# Patient Record
Sex: MALE | Race: WHITE | Employment: OTHER | URBAN - NONMETROPOLITAN AREA
[De-identification: names, ages, dates, MRNs, and addresses within clinical notes are randomized per-mention and may not be internally consistent; named-entity substitution may affect disease eponyms.]

---

## 2018-10-09 LAB — DIABETIC RETINOPATHY: NEGATIVE

## 2019-03-07 LAB
AVERAGE GLUCOSE: NORMAL
CHOLESTEROL, TOTAL: 188 MG/DL
CHOLESTEROL/HDL RATIO: 3.9
HBA1C MFR BLD: 9.1 %
HDLC SERPL-MCNC: 48 MG/DL (ref 35–70)
LDL CHOLESTEROL CALCULATED: 122 MG/DL (ref 0–160)
TRIGL SERPL-MCNC: 81 MG/DL
VLDLC SERPL CALC-MCNC: NORMAL MG/DL

## 2019-06-07 ENCOUNTER — OFFICE VISIT (OUTPATIENT)
Dept: PRIMARY CARE CLINIC | Age: 44
End: 2019-06-07
Payer: COMMERCIAL

## 2019-06-07 VITALS
WEIGHT: 315 LBS | SYSTOLIC BLOOD PRESSURE: 128 MMHG | BODY MASS INDEX: 38.36 KG/M2 | DIASTOLIC BLOOD PRESSURE: 84 MMHG | TEMPERATURE: 96.8 F | HEIGHT: 76 IN | HEART RATE: 114 BPM | OXYGEN SATURATION: 96 %

## 2019-06-07 DIAGNOSIS — G62.9 NEUROPATHY: ICD-10-CM

## 2019-06-07 DIAGNOSIS — I10 ESSENTIAL HYPERTENSION: ICD-10-CM

## 2019-06-07 DIAGNOSIS — J38.3 REACTIVE LESION OF THE VOCAL FOLD: ICD-10-CM

## 2019-06-07 DIAGNOSIS — E78.2 MIXED HYPERLIPIDEMIA: ICD-10-CM

## 2019-06-07 DIAGNOSIS — E11.65 UNCONTROLLED TYPE 2 DIABETES MELLITUS WITH HYPERGLYCEMIA (HCC): Primary | ICD-10-CM

## 2019-06-07 PROCEDURE — 99214 OFFICE O/P EST MOD 30 MIN: CPT | Performed by: FAMILY MEDICINE

## 2019-06-07 PROCEDURE — 96160 PT-FOCUSED HLTH RISK ASSMT: CPT | Performed by: FAMILY MEDICINE

## 2019-06-07 RX ORDER — HYDROCODONE BITARTRATE AND ACETAMINOPHEN 5; 325 MG/1; MG/1
1 TABLET ORAL 4 TIMES DAILY
Qty: 120 TABLET | Refills: 0 | Status: SHIPPED | OUTPATIENT
Start: 2019-07-07 | End: 2019-08-06 | Stop reason: SDUPTHER

## 2019-06-07 RX ORDER — LANCETS 28 GAUGE
EACH MISCELLANEOUS
Refills: 0 | COMMUNITY
Start: 2019-05-13 | End: 2019-11-06 | Stop reason: SDUPTHER

## 2019-06-07 RX ORDER — PEN NEEDLE, DIABETIC 29 G X1/2"
NEEDLE, DISPOSABLE MISCELLANEOUS
Refills: 0 | COMMUNITY
Start: 2019-04-04 | End: 2020-02-05

## 2019-06-07 RX ORDER — HYDROCODONE BITARTRATE AND ACETAMINOPHEN 5; 325 MG/1; MG/1
TABLET ORAL
COMMUNITY
Start: 2018-07-20 | End: 2019-06-07 | Stop reason: SDUPTHER

## 2019-06-07 RX ORDER — SERTRALINE HYDROCHLORIDE 100 MG/1
100 TABLET, FILM COATED ORAL DAILY
Refills: 0 | COMMUNITY
Start: 2019-05-13 | End: 2020-02-06 | Stop reason: ALTCHOICE

## 2019-06-07 RX ORDER — ARIPIPRAZOLE 10 MG/1
TABLET ORAL
Refills: 0 | COMMUNITY
Start: 2019-06-04 | End: 2020-02-11

## 2019-06-07 RX ORDER — GLUCOSAMINE HCL/CHONDROITIN SU 500-400 MG
CAPSULE ORAL
COMMUNITY
Start: 2019-01-07 | End: 2021-10-14

## 2019-06-07 RX ORDER — PEN NEEDLE, DIABETIC 29 G X1/2"
NEEDLE, DISPOSABLE MISCELLANEOUS
COMMUNITY
Start: 2018-07-02 | End: 2019-11-06 | Stop reason: SDUPTHER

## 2019-06-07 RX ORDER — ATORVASTATIN CALCIUM 20 MG/1
20 TABLET, FILM COATED ORAL
COMMUNITY
Start: 2018-03-21 | End: 2020-02-05

## 2019-06-07 RX ORDER — PRAZOSIN HYDROCHLORIDE 5 MG/1
CAPSULE ORAL
Refills: 0 | Status: ON HOLD | COMMUNITY
Start: 2019-06-04 | End: 2020-03-19 | Stop reason: HOSPADM

## 2019-06-07 RX ORDER — HYDROCODONE BITARTRATE AND ACETAMINOPHEN 5; 325 MG/1; MG/1
1 TABLET ORAL 4 TIMES DAILY
Qty: 120 TABLET | Refills: 0 | Status: SHIPPED | OUTPATIENT
Start: 2019-06-07 | End: 2019-06-07 | Stop reason: SDUPTHER

## 2019-06-07 RX ORDER — PEN NEEDLE, DIABETIC 32GX 5/32"
NEEDLE, DISPOSABLE MISCELLANEOUS
Refills: 0 | COMMUNITY
Start: 2019-05-13 | End: 2020-08-19 | Stop reason: SDUPTHER

## 2019-06-07 ASSESSMENT — PATIENT HEALTH QUESTIONNAIRE - PHQ9
4. FEELING TIRED OR HAVING LITTLE ENERGY: 3
SUM OF ALL RESPONSES TO PHQ QUESTIONS 1-9: 20
10. IF YOU CHECKED OFF ANY PROBLEMS, HOW DIFFICULT HAVE THESE PROBLEMS MADE IT FOR YOU TO DO YOUR WORK, TAKE CARE OF THINGS AT HOME, OR GET ALONG WITH OTHER PEOPLE: 2
7. TROUBLE CONCENTRATING ON THINGS, SUCH AS READING THE NEWSPAPER OR WATCHING TELEVISION: 3
1. LITTLE INTEREST OR PLEASURE IN DOING THINGS: 3
9. THOUGHTS THAT YOU WOULD BE BETTER OFF DEAD, OR OF HURTING YOURSELF: 1
8. MOVING OR SPEAKING SO SLOWLY THAT OTHER PEOPLE COULD HAVE NOTICED. OR THE OPPOSITE, BEING SO FIGETY OR RESTLESS THAT YOU HAVE BEEN MOVING AROUND A LOT MORE THAN USUAL: 0
SUM OF ALL RESPONSES TO PHQ9 QUESTIONS 1 & 2: 6
2. FEELING DOWN, DEPRESSED OR HOPELESS: 3
3. TROUBLE FALLING OR STAYING ASLEEP: 3
5. POOR APPETITE OR OVEREATING: 2
6. FEELING BAD ABOUT YOURSELF - OR THAT YOU ARE A FAILURE OR HAVE LET YOURSELF OR YOUR FAMILY DOWN: 2
SUM OF ALL RESPONSES TO PHQ QUESTIONS 1-9: 20

## 2019-06-07 NOTE — PROGRESS NOTES
2019     Hiren Harvey    : 1975 Sex: male   Age: 44 y.o. Chief Complaint   Patient presents with    Medication Refill       HPI: This 44y.o. -year-old male  presents today for evaluation and management of his  chronic medical problems. Current medication list reviewed. The patient is tolerating all medications well without adverse events or known side effects. The patient does understand the risk and benefits of the prescribed medications. The patient is up-to-date on all age-appropriate wellness issues. Agent presents today with a lesion on the parietal scalp region. Patient denies any recent evolution. Functional assessment of pain is positive for noted daily pain, however, the prescribed medication does allow the patient to perform ADLs. ROS:   Const: Denies changes in appetite, chills, fever, night sweats and weight loss. Eyes:  Denies discharge, a recent change in visual acuity, blurred vision and double vision. ENMT: Denies discharge of the ears, hearing loss, pain of the ears. Denies nasal or sinus symptoms other than stated above. Denies mouth or throat symptoms. CV:  Denies chest pain, dyspnea on exertion, orthopnea, palpitations and PND  Resp: Denies chest pain, cough, SOB and wheezing. GI: Denies abdominal pain, constipation, diarrhea, heartburn, indigestion, nausea and vomiting. : Denies dysuria, frequency, hematuria, nocturia and urgency. Musculo: Denies arthralgias and myalgia  Skin:  Denies lesions, pruritus and rash. Neuro: Denies dizziness, lightheadedness, numbness, tingling and weakness. Psych:  Denies anxiety and depression  Endocrine: Denies anxiety and depression. Hema/Lymph: Denies hematologic symptoms  Allergy/Immuno:  Denies allergic/immunologic symptoms.   Pertinent positives reviewed and noted      Current Outpatient Medications:     Alcohol Swabs 70 % PADS, , Disp: , Rfl:     blood glucose test strips (ASCENSIA AUTODISC VI;ONE TOUCH ULTRA TEST VI) strip, , Disp: , Rfl:     blood glucose test strips (EXACTECH TEST) strip, , Disp: , Rfl:     Insulin Pen Needle (PEN NEEDLES 29GX1/2\") 29G X 12MM MISC, , Disp: , Rfl:     ARIPiprazole (ABILIFY) 10 MG tablet, take 1 tablet by mouth once daily at bedtime, Disp: , Rfl: 0    BD INSULIN SYRINGE U/F 30G X 1/2\" 0.3 ML MISC, , Disp: , Rfl: 0    DULERA 100-5 MCG/ACT inhaler, , Disp: , Rfl: 0    prazosin (MINIPRESS) 5 MG capsule, take 1 capsule by mouth once daily at bedtime, Disp: , Rfl: 0    sertraline (ZOLOFT) 100 MG tablet, take 2 tablets by mouth once daily, Disp: , Rfl: 0    atorvastatin (LIPITOR) 40 MG tablet, Take by mouth, Disp: , Rfl:     insulin glargine (BASAGLAR KWIKPEN) 100 UNIT/ML injection pen, , Disp: , Rfl:     FREESTYLE LANCETS MISC, , Disp: , Rfl: 0    BD PEN NEEDLE PRAMOD U/F 32G X 4 MM MISC, , Disp: , Rfl: 0    Cholecalciferol (VITAMIN D3 ULTRA POTENCY) 72642 units TABS, Take 50,000 Int'l Units/day by mouth once a week, Disp: 30 tablet, Rfl: 2    HYDROcodone-acetaminophen (NORCO) 5-325 MG per tablet, Take 1 tablet by mouth 4 times daily for 30 days. , Disp: 120 tablet, Rfl: 0    lisinopril (PRINIVIL;ZESTRIL) 40 MG tablet, Take 40 mg by mouth daily, Disp: , Rfl:     amLODIPine (NORVASC) 5 MG tablet, Take 5 mg by mouth daily, Disp: , Rfl:     metFORMIN (GLUCOPHAGE) 850 MG tablet, Take 850 mg by mouth 3 times daily, Disp: , Rfl:     budesonide-formoterol (SYMBICORT) 160-4.5 MCG/ACT AERO, Inhale 2 puffs into the lungs 2 times daily, Disp: , Rfl:     albuterol sulfate  (90 BASE) MCG/ACT inhaler, Inhale 2 puffs into the lungs every 6 hours as needed for Wheezing, Disp: , Rfl:     insulin lispro (HUMALOG) 100 UNIT/ML injection vial, Inject into the skin 3 times daily (before meals) Indications: sliding scale, Disp: , Rfl:     Allergies   Allergen Reactions    Sulfa Antibiotics        Past Medical History:   Diagnosis Date    [facility]     Diabetes mellitus [facility]     Hyperlipidemia     Hypertension     Mediastinal mass     Neuropathy     Obesity     329 #     Social History     Socioeconomic History    Marital status: Single     Spouse name: Not on file    Number of children: Not on file    Years of education: Not on file    Highest education level: Not on file   Occupational History    Not on file   Social Needs    Financial resource strain: Not on file    Food insecurity:     Worry: Not on file     Inability: Not on file    Transportation needs:     Medical: Not on file     Non-medical: Not on file   Tobacco Use    Smoking status: Current Every Day Smoker     Packs/day: 1.00     Years: 25.00     Pack years: 25.00     Types: Cigarettes     Start date: 6/7/1994    Smokeless tobacco: Never Used   Substance and Sexual Activity    Alcohol use: No    Drug use: No    Sexual activity: Not on file   Lifestyle    Physical activity:     Days per week: Not on file     Minutes per session: Not on file    Stress: Not on file   Relationships    Social connections:     Talks on phone: Not on file     Gets together: Not on file     Attends Baptism service: Not on file     Active member of club or organization: Not on file     Attends meetings of clubs or organizations: Not on file     Relationship status: Not on file    Intimate partner violence:     Fear of current or ex partner: Not on file     Emotionally abused: Not on file     Physically abused: Not on file     Forced sexual activity: Not on file   Other Topics Concern    Not on file   Social History Narrative    Not on file     Past Surgical History:   Procedure Laterality Date    THORACOSCOPY  04/28/2017    resection of mediastinal mass    TONSILLECTOMY        Family History   Problem Relation Age of Onset    Heart Disease Mother     High Blood Pressure Mother     Diabetes Father     Heart Disease Father     High Blood Pressure Father     High Cholesterol Father     Cancer

## 2019-08-06 ENCOUNTER — OFFICE VISIT (OUTPATIENT)
Dept: PRIMARY CARE CLINIC | Age: 44
End: 2019-08-06
Payer: COMMERCIAL

## 2019-08-06 ENCOUNTER — HOSPITAL ENCOUNTER (OUTPATIENT)
Age: 44
Discharge: HOME | End: 2019-08-08
Payer: COMMERCIAL

## 2019-08-06 VITALS
SYSTOLIC BLOOD PRESSURE: 136 MMHG | HEIGHT: 76 IN | DIASTOLIC BLOOD PRESSURE: 82 MMHG | BODY MASS INDEX: 38.36 KG/M2 | HEART RATE: 108 BPM | WEIGHT: 315 LBS | RESPIRATION RATE: 16 BRPM | OXYGEN SATURATION: 97 %

## 2019-08-06 DIAGNOSIS — E78.2 MIXED HYPERLIPIDEMIA: ICD-10-CM

## 2019-08-06 DIAGNOSIS — G62.9 NEUROPATHY: ICD-10-CM

## 2019-08-06 DIAGNOSIS — I65.23 BILATERAL CAROTID ARTERY STENOSIS: ICD-10-CM

## 2019-08-06 DIAGNOSIS — E11.65 UNCONTROLLED TYPE 2 DIABETES MELLITUS WITH HYPERGLYCEMIA (HCC): Primary | ICD-10-CM

## 2019-08-06 DIAGNOSIS — E11.65 UNCONTROLLED TYPE 2 DIABETES MELLITUS WITH HYPERGLYCEMIA (HCC): ICD-10-CM

## 2019-08-06 DIAGNOSIS — I10 ESSENTIAL HYPERTENSION: ICD-10-CM

## 2019-08-06 LAB
ALBUMIN SERPL-MCNC: 4.2 G/DL (ref 3.5–5.2)
ALP BLD-CCNC: 91 U/L (ref 40–129)
ALT SERPL-CCNC: 13 U/L (ref 0–40)
ANION GAP SERPL CALCULATED.3IONS-SCNC: 12 MMOL/L (ref 7–16)
AST SERPL-CCNC: 10 U/L (ref 0–39)
BILIRUB SERPL-MCNC: 0.3 MG/DL (ref 0–1.2)
BUN BLDV-MCNC: 12 MG/DL (ref 6–20)
CALCIUM SERPL-MCNC: 9.7 MG/DL (ref 8.6–10.2)
CHLORIDE BLD-SCNC: 99 MMOL/L (ref 98–107)
CO2: 25 MMOL/L (ref 22–29)
CREAT SERPL-MCNC: 0.6 MG/DL (ref 0.7–1.2)
GFR AFRICAN AMERICAN: >60
GFR NON-AFRICAN AMERICAN: >60 ML/MIN/1.73
GLUCOSE BLD-MCNC: 178 MG/DL (ref 74–99)
HBA1C MFR BLD: 9.5 % (ref 4–5.6)
MICROALBUMIN UR-MCNC: 21.2 MG/L
POTASSIUM SERPL-SCNC: 4.1 MMOL/L (ref 3.5–5)
SODIUM BLD-SCNC: 136 MMOL/L (ref 132–146)
TOTAL PROTEIN: 7.8 G/DL (ref 6.4–8.3)

## 2019-08-06 PROCEDURE — 99214 OFFICE O/P EST MOD 30 MIN: CPT | Performed by: FAMILY MEDICINE

## 2019-08-06 PROCEDURE — 36415 COLL VENOUS BLD VENIPUNCTURE: CPT

## 2019-08-06 PROCEDURE — 83036 HEMOGLOBIN GLYCOSYLATED A1C: CPT

## 2019-08-06 PROCEDURE — 82044 UR ALBUMIN SEMIQUANTITATIVE: CPT

## 2019-08-06 PROCEDURE — 93880 EXTRACRANIAL BILAT STUDY: CPT | Performed by: FAMILY MEDICINE

## 2019-08-06 PROCEDURE — 80053 COMPREHEN METABOLIC PANEL: CPT

## 2019-08-06 RX ORDER — HYDROCODONE BITARTRATE AND ACETAMINOPHEN 5; 325 MG/1; MG/1
1 TABLET ORAL 4 TIMES DAILY
Qty: 120 TABLET | Refills: 0 | Status: SHIPPED | OUTPATIENT
Start: 2019-08-06 | End: 2019-09-05

## 2019-09-06 ENCOUNTER — HOSPITAL ENCOUNTER (OUTPATIENT)
Age: 44
Discharge: HOME | End: 2019-09-08
Payer: COMMERCIAL

## 2019-09-06 ENCOUNTER — OFFICE VISIT (OUTPATIENT)
Dept: PRIMARY CARE CLINIC | Age: 44
End: 2019-09-06
Payer: COMMERCIAL

## 2019-09-06 VITALS
HEIGHT: 76 IN | WEIGHT: 315 LBS | BODY MASS INDEX: 38.36 KG/M2 | DIASTOLIC BLOOD PRESSURE: 70 MMHG | TEMPERATURE: 97.7 F | RESPIRATION RATE: 16 BRPM | SYSTOLIC BLOOD PRESSURE: 128 MMHG | HEART RATE: 110 BPM | OXYGEN SATURATION: 97 %

## 2019-09-06 DIAGNOSIS — M25.551 BILATERAL HIP PAIN: ICD-10-CM

## 2019-09-06 DIAGNOSIS — M54.5 CHRONIC LOW BACK PAIN, UNSPECIFIED BACK PAIN LATERALITY, WITH SCIATICA PRESENCE UNSPECIFIED: ICD-10-CM

## 2019-09-06 DIAGNOSIS — M25.552 BILATERAL HIP PAIN: ICD-10-CM

## 2019-09-06 DIAGNOSIS — G62.9 NEUROPATHY: Primary | ICD-10-CM

## 2019-09-06 DIAGNOSIS — G62.9 NEUROPATHY: ICD-10-CM

## 2019-09-06 DIAGNOSIS — G89.29 CHRONIC LOW BACK PAIN, UNSPECIFIED BACK PAIN LATERALITY, WITH SCIATICA PRESENCE UNSPECIFIED: ICD-10-CM

## 2019-09-06 LAB
AMPHETAMINE SCREEN, URINE: NOT DETECTED
BARBITURATE SCREEN URINE: NOT DETECTED
BENZODIAZEPINE SCREEN, URINE: NOT DETECTED
CANNABINOID SCREEN URINE: NOT DETECTED
COCAINE METABOLITE SCREEN URINE: NOT DETECTED
Lab: NORMAL
METHADONE SCREEN, URINE: NOT DETECTED
OPIATE SCREEN URINE: NOT DETECTED
PHENCYCLIDINE SCREEN URINE: NOT DETECTED
PROPOXYPHENE SCREEN: NOT DETECTED

## 2019-09-06 PROCEDURE — 99213 OFFICE O/P EST LOW 20 MIN: CPT | Performed by: FAMILY MEDICINE

## 2019-09-06 PROCEDURE — 80307 DRUG TEST PRSMV CHEM ANLYZR: CPT

## 2019-09-06 RX ORDER — HYDROCODONE BITARTRATE AND ACETAMINOPHEN 5; 325 MG/1; MG/1
1 TABLET ORAL EVERY 6 HOURS PRN
COMMUNITY
End: 2019-09-10 | Stop reason: SDUPTHER

## 2019-09-10 DIAGNOSIS — G62.9 NEUROPATHY: Primary | ICD-10-CM

## 2019-09-10 RX ORDER — HYDROCODONE BITARTRATE AND ACETAMINOPHEN 5; 325 MG/1; MG/1
1 TABLET ORAL EVERY 8 HOURS PRN
Qty: 42 TABLET | Refills: 0 | Status: SHIPPED | OUTPATIENT
Start: 2019-09-10 | End: 2019-09-24

## 2019-11-06 ENCOUNTER — OFFICE VISIT (OUTPATIENT)
Dept: PRIMARY CARE CLINIC | Age: 44
End: 2019-11-06
Payer: COMMERCIAL

## 2019-11-06 ENCOUNTER — TELEPHONE (OUTPATIENT)
Dept: ADMINISTRATIVE | Age: 44
End: 2019-11-06

## 2019-11-06 VITALS
OXYGEN SATURATION: 94 % | SYSTOLIC BLOOD PRESSURE: 132 MMHG | HEIGHT: 76 IN | HEART RATE: 110 BPM | RESPIRATION RATE: 16 BRPM | DIASTOLIC BLOOD PRESSURE: 72 MMHG | BODY MASS INDEX: 38.36 KG/M2 | TEMPERATURE: 98.5 F | WEIGHT: 315 LBS

## 2019-11-06 DIAGNOSIS — R00.2 HEART PALPITATIONS: ICD-10-CM

## 2019-11-06 DIAGNOSIS — E11.65 UNCONTROLLED TYPE 2 DIABETES MELLITUS WITH HYPERGLYCEMIA (HCC): ICD-10-CM

## 2019-11-06 DIAGNOSIS — E78.2 MIXED HYPERLIPIDEMIA: ICD-10-CM

## 2019-11-06 DIAGNOSIS — I10 ESSENTIAL HYPERTENSION: Primary | ICD-10-CM

## 2019-11-06 LAB — HBA1C MFR BLD: 12 %

## 2019-11-06 PROCEDURE — G8599 NO ASA/ANTIPLAT THER USE RNG: HCPCS | Performed by: FAMILY MEDICINE

## 2019-11-06 PROCEDURE — G8427 DOCREV CUR MEDS BY ELIG CLIN: HCPCS | Performed by: FAMILY MEDICINE

## 2019-11-06 PROCEDURE — 3046F HEMOGLOBIN A1C LEVEL >9.0%: CPT | Performed by: FAMILY MEDICINE

## 2019-11-06 PROCEDURE — 83036 HEMOGLOBIN GLYCOSYLATED A1C: CPT | Performed by: FAMILY MEDICINE

## 2019-11-06 PROCEDURE — G8417 CALC BMI ABV UP PARAM F/U: HCPCS | Performed by: FAMILY MEDICINE

## 2019-11-06 PROCEDURE — 2022F DILAT RTA XM EVC RTNOPTHY: CPT | Performed by: FAMILY MEDICINE

## 2019-11-06 PROCEDURE — G8484 FLU IMMUNIZE NO ADMIN: HCPCS | Performed by: FAMILY MEDICINE

## 2019-11-06 PROCEDURE — 99214 OFFICE O/P EST MOD 30 MIN: CPT | Performed by: FAMILY MEDICINE

## 2019-11-06 PROCEDURE — 4004F PT TOBACCO SCREEN RCVD TLK: CPT | Performed by: FAMILY MEDICINE

## 2019-11-06 RX ORDER — LISINOPRIL 40 MG/1
40 TABLET ORAL DAILY
Qty: 30 TABLET | Refills: 3 | Status: SHIPPED
Start: 2019-11-06 | End: 2020-02-05 | Stop reason: SDUPTHER

## 2019-11-06 RX ORDER — BUDESONIDE AND FORMOTEROL FUMARATE DIHYDRATE 160; 4.5 UG/1; UG/1
2 AEROSOL RESPIRATORY (INHALATION) 2 TIMES DAILY
Qty: 1 INHALER | Refills: 3 | Status: CANCELLED | OUTPATIENT
Start: 2019-11-06

## 2019-11-06 RX ORDER — LANCETS 28 GAUGE
EACH MISCELLANEOUS
Qty: 100 EACH | Refills: 3 | Status: SHIPPED
Start: 2019-11-06 | End: 2020-02-05 | Stop reason: SDUPTHER

## 2019-11-06 RX ORDER — PEN NEEDLE, DIABETIC 29 G X1/2"
NEEDLE, DISPOSABLE MISCELLANEOUS
Qty: 100 EACH | Refills: 5 | Status: SHIPPED
Start: 2019-11-06 | End: 2021-03-17 | Stop reason: SDUPTHER

## 2019-11-11 RX ORDER — ATORVASTATIN CALCIUM 20 MG/1
20 TABLET, FILM COATED ORAL DAILY
Qty: 30 TABLET | Refills: 3 | Status: SHIPPED
Start: 2019-11-11 | End: 2020-02-05 | Stop reason: SDUPTHER

## 2019-11-11 RX ORDER — ALBUTEROL SULFATE 90 UG/1
2 AEROSOL, METERED RESPIRATORY (INHALATION) EVERY 6 HOURS PRN
Qty: 1 INHALER | Refills: 3 | Status: SHIPPED
Start: 2019-11-11 | End: 2020-02-05 | Stop reason: SDUPTHER

## 2019-11-20 ENCOUNTER — OFFICE VISIT (OUTPATIENT)
Dept: CARDIOLOGY CLINIC | Age: 44
End: 2019-11-20
Payer: COMMERCIAL

## 2019-11-20 VITALS
BODY MASS INDEX: 38.36 KG/M2 | WEIGHT: 315 LBS | RESPIRATION RATE: 18 BRPM | DIASTOLIC BLOOD PRESSURE: 76 MMHG | SYSTOLIC BLOOD PRESSURE: 112 MMHG | HEART RATE: 98 BPM | HEIGHT: 76 IN

## 2019-11-20 DIAGNOSIS — R00.2 PALPITATIONS: Primary | ICD-10-CM

## 2019-11-20 DIAGNOSIS — R07.9 CHEST PAIN, UNSPECIFIED TYPE: ICD-10-CM

## 2019-11-20 DIAGNOSIS — R06.02 SHORTNESS OF BREATH: ICD-10-CM

## 2019-11-20 PROCEDURE — G8427 DOCREV CUR MEDS BY ELIG CLIN: HCPCS | Performed by: INTERNAL MEDICINE

## 2019-11-20 PROCEDURE — 93000 ELECTROCARDIOGRAM COMPLETE: CPT | Performed by: INTERNAL MEDICINE

## 2019-11-20 PROCEDURE — G8417 CALC BMI ABV UP PARAM F/U: HCPCS | Performed by: INTERNAL MEDICINE

## 2019-11-20 PROCEDURE — G8484 FLU IMMUNIZE NO ADMIN: HCPCS | Performed by: INTERNAL MEDICINE

## 2019-11-20 PROCEDURE — 99242 OFF/OP CONSLTJ NEW/EST SF 20: CPT | Performed by: INTERNAL MEDICINE

## 2019-11-20 RX ORDER — HYDROCODONE BITARTRATE AND ACETAMINOPHEN 10; 325 MG/1; MG/1
1 TABLET ORAL EVERY 6 HOURS PRN
Status: ON HOLD | COMMUNITY
End: 2020-03-19 | Stop reason: HOSPADM

## 2019-12-13 ENCOUNTER — TELEPHONE (OUTPATIENT)
Dept: PRIMARY CARE CLINIC | Age: 44
End: 2019-12-13

## 2019-12-20 ENCOUNTER — HOSPITAL ENCOUNTER (OUTPATIENT)
Dept: CARDIOLOGY | Age: 44
Discharge: HOME | End: 2019-12-20
Payer: COMMERCIAL

## 2019-12-20 VITALS
HEART RATE: 97 BPM | BODY MASS INDEX: 38.36 KG/M2 | HEIGHT: 76 IN | DIASTOLIC BLOOD PRESSURE: 68 MMHG | SYSTOLIC BLOOD PRESSURE: 112 MMHG | WEIGHT: 315 LBS

## 2019-12-20 DIAGNOSIS — R06.02 SHORTNESS OF BREATH: ICD-10-CM

## 2019-12-20 DIAGNOSIS — R07.9 CHEST PAIN, UNSPECIFIED TYPE: ICD-10-CM

## 2019-12-20 LAB
LV EF: 60 %
LVEF MODALITY: NORMAL

## 2019-12-20 PROCEDURE — 93306 TTE W/DOPPLER COMPLETE: CPT

## 2019-12-20 PROCEDURE — A9500 TC99M SESTAMIBI: HCPCS | Performed by: INTERNAL MEDICINE

## 2019-12-20 PROCEDURE — 6360000002 HC RX W HCPCS: Performed by: INTERNAL MEDICINE

## 2019-12-20 PROCEDURE — 3430000000 HC RX DIAGNOSTIC RADIOPHARMACEUTICAL: Performed by: INTERNAL MEDICINE

## 2019-12-20 PROCEDURE — 2580000003 HC RX 258: Performed by: INTERNAL MEDICINE

## 2019-12-20 PROCEDURE — 93017 CV STRESS TEST TRACING ONLY: CPT

## 2019-12-20 PROCEDURE — 78452 HT MUSCLE IMAGE SPECT MULT: CPT

## 2019-12-20 RX ORDER — SODIUM CHLORIDE 0.9 % (FLUSH) 0.9 %
10 SYRINGE (ML) INJECTION PRN
Status: DISCONTINUED | OUTPATIENT
Start: 2019-12-20 | End: 2019-12-21 | Stop reason: HOSPADM

## 2019-12-20 RX ADMIN — Medication 10 ML: at 07:51

## 2019-12-20 RX ADMIN — Medication 10 ML: at 06:38

## 2019-12-20 RX ADMIN — Medication 28.8 MILLICURIE: at 07:51

## 2019-12-20 RX ADMIN — Medication 9 MILLICURIE: at 06:38

## 2019-12-20 RX ADMIN — Medication 10 ML: at 07:52

## 2019-12-20 RX ADMIN — REGADENOSON 0.4 MG: 0.08 INJECTION, SOLUTION INTRAVENOUS at 07:51

## 2019-12-23 ENCOUNTER — TELEPHONE (OUTPATIENT)
Dept: CARDIOLOGY CLINIC | Age: 44
End: 2019-12-23

## 2019-12-23 DIAGNOSIS — R07.9 CHEST PAIN, UNSPECIFIED TYPE: Primary | ICD-10-CM

## 2020-01-03 ENCOUNTER — OFFICE VISIT (OUTPATIENT)
Dept: FAMILY MEDICINE CLINIC | Age: 45
End: 2020-01-03
Payer: COMMERCIAL

## 2020-01-03 VITALS
TEMPERATURE: 98.7 F | DIASTOLIC BLOOD PRESSURE: 80 MMHG | RESPIRATION RATE: 18 BRPM | HEART RATE: 87 BPM | WEIGHT: 315 LBS | OXYGEN SATURATION: 96 % | BODY MASS INDEX: 38.36 KG/M2 | SYSTOLIC BLOOD PRESSURE: 124 MMHG | HEIGHT: 76 IN

## 2020-01-03 PROCEDURE — G8417 CALC BMI ABV UP PARAM F/U: HCPCS | Performed by: PHYSICIAN ASSISTANT

## 2020-01-03 PROCEDURE — G8427 DOCREV CUR MEDS BY ELIG CLIN: HCPCS | Performed by: PHYSICIAN ASSISTANT

## 2020-01-03 PROCEDURE — 99213 OFFICE O/P EST LOW 20 MIN: CPT | Performed by: PHYSICIAN ASSISTANT

## 2020-01-03 PROCEDURE — 4004F PT TOBACCO SCREEN RCVD TLK: CPT | Performed by: PHYSICIAN ASSISTANT

## 2020-01-03 PROCEDURE — G8484 FLU IMMUNIZE NO ADMIN: HCPCS | Performed by: PHYSICIAN ASSISTANT

## 2020-01-03 RX ORDER — PAROXETINE HYDROCHLORIDE 40 MG/1
TABLET, FILM COATED ORAL
Refills: 0 | COMMUNITY
Start: 2019-12-04 | End: 2020-02-06 | Stop reason: ALTCHOICE

## 2020-01-03 RX ORDER — DOXYCYCLINE 100 MG/1
100 CAPSULE ORAL 2 TIMES DAILY
Qty: 20 CAPSULE | Refills: 0 | Status: SHIPPED | OUTPATIENT
Start: 2020-01-03 | End: 2020-01-13

## 2020-01-03 NOTE — PROGRESS NOTES
1/3/2020   [address]  [facility] [address]  [address]  : 1975  Age: 38 y.o. Sex: male      Subjective:  Chief Complaint   Patient presents with    Otalgia     ear pain for 6 days        HPI: Pt presents with right ear pain for the past 6  days. The patient denies any trauma or injury to the ear. Denies any discharge from the ear. Patient states his right ear started hurting but then shortly after he developed right-sided sinus pressure drainage runny nose and what feels like a swollen lymph node on the right side of his throat. Patient denies fever, chills. Has been able to eat and drink without difficulty. Denies any nausea vomiting or diarrhea. Denies any chest pain or shortness of breath. Has not been taking any medication for symptoms. Patient denies other symptoms and presents for evaluation    ROS:  Positive and pertinent negatives as per HPI. All other systems are reviewed and negative.        Current Outpatient Medications:     PARoxetine (PAXIL) 40 MG tablet, TAKE 1/2 TABLET BY MOUTH ONCE DAILY X 7 DAYS THEN 1 TAB  DAILY THEREAFTER, Disp: , Rfl: 0    doxycycline monohydrate (MONODOX) 100 MG capsule, Take 1 capsule by mouth 2 times daily for 10 days, Disp: 20 capsule, Rfl: 0    HYDROcodone-acetaminophen (NORCO)  MG per tablet, Take 1 tablet by mouth every 6 hours as needed for Pain., Disp: , Rfl:     albuterol sulfate  (90 Base) MCG/ACT inhaler, Inhale 2 puffs into the lungs every 6 hours as needed for Wheezing, Disp: 1 Inhaler, Rfl: 3    atorvastatin (LIPITOR) 20 MG tablet, Take 1 tablet by mouth daily, Disp: 30 tablet, Rfl: 3    Cholecalciferol (VITAMIN D3 ULTRA POTENCY) 1.25 MG (91381 UT) TABS, Take 50,000 Int'l Units/day by mouth once a week, Disp: 30 tablet, Rfl: 2    metFORMIN (GLUCOPHAGE) 850 MG tablet, Take 1 tablet by mouth 3 times daily, Disp: 30 tablet, Rfl: 3    blood glucose test strips (EXACTECH TEST) strip, Use AS DIRECTED, Disp: 100 each, Rfl: 5    DULERA 100-5 MCG/ACT inhaler, Inhale 2 puffs into the lungs 2 times daily, Disp: 1 Inhaler, Rfl: 3    FREESTYLE LANCETS MISC, USE AS DIRECTED, Disp: 100 each, Rfl: 3    insulin glargine (BASAGLAR KWIKPEN) 100 UNIT/ML injection pen, Inject 110 Units into the skin nightly, Disp: 5 pen, Rfl: 3    Insulin Pen Needle (PEN NEEDLES 29GX1/2\") 29G X 12MM MISC, USE AS DIRECTED, Disp: 100 each, Rfl: 5    lisinopril (PRINIVIL;ZESTRIL) 40 MG tablet, Take 1 tablet by mouth daily, Disp: 30 tablet, Rfl: 3    insulin aspart (NOVOLOG) 100 UNIT/ML injection vial, Use tid with sliding scale, Disp: 5 Package, Rfl: 5    Alcohol Swabs 70 % PADS, , Disp: , Rfl:     blood glucose test strips (ASCENSIA AUTODISC VI;ONE TOUCH ULTRA TEST VI) strip, , Disp: , Rfl:     ARIPiprazole (ABILIFY) 10 MG tablet, take 1 tablet by mouth once daily at bedtime, Disp: , Rfl: 0    BD INSULIN SYRINGE U/F 30G X 1/2\" 0.3 ML MISC, , Disp: , Rfl: 0    prazosin (MINIPRESS) 5 MG capsule, take 1 capsule by mouth once daily at bedtime, Disp: , Rfl: 0    sertraline (ZOLOFT) 100 MG tablet, 100 mg daily , Disp: , Rfl: 0    atorvastatin (LIPITOR) 40 MG tablet, Take by mouth, Disp: , Rfl:     BD PEN NEEDLE PRAMOD U/F 32G X 4 MM MISC, , Disp: , Rfl: 0    amLODIPine (NORVASC) 5 MG tablet, Take 5 mg by mouth daily, Disp: , Rfl:    Allergies   Allergen Reactions    Sulfa Antibiotics      Told as a child        Objective:  Vitals:    01/03/20 1216   BP: 124/80   Pulse: 87   Resp: 18   Temp: 98.7 °F (37.1 °C)   TempSrc: Temporal   SpO2: 96%   Weight: (!) 324 lb (147 kg)   Height: 6' 4\" (1.93 m)        Exam:  Const: Appears healthy and well developed. Vital reviewed as per triage. No acute distress. Head/Face: Normocephalic, atraumatic. Facies is symmetric. Eyes: Pupils equal, round and reactive to light. ENMT: Left external canal is without inflammation or occlusion.   Right external canal is without inflammation or

## 2020-01-07 NOTE — PROGRESS NOTES
[facility]  Amilcar Nieves. Kunal Cronin M.D. Mima King M.D.  Nancie Peralta. Bianka Astudillo M.D. Jose Avery M.D. Aliyah Aguilar M.D. MD Tito Merritt   1975  Chelsea Cuellar MD      This 52-year-old man is seen for outpatient cardiac follow-up today. He had clinical evaluation 11/20/2019. He had a variety of symptoms. An echocardiogram showed a normal left ventricular ejection fraction without dilatation. There was moderate mitral regurgitation. A Lexiscan perfusion study showed fixed defects but no ischemia. The ejection fraction was 50%. Coronary CTA was recommended and approved but not scheduled. He has continued to have frequent palpitations which are anxiety producing for him. Unfortunately he continues to smoke cigarettes      Medical History:  1. COPD. 2. Diabetes mellitus. 3. Hyperlipidemia. Total cholesterol 188, HDL 48,  (03/2019). 4. Hypertension. 5. No history of MI, CHF, CVA. 6. Neuropathy. 7. Obesity. BMI 39- 01/2020.  8. Resection of a left anterior mediastinal mass, 04/28/2017 (Dr. Marlene Oliver). 9. Right carotid stenosis by ultrasound [facility], 2017. 10. Cigarette abuse. 11. Lexiscan stress MPS, 12/20/2019. Fixed inferolateral defect.  Mild inferior hypokinesis. EF 50%.  No reversible defect. 12. Echo, 12/20/2019. No LV dilatation.  Mild good concentric LVH.  Normal EF.  No WMA.   Mild to moderate central mitral regurgitation jet.       Review of Systems:  Constitutional: negative for fever and chills  Respiratory: negative for cough and hemoptysis  Cardiovascular:   Gastrointestinal: negative for abdominal pain, diarrhea, nausea and vomiting  Genitourinary:negative for dysuria and hematuria  Derm: negative for rash and skin lesion(s)  Neurological: negative for seizures and tremors  Endocrine: negative for diabetic symptoms including polydipsia and polyuria  Musculoskeletal: DIRECTED, Disp: 100 each, Rfl: 5    DULERA 100-5 MCG/ACT inhaler, Inhale 2 puffs into the lungs 2 times daily, Disp: 1 Inhaler, Rfl: 3    FREESTYLE LANCETS MISC, USE AS DIRECTED, Disp: 100 each, Rfl: 3    insulin glargine (BASAGLAR KWIKPEN) 100 UNIT/ML injection pen, Inject 110 Units into the skin nightly, Disp: 5 pen, Rfl: 3    Insulin Pen Needle (PEN NEEDLES 29GX1/2\") 29G X 12MM MISC, USE AS DIRECTED, Disp: 100 each, Rfl: 5    lisinopril (PRINIVIL;ZESTRIL) 40 MG tablet, Take 1 tablet by mouth daily, Disp: 30 tablet, Rfl: 3    insulin aspart (NOVOLOG) 100 UNIT/ML injection vial, Use tid with sliding scale, Disp: 5 Package, Rfl: 5    Alcohol Swabs 70 % PADS, , Disp: , Rfl:     blood glucose test strips (ASCENSIA AUTODISC VI;ONE TOUCH ULTRA TEST VI) strip, , Disp: , Rfl:     ARIPiprazole (ABILIFY) 10 MG tablet, take 1 tablet by mouth once daily at bedtime, Disp: , Rfl: 0    BD INSULIN SYRINGE U/F 30G X 1/2\" 0.3 ML MISC, , Disp: , Rfl: 0    prazosin (MINIPRESS) 5 MG capsule, take 1 capsule by mouth once daily at bedtime, Disp: , Rfl: 0    sertraline (ZOLOFT) 100 MG tablet, 100 mg daily , Disp: , Rfl: 0    atorvastatin (LIPITOR) 20 MG tablet, Take 20 mg by mouth , Disp: , Rfl:     BD PEN NEEDLE PRAMOD U/F 32G X 4 MM MISC, , Disp: , Rfl: 0    amLODIPine (NORVASC) 5 MG tablet, Take 5 mg by mouth daily, Disp: , Rfl:       Note: This report was completed utilizing computer voice recognition software. Every effort has been made to ensure accuracy, however; inadvertent computerized transcription errors may be present. Paris Rocha.  Haley Cantu MD

## 2020-01-09 ENCOUNTER — OFFICE VISIT (OUTPATIENT)
Dept: CARDIOLOGY CLINIC | Age: 45
End: 2020-01-09
Payer: COMMERCIAL

## 2020-01-09 VITALS
SYSTOLIC BLOOD PRESSURE: 134 MMHG | RESPIRATION RATE: 16 BRPM | HEART RATE: 94 BPM | DIASTOLIC BLOOD PRESSURE: 78 MMHG | BODY MASS INDEX: 38.36 KG/M2 | WEIGHT: 315 LBS | HEIGHT: 76 IN

## 2020-01-09 PROCEDURE — G8484 FLU IMMUNIZE NO ADMIN: HCPCS | Performed by: INTERNAL MEDICINE

## 2020-01-09 PROCEDURE — G8417 CALC BMI ABV UP PARAM F/U: HCPCS | Performed by: INTERNAL MEDICINE

## 2020-01-09 PROCEDURE — G8427 DOCREV CUR MEDS BY ELIG CLIN: HCPCS | Performed by: INTERNAL MEDICINE

## 2020-01-09 PROCEDURE — 4004F PT TOBACCO SCREEN RCVD TLK: CPT | Performed by: INTERNAL MEDICINE

## 2020-01-09 PROCEDURE — 99213 OFFICE O/P EST LOW 20 MIN: CPT | Performed by: INTERNAL MEDICINE

## 2020-01-09 PROCEDURE — 93000 ELECTROCARDIOGRAM COMPLETE: CPT | Performed by: INTERNAL MEDICINE

## 2020-01-10 ENCOUNTER — HOSPITAL ENCOUNTER (OUTPATIENT)
Age: 45
Discharge: HOME | End: 2020-01-12
Payer: COMMERCIAL

## 2020-01-10 ENCOUNTER — NURSE ONLY (OUTPATIENT)
Dept: CARDIOLOGY CLINIC | Age: 45
End: 2020-01-10

## 2020-01-10 LAB
ANION GAP SERPL CALCULATED.3IONS-SCNC: 17 MMOL/L (ref 7–16)
BUN BLDV-MCNC: 23 MG/DL (ref 6–20)
CALCIUM SERPL-MCNC: 9.6 MG/DL (ref 8.6–10.2)
CHLORIDE BLD-SCNC: 98 MMOL/L (ref 98–107)
CO2: 19 MMOL/L (ref 22–29)
CREAT SERPL-MCNC: 0.5 MG/DL (ref 0.7–1.2)
GFR AFRICAN AMERICAN: >60
GFR NON-AFRICAN AMERICAN: >60 ML/MIN/1.73
GLUCOSE BLD-MCNC: 231 MG/DL (ref 74–99)
POTASSIUM SERPL-SCNC: 4.4 MMOL/L (ref 3.5–5)
SODIUM BLD-SCNC: 134 MMOL/L (ref 132–146)

## 2020-01-10 PROCEDURE — 80048 BASIC METABOLIC PNL TOTAL CA: CPT

## 2020-01-31 ENCOUNTER — HOSPITAL ENCOUNTER (OUTPATIENT)
Dept: CT IMAGING | Age: 45
Discharge: HOME | End: 2020-02-02
Payer: COMMERCIAL

## 2020-01-31 VITALS
HEIGHT: 76 IN | WEIGHT: 315 LBS | BODY MASS INDEX: 38.36 KG/M2 | HEART RATE: 71 BPM | DIASTOLIC BLOOD PRESSURE: 67 MMHG | RESPIRATION RATE: 16 BRPM | OXYGEN SATURATION: 95 % | SYSTOLIC BLOOD PRESSURE: 129 MMHG

## 2020-01-31 PROCEDURE — 6360000004 HC RX CONTRAST MEDICATION: Performed by: RADIOLOGY

## 2020-01-31 PROCEDURE — 2580000003 HC RX 258: Performed by: RADIOLOGY

## 2020-01-31 PROCEDURE — 6370000000 HC RX 637 (ALT 250 FOR IP): Performed by: RADIOLOGY

## 2020-01-31 PROCEDURE — 75574 CT ANGIO HRT W/3D IMAGE: CPT

## 2020-01-31 RX ORDER — 0.9 % SODIUM CHLORIDE 0.9 %
1000 INTRAVENOUS SOLUTION INTRAVENOUS ONCE
Status: COMPLETED | OUTPATIENT
Start: 2020-01-31 | End: 2020-01-31

## 2020-01-31 RX ORDER — NITROGLYCERIN 0.4 MG/1
0.4 TABLET SUBLINGUAL ONCE
Status: COMPLETED | OUTPATIENT
Start: 2020-01-31 | End: 2020-01-31

## 2020-01-31 RX ORDER — METOPROLOL TARTRATE 50 MG/1
100 TABLET, FILM COATED ORAL
Status: COMPLETED | OUTPATIENT
Start: 2020-01-31 | End: 2020-01-31

## 2020-01-31 RX ORDER — HYDROCODONE BITARTRATE AND ACETAMINOPHEN 10; 325 MG/1; MG/1
0.5 TABLET ORAL ONCE
Status: COMPLETED | OUTPATIENT
Start: 2020-01-31 | End: 2020-01-31

## 2020-01-31 RX ORDER — METOPROLOL TARTRATE 50 MG/1
100 TABLET, FILM COATED ORAL ONCE
Status: COMPLETED | OUTPATIENT
Start: 2020-01-31 | End: 2020-01-31

## 2020-01-31 RX ADMIN — SODIUM CHLORIDE 1000 ML: 9 INJECTION, SOLUTION INTRAVENOUS at 09:22

## 2020-01-31 RX ADMIN — METOPROLOL TARTRATE 100 MG: 50 TABLET, FILM COATED ORAL at 10:16

## 2020-01-31 RX ADMIN — NITROGLYCERIN 0.4 MG: 0.4 TABLET, ORALLY DISINTEGRATING SUBLINGUAL at 11:46

## 2020-01-31 RX ADMIN — METOPROLOL TARTRATE 100 MG: 50 TABLET, FILM COATED ORAL at 09:22

## 2020-01-31 RX ADMIN — IOPAMIDOL 80 ML: 755 INJECTION, SOLUTION INTRAVENOUS at 11:30

## 2020-01-31 RX ADMIN — HYDROCODONE BITARTRATE AND ACETAMINOPHEN 0.5 TABLET: 10; 325 TABLET ORAL at 11:07

## 2020-01-31 ASSESSMENT — PAIN SCALES - GENERAL: PAINLEVEL_OUTOF10: 6

## 2020-01-31 ASSESSMENT — PAIN - FUNCTIONAL ASSESSMENT: PAIN_FUNCTIONAL_ASSESSMENT: 0-10

## 2020-02-05 ENCOUNTER — OFFICE VISIT (OUTPATIENT)
Dept: PRIMARY CARE CLINIC | Age: 45
End: 2020-02-05
Payer: COMMERCIAL

## 2020-02-05 ENCOUNTER — HOSPITAL ENCOUNTER (OUTPATIENT)
Age: 45
Discharge: HOME | End: 2020-02-07
Payer: COMMERCIAL

## 2020-02-05 VITALS
BODY MASS INDEX: 38.36 KG/M2 | OXYGEN SATURATION: 98 % | HEART RATE: 112 BPM | HEIGHT: 76 IN | DIASTOLIC BLOOD PRESSURE: 80 MMHG | SYSTOLIC BLOOD PRESSURE: 140 MMHG | TEMPERATURE: 97.8 F | WEIGHT: 315 LBS | RESPIRATION RATE: 18 BRPM

## 2020-02-05 LAB
ALBUMIN SERPL-MCNC: 4.1 G/DL (ref 3.5–5.2)
ALP BLD-CCNC: 81 U/L (ref 40–129)
ALT SERPL-CCNC: 14 U/L (ref 0–40)
ANION GAP SERPL CALCULATED.3IONS-SCNC: 15 MMOL/L (ref 7–16)
AST SERPL-CCNC: 11 U/L (ref 0–39)
BILIRUB SERPL-MCNC: 0.5 MG/DL (ref 0–1.2)
BUN BLDV-MCNC: 17 MG/DL (ref 6–20)
CALCIUM SERPL-MCNC: 9.6 MG/DL (ref 8.6–10.2)
CHLORIDE BLD-SCNC: 103 MMOL/L (ref 98–107)
CHOLESTEROL, TOTAL: 213 MG/DL (ref 0–199)
CO2: 21 MMOL/L (ref 22–29)
CREAT SERPL-MCNC: 0.6 MG/DL (ref 0.7–1.2)
CREATININE URINE POCT: 300
GFR AFRICAN AMERICAN: >60
GFR NON-AFRICAN AMERICAN: >60 ML/MIN/1.73
GLUCOSE BLD-MCNC: 209 MG/DL (ref 74–99)
HBA1C MFR BLD: 9 %
HDLC SERPL-MCNC: 59 MG/DL
LDL CHOLESTEROL CALCULATED: 132 MG/DL (ref 0–99)
MICROALBUMIN/CREAT 24H UR: 80 MG/G{CREAT}
MICROALBUMIN/CREAT UR-RTO: 30
POTASSIUM SERPL-SCNC: 3.9 MMOL/L (ref 3.5–5)
SODIUM BLD-SCNC: 139 MMOL/L (ref 132–146)
TOTAL PROTEIN: 7.6 G/DL (ref 6.4–8.3)
TRIGL SERPL-MCNC: 112 MG/DL (ref 0–149)
VLDLC SERPL CALC-MCNC: 22 MG/DL

## 2020-02-05 PROCEDURE — 82044 UR ALBUMIN SEMIQUANTITATIVE: CPT | Performed by: FAMILY MEDICINE

## 2020-02-05 PROCEDURE — 80053 COMPREHEN METABOLIC PANEL: CPT

## 2020-02-05 PROCEDURE — 83036 HEMOGLOBIN GLYCOSYLATED A1C: CPT | Performed by: FAMILY MEDICINE

## 2020-02-05 PROCEDURE — 36415 COLL VENOUS BLD VENIPUNCTURE: CPT

## 2020-02-05 PROCEDURE — G8417 CALC BMI ABV UP PARAM F/U: HCPCS | Performed by: FAMILY MEDICINE

## 2020-02-05 PROCEDURE — 4004F PT TOBACCO SCREEN RCVD TLK: CPT | Performed by: FAMILY MEDICINE

## 2020-02-05 PROCEDURE — G8484 FLU IMMUNIZE NO ADMIN: HCPCS | Performed by: FAMILY MEDICINE

## 2020-02-05 PROCEDURE — G8427 DOCREV CUR MEDS BY ELIG CLIN: HCPCS | Performed by: FAMILY MEDICINE

## 2020-02-05 PROCEDURE — 80061 LIPID PANEL: CPT

## 2020-02-05 PROCEDURE — 2022F DILAT RTA XM EVC RTNOPTHY: CPT | Performed by: FAMILY MEDICINE

## 2020-02-05 PROCEDURE — 99214 OFFICE O/P EST MOD 30 MIN: CPT | Performed by: FAMILY MEDICINE

## 2020-02-05 PROCEDURE — 3046F HEMOGLOBIN A1C LEVEL >9.0%: CPT | Performed by: FAMILY MEDICINE

## 2020-02-05 RX ORDER — PEN NEEDLE, DIABETIC 32GX 5/32"
1 NEEDLE, DISPOSABLE MISCELLANEOUS 3 TIMES DAILY
Qty: 100 EACH | Refills: 2 | Status: CANCELLED | OUTPATIENT
Start: 2020-02-05 | End: 2020-03-06

## 2020-02-05 RX ORDER — ATORVASTATIN CALCIUM 20 MG/1
20 TABLET, FILM COATED ORAL DAILY
Qty: 30 TABLET | Refills: 3 | Status: SHIPPED
Start: 2020-02-05 | End: 2020-02-11 | Stop reason: ALTCHOICE

## 2020-02-05 RX ORDER — BUSPIRONE HYDROCHLORIDE 10 MG/1
TABLET ORAL
COMMUNITY
Start: 2020-01-17 | End: 2020-03-03

## 2020-02-05 RX ORDER — ALBUTEROL SULFATE 90 UG/1
2 AEROSOL, METERED RESPIRATORY (INHALATION) EVERY 6 HOURS PRN
Qty: 1 INHALER | Refills: 3 | Status: SHIPPED
Start: 2020-02-05 | End: 2020-05-12 | Stop reason: SDUPTHER

## 2020-02-05 RX ORDER — AMLODIPINE BESYLATE 5 MG/1
5 TABLET ORAL DAILY
Qty: 30 TABLET | Refills: 2 | Status: ON HOLD
Start: 2020-02-05 | End: 2020-03-19 | Stop reason: HOSPADM

## 2020-02-05 RX ORDER — LANCETS 28 GAUGE
EACH MISCELLANEOUS
Qty: 100 EACH | Refills: 3 | Status: SHIPPED
Start: 2020-02-05 | End: 2020-07-02 | Stop reason: SDUPTHER

## 2020-02-05 RX ORDER — PEN NEEDLE, DIABETIC 29 G X1/2"
NEEDLE, DISPOSABLE MISCELLANEOUS
Qty: 100 EACH | Refills: 5 | Status: CANCELLED | OUTPATIENT
Start: 2020-02-05

## 2020-02-05 RX ORDER — LISINOPRIL 40 MG/1
40 TABLET ORAL DAILY
Qty: 30 TABLET | Refills: 3 | Status: ON HOLD
Start: 2020-02-05 | End: 2020-03-19 | Stop reason: HOSPADM

## 2020-02-05 RX ORDER — VENLAFAXINE HYDROCHLORIDE 37.5 MG/1
CAPSULE, EXTENDED RELEASE ORAL
COMMUNITY
Start: 2020-01-17 | End: 2020-03-06 | Stop reason: ALTCHOICE

## 2020-02-05 ASSESSMENT — PATIENT HEALTH QUESTIONNAIRE - PHQ9
4. FEELING TIRED OR HAVING LITTLE ENERGY: 3
3. TROUBLE FALLING OR STAYING ASLEEP: 3
DEPRESSION UNABLE TO ASSESS: FUNCTIONAL CAPACITY MOTIVATION LIMITS ACCURACY
9. THOUGHTS THAT YOU WOULD BE BETTER OFF DEAD, OR OF HURTING YOURSELF: 1
SUM OF ALL RESPONSES TO PHQ9 QUESTIONS 1 & 2: 6
7. TROUBLE CONCENTRATING ON THINGS, SUCH AS READING THE NEWSPAPER OR WATCHING TELEVISION: 3
8. MOVING OR SPEAKING SO SLOWLY THAT OTHER PEOPLE COULD HAVE NOTICED. OR THE OPPOSITE, BEING SO FIGETY OR RESTLESS THAT YOU HAVE BEEN MOVING AROUND A LOT MORE THAN USUAL: 3
10. IF YOU CHECKED OFF ANY PROBLEMS, HOW DIFFICULT HAVE THESE PROBLEMS MADE IT FOR YOU TO DO YOUR WORK, TAKE CARE OF THINGS AT HOME, OR GET ALONG WITH OTHER PEOPLE: 3
6. FEELING BAD ABOUT YOURSELF - OR THAT YOU ARE A FAILURE OR HAVE LET YOURSELF OR YOUR FAMILY DOWN: 3
2. FEELING DOWN, DEPRESSED OR HOPELESS: 3
SUM OF ALL RESPONSES TO PHQ QUESTIONS 1-9: 25
5. POOR APPETITE OR OVEREATING: 3
SUM OF ALL RESPONSES TO PHQ QUESTIONS 1-9: 25
1. LITTLE INTEREST OR PLEASURE IN DOING THINGS: 3

## 2020-02-05 NOTE — PROGRESS NOTES
well demonstrated. Possible severe stenosis mid RCA. EF 60%. 14. Ambulatory monitor 01/10 through 01/16/2020: Sinus rhythm. No atrial fibrillation. No prolonged pauses. Occasional PVCs without complex repetitive forms.     Review of Systems:  Constitutional: negative for fever and chills  Respiratory: negative for cough and hemoptysis  Cardiovascular:   Gastrointestinal: negative for abdominal pain, diarrhea, nausea and vomiting  Genitourinary:negative for dysuria and hematuria  Derm: negative for rash and skin lesion(s)  Neurological: negative for seizures and tremors  Endocrine: negative for diabetic symptoms including polydipsia and polyuria  Musculoskeletal: negative for CTD  Psychiatric: negative for psychosis and major depression    On examination, he is an alert pleasant middle-age man in no distress. Skin is warm and dry. Respirations are unlabored. /80   Pulse 96   Resp 16   Ht 6' 4\" (1.93 m)   Wt (!) 329 lb 6.4 oz (149.4 kg)   BMI 40.10 kg/m² . HEENT negative for scleral icterus. Extraocular muscles intact. No facial asymmetry or central cyanosis. Neck without masses or goiter. No bruit or JVD. Cardiac apex not displaced. Rhythm regular. Abdomen normal.  Extremities without edema. Lungs are clear    EKG today shows sinus rhythm 96/min. Normal.    Given the patient's age and CT results, he is recommended to have an invasive coronary angiogram.  This was reviewed with him and will be scheduled for his earliest possible convenience. Further recommendations will follow the study.   Today he is given prescriptions for Ecotrin 81 mg/day Lipitor 80 mg/day Lopressor 25 mg twice daily and nitroglycerin sublingual    At completion of today's visit, medications include the following:            Current Outpatient Medications:     busPIRone (BUSPAR) 10 MG tablet, , Disp: , Rfl:     venlafaxine (EFFEXOR XR) 37.5 MG extended release capsule, , Disp: , Rfl:     albuterol sulfate  (90 Base) MCG/ACT inhaler, Inhale 2 puffs into the lungs every 6 hours as needed for Wheezing, Disp: 1 Inhaler, Rfl: 3    atorvastatin (LIPITOR) 20 MG tablet, Take 1 tablet by mouth daily, Disp: 30 tablet, Rfl: 3    Cholecalciferol (VITAMIN D3 ULTRA POTENCY) 1.25 MG (96673 UT) TABS, Take 50,000 Int'l Units/day by mouth once a week, Disp: 30 tablet, Rfl: 2    DULERA 100-5 MCG/ACT inhaler, Inhale 2 puffs into the lungs 2 times daily, Disp: 1 Inhaler, Rfl: 3    FreeStyle Lancets MISC, USE AS DIRECTED, Disp: 100 each, Rfl: 3    insulin aspart (NOVOLOG) 100 UNIT/ML injection vial, Use tid with sliding scale, Disp: 5 Package, Rfl: 5    insulin glargine (BASAGLAR KWIKPEN) 100 UNIT/ML injection pen, Inject 110 Units into the skin nightly, Disp: 5 pen, Rfl: 3    lisinopril (PRINIVIL;ZESTRIL) 40 MG tablet, Take 1 tablet by mouth daily, Disp: 30 tablet, Rfl: 3    metFORMIN (GLUCOPHAGE) 850 MG tablet, Take 1 tablet by mouth 3 times daily, Disp: 30 tablet, Rfl: 3    amLODIPine (NORVASC) 5 MG tablet, Take 1 tablet by mouth daily Take 5 mg by mouth daily, Disp: 30 tablet, Rfl: 2    HYDROcodone-acetaminophen (NORCO)  MG per tablet, Take 1 tablet by mouth every 6 hours as needed for Pain., Disp: , Rfl:     blood glucose test strips (EXACTECH TEST) strip, Use AS DIRECTED, Disp: 100 each, Rfl: 5    Insulin Pen Needle (PEN NEEDLES 29GX1/2\") 29G X 12MM MISC, USE AS DIRECTED, Disp: 100 each, Rfl: 5    Alcohol Swabs 70 % PADS, , Disp: , Rfl:     blood glucose test strips (ASCENSIA AUTODISC VI;ONE TOUCH ULTRA TEST VI) strip, , Disp: , Rfl:     ARIPiprazole (ABILIFY) 10 MG tablet, take 1 tablet by mouth once daily at bedtime, Disp: , Rfl: 0    prazosin (MINIPRESS) 5 MG capsule, take 1 capsule by mouth once daily at bedtime, Disp: , Rfl: 0    BD PEN NEEDLE PRAMOD U/F 32G X 4 MM MISC, , Disp: , Rfl: 0    PARoxetine (PAXIL) 40 MG tablet, TAKE 1/2 TABLET BY MOUTH ONCE DAILY X 7 DAYS THEN 1 TAB  DAILY THEREAFTER,

## 2020-02-05 NOTE — PROGRESS NOTES
2020     Sheela Murphy    : 1975 Sex: male   Age: 38 y.o. Chief Complaint   Patient presents with    Hypertension    Diabetes    Peripheral Neuropathy       HPI: This 38y.o. -year-old male  presents today for evaluation and management of his  chronic medical problems. Current medication list reviewed. The patient is tolerating all medications well without adverse events or known side effects. The patient does understand the risk and benefits of the prescribed medications. The patient is up-to-date on all age-appropriate wellness issues. The patient recently had a cardiac work-up including CTA and Holter monitor. ROS:   Const: Denies changes in appetite, chills, fever, night sweats and weight loss. Eyes:  Denies discharge, a recent change in visual acuity, blurred vision and double vision. ENMT: Denies discharge of the ears, hearing loss, pain of the ears. Denies nasal or sinus symptoms other than stated above. Denies mouth or throat symptoms. CV:  Denies chest pain, dyspnea on exertion, orthopnea, palpitations and PND  Resp: Denies chest pain, cough, SOB and wheezing. GI: Denies abdominal pain, constipation, diarrhea, heartburn, indigestion, nausea and vomiting. : Denies dysuria, frequency, hematuria, nocturia and urgency. Musculo: Denies arthralgias and myalgia  Skin:  Denies lesions, pruritus and rash. Neuro: Denies dizziness, lightheadedness, numbness, tingling and weakness. Psych:  Denies anxiety and depression  Endocrine: Denies anxiety and depression. Hema/Lymph: Denies hematologic symptoms  Allergy/Immuno:  Denies allergic/immunologic symptoms.   Pertinent positives reviewed and noted      Current Outpatient Medications:     busPIRone (BUSPAR) 10 MG tablet, , Disp: , Rfl:     venlafaxine (EFFEXOR XR) 37.5 MG extended release capsule, , Disp: , Rfl:     albuterol sulfate  (90 Base) MCG/ACT inhaler, Inhale 2 puffs into the lungs every 6 hours as needed for Wheezing, Disp: 1 Inhaler, Rfl: 3    atorvastatin (LIPITOR) 20 MG tablet, Take 1 tablet by mouth daily, Disp: 30 tablet, Rfl: 3    Cholecalciferol (VITAMIN D3 ULTRA POTENCY) 1.25 MG (72415 UT) TABS, Take 50,000 Int'l Units/day by mouth once a week, Disp: 30 tablet, Rfl: 2    DULERA 100-5 MCG/ACT inhaler, Inhale 2 puffs into the lungs 2 times daily, Disp: 1 Inhaler, Rfl: 3    FreeStyle Lancets MISC, USE AS DIRECTED, Disp: 100 each, Rfl: 3    insulin aspart (NOVOLOG) 100 UNIT/ML injection vial, Use tid with sliding scale, Disp: 5 Package, Rfl: 5    insulin glargine (BASAGLAR KWIKPEN) 100 UNIT/ML injection pen, Inject 110 Units into the skin nightly, Disp: 5 pen, Rfl: 3    lisinopril (PRINIVIL;ZESTRIL) 40 MG tablet, Take 1 tablet by mouth daily, Disp: 30 tablet, Rfl: 3    metFORMIN (GLUCOPHAGE) 850 MG tablet, Take 1 tablet by mouth 3 times daily, Disp: 30 tablet, Rfl: 3    amLODIPine (NORVASC) 5 MG tablet, Take 1 tablet by mouth daily Take 5 mg by mouth daily, Disp: 30 tablet, Rfl: 2    PARoxetine (PAXIL) 40 MG tablet, TAKE 1/2 TABLET BY MOUTH ONCE DAILY X 7 DAYS THEN 1 TAB  DAILY THEREAFTER, Disp: , Rfl: 0    HYDROcodone-acetaminophen (NORCO)  MG per tablet, Take 1 tablet by mouth every 6 hours as needed for Pain., Disp: , Rfl:     blood glucose test strips (EXACTECH TEST) strip, Use AS DIRECTED, Disp: 100 each, Rfl: 5    Insulin Pen Needle (PEN NEEDLES 29GX1/2\") 29G X 12MM MISC, USE AS DIRECTED, Disp: 100 each, Rfl: 5    Alcohol Swabs 70 % PADS, , Disp: , Rfl:     blood glucose test strips (ASCENSIA AUTODISC VI;ONE TOUCH ULTRA TEST VI) strip, , Disp: , Rfl:     ARIPiprazole (ABILIFY) 10 MG tablet, take 1 tablet by mouth once daily at bedtime, Disp: , Rfl: 0    prazosin (MINIPRESS) 5 MG capsule, take 1 capsule by mouth once daily at bedtime, Disp: , Rfl: 0    sertraline (ZOLOFT) 100 MG tablet, 100 mg daily , Disp: , Rfl: 0    BD PEN NEEDLE PRAMOD U/F 32G X 4 MM MISC, , Disp: , Rfl: resection of mediastinal mass    TONSILLECTOMY      as a child      Family History   Problem Relation Age of Onset    Heart Disease Mother         MI age 39    [facility] High Blood Pressure Mother     Diabetes Father     Heart Disease Father         MI    High Blood Pressure Father     High Cholesterol Father     Cancer Father     Stroke Father     Kidney Disease Father         dialysis    High Blood Pressure Sister     Other Brother     COPD Brother     High Blood Pressure Brother         Vitals:    20 0807 20 0822   BP: (!) 140/80 (!) 140/80   Pulse: 112    Resp: 18    Temp: 97.8 °F (36.6 °C)    TempSrc: Temporal    SpO2: 98%    Weight: (!) 331 lb (150.1 kg)    Height: 6' 4\" (1.93 m)         Exam: Const: Appears healthy and well developed. No signs of acute distress present. Eyes: PERRL  ENMT: Tympanic membranes are intact. Nasal mucosa intact without noted erythema Septum is in the midline. Posterior pharynx shows no exudate, irritation or redness. Neck:  Supple without adenopathy. Adequate range of motion   Resp: No rales, rhonchi, wheezes appreciated over the lungs bilaterally. CV: S1, S2 within normal limits. Regular rate and rhythm noted. Without murmur, gallop or rub. Extremities:  Pulses intact. Without noted edema. Abdomen: Positive bowel sounds. Palpation reveals softness, with no distension, organomegaly or tenderness. No abdominal masses palpable. Skin: Skin is warm and dry. Musculo: Unchanged upon examination  Neuro: Alert and oriented X3. Cranial nerves grossly intact. Psych: Mood is normal.  Affect is normal.   Vital signs reviewed. Controlled Substances Monitoring:     RX Monitoring 2019   Periodic Controlled Substance Monitoring No signs of potential drug abuse or diversion identified. Plan Per Assessment:  Giorgi Rascon was seen today for hypertension, diabetes and peripheral neuropathy.     Diagnoses and all orders for this visit:    Mixed hyperlipidemia  -     Lipid Panel; Future    Uncontrolled type 2 diabetes mellitus with hyperglycemia (HCC)  -     Comprehensive Metabolic Panel; Future  -     Lipid Panel; Future  -     POCT microalbumin  -     POCT glycosylated hemoglobin (Hb A1C)    Essential hypertension  -     Comprehensive Metabolic Panel; Future  -     Lipid Panel; Future    Other orders  -     albuterol sulfate  (90 Base) MCG/ACT inhaler; Inhale 2 puffs into the lungs every 6 hours as needed for Wheezing  -     atorvastatin (LIPITOR) 20 MG tablet; Take 1 tablet by mouth daily  -     Cholecalciferol (VITAMIN D3 ULTRA POTENCY) 1.25 MG (42932 UT) TABS; Take 50,000 Int'l Units/day by mouth once a week  -     DULERA 100-5 MCG/ACT inhaler; Inhale 2 puffs into the lungs 2 times daily  -     FreeStyle Lancets MISC; USE AS DIRECTED  -     insulin aspart (NOVOLOG) 100 UNIT/ML injection vial; Use tid with sliding scale  -     insulin glargine (BASAGLAR KWIKPEN) 100 UNIT/ML injection pen; Inject 110 Units into the skin nightly  -     lisinopril (PRINIVIL;ZESTRIL) 40 MG tablet; Take 1 tablet by mouth daily  -     metFORMIN (GLUCOPHAGE) 850 MG tablet; Take 1 tablet by mouth 3 times daily  -     amLODIPine (NORVASC) 5 MG tablet; Take 1 tablet by mouth daily Take 5 mg by mouth daily      Follow-up with cardiology as directed. Return in about 3 months (around 5/5/2020) for MEDICATION CHECK, FOLLOW UP [id number] Jacqueline Mills MD    Note was generated with the assistance of voice recognition software. Document was reviewed however may contain grammatical errors.

## 2020-02-06 ENCOUNTER — OFFICE VISIT (OUTPATIENT)
Dept: CARDIOLOGY CLINIC | Age: 45
End: 2020-02-06
Payer: COMMERCIAL

## 2020-02-06 VITALS
HEART RATE: 96 BPM | BODY MASS INDEX: 38.36 KG/M2 | RESPIRATION RATE: 16 BRPM | HEIGHT: 76 IN | SYSTOLIC BLOOD PRESSURE: 128 MMHG | WEIGHT: 315 LBS | DIASTOLIC BLOOD PRESSURE: 80 MMHG

## 2020-02-06 PROCEDURE — 99213 OFFICE O/P EST LOW 20 MIN: CPT | Performed by: INTERNAL MEDICINE

## 2020-02-06 PROCEDURE — 4004F PT TOBACCO SCREEN RCVD TLK: CPT | Performed by: INTERNAL MEDICINE

## 2020-02-06 PROCEDURE — G8427 DOCREV CUR MEDS BY ELIG CLIN: HCPCS | Performed by: INTERNAL MEDICINE

## 2020-02-06 PROCEDURE — 93000 ELECTROCARDIOGRAM COMPLETE: CPT | Performed by: INTERNAL MEDICINE

## 2020-02-06 PROCEDURE — G8417 CALC BMI ABV UP PARAM F/U: HCPCS | Performed by: INTERNAL MEDICINE

## 2020-02-06 PROCEDURE — G8484 FLU IMMUNIZE NO ADMIN: HCPCS | Performed by: INTERNAL MEDICINE

## 2020-02-06 RX ORDER — ASPIRIN 81 MG/1
81 TABLET ORAL DAILY
Qty: 90 TABLET | Refills: 3 | Status: SHIPPED
Start: 2020-02-06 | End: 2020-08-28 | Stop reason: SDUPTHER

## 2020-02-06 RX ORDER — NITROGLYCERIN 0.4 MG/1
0.4 TABLET SUBLINGUAL EVERY 5 MIN PRN
Qty: 25 TABLET | Refills: 3 | Status: ON HOLD
Start: 2020-02-06 | End: 2020-03-19 | Stop reason: HOSPADM

## 2020-02-06 RX ORDER — ATORVASTATIN CALCIUM 80 MG/1
80 TABLET, FILM COATED ORAL DAILY
Qty: 90 TABLET | Refills: 3 | Status: ON HOLD
Start: 2020-02-06 | End: 2020-03-19 | Stop reason: HOSPADM

## 2020-02-11 ENCOUNTER — HOSPITAL ENCOUNTER (OUTPATIENT)
Dept: CARDIAC CATH/INVASIVE PROCEDURES | Age: 45
Discharge: HOME | End: 2020-02-11
Payer: COMMERCIAL

## 2020-02-11 VITALS
WEIGHT: 315 LBS | RESPIRATION RATE: 20 BRPM | DIASTOLIC BLOOD PRESSURE: 80 MMHG | HEART RATE: 90 BPM | HEIGHT: 76 IN | BODY MASS INDEX: 38.36 KG/M2 | TEMPERATURE: 98.4 F | SYSTOLIC BLOOD PRESSURE: 127 MMHG

## 2020-02-11 LAB
ABO/RH: NORMAL
ANTIBODY SCREEN: NORMAL
HCT VFR BLD CALC: 45.4 % (ref 37–54)
HEMOGLOBIN: 14.9 G/DL (ref 12.5–16.5)
MCH RBC QN AUTO: 30 PG (ref 26–35)
MCHC RBC AUTO-ENTMCNC: 32.8 % (ref 32–34.5)
MCV RBC AUTO: 91.3 FL (ref 80–99.9)
PDW BLD-RTO: 13.1 FL (ref 11.5–15)
PLATELET # BLD: 250 E9/L (ref 130–450)
PMV BLD AUTO: 9.1 FL (ref 7–12)
POC ACT LR: 244 SECONDS
RBC # BLD: 4.97 E12/L (ref 3.8–5.8)
WBC # BLD: 8.8 E9/L (ref 4.5–11.5)

## 2020-02-11 PROCEDURE — 93458 L HRT ARTERY/VENTRICLE ANGIO: CPT | Performed by: INTERNAL MEDICINE

## 2020-02-11 PROCEDURE — 93571 IV DOP VEL&/PRESS C FLO 1ST: CPT | Performed by: INTERNAL MEDICINE

## 2020-02-11 PROCEDURE — 86901 BLOOD TYPING SEROLOGIC RH(D): CPT

## 2020-02-11 PROCEDURE — C1894 INTRO/SHEATH, NON-LASER: HCPCS

## 2020-02-11 PROCEDURE — 85027 COMPLETE CBC AUTOMATED: CPT

## 2020-02-11 PROCEDURE — 6360000002 HC RX W HCPCS

## 2020-02-11 PROCEDURE — 2709999900 HC NON-CHARGEABLE SUPPLY

## 2020-02-11 PROCEDURE — 86900 BLOOD TYPING SEROLOGIC ABO: CPT

## 2020-02-11 PROCEDURE — 85347 COAGULATION TIME ACTIVATED: CPT

## 2020-02-11 PROCEDURE — 36415 COLL VENOUS BLD VENIPUNCTURE: CPT

## 2020-02-11 PROCEDURE — C1887 CATHETER, GUIDING: HCPCS

## 2020-02-11 PROCEDURE — 86850 RBC ANTIBODY SCREEN: CPT

## 2020-02-11 PROCEDURE — C1769 GUIDE WIRE: HCPCS

## 2020-02-11 PROCEDURE — 2500000003 HC RX 250 WO HCPCS

## 2020-02-11 RX ORDER — ACETAMINOPHEN 325 MG/1
650 TABLET ORAL EVERY 4 HOURS PRN
Status: DISCONTINUED | OUTPATIENT
Start: 2020-02-11 | End: 2020-02-12 | Stop reason: HOSPADM

## 2020-02-11 NOTE — PROCEDURES
fashion. Using a micropuncture   technique, a 6-Egyptian Rebeca Silversmith was placed in the right radial  artery, this was  aspirated and flushed several times throughout the  procedure. This was medicated with  verapamil and nitroglycerin. He  was given heparin systemically. A 5-Egyptian Jaylon catheter was advanced  over a wire to the radial artery, this was aspirated and flushed with  saline. Pressures were obtained. This was then filled  with contrast  and manipulated in the left main coronary artery. Four orthogonal views  were obtained. The catheter was then manipulated in the right coronary  artery and three orthogonal views were obtained. The catheter was  removed. A 5-Egyptian  angled pigtail was advanced and manipulated in the  left ventricle. The catheter was  aspirated and flushed with saline,  and pressures were obtained. Pullback pressures across  the aortic  valve were obtained. Pressure wire was then prepped outside of body. This was then  advanced and normalized in the left main with the guide  catheter pulled back into the aorta outside of the left main. The  pressure wire was then advanced with the transducer well past the mid  LAD lesion. Three IFRs were performed, which demonstrated a  hemodynamically significant stenosis. The pressure wire was then pulled  back between the proximal stenosis and the mid LAD stenosis and three  IFRs were performed. This demonstrated a negative IFR, indicating that  the mid LAD stenosis is the more severe hemodynamically significant  lesion. The wire was removed. Followup angiography was performed, this  demonstrated no complications. The catheter was removed. The right  radial artery sheath has been removed and TR Band placed successfully  with good patent hemostasis. He tolerated the procedure well with no   complications. Cardiothoracic Surgery has been consulted.         Angelina Staples DO    D: 02/11/2020 11:16:28       T: 02/11/2020 12:51:06     [email or web address]  Job#: [id number]     Doc#: [id number]    CC:  Rachel Avery MD

## 2020-02-11 NOTE — PROGRESS NOTES
Consult received  Will see pt in office    Future Appointments   Date Time Provider Larisa Salazar   2/25/2020 10:00 AM Francisca Martinez MD CARDIO SURG [facility]   5/5/2020  8:20 AM Kassandra Landau,  [facility]

## 2020-02-12 ENCOUNTER — TELEPHONE (OUTPATIENT)
Dept: FAMILY MEDICINE CLINIC | Age: 45
End: 2020-02-12

## 2020-02-12 RX ORDER — NICOTINE 21 MG/24HR
1 PATCH, TRANSDERMAL 24 HOURS TRANSDERMAL EVERY 24 HOURS
Qty: 30 PATCH | Refills: 1 | Status: SHIPPED
Start: 2020-02-12 | End: 2020-03-03

## 2020-02-12 NOTE — TELEPHONE ENCOUNTER
If he wants a medication try Wellbutrin 150 milligrams daily for 3 days then 300 mg daily. If he wants nicotine patch start with 21 mg daily.

## 2020-02-14 ENCOUNTER — TELEPHONE (OUTPATIENT)
Dept: ADMINISTRATIVE | Age: 45
End: 2020-02-14

## 2020-02-14 NOTE — TELEPHONE ENCOUNTER
Contacted patient. Per Dr. Maida Mcdonald, he will see patient after his consult and CABG. Patient acknowledged understanding.

## 2020-02-19 ENCOUNTER — OFFICE VISIT (OUTPATIENT)
Dept: FAMILY MEDICINE CLINIC | Age: 45
End: 2020-02-19
Payer: COMMERCIAL

## 2020-02-19 VITALS
DIASTOLIC BLOOD PRESSURE: 88 MMHG | HEART RATE: 104 BPM | WEIGHT: 315 LBS | BODY MASS INDEX: 38.36 KG/M2 | OXYGEN SATURATION: 98 % | SYSTOLIC BLOOD PRESSURE: 132 MMHG | TEMPERATURE: 97.9 F | HEIGHT: 76 IN

## 2020-02-19 PROCEDURE — 99213 OFFICE O/P EST LOW 20 MIN: CPT | Performed by: PHYSICIAN ASSISTANT

## 2020-02-19 PROCEDURE — G8417 CALC BMI ABV UP PARAM F/U: HCPCS | Performed by: PHYSICIAN ASSISTANT

## 2020-02-19 PROCEDURE — G8427 DOCREV CUR MEDS BY ELIG CLIN: HCPCS | Performed by: PHYSICIAN ASSISTANT

## 2020-02-19 PROCEDURE — G8484 FLU IMMUNIZE NO ADMIN: HCPCS | Performed by: PHYSICIAN ASSISTANT

## 2020-02-19 PROCEDURE — 4004F PT TOBACCO SCREEN RCVD TLK: CPT | Performed by: PHYSICIAN ASSISTANT

## 2020-02-19 RX ORDER — METHYLPHENIDATE HYDROCHLORIDE 20 MG/1
TABLET ORAL
COMMUNITY
Start: 2020-02-13 | End: 2020-03-03

## 2020-02-19 RX ORDER — CYCLOBENZAPRINE HCL 10 MG
10 TABLET ORAL 3 TIMES DAILY PRN
Qty: 12 TABLET | Refills: 0 | Status: SHIPPED
Start: 2020-02-19 | End: 2020-03-03

## 2020-02-19 NOTE — PROGRESS NOTES
2020   [address] [place] [address]  [address] [place]  : 1975  Age: 38 y.o. Sex: male      Subjective:  Chief Complaint   Patient presents with    Back Pain       HPI: Back pain. The patient states that they are experiencing mid to low back pain. The pain started over the weekend. Patient denies any trauma or injury but states he works as a  and it started hurting him 1 day while he was at work. Patient states he picks up many heavy objects as well as just has an overall physical job. Pain is nonradiating. The pain is worse with movement. The patient denies any radicular symptoms. Patient denies any numbness, tingling weakness or paralysis to the extremities. Patient denies any saddle anesthesia. Denies any bowel or bladder incontinence. No fever or chills. No dysuria, urinary, frequency, or gross hematuria. No abdominal pain, nausea, vomiting, or diarrhea. No history of kidney stones. Denies chest pain or shortness of breath. Patient denies other symptoms and presents for evaluation      ROS:  Positive and pertinent negatives as per HPI. All other systems are reviewed and negative.        Current Outpatient Medications:     methylphenidate (RITALIN) 20 MG tablet, , Disp: , Rfl:     cyclobenzaprine (FLEXERIL) 10 MG tablet, Take 1 tablet by mouth 3 times daily as needed for Muscle spasms, Disp: 12 tablet, Rfl: 0    nicotine (NICODERM CQ) 21 MG/24HR, Place 1 patch onto the skin every 24 hours, Disp: 30 patch, Rfl: 1    atorvastatin (LIPITOR) 80 MG tablet, Take 1 tablet by mouth daily, Disp: 90 tablet, Rfl: 3    aspirin EC 81 MG EC tablet, Take 1 tablet by mouth daily (Patient taking differently: Take 81 mg by mouth daily Pt too 325 ASA for cath on 2020), Disp: 90 tablet, Rfl: 3    nitroGLYCERIN (NITROSTAT) 0.4 MG SL tablet, Place 1 tablet under the tongue every 5 minutes as needed for Chest pain, Disp: 25 tablet, Rfl: 3    busPIRone (BUSPAR) 10 MG tablet, , Disp: , Rfl:     venlafaxine (EFFEXOR XR) 37.5 MG extended release capsule, , Disp: , Rfl:     albuterol sulfate  (90 Base) MCG/ACT inhaler, Inhale 2 puffs into the lungs every 6 hours as needed for Wheezing, Disp: 1 Inhaler, Rfl: 3    Cholecalciferol (VITAMIN D3 ULTRA POTENCY) 1.25 MG (30003 UT) TABS, Take 50,000 Int'l Units/day by mouth once a week, Disp: 30 tablet, Rfl: 2    DULERA 100-5 MCG/ACT inhaler, Inhale 2 puffs into the lungs 2 times daily, Disp: 1 Inhaler, Rfl: 3    FreeStyle Lancets MISC, USE AS DIRECTED, Disp: 100 each, Rfl: 3    insulin aspart (NOVOLOG) 100 UNIT/ML injection vial, Use tid with sliding scale, Disp: 5 Package, Rfl: 5    insulin glargine (BASAGLAR KWIKPEN) 100 UNIT/ML injection pen, Inject 110 Units into the skin nightly, Disp: 5 pen, Rfl: 3    lisinopril (PRINIVIL;ZESTRIL) 40 MG tablet, Take 1 tablet by mouth daily, Disp: 30 tablet, Rfl: 3    metFORMIN (GLUCOPHAGE) 850 MG tablet, Take 1 tablet by mouth 3 times daily, Disp: 30 tablet, Rfl: 3    amLODIPine (NORVASC) 5 MG tablet, Take 1 tablet by mouth daily Take 5 mg by mouth daily, Disp: 30 tablet, Rfl: 2    HYDROcodone-acetaminophen (NORCO)  MG per tablet, Take 1 tablet by mouth every 6 hours as needed for Pain., Disp: , Rfl:     blood glucose test strips (EXACTECH TEST) strip, Use AS DIRECTED, Disp: 100 each, Rfl: 5    Insulin Pen Needle (PEN NEEDLES 29GX1/2\") 29G X 12MM MISC, USE AS DIRECTED, Disp: 100 each, Rfl: 5    Alcohol Swabs 70 % PADS, , Disp: , Rfl:     blood glucose test strips (ASCENSIA AUTODISC VI;ONE TOUCH ULTRA TEST VI) strip, , Disp: , Rfl:     prazosin (MINIPRESS) 5 MG capsule, take 1 capsule by mouth once daily at bedtime, Disp: , Rfl: 0    BD PEN NEEDLE PRAMOD U/F 32G X 4 MM MISC, , Disp: , Rfl: 0   Allergies   Allergen Reactions    Sulfa Antibiotics      Told as a child        Objective:  Vitals:    02/19/20 0853   BP: 132/88   Pulse: 104   Temp: 97.9 °F (36.6 °C) TempSrc: Temporal   SpO2: 98%   Weight: (!) 328 lb (148.8 kg)   Height: 6' 4\" (1.93 m)        Exam:  Const: Appears healthy and well developed. No signs of acute distress present. Vital signs reviewed per triage. Head/Face: Normocephalic, atraumatic on inspection  ENMT: Buccal mucosa is moist.  Neck: Trachea midline. Resp: Clear to auscultation bilaterally. CV:S1 and S2 audible. Musculo: Spine: The patient has tenderness to bilateral paraspinous lumbar musculature with spasms. No POP to midline spine. No tenderness to the SI joints. Straight leg raise is negative bilaterally. Muscle strength is a 5/5 and equal bilaterally in the lower extremities. Pulses are equal bilaterally. No peripheral edema to lower extremities. Skin: Dry and warm. Neuro: Alert and oriented x3. Displays comfort and cooperation during encounter. Speech is articulate and fluent. Sensation grossly intact to soft touch and pinprick noted. No saddle anethesia noted. Psych: Mood and affect are normal.       Joceline Coffman was seen today for back pain. Diagnoses and all orders for this visit:    Acute bilateral low back pain without sciatica  -     cyclobenzaprine (FLEXERIL) 10 MG tablet; Take 1 tablet by mouth 3 times daily as needed for Muscle spasms        Patient is to follow-up with their PCP in the next 5-7 days. If there are any new or worsening symptoms to the emergency department.       Seen By:    Jose Valencia PA-C

## 2020-02-25 ENCOUNTER — OFFICE VISIT (OUTPATIENT)
Dept: CARDIOTHORACIC SURGERY | Age: 45
End: 2020-02-25
Payer: COMMERCIAL

## 2020-02-25 ENCOUNTER — PREP FOR PROCEDURE (OUTPATIENT)
Dept: CARDIOTHORACIC SURGERY | Age: 45
End: 2020-02-25

## 2020-02-25 VITALS
WEIGHT: 315 LBS | HEIGHT: 76 IN | SYSTOLIC BLOOD PRESSURE: 136 MMHG | BODY MASS INDEX: 38.36 KG/M2 | HEART RATE: 100 BPM | DIASTOLIC BLOOD PRESSURE: 83 MMHG

## 2020-02-25 PROCEDURE — G8427 DOCREV CUR MEDS BY ELIG CLIN: HCPCS | Performed by: THORACIC SURGERY (CARDIOTHORACIC VASCULAR SURGERY)

## 2020-02-25 PROCEDURE — 99204 OFFICE O/P NEW MOD 45 MIN: CPT | Performed by: THORACIC SURGERY (CARDIOTHORACIC VASCULAR SURGERY)

## 2020-02-25 PROCEDURE — G8484 FLU IMMUNIZE NO ADMIN: HCPCS | Performed by: THORACIC SURGERY (CARDIOTHORACIC VASCULAR SURGERY)

## 2020-02-25 PROCEDURE — 4004F PT TOBACCO SCREEN RCVD TLK: CPT | Performed by: THORACIC SURGERY (CARDIOTHORACIC VASCULAR SURGERY)

## 2020-02-25 PROCEDURE — G8417 CALC BMI ABV UP PARAM F/U: HCPCS | Performed by: THORACIC SURGERY (CARDIOTHORACIC VASCULAR SURGERY)

## 2020-02-25 RX ORDER — CHLORHEXIDINE GLUCONATE 4 G/100ML
SOLUTION TOPICAL ONCE
Status: CANCELLED | OUTPATIENT
Start: 2020-02-25 | End: 2020-02-25

## 2020-02-25 RX ORDER — SODIUM CHLORIDE 9 MG/ML
INJECTION, SOLUTION INTRAVENOUS CONTINUOUS
Status: CANCELLED | OUTPATIENT
Start: 2020-02-25

## 2020-02-25 RX ORDER — SODIUM CHLORIDE 0.9 % (FLUSH) 0.9 %
10 SYRINGE (ML) INJECTION PRN
Status: CANCELLED | OUTPATIENT
Start: 2020-02-25

## 2020-02-25 RX ORDER — CHLORHEXIDINE GLUCONATE 0.12 MG/ML
15 RINSE ORAL ONCE
Status: CANCELLED | OUTPATIENT
Start: 2020-02-25 | End: 2020-02-25

## 2020-02-25 RX ORDER — SODIUM CHLORIDE 0.9 % (FLUSH) 0.9 %
10 SYRINGE (ML) INJECTION EVERY 12 HOURS SCHEDULED
Status: CANCELLED | OUTPATIENT
Start: 2020-02-25

## 2020-02-25 ASSESSMENT — ENCOUNTER SYMPTOMS
COLOR CHANGE: 0
ABDOMINAL PAIN: 0
SHORTNESS OF BREATH: 0

## 2020-02-25 NOTE — H&P
Stroke Father      Kidney Disease Father           dialysis    High Blood Pressure Sister      Other Brother      COPD Brother      High Blood Pressure Brother                 Allergies   Allergen Reactions    Sulfa Antibiotics         Told as a child            Current Outpatient Medications:     methylphenidate (RITALIN) 20 MG tablet, , Disp: , Rfl:     cyclobenzaprine (FLEXERIL) 10 MG tablet, Take 1 tablet by mouth 3 times daily as needed for Muscle spasms, Disp: 12 tablet, Rfl: 0    nicotine (NICODERM CQ) 21 MG/24HR, Place 1 patch onto the skin every 24 hours, Disp: 30 patch, Rfl: 1    atorvastatin (LIPITOR) 80 MG tablet, Take 1 tablet by mouth daily, Disp: 90 tablet, Rfl: 3    aspirin EC 81 MG EC tablet, Take 1 tablet by mouth daily (Patient taking differently: Take 81 mg by mouth daily Pt too 325 ASA for cath on 2/11/2020), Disp: 90 tablet, Rfl: 3    nitroGLYCERIN (NITROSTAT) 0.4 MG SL tablet, Place 1 tablet under the tongue every 5 minutes as needed for Chest pain, Disp: 25 tablet, Rfl: 3    busPIRone (BUSPAR) 10 MG tablet, , Disp: , Rfl:     venlafaxine (EFFEXOR XR) 37.5 MG extended release capsule, , Disp: , Rfl:     albuterol sulfate  (90 Base) MCG/ACT inhaler, Inhale 2 puffs into the lungs every 6 hours as needed for Wheezing, Disp: 1 Inhaler, Rfl: 3    Cholecalciferol (VITAMIN D3 ULTRA POTENCY) 1.25 MG (44614 UT) TABS, Take 50,000 Int'l Units/day by mouth once a week, Disp: 30 tablet, Rfl: 2    DULERA 100-5 MCG/ACT inhaler, Inhale 2 puffs into the lungs 2 times daily, Disp: 1 Inhaler, Rfl: 3    FreeStyle Lancets MISC, USE AS DIRECTED, Disp: 100 each, Rfl: 3    insulin aspart (NOVOLOG) 100 UNIT/ML injection vial, Use tid with sliding scale, Disp: 5 Package, Rfl: 5    insulin glargine (BASAGLAR KWIKPEN) 100 UNIT/ML injection pen, Inject 110 Units into the skin nightly, Disp: 5 pen, Rfl: 3    lisinopril (PRINIVIL;ZESTRIL) 40 MG tablet, Take 1 tablet by mouth daily, Disp: 30 tablet, Rfl: 3    metFORMIN (GLUCOPHAGE) 850 MG tablet, Take 1 tablet by mouth 3 times daily, Disp: 30 tablet, Rfl: 3    amLODIPine (NORVASC) 5 MG tablet, Take 1 tablet by mouth daily Take 5 mg by mouth daily, Disp: 30 tablet, Rfl: 2    HYDROcodone-acetaminophen (NORCO)  MG per tablet, Take 1 tablet by mouth every 6 hours as needed for Pain., Disp: , Rfl:     blood glucose test strips (EXACTECH TEST) strip, Use AS DIRECTED, Disp: 100 each, Rfl: 5    Insulin Pen Needle (PEN NEEDLES 29GX1/2\") 29G X 12MM MISC, USE AS DIRECTED, Disp: 100 each, Rfl: 5    Alcohol Swabs 70 % PADS, , Disp: , Rfl:     blood glucose test strips (ASCENSIA AUTODISC VI;ONE TOUCH ULTRA TEST VI) strip, , Disp: , Rfl:     prazosin (MINIPRESS) 5 MG capsule, take 1 capsule by mouth once daily at bedtime, Disp: , Rfl: 0    BD PEN NEEDLE PRAMOD U/F 32G X 4 MM MISC, , Disp: , Rfl: 0     Social History            Tobacco Use    Smoking status: Current Every Day Smoker       Packs/day: 1.00       Years: 25.00       Pack years: 25.00       Types: Cigarettes       Start date: 6/7/1994    Smokeless tobacco: Current User       Types: Chew, Snuff   Substance Use Topics    Alcohol use: No             Vitals:     02/25/20 1012   BP: 136/83   Site: Left Upper Arm   Position: Sitting   Pulse: 100   Weight: (!) 325 lb (147.4 kg)   Height: 6' 4\" (1.93 m)            Review of Systems   Constitutional: Negative for fatigue. Respiratory: Negative for shortness of breath. Cardiovascular: Positive for palpitations. + chest pressure   Gastrointestinal: Negative for abdominal pain. Skin: Negative for color change. Neurological: Negative for dizziness, syncope and light-headedness.         Objective:   Physical Exam  Constitutional:       General: He is not in acute distress. Neck:      Musculoskeletal: Normal range of motion. Cardiovascular:      Rate and Rhythm: Normal rate.    Pulmonary:      Effort: Pulmonary effort is normal. No

## 2020-02-25 NOTE — LETTER
[address] [facility]  [address] [address] [facility] [address] [place] [address]  Phone: [phone]  Fax: [phone]    Kirstin Lyons MD        2020       Patient: Mert Mcgraw   MR Number: [id number]   YOB: 1975   Date of Visit: 2020       Dear Dr. Bebe Regan: Thank you for the request for consultation for Mert Mcgraw to me. Below are the relevant portions of my assessment and plan of care.   Past Medical History:   Diagnosis Date    CAD (coronary artery disease)     [facility]     Diabetes mellitus ([facility]     Hyperlipidemia     Hypertension     Mediastinal mass     Neuropathy     Obesity     329 #       Past Surgical History:   Procedure Laterality Date    CARDIAC CATHETERIZATION  2020    Dr. Flor Kim- multi vessel disease    THORACOSCOPY  2017    resection of mediastinal mass    TONSILLECTOMY      as a child       Family History   Problem Relation Age of Onset    Heart Disease Mother         MI age 39     High Blood Pressure Mother     Diabetes Father     Heart Disease Father         MI    High Blood Pressure Father     High Cholesterol Father     Cancer Father     Stroke Father     Kidney Disease Father         dialysis    High Blood Pressure Sister     Other Brother     COPD Brother     High Blood Pressure Brother        Allergies   Allergen Reactions    Sulfa Antibiotics      Told as a child         Current Outpatient Medications:     methylphenidate (RITALIN) 20 MG tablet, , Disp: , Rfl:     cyclobenzaprine (FLEXERIL) 10 MG tablet, Take 1 tablet by mouth 3 times daily as needed for Muscle spasms, Disp: 12 tablet, Rfl: 0    nicotine (NICODERM CQ) 21 MG/24HR, Place 1 patch onto the skin every 24 hours, Disp: 30 patch, Rfl: 1    atorvastatin (LIPITOR) 80 MG tablet, Take 1 tablet by mouth daily, Disp: 90 tablet, Rfl: 3 VI) strip, , Disp: , Rfl:     prazosin (MINIPRESS) 5 MG capsule, take 1 capsule by mouth once daily at bedtime, Disp: , Rfl: 0    BD PEN NEEDLE PRAMOD U/F 32G X 4 MM MISC, , Disp: , Rfl: 0    Social History     Tobacco Use    Smoking status: Current Every Day Smoker     Packs/day: 1.00     Years: 25.00     Pack years: 25.00     Types: Cigarettes     Start date: 6/7/1994    Smokeless tobacco: Current User     Types: Chew, Snuff   Substance Use Topics    Alcohol use: No       Vitals:    02/25/20 1012   BP: 136/83   Site: Left Upper Arm   Position: Sitting   Pulse: 100   Weight: (!) 325 lb (147.4 kg)   Height: 6' 4\" (1.93 m)       Subjective:      Patient ID: Lefty Walsh is a 38 y.o. male. Chief Complaint   Patient presents with    Consultation     discuss surgery     HPI   Mr. Adarsh Dai is a 25-year-old male presenting to office to discuss possible surgical intervention. He has been following with cardiology due to a history of palpitations, and recently has reported chest discomfort and mild exertional dyspnea. He underwent a coronary CT angiogram that showed a total calcium score of 367, with the LAD having extensive calcified and noncalcified plaque without stenosis as well as probable total mid circumflex and RCA occlusion. This prompted him to undergo a cardiac catheterization on 2-, which revealed a left main with 0% stenosis, LAD with 40, 60 to 70% stenosis, diagonal 2 with 90% stenosis, circumflex with 99% stenosis, OM with 99% stenosis, RCA dominant with 85% stenosis, and a PDA with 85% stenosis. His history includes hypertension, hyperlipidemia, diabetes, COPD, neuropathy, and a mediastinal mass which was resected in 2017. He currently denies any chest pain at rest, S OB at rest, or any other significant complaints. Review of Systems   Constitutional: Negative for fatigue. Respiratory: Negative for shortness of breath. Cardiovascular: Positive for palpitations.

## 2020-02-25 NOTE — PROGRESS NOTES
Status: He is alert and oriented to person, place, and time. Psychiatric:         Mood and Affect: Mood normal.         Assessment:      CAD      Plan:      CABG to LAD, OM and RCA sequence onto PDA. His echo shows at least moderate MR and not surprising considering his OM is essentially . I would like him to get a PATRICE with Dr. Medina Valdivia before surgery to confirm or deny the severity of his MR (moderate is left alone, severe needs mitral replacement).   He will work on dental.  PAT on 3/12 and OR 3/16 at 7am.

## 2020-02-27 ENCOUNTER — TELEPHONE (OUTPATIENT)
Dept: CARDIOLOGY CLINIC | Age: 45
End: 2020-02-27

## 2020-02-27 NOTE — TELEPHONE ENCOUNTER
Pt states he received a call yesterday from the office regarding his procedure on 03-03-20.  Please return call to [phone]

## 2020-02-28 ENCOUNTER — TELEPHONE (OUTPATIENT)
Dept: CARDIOTHORACIC SURGERY | Age: 45
End: 2020-02-28

## 2020-03-02 ENCOUNTER — OFFICE VISIT (OUTPATIENT)
Dept: FAMILY MEDICINE CLINIC | Age: 45
End: 2020-03-02
Payer: COMMERCIAL

## 2020-03-02 VITALS
HEIGHT: 76 IN | BODY MASS INDEX: 38.36 KG/M2 | RESPIRATION RATE: 18 BRPM | WEIGHT: 315 LBS | TEMPERATURE: 98.7 F | OXYGEN SATURATION: 97 % | SYSTOLIC BLOOD PRESSURE: 132 MMHG | DIASTOLIC BLOOD PRESSURE: 84 MMHG | HEART RATE: 87 BPM

## 2020-03-02 PROCEDURE — G8484 FLU IMMUNIZE NO ADMIN: HCPCS | Performed by: PHYSICIAN ASSISTANT

## 2020-03-02 PROCEDURE — G8427 DOCREV CUR MEDS BY ELIG CLIN: HCPCS | Performed by: PHYSICIAN ASSISTANT

## 2020-03-02 PROCEDURE — 4004F PT TOBACCO SCREEN RCVD TLK: CPT | Performed by: PHYSICIAN ASSISTANT

## 2020-03-02 PROCEDURE — 99213 OFFICE O/P EST LOW 20 MIN: CPT | Performed by: PHYSICIAN ASSISTANT

## 2020-03-02 PROCEDURE — G8417 CALC BMI ABV UP PARAM F/U: HCPCS | Performed by: PHYSICIAN ASSISTANT

## 2020-03-02 RX ORDER — TOBRAMYCIN 3 MG/ML
1 SOLUTION/ DROPS OPHTHALMIC EVERY 4 HOURS
Qty: 1 BOTTLE | Refills: 0 | Status: SHIPPED | OUTPATIENT
Start: 2020-03-02 | End: 2020-03-12

## 2020-03-02 RX ORDER — ERGOCALCIFEROL 1.25 MG/1
CAPSULE ORAL
COMMUNITY
Start: 2020-02-22 | End: 2020-03-06

## 2020-03-02 NOTE — PROGRESS NOTES
3/2/2020   [address]  [facility] [address]  [address]  : 1975  Age: 38 y.o. Sex: male      Subjective:  Chief Complaint   Patient presents with    Eye Drainage       HPI:  The patient states discharge from the left eye for the last 2 days. Patient states the last 2 days he has woken up with his eye matted shut in the morning. The patient denies any trauma or injury to the eye. The patient denies any blurred or double vision. Patient denies pain to the eye. Patient denies any upper respiratory symptoms. The patient does not wear contacts. The patient denies any coughing, sneezing and or heavy lifting. No fevers or chills. No headache or dizziness. No contact exposures. ROS:  Positive and pertinent negatives as per HPI. All other systems are reviewed and negative.        Current Outpatient Medications:     vitamin D (ERGOCALCIFEROL) 1.25 MG (41809 UT) CAPS capsule, take 1 capsule by mouth every week, Disp: , Rfl:     tobramycin (TOBREX) 0.3 % ophthalmic solution, Apply 1 drop to eye every 4 hours for 10 days, Disp: 1 Bottle, Rfl: 0    methylphenidate (RITALIN) 20 MG tablet, , Disp: , Rfl:     cyclobenzaprine (FLEXERIL) 10 MG tablet, Take 1 tablet by mouth 3 times daily as needed for Muscle spasms, Disp: 12 tablet, Rfl: 0    nicotine (NICODERM CQ) 21 MG/24HR, Place 1 patch onto the skin every 24 hours, Disp: 30 patch, Rfl: 1    atorvastatin (LIPITOR) 80 MG tablet, Take 1 tablet by mouth daily, Disp: 90 tablet, Rfl: 3    aspirin EC 81 MG EC tablet, Take 1 tablet by mouth daily (Patient taking differently: Take 81 mg by mouth daily Pt too 325 ASA for cath on 2020), Disp: 90 tablet, Rfl: 3    nitroGLYCERIN (NITROSTAT) 0.4 MG SL tablet, Place 1 tablet under the tongue every 5 minutes as needed for Chest pain, Disp: 25 tablet, Rfl: 3    busPIRone (BUSPAR) 10 MG tablet, , Disp: , Rfl:     venlafaxine (EFFEXOR XR) 37.5 MG extended release (1.93 m)        Exam:  Const: Appears healthy and well developed. No signs of acute distress present. Vitals reviewed per triage. Head/Face: Normocephalic, atraumatic. Facies is symmetric. Eyes: Pupils are equal round and reactive to light. Extraocular movements are intact. Eyelids are normal without inflammation. Conjunctiva involving right eye is clear. No discharge noted. Conjunctiva involving left eye is erythematous with discharge noted. No gross abnormalities on fundoscopic exam.  The globe is soft bilaterally. ENMT: Tympanic membranes are pearly gray with good light reflex bilaterally. Nares are patent. Buccal mucosa is moist. No erythema in the posterior pharynx. Neck: Supple and symmetric. Palpation reveals no adenopathy. Trachea midline. Resp:  No respiratory distress. Musculo: Patient moves extremities without pain or limitation. Skin: Skin is warm and dry. Neuro: Alert and oriented x3. Speech is articulate and fluent. Psych: Mood/Affect: Patient's mood and affect is appropriate to situation. Eddye Heal was seen today for eye drainage. Diagnoses and all orders for this visit:    Conjunctivitis of left eye, unspecified conjunctivitis type    Other orders  -     tobramycin (TOBREX) 0.3 % ophthalmic solution; Apply 1 drop to eye every 4 hours for 10 days        Return for Follow up with PCP in 5 days for re-evaluation. .    Seen By:    RICKY Munoz

## 2020-03-03 ENCOUNTER — ANESTHESIA EVENT (OUTPATIENT)
Dept: CARDIAC CATH/INVASIVE PROCEDURES | Age: 45
End: 2020-03-03

## 2020-03-03 ENCOUNTER — HOSPITAL ENCOUNTER (OUTPATIENT)
Dept: CARDIAC CATH/INVASIVE PROCEDURES | Age: 45
Discharge: HOME | End: 2020-03-03
Payer: COMMERCIAL

## 2020-03-03 ENCOUNTER — ANESTHESIA (OUTPATIENT)
Dept: CARDIAC CATH/INVASIVE PROCEDURES | Age: 45
End: 2020-03-03

## 2020-03-03 VITALS
HEIGHT: 76 IN | RESPIRATION RATE: 26 BRPM | SYSTOLIC BLOOD PRESSURE: 101 MMHG | TEMPERATURE: 98.4 F | WEIGHT: 315 LBS | HEART RATE: 96 BPM | BODY MASS INDEX: 38.36 KG/M2 | OXYGEN SATURATION: 99 % | DIASTOLIC BLOOD PRESSURE: 67 MMHG

## 2020-03-03 VITALS
DIASTOLIC BLOOD PRESSURE: 98 MMHG | OXYGEN SATURATION: 97 % | RESPIRATION RATE: 23 BRPM | SYSTOLIC BLOOD PRESSURE: 134 MMHG

## 2020-03-03 LAB
LV EF: 60 %
LVEF MODALITY: NORMAL
METER GLUCOSE: 144 MG/DL (ref 74–99)

## 2020-03-03 PROCEDURE — 93312 ECHO TRANSESOPHAGEAL: CPT

## 2020-03-03 PROCEDURE — 2709999900 HC NON-CHARGEABLE SUPPLY

## 2020-03-03 PROCEDURE — 93325 DOPPLER ECHO COLOR FLOW MAPG: CPT

## 2020-03-03 PROCEDURE — 6370000000 HC RX 637 (ALT 250 FOR IP): Performed by: ANESTHESIOLOGIST ASSISTANT

## 2020-03-03 PROCEDURE — 93320 DOPPLER ECHO COMPLETE: CPT | Performed by: INTERNAL MEDICINE

## 2020-03-03 PROCEDURE — 93312 ECHO TRANSESOPHAGEAL: CPT | Performed by: INTERNAL MEDICINE

## 2020-03-03 PROCEDURE — 82962 GLUCOSE BLOOD TEST: CPT

## 2020-03-03 PROCEDURE — 93325 DOPPLER ECHO COLOR FLOW MAPG: CPT | Performed by: INTERNAL MEDICINE

## 2020-03-03 PROCEDURE — 6360000002 HC RX W HCPCS: Performed by: ANESTHESIOLOGIST ASSISTANT

## 2020-03-03 PROCEDURE — 3700000001 HC ADD 15 MINUTES (ANESTHESIA)

## 2020-03-03 PROCEDURE — 93321 DOPPLER ECHO F-UP/LMTD STD: CPT

## 2020-03-03 PROCEDURE — 2580000003 HC RX 258: Performed by: ANESTHESIOLOGIST ASSISTANT

## 2020-03-03 PROCEDURE — 3700000000 HC ANESTHESIA ATTENDED CARE

## 2020-03-03 RX ORDER — PROPOFOL 10 MG/ML
INJECTION, EMULSION INTRAVENOUS PRN
Status: DISCONTINUED | OUTPATIENT
Start: 2020-03-03 | End: 2020-03-03 | Stop reason: SDUPTHER

## 2020-03-03 RX ORDER — ALBUTEROL SULFATE 90 UG/1
AEROSOL, METERED RESPIRATORY (INHALATION) PRN
Status: DISCONTINUED | OUTPATIENT
Start: 2020-03-03 | End: 2020-03-03 | Stop reason: SDUPTHER

## 2020-03-03 RX ORDER — SODIUM CHLORIDE 9 MG/ML
INJECTION, SOLUTION INTRAVENOUS CONTINUOUS PRN
Status: DISCONTINUED | OUTPATIENT
Start: 2020-03-03 | End: 2020-03-03 | Stop reason: SDUPTHER

## 2020-03-03 RX ADMIN — PROPOFOL 220 MG: 10 INJECTION, EMULSION INTRAVENOUS at 08:37

## 2020-03-03 RX ADMIN — ALBUTEROL SULFATE 2 PUFF: 108 AEROSOL, METERED RESPIRATORY (INHALATION) at 08:24

## 2020-03-03 RX ADMIN — SODIUM CHLORIDE: 9 INJECTION, SOLUTION INTRAVENOUS at 08:26

## 2020-03-03 ASSESSMENT — LIFESTYLE VARIABLES: SMOKING_STATUS: 1

## 2020-03-03 NOTE — ANESTHESIA PRE PROCEDURE
hours.    Coags: No results found for: PROTIME, INR, APTT    HCG (If Applicable): No results found for: PREGTESTUR, PREGSERUM, HCG, HCGQUANT     ABGs: No results found for: PHART, PO2ART, ECJ6OMO, JVI7CDI, BEART, C0ZCPYAY     Type & Screen (If Applicable):  No results found for: LABABO, LABRH     EK2020  Sinus  Rhythm (96 bpm)  -Left axis. ABNORMAL     ECHO: 2019  TTE procedure:Echo Complete W/ Dop & Color Flow. Procedure Date  Date: 2019 Start: 08:00 AM     Study Location: Echo Lab  Technical Quality: Adequate visualization     Indications:Shortness of breath.     Patient Status: Routine     Height: 76 inches Weight: 320 pounds BSA: 2.71 m^2 BMI: 38.95 kg/m^2     BP: 112/76 mmHg      Findings      Left Ventricle   Normal left ventricle size. Normal left ventricle wall thickness. Normal left ventricle wall thickness. Normal diastolic function. Ejection fraction is visually estimated at 55-65%. Right Ventricle   Normal right ventricular size and function. Left Atrium   Normal left atrium. Right Atrium   Normal right atrium size. Mitral Valve   Normal mitral valve structure   Moderate mitral regurgitation      Tricuspid Valve   The tricuspid valve appears structurally normal.   Mild tricuspid regurgitation. RVSP is 24 mmHg. TR velocity = 2.48 m/s   No pulmonary hypertension      Aortic Valve   Structurally normal aortic valve. Pulmonic Valve   Pulmonic valve is structurally normal.      Pericardial Effusion   No pericardial effusion. Aorta   The aorta is within normal limits. Conclusions      Summary   Normal diastolic function. Ejection fraction is visually estimated at 55-65%. Normal left atrium. Normal mitral valve structure   Moderate mitral regurgitation   Structurally normal aortic valve. No pulmonary hypertension   No pericardial effusion. C. Cath: 2020     PROCEDURES:  1. Left heart catheterization.   2.   IFR of the

## 2020-03-06 NOTE — PROGRESS NOTES
[address] PRE-ADMISSION TESTING GENERAL INSTRUCTIONS- [facility]-phone number:[phone]    GENERAL INSTRUCTIONS  [x] Antibacterial Soap shower Night before and/or AM of Surgery  [x]Hibiclens shower  the night before and the morning of surgery. Do not use             Hibiclens on your face or head. [x] Nothing by mouth after midnight, including gum, candy, mints, or water. [x] You may brush your teeth, gargle, but do NOT swallow water. [x]No smoking, chewing tobacco, illegal drugs, or alcohol within 24 hours of your surgery. [x] Jewelry, valuables or body piercing's should not be brought to the hospital. All body and/or tongue piercing's must be removed prior to arriving to hospital.   [x] Do not wear lotions, powders, deodorant. [] Bring insurance card and photo ID. [x] Transfusion Bracelet: Please bring with you to hospital, day of surgery  [x] Use inhalers the morning of surgery and bring with you to hospital.     PARKING INSTRUCTIONS:   [x] Arrival Time:_5 AM, MARCH 16.  · [x] Parking lot '\"I\"  is located on [facility] (the corner of [facility] and [facility]). To enter, press the button and the gate will lift. A free token will be provided to exit the lot. One car per patient is allowed to park in this lot. All other cars are to park on [address] [place] [address] either in the parking garage or the handicap lot. [] To reach the [facility] from [address] [place] [address], upon entering the hospital, take elevator B to the 3rd floor. EDUCATION INSTRUCTIONS:    [x] [address] placed in chart. [x] Pre-admission Testing educational folder given  [x] Incentive Spirometry,coughing & deep breathing exercises reviewed. [x]Medication information sheet(s)   [x]Fluoroscopy-Xray used in surgery reviewed with patient. Educational pamphlet placed in chart. [x]Pain: Post-op pain is normal and to be expected.   You will be asked to rate your pain from 0-10(a zero is [x]  Delays may occur with surgery and staff will make a sincere effort to keep you informed of delays. If any delays occur with your procedure, we apologize ahead of time for your inconvenience as we recognize the value of your time.

## 2020-03-12 ENCOUNTER — HOSPITAL ENCOUNTER (OUTPATIENT)
Dept: INTERVENTIONAL RADIOLOGY/VASCULAR | Age: 45
Discharge: HOME | End: 2020-03-14
Payer: COMMERCIAL

## 2020-03-12 ENCOUNTER — HOSPITAL ENCOUNTER (OUTPATIENT)
Dept: PREADMISSION TESTING | Age: 45
Discharge: HOME | End: 2020-03-12
Payer: COMMERCIAL

## 2020-03-12 ENCOUNTER — HOSPITAL ENCOUNTER (OUTPATIENT)
Dept: ULTRASOUND IMAGING | Age: 45
Discharge: HOME | End: 2020-03-14
Payer: COMMERCIAL

## 2020-03-12 ENCOUNTER — HOSPITAL ENCOUNTER (OUTPATIENT)
Dept: GENERAL RADIOLOGY | Age: 45
Discharge: HOME | End: 2020-03-14
Payer: COMMERCIAL

## 2020-03-12 ENCOUNTER — HOSPITAL ENCOUNTER (OUTPATIENT)
Dept: PULMONOLOGY | Age: 45
Discharge: HOME | End: 2020-03-12
Payer: COMMERCIAL

## 2020-03-12 ENCOUNTER — ANESTHESIA EVENT (OUTPATIENT)
Dept: OPERATING ROOM | Age: 45
DRG: 166 | End: 2020-03-12
Payer: COMMERCIAL

## 2020-03-12 VITALS
DIASTOLIC BLOOD PRESSURE: 85 MMHG | RESPIRATION RATE: 12 BRPM | OXYGEN SATURATION: 97 % | HEIGHT: 76 IN | BODY MASS INDEX: 38.36 KG/M2 | HEART RATE: 90 BPM | WEIGHT: 315 LBS | TEMPERATURE: 97.9 F | SYSTOLIC BLOOD PRESSURE: 136 MMHG

## 2020-03-12 LAB
ABO/RH: NORMAL
ANION GAP SERPL CALCULATED.3IONS-SCNC: 14 MMOL/L (ref 7–16)
ANTIBODY SCREEN: NORMAL
APTT: 36.3 SEC (ref 24.5–35.1)
BILIRUBIN URINE: ABNORMAL
BLOOD, URINE: NEGATIVE
BUN BLDV-MCNC: 16 MG/DL (ref 6–20)
CALCIUM SERPL-MCNC: 10.1 MG/DL (ref 8.6–10.2)
CHLORIDE BLD-SCNC: 98 MMOL/L (ref 98–107)
CLARITY: CLEAR
CO2: 25 MMOL/L (ref 22–29)
COLOR: YELLOW
CREAT SERPL-MCNC: 0.6 MG/DL (ref 0.7–1.2)
GFR AFRICAN AMERICAN: >60
GFR NON-AFRICAN AMERICAN: >60 ML/MIN/1.73
GLUCOSE BLD-MCNC: 188 MG/DL (ref 74–99)
GLUCOSE URINE: NEGATIVE MG/DL
HBA1C MFR BLD: 8.4 % (ref 4–5.6)
HCT VFR BLD CALC: 46.2 % (ref 37–54)
HEMOGLOBIN: 15.5 G/DL (ref 12.5–16.5)
INR BLD: 1.1
KETONES, URINE: ABNORMAL MG/DL
LEUKOCYTE ESTERASE, URINE: NEGATIVE
MCH RBC QN AUTO: 30.8 PG (ref 26–35)
MCHC RBC AUTO-ENTMCNC: 33.5 % (ref 32–34.5)
MCV RBC AUTO: 91.7 FL (ref 80–99.9)
NITRITE, URINE: NEGATIVE
PDW BLD-RTO: 12.9 FL (ref 11.5–15)
PH UA: 5 (ref 5–9)
PLATELET # BLD: 279 E9/L (ref 130–450)
PMV BLD AUTO: 9.1 FL (ref 7–12)
POTASSIUM SERPL-SCNC: 3.9 MMOL/L (ref 3.5–5)
PROTEIN UA: NEGATIVE MG/DL
PROTHROMBIN TIME: 12.2 SEC (ref 9.3–12.4)
RBC # BLD: 5.04 E12/L (ref 3.8–5.8)
SODIUM BLD-SCNC: 137 MMOL/L (ref 132–146)
SPECIFIC GRAVITY UA: >=1.03 (ref 1–1.03)
UROBILINOGEN, URINE: 1 E.U./DL
WBC # BLD: 8.4 E9/L (ref 4.5–11.5)

## 2020-03-12 PROCEDURE — 85610 PROTHROMBIN TIME: CPT

## 2020-03-12 PROCEDURE — 86923 COMPATIBILITY TEST ELECTRIC: CPT

## 2020-03-12 PROCEDURE — 36415 COLL VENOUS BLD VENIPUNCTURE: CPT

## 2020-03-12 PROCEDURE — 94010 BREATHING CAPACITY TEST: CPT

## 2020-03-12 PROCEDURE — 93923 UPR/LXTR ART STDY 3+ LVLS: CPT

## 2020-03-12 PROCEDURE — 93880 EXTRACRANIAL BILAT STUDY: CPT

## 2020-03-12 PROCEDURE — 86850 RBC ANTIBODY SCREEN: CPT

## 2020-03-12 PROCEDURE — 86901 BLOOD TYPING SEROLOGIC RH(D): CPT

## 2020-03-12 PROCEDURE — 87088 URINE BACTERIA CULTURE: CPT

## 2020-03-12 PROCEDURE — 94729 DIFFUSING CAPACITY: CPT

## 2020-03-12 PROCEDURE — 83036 HEMOGLOBIN GLYCOSYLATED A1C: CPT

## 2020-03-12 PROCEDURE — 94726 PLETHYSMOGRAPHY LUNG VOLUMES: CPT | Performed by: INTERNAL MEDICINE

## 2020-03-12 PROCEDURE — 85027 COMPLETE CBC AUTOMATED: CPT

## 2020-03-12 PROCEDURE — 94729 DIFFUSING CAPACITY: CPT | Performed by: INTERNAL MEDICINE

## 2020-03-12 PROCEDURE — 85730 THROMBOPLASTIN TIME PARTIAL: CPT

## 2020-03-12 PROCEDURE — 93922 UPR/L XTREMITY ART 2 LEVELS: CPT

## 2020-03-12 PROCEDURE — 86900 BLOOD TYPING SEROLOGIC ABO: CPT

## 2020-03-12 PROCEDURE — 81003 URINALYSIS AUTO W/O SCOPE: CPT

## 2020-03-12 PROCEDURE — 94060 EVALUATION OF WHEEZING: CPT | Performed by: INTERNAL MEDICINE

## 2020-03-12 PROCEDURE — 80048 BASIC METABOLIC PNL TOTAL CA: CPT

## 2020-03-12 PROCEDURE — 87081 CULTURE SCREEN ONLY: CPT

## 2020-03-12 PROCEDURE — 94375 RESPIRATORY FLOW VOLUME LOOP: CPT

## 2020-03-12 PROCEDURE — 71046 X-RAY EXAM CHEST 2 VIEWS: CPT

## 2020-03-12 PROCEDURE — 93970 EXTREMITY STUDY: CPT

## 2020-03-12 ASSESSMENT — LIFESTYLE VARIABLES: SMOKING_STATUS: 1

## 2020-03-12 NOTE — ANESTHESIA PRE PROCEDURE
Department of Anesthesiology  Preprocedure Note       Name:  Danielle Reilly   Age:  38 y.o.  :  1975                                          MRN:  [id number]         Date:  3/16/2020      Surgeon: Hayden John):  Davina Valencia MD    Procedure: CORONARY ARTERY BYPASS POSSIBLE LEFT RADIAL ARTERY HARVEST, PATRICE (N/A )    Medications prior to admission:   Prior to Admission medications    Medication Sig Start Date End Date Taking? Authorizing Provider   atorvastatin (LIPITOR) 80 MG tablet Take 1 tablet by mouth daily 20  Yes Juaquin Rossi MD   aspirin EC 81 MG EC tablet Take 1 tablet by mouth daily  Patient taking differently: Take 81 mg by mouth every evening  20  Yes Juaquin Rossi MD   nitroGLYCERIN (NITROSTAT) 0.4 MG SL tablet Place 1 tablet under the tongue every 5 minutes as needed for Chest pain 20  Yes Juaquin Rossi MD   albuterol sulfate  (90 Base) MCG/ACT inhaler Inhale 2 puffs into the lungs every 6 hours as needed for Wheezing 20  Yes Ac Shields MD   insulin aspart (NOVOLOG) 100 UNIT/ML injection vial Use tid with sliding scale 20  Yes Ac Shields MD   insulin glargine (BASAGLAR KWIKPEN) 100 UNIT/ML injection pen Inject 110 Units into the skin nightly 20  Yes Ac Shields MD   lisinopril (PRINIVIL;ZESTRIL) 40 MG tablet Take 1 tablet by mouth daily  Patient taking differently: Take 40 mg by mouth every evening  20  Yes Ac Shields MD   metFORMIN (GLUCOPHAGE) 850 MG tablet Take 1 tablet by mouth 3 times daily 20  Yes Ac Shields MD   amLODIPine (NORVASC) 5 MG tablet Take 1 tablet by mouth daily Take 5 mg by mouth daily  Patient taking differently: Take 5 mg by mouth every evening Take 5 mg by mouth daily 2/5/20 3/16/20 Yes Ac Shields MD   HYDROcodone-acetaminophen (NORCO)  MG per tablet Take 1 tablet by mouth every 6 hours as needed for Pain.    Yes Historical Provider, MD   prazosin (MINIPRESS) 5 MG capsule take 1 capsule by mouth 279 03/12/2020       CMP:   Lab Results   Component Value Date     03/12/2020    K 3.9 03/12/2020    CL 98 03/12/2020    CO2 25 03/12/2020    BUN 16 03/12/2020    CREATININE 0.6 03/12/2020    GFRAA >60 03/12/2020    LABGLOM >60 03/12/2020    GLUCOSE 188 03/12/2020    PROT 7.6 02/05/2020    CALCIUM 10.1 03/12/2020    BILITOT 0.5 02/05/2020    ALKPHOS 81 02/05/2020    AST 11 02/05/2020    ALT 14 02/05/2020       POC Tests: No results for input(s): POCGLU, POCNA, POCK, POCCL, POCBUN, POCHEMO, POCHCT in the last 72 hours. Coags:   Lab Results   Component Value Date    PROTIME 12.2 03/12/2020    INR 1.1 03/12/2020    APTT 36.3 03/12/2020       HCG (If Applicable): No results found for: PREGTESTUR, PREGSERUM, HCG, HCGQUANT     ABGs: No results found for: PHART, PO2ART, UMK3FGV, OLV8HKQ, BEART, L9LPXUPG     Type & Screen (If Applicable):  No results found for: LABABO, [address]      Carotid US 3/12/2020  FINDINGS:   Right common carotid flow velocity is elevated measuring 163 cm/s. There is no flow in the proximal mid or distal right ICA. Right ECA is   patent with a flow velocity 164 cm/s. ICA\CCA ratios are  within normal limits on the left measuring 0.6. The right vertebral artery doppler images demonstrate  antegrade flow.       The left vertebral artery doppler images demonstrate  antegrade flow. Grey scale images demonstrate mild plaque identified in the right and   left carotid arteries.               Impression   Atherosclerotic disease. Right ICA is occluded at its origin       Estimated stenosis by NASCET criteria in the proximal left carotid   artery is between 0% and 49%.      CTA Coronary 1/31/2020  Impression   Extensive atherosclerotic disease of the LAD, without   hemodynamically significant stenosis   Essentially nonvisualization of the circumflex artery beyond its   origin highly suspicious for severe stenosis or occlusion   Findings compatible severe stenosis of the right mid coronary artery,   significant motion in this region is noted. The findings could be   related to severe motion artifact. ECHO 3/3/2020     Procedure Date  Date: 03/03/2020 Start: 08:23 AM     Study Location: Portable  Technical Quality: Adequate visualization     Indications:Mitral regurgitation.     Patient Status: Routine     Contrast Medium: Bubble Study.     Height: 76 inches Weight: 325 pounds BSA: 2.72 m^2 BMI: 39.56 kg/m^2     Rhythm: Within normal limits     PATRICE Performed By: the attending and the sonographer      Type of Anesthesia: Moderate sedation     Allergies    - Sulfa drugs.      Findings      Left Ventricle   Left ventricle size is normal.   Normal left ventricular systolic function. Ejection fraction is visually estimated at 60%. Right Ventricle   Normal right ventricular size and function. Left Atrium   Mildly dilated left atrium. No evidence of a left atrial appendage thrombus. No evidence of right to left interatrial shunting on bubble study. Right Atrium   Normal sized right atrium. Mitral Valve   Mild mitral regurgitation. No evidence of hemodynamically significant mitral stenosis. Tricuspid Valve   Physiologic and/or trace tricuspid regurgitation. Aortic Valve   Aortic valve opens well. The aortic valve is trileaflet. No evidence of hemodynamically significant aortic regurgitation or   stenosis. Pulmonic Valve   No evidence of hemodynamically significant pulmonic regurgitation. Pericardial Effusion   No evidence of a hemodynamically significant pericardial effusion. Aorta   Aortic root dimension within normal limits. Conclusions      Summary   Normal left ventricular systolic function. Ejection fraction is visually estimated at 60%. Normal right ventricular size and function. Mild mitral regurgitation. Signature     EKG 2*/6/2020  Sinus  Rhythm HR 96  -Left axis.      ABNORMAL       Anesthesia Evaluation  Patient summary

## 2020-03-13 LAB — MRSA CULTURE ONLY: NORMAL

## 2020-03-14 LAB — URINE CULTURE, ROUTINE: NORMAL

## 2020-03-14 NOTE — PROCEDURES
[address]                  [address] [facility],  [facility]                               PULMONARY FUNCTION    PATIENT NAME: Julee Martinez                      :        1975  MED REC NO:   [id number]                            ROOM:  ACCOUNT NO:   [de-identified]                           ADMIT DATE: 2020  PROVIDER:     Malia Hood DO    DATE OF PROCEDURE:  2020    INDICATIONS:  A 67-year-old male, 76 inches, 325 pounds. Cigarette  smoker, 25 pack years. FINDINGS:  Pulmonary function test revealed forced vital capacity of  5.67 liters, 90% of predicted with an FEV1 of 4.50 liters, 90% of  predicted with an FEV1/FVC ratio of 79%. Expiratory time is 7.05  seconds. Static lung volumes with slow vital capacity of 5.56 liters, 88% of  predicted. Inspiratory capacity of 5.09 liters, 129% of predicted. Expiratory reserve volume of 0.46 liters, 19% of predicted. Diffusion  capacity of 54.87 mL/minute per mmHg, which is 135% of predicted. Flow  volume loops are normal.    IMPRESSION:  Normal spirometry with no evidence of airflow obstruction. There is no loss in gas transfer. Clinical correlation needed.         Torrey Dos Santos DO    D: 2020 18:41:09       T: 2020 18:50:26     [id number]  Job#: [id number]     Doc#: [id number]    CC:

## 2020-03-16 ENCOUNTER — HOSPITAL ENCOUNTER (INPATIENT)
Age: 45
LOS: 3 days | Discharge: HOME | DRG: 166 | End: 2020-03-19
Attending: THORACIC SURGERY (CARDIOTHORACIC VASCULAR SURGERY) | Admitting: THORACIC SURGERY (CARDIOTHORACIC VASCULAR SURGERY)
Payer: COMMERCIAL

## 2020-03-16 ENCOUNTER — APPOINTMENT (OUTPATIENT)
Dept: GENERAL RADIOLOGY | Age: 45
DRG: 166 | End: 2020-03-16
Attending: THORACIC SURGERY (CARDIOTHORACIC VASCULAR SURGERY)
Payer: COMMERCIAL

## 2020-03-16 ENCOUNTER — ANESTHESIA (OUTPATIENT)
Dept: OPERATING ROOM | Age: 45
DRG: 166 | End: 2020-03-16
Payer: COMMERCIAL

## 2020-03-16 VITALS
RESPIRATION RATE: 7 BRPM | DIASTOLIC BLOOD PRESSURE: 77 MMHG | SYSTOLIC BLOOD PRESSURE: 128 MMHG | OXYGEN SATURATION: 86 %

## 2020-03-16 PROBLEM — I65.21 INTERNAL CAROTID ARTERY OCCLUSION, RIGHT: Status: ACTIVE | Noted: 2020-03-16

## 2020-03-16 PROBLEM — I25.10 CAD IN NATIVE ARTERY: Status: ACTIVE | Noted: 2020-03-16

## 2020-03-16 LAB
AADO2: 193 MMHG
AADO2: 279.7 MMHG
ANION GAP SERPL CALCULATED.3IONS-SCNC: 8 MMOL/L (ref 7–16)
APTT: 30.1 SEC (ref 24.5–35.1)
B.E.: -1.1 MMOL/L (ref -3–0)
B.E.: -1.6 MMOL/L (ref -3–0)
B.E.: -2.4 MMOL/L (ref -3–0)
B.E.: -3.9 MMOL/L (ref -3–3)
B.E.: -4.4 MMOL/L (ref -3–3)
B.E.: -4.7 MMOL/L (ref -3–3)
BLOOD BANK DISPENSE STATUS: NORMAL
BLOOD BANK PRODUCT CODE: NORMAL
BPU ID: NORMAL
BUN BLDV-MCNC: 14 MG/DL (ref 6–20)
CALCIUM SERPL-MCNC: 8.5 MG/DL (ref 8.6–10.2)
CALCIUM SERPL-MCNC: 8.6 MG/DL (ref 8.6–10.2)
CARDIOPULMONARY BYPASS: NO
CARDIOPULMONARY BYPASS: YES
CARDIOPULMONARY BYPASS: YES
CHLORIDE BLD-SCNC: 101 MMOL/L (ref 98–107)
CO2: 23 MMOL/L (ref 22–29)
COHB: 1.1 % (ref 0–1.5)
COHB: 1.2 % (ref 0–1.5)
COHB: 1.5 % (ref 0–1.5)
CREAT SERPL-MCNC: 0.6 MG/DL (ref 0.7–1.2)
CRITICAL: ABNORMAL
CRITICAL: NORMAL
DATE ANALYZED: ABNORMAL
DATE ANALYZED: NORMAL
DATE OF COLLECTION: ABNORMAL
DATE OF COLLECTION: NORMAL
DESCRIPTION BLOOD BANK: NORMAL
DEVICE: ABNORMAL
FIO2: 50 %
FIO2: 60 %
GFR AFRICAN AMERICAN: >60
GFR NON-AFRICAN AMERICAN: >60 ML/MIN/1.73
GLUCOSE BLD-MCNC: 148 MG/DL (ref 74–99)
HCO3 ARTERIAL: 24 MMOL/L (ref 22–26)
HCO3 ARTERIAL: 24.4 MMOL/L (ref 22–26)
HCO3 ARTERIAL: 25.7 MMOL/L (ref 22–26)
HCO3: 21.6 MMOL/L (ref 22–26)
HCO3: 21.6 MMOL/L (ref 22–26)
HCO3: 22.3 MMOL/L (ref 22–26)
HCT (EST): 32 % (ref 37–54)
HCT (EST): 34 % (ref 37–54)
HCT (EST): 35 % (ref 37–54)
HCT VFR BLD CALC: 38.8 % (ref 37–54)
HEMOGLOBIN: 12.8 G/DL (ref 12.5–16.5)
HGB, (EST): 11 G/DL (ref 12.5–15.5)
HGB, (EST): 11.5 G/DL (ref 12.5–15.5)
HGB, (EST): 11.9 G/DL (ref 12.5–15.5)
HHB: 3.1 % (ref 0–5)
HHB: 4.4 % (ref 0–5)
HHB: 5.1 % (ref 0–5)
INR BLD: 1.2
LAB: ABNORMAL
LAB: NORMAL
Lab: ABNORMAL
Lab: NORMAL
MAGNESIUM: 2.4 MG/DL (ref 1.6–2.6)
MCH RBC QN AUTO: 30.2 PG (ref 26–35)
MCHC RBC AUTO-ENTMCNC: 33 % (ref 32–34.5)
MCV RBC AUTO: 91.5 FL (ref 80–99.9)
METER GLUCOSE: 133 MG/DL (ref 74–99)
METER GLUCOSE: 144 MG/DL (ref 74–99)
METER GLUCOSE: 148 MG/DL (ref 74–99)
METER GLUCOSE: 154 MG/DL (ref 74–99)
METER GLUCOSE: 163 MG/DL (ref 74–99)
METER GLUCOSE: 165 MG/DL (ref 74–99)
METER GLUCOSE: 166 MG/DL (ref 74–99)
METER GLUCOSE: 202 MG/DL (ref 74–99)
METER GLUCOSE: 232 MG/DL (ref 74–99)
METER GLUCOSE: 238 MG/DL (ref 74–99)
METHB: 0.3 % (ref 0–1.5)
METHB: 0.4 % (ref 0–1.5)
METHB: 0.5 % (ref 0–1.5)
MODE: ABNORMAL
MODE: ABNORMAL
MODE: AC
MODE: NORMAL
O2 CONTENT: 18.4 ML/DL
O2 CONTENT: 18.6 ML/DL
O2 CONTENT: 18.6 ML/DL
O2 SATURATION: 100 % (ref 92–98.5)
O2 SATURATION: 94.8 % (ref 92–98.5)
O2 SATURATION: 95.5 % (ref 92–98.5)
O2 SATURATION: 96.9 % (ref 92–98.5)
O2 SATURATION: 98 % (ref 92–98.5)
O2 SATURATION: 99.9 % (ref 92–98.5)
O2HB: 93.3 % (ref 94–97)
O2HB: 93.7 % (ref 94–97)
O2HB: 95.4 % (ref 94–97)
OPERATOR ID: ABNORMAL
OPERATOR ID: NORMAL
PATIENT TEMP: 37 C
PCO2 ARTERIAL: 42.9 MMHG (ref 35–45)
PCO2 ARTERIAL: 49.5 MMHG (ref 35–45)
PCO2 ARTERIAL: 51.6 MMHG (ref 35–45)
PCO2: 40.9 MMHG (ref 35–45)
PCO2: 44.5 MMHG (ref 35–45)
PCO2: 46.7 MMHG (ref 35–45)
PDW BLD-RTO: 13 FL (ref 11.5–15)
PEEP/CPAP: 5 CMH2O
PEEP/CPAP: 8 CMH2O
PFO2: 1.4 MMHG/%
PFO2: 1.97 MMHG/%
PH BLOOD GAS: 7.3 (ref 7.35–7.45)
PH BLOOD GAS: 7.31 (ref 7.35–7.45)
PH BLOOD GAS: 7.34 (ref 7.35–7.45)
PH BLOOD GAS: 7.36 (ref 7.35–7.45)
PLATELET # BLD: 198 E9/L (ref 130–450)
PMV BLD AUTO: 9 FL (ref 7–12)
PO2 ARTERIAL: 116.9 MMHG (ref 80–100)
PO2 ARTERIAL: 348.5 MMHG (ref 80–100)
PO2 ARTERIAL: 559.9 MMHG (ref 80–100)
PO2: 77 MMHG (ref 75–100)
PO2: 84.2 MMHG (ref 75–100)
PO2: 98.5 MMHG (ref 75–100)
POTASSIUM SERPL-SCNC: 4.1 MMOL/L (ref 3.5–5.5)
POTASSIUM SERPL-SCNC: 4.17 MMOL/L (ref 3.5–5)
POTASSIUM SERPL-SCNC: 4.2 MMOL/L (ref 3.5–5)
POTASSIUM SERPL-SCNC: 4.3 MMOL/L (ref 3.5–5)
POTASSIUM SERPL-SCNC: 4.39 MMOL/L (ref 3.5–5)
POTASSIUM SERPL-SCNC: 4.41 MMOL/L (ref 3.5–5)
POTASSIUM SERPL-SCNC: 4.43 MMOL/L (ref 3.5–5)
POTASSIUM SERPL-SCNC: 4.5 MMOL/L (ref 3.5–5.5)
POTASSIUM SERPL-SCNC: 5.2 MMOL/L (ref 3.5–5.5)
PROTHROMBIN TIME: 13 SEC (ref 9.3–12.4)
PS: 10 CMH20
RBC # BLD: 4.24 E12/L (ref 3.8–5.8)
RI(T): 1.96
RI(T): 3.32
RR MECHANICAL: 14 B/MIN
SODIUM BLD-SCNC: 132 MMOL/L (ref 132–146)
SOURCE, BLOOD GAS: ABNORMAL
SOURCE, BLOOD GAS: NORMAL
THB: 13.8 G/DL (ref 11.5–16.5)
THB: 14 G/DL (ref 11.5–16.5)
THB: 14.1 G/DL (ref 11.5–16.5)
TIME ANALYZED: 1124
TIME ANALYZED: 1241
TIME ANALYZED: 1359
TIME ANALYZED: 1846
VT MECHANICAL: 600 ML
WBC # BLD: 17 E9/L (ref 4.5–11.5)

## 2020-03-16 PROCEDURE — 6360000002 HC RX W HCPCS

## 2020-03-16 PROCEDURE — 99231 SBSQ HOSP IP/OBS SF/LOW 25: CPT | Performed by: INTERNAL MEDICINE

## 2020-03-16 PROCEDURE — 6370000000 HC RX 637 (ALT 250 FOR IP): Performed by: THORACIC SURGERY (CARDIOTHORACIC VASCULAR SURGERY)

## 2020-03-16 PROCEDURE — C1713 ANCHOR/SCREW BN/BN,TIS/BN: HCPCS | Performed by: THORACIC SURGERY (CARDIOTHORACIC VASCULAR SURGERY)

## 2020-03-16 PROCEDURE — 6360000002 HC RX W HCPCS: Performed by: THORACIC SURGERY (CARDIOTHORACIC VASCULAR SURGERY)

## 2020-03-16 PROCEDURE — 33534 CABG ARTERIAL TWO: CPT | Performed by: THORACIC SURGERY (CARDIOTHORACIC VASCULAR SURGERY)

## 2020-03-16 PROCEDURE — 2709999900 HC NON-CHARGEABLE SUPPLY: Performed by: THORACIC SURGERY (CARDIOTHORACIC VASCULAR SURGERY)

## 2020-03-16 PROCEDURE — 94640 AIRWAY INHALATION TREATMENT: CPT

## 2020-03-16 PROCEDURE — 6370000000 HC RX 637 (ALT 250 FOR IP): Performed by: NURSE PRACTITIONER

## 2020-03-16 PROCEDURE — 6370000000 HC RX 637 (ALT 250 FOR IP)

## 2020-03-16 PROCEDURE — 3700000001 HC ADD 15 MINUTES (ANESTHESIA): Performed by: THORACIC SURGERY (CARDIOTHORACIC VASCULAR SURGERY)

## 2020-03-16 PROCEDURE — 82805 BLOOD GASES W/O2 SATURATION: CPT

## 2020-03-16 PROCEDURE — P9041 ALBUMIN (HUMAN),5%, 50ML: HCPCS

## 2020-03-16 PROCEDURE — 85610 PROTHROMBIN TIME: CPT

## 2020-03-16 PROCEDURE — 33508 ENDOSCOPIC VEIN HARVEST: CPT | Performed by: THORACIC SURGERY (CARDIOTHORACIC VASCULAR SURGERY)

## 2020-03-16 PROCEDURE — 5A1221Z PERFORMANCE OF CARDIAC OUTPUT, CONTINUOUS: ICD-10-PCS | Performed by: THORACIC SURGERY (CARDIOTHORACIC VASCULAR SURGERY)

## 2020-03-16 PROCEDURE — 94002 VENT MGMT INPAT INIT DAY: CPT

## 2020-03-16 PROCEDURE — 2580000003 HC RX 258

## 2020-03-16 PROCEDURE — 2580000003 HC RX 258: Performed by: THORACIC SURGERY (CARDIOTHORACIC VASCULAR SURGERY)

## 2020-03-16 PROCEDURE — 94664 DEMO&/EVAL PT USE INHALER: CPT

## 2020-03-16 PROCEDURE — 85027 COMPLETE CBC AUTOMATED: CPT

## 2020-03-16 PROCEDURE — 2580000003 HC RX 258: Performed by: NURSE PRACTITIONER

## 2020-03-16 PROCEDURE — 3600000018 HC SURGERY OHS ADDTL 15MIN: Performed by: THORACIC SURGERY (CARDIOTHORACIC VASCULAR SURGERY)

## 2020-03-16 PROCEDURE — 3700000000 HC ANESTHESIA ATTENDED CARE: Performed by: THORACIC SURGERY (CARDIOTHORACIC VASCULAR SURGERY)

## 2020-03-16 PROCEDURE — 2500000003 HC RX 250 WO HCPCS

## 2020-03-16 PROCEDURE — 2700000000 HC OXYGEN THERAPY PER DAY

## 2020-03-16 PROCEDURE — 06BQ4ZZ EXCISION OF LEFT SAPHENOUS VEIN, PERCUTANEOUS ENDOSCOPIC APPROACH: ICD-10-PCS | Performed by: THORACIC SURGERY (CARDIOTHORACIC VASCULAR SURGERY)

## 2020-03-16 PROCEDURE — 2720000010 HC SURG SUPPLY STERILE: Performed by: THORACIC SURGERY (CARDIOTHORACIC VASCULAR SURGERY)

## 2020-03-16 PROCEDURE — 02100Z9 BYPASS CORONARY ARTERY, ONE ARTERY FROM LEFT INTERNAL MAMMARY, OPEN APPROACH: ICD-10-PCS | Performed by: THORACIC SURGERY (CARDIOTHORACIC VASCULAR SURGERY)

## 2020-03-16 PROCEDURE — 82803 BLOOD GASES ANY COMBINATION: CPT

## 2020-03-16 PROCEDURE — 7100000000 HC PACU RECOVERY - FIRST 15 MIN

## 2020-03-16 PROCEDURE — 84132 ASSAY OF SERUM POTASSIUM: CPT

## 2020-03-16 PROCEDURE — 99233 SBSQ HOSP IP/OBS HIGH 50: CPT | Performed by: NURSE PRACTITIONER

## 2020-03-16 PROCEDURE — 021209W BYPASS CORONARY ARTERY, THREE ARTERIES FROM AORTA WITH AUTOLOGOUS VENOUS TISSUE, OPEN APPROACH: ICD-10-PCS | Performed by: THORACIC SURGERY (CARDIOTHORACIC VASCULAR SURGERY)

## 2020-03-16 PROCEDURE — 99254 IP/OBS CNSLTJ NEW/EST MOD 60: CPT | Performed by: SURGERY

## 2020-03-16 PROCEDURE — 80048 BASIC METABOLIC PNL TOTAL CA: CPT

## 2020-03-16 PROCEDURE — 7100000001 HC PACU RECOVERY - ADDTL 15 MIN

## 2020-03-16 PROCEDURE — 85347 COAGULATION TIME ACTIVATED: CPT

## 2020-03-16 PROCEDURE — 35600 OPEN HRV UXTR ART 1 SGM CAB: CPT | Performed by: THORACIC SURGERY (CARDIOTHORACIC VASCULAR SURGERY)

## 2020-03-16 PROCEDURE — P9045 ALBUMIN (HUMAN), 5%, 250 ML: HCPCS | Performed by: THORACIC SURGERY (CARDIOTHORACIC VASCULAR SURGERY)

## 2020-03-16 PROCEDURE — 71045 X-RAY EXAM CHEST 1 VIEW: CPT

## 2020-03-16 PROCEDURE — APPSS30 APP SPLIT SHARED TIME 16-30 MINUTES: Performed by: PHYSICIAN ASSISTANT

## 2020-03-16 PROCEDURE — 83735 ASSAY OF MAGNESIUM: CPT

## 2020-03-16 PROCEDURE — 33518 CABG ARTERY-VEIN TWO: CPT | Performed by: THORACIC SURGERY (CARDIOTHORACIC VASCULAR SURGERY)

## 2020-03-16 PROCEDURE — 85730 THROMBOPLASTIN TIME PARTIAL: CPT

## 2020-03-16 PROCEDURE — 2000000000 HC ICU R&B

## 2020-03-16 PROCEDURE — 6360000002 HC RX W HCPCS: Performed by: NURSE PRACTITIONER

## 2020-03-16 PROCEDURE — 82310 ASSAY OF CALCIUM: CPT

## 2020-03-16 PROCEDURE — 36415 COLL VENOUS BLD VENIPUNCTURE: CPT

## 2020-03-16 PROCEDURE — 82962 GLUCOSE BLOOD TEST: CPT

## 2020-03-16 PROCEDURE — 3600000008 HC SURGERY OHS BASE: Performed by: THORACIC SURGERY (CARDIOTHORACIC VASCULAR SURGERY)

## 2020-03-16 PROCEDURE — C9113 INJ PANTOPRAZOLE SODIUM, VIA: HCPCS | Performed by: THORACIC SURGERY (CARDIOTHORACIC VASCULAR SURGERY)

## 2020-03-16 DEVICE — PLATE LCK STRNL 8-H LAD T 1.8MM: Type: IMPLANTABLE DEVICE | Site: STERNUM | Status: FUNCTIONAL

## 2020-03-16 DEVICE — SCREW BNE L13MM DIA2.3MM THOR STRNL TI LOK DRL FREE LEV 1: Type: IMPLANTABLE DEVICE | Site: STERNUM | Status: FUNCTIONAL

## 2020-03-16 DEVICE — SCREW BNE L17MM DIA2.3MM THOR STRNL TI LOK DRL FREE LEV 1: Type: IMPLANTABLE DEVICE | Site: STERNUM | Status: FUNCTIONAL

## 2020-03-16 DEVICE — PLATE BONE 1.8MM THICKNESS STRNL LCK 6 H CP TI: Type: IMPLANTABLE DEVICE | Site: STERNUM | Status: FUNCTIONAL

## 2020-03-16 DEVICE — SCREW BNE L15MM DIA2.3MM THOR STRNL TI LOK DRL FREE LEV 1: Type: IMPLANTABLE DEVICE | Site: STERNUM | Status: FUNCTIONAL

## 2020-03-16 RX ORDER — OXYCODONE HYDROCHLORIDE 5 MG/1
10 TABLET ORAL EVERY 4 HOURS PRN
Status: DISCONTINUED | OUTPATIENT
Start: 2020-03-16 | End: 2020-03-19 | Stop reason: HOSPADM

## 2020-03-16 RX ORDER — CHLORHEXIDINE GLUCONATE 0.12 MG/ML
15 RINSE ORAL 2 TIMES DAILY
Status: DISCONTINUED | OUTPATIENT
Start: 2020-03-16 | End: 2020-03-17

## 2020-03-16 RX ORDER — ACETAMINOPHEN 325 MG/1
650 TABLET ORAL EVERY 4 HOURS PRN
Status: DISCONTINUED | OUTPATIENT
Start: 2020-03-21 | End: 2020-03-19 | Stop reason: HOSPADM

## 2020-03-16 RX ORDER — CALCIUM CHLORIDE 100 MG/ML
INJECTION INTRAVENOUS; INTRAVENTRICULAR PRN
Status: DISCONTINUED | OUTPATIENT
Start: 2020-03-16 | End: 2020-03-16 | Stop reason: SDUPTHER

## 2020-03-16 RX ORDER — INSULIN GLARGINE 100 [IU]/ML
0.15 INJECTION, SOLUTION SUBCUTANEOUS NIGHTLY
Status: DISCONTINUED | OUTPATIENT
Start: 2020-03-17 | End: 2020-03-19

## 2020-03-16 RX ORDER — HEPARIN SODIUM 10000 [USP'U]/ML
INJECTION, SOLUTION INTRAVENOUS; SUBCUTANEOUS PRN
Status: DISCONTINUED | OUTPATIENT
Start: 2020-03-16 | End: 2020-03-16 | Stop reason: SDUPTHER

## 2020-03-16 RX ORDER — 0.9 % SODIUM CHLORIDE 0.9 %
250 INTRAVENOUS SOLUTION INTRAVENOUS CONTINUOUS PRN
Status: DISCONTINUED | OUTPATIENT
Start: 2020-03-16 | End: 2020-03-17

## 2020-03-16 RX ORDER — SODIUM CHLORIDE 9 MG/ML
INJECTION, SOLUTION INTRAVENOUS CONTINUOUS PRN
Status: DISCONTINUED | OUTPATIENT
Start: 2020-03-16 | End: 2020-03-16 | Stop reason: SDUPTHER

## 2020-03-16 RX ORDER — ACETAMINOPHEN 325 MG/1
650 TABLET ORAL EVERY 4 HOURS PRN
Status: DISCONTINUED | OUTPATIENT
Start: 2020-03-16 | End: 2020-03-16

## 2020-03-16 RX ORDER — MORPHINE SULFATE 10 MG/ML
INJECTION, SOLUTION INTRAMUSCULAR; INTRAVENOUS PRN
Status: DISCONTINUED | OUTPATIENT
Start: 2020-03-16 | End: 2020-03-16 | Stop reason: SDUPTHER

## 2020-03-16 RX ORDER — ACETAMINOPHEN 500 MG
1000 TABLET ORAL EVERY 6 HOURS SCHEDULED
Status: DISCONTINUED | OUTPATIENT
Start: 2020-03-16 | End: 2020-03-19 | Stop reason: HOSPADM

## 2020-03-16 RX ORDER — NITROGLYCERIN 5 MG/ML
INJECTION, SOLUTION INTRAVENOUS PRN
Status: DISCONTINUED | OUTPATIENT
Start: 2020-03-16 | End: 2020-03-16 | Stop reason: SDUPTHER

## 2020-03-16 RX ORDER — OXYCODONE HYDROCHLORIDE 5 MG/1
5 TABLET ORAL EVERY 4 HOURS PRN
Status: DISCONTINUED | OUTPATIENT
Start: 2020-03-16 | End: 2020-03-19 | Stop reason: HOSPADM

## 2020-03-16 RX ORDER — LIDOCAINE HYDROCHLORIDE 20 MG/ML
INJECTION, SOLUTION INTRAVENOUS PRN
Status: DISCONTINUED | OUTPATIENT
Start: 2020-03-16 | End: 2020-03-16 | Stop reason: SDUPTHER

## 2020-03-16 RX ORDER — AMINOCAPROIC ACID 250 MG/ML
INJECTION, SOLUTION INTRAVENOUS PRN
Status: DISCONTINUED | OUTPATIENT
Start: 2020-03-16 | End: 2020-03-16 | Stop reason: SDUPTHER

## 2020-03-16 RX ORDER — PROPOFOL 10 MG/ML
10 INJECTION, EMULSION INTRAVENOUS CONTINUOUS PRN
Status: DISCONTINUED | OUTPATIENT
Start: 2020-03-16 | End: 2020-03-17

## 2020-03-16 RX ORDER — ALBUMIN, HUMAN INJ 5% 5 %
SOLUTION INTRAVENOUS
Status: DISPENSED
Start: 2020-03-16 | End: 2020-03-16

## 2020-03-16 RX ORDER — ACETAMINOPHEN 650 MG/1
650 SUPPOSITORY RECTAL EVERY 4 HOURS PRN
Status: DISCONTINUED | OUTPATIENT
Start: 2020-03-16 | End: 2020-03-16

## 2020-03-16 RX ORDER — SODIUM CHLORIDE 0.9 % (FLUSH) 0.9 %
10 SYRINGE (ML) INJECTION PRN
Status: DISCONTINUED | OUTPATIENT
Start: 2020-03-16 | End: 2020-03-16

## 2020-03-16 RX ORDER — ONDANSETRON 2 MG/ML
4 INJECTION INTRAMUSCULAR; INTRAVENOUS EVERY 8 HOURS PRN
Status: DISCONTINUED | OUTPATIENT
Start: 2020-03-16 | End: 2020-03-19 | Stop reason: HOSPADM

## 2020-03-16 RX ORDER — CLOPIDOGREL BISULFATE 75 MG/1
75 TABLET ORAL DAILY
Status: DISCONTINUED | OUTPATIENT
Start: 2020-03-17 | End: 2020-03-19 | Stop reason: HOSPADM

## 2020-03-16 RX ORDER — IPRATROPIUM BROMIDE AND ALBUTEROL SULFATE 2.5; .5 MG/3ML; MG/3ML
1 SOLUTION RESPIRATORY (INHALATION)
Status: DISCONTINUED | OUTPATIENT
Start: 2020-03-16 | End: 2020-03-19 | Stop reason: HOSPADM

## 2020-03-16 RX ORDER — CHLORHEXIDINE GLUCONATE 0.12 MG/ML
15 RINSE ORAL ONCE
Status: COMPLETED | OUTPATIENT
Start: 2020-03-16 | End: 2020-03-16

## 2020-03-16 RX ORDER — KETOROLAC TROMETHAMINE 30 MG/ML
INJECTION, SOLUTION INTRAMUSCULAR; INTRAVENOUS
Status: DISCONTINUED
Start: 2020-03-16 | End: 2020-03-17

## 2020-03-16 RX ORDER — ASPIRIN 81 MG/1
81 TABLET ORAL DAILY
Status: DISCONTINUED | OUTPATIENT
Start: 2020-03-17 | End: 2020-03-17

## 2020-03-16 RX ORDER — FENTANYL CITRATE 0.05 MG/ML
INJECTION, SOLUTION INTRAMUSCULAR; INTRAVENOUS PRN
Status: DISCONTINUED | OUTPATIENT
Start: 2020-03-16 | End: 2020-03-16 | Stop reason: SDUPTHER

## 2020-03-16 RX ORDER — KETOROLAC TROMETHAMINE 30 MG/ML
15 INJECTION, SOLUTION INTRAMUSCULAR; INTRAVENOUS EVERY 6 HOURS PRN
Status: DISCONTINUED | OUTPATIENT
Start: 2020-03-16 | End: 2020-03-19 | Stop reason: HOSPADM

## 2020-03-16 RX ORDER — SODIUM CHLORIDE 9 MG/ML
10 INJECTION INTRAVENOUS DAILY
Status: COMPLETED | OUTPATIENT
Start: 2020-03-16 | End: 2020-03-16

## 2020-03-16 RX ORDER — ALBUMIN, HUMAN INJ 5% 5 %
12.5 SOLUTION INTRAVENOUS ONCE
Status: DISCONTINUED | OUTPATIENT
Start: 2020-03-16 | End: 2020-03-17

## 2020-03-16 RX ORDER — SODIUM CHLORIDE 9 MG/ML
30 INJECTION, SOLUTION INTRAVENOUS CONTINUOUS
Status: DISCONTINUED | OUTPATIENT
Start: 2020-03-16 | End: 2020-03-17

## 2020-03-16 RX ORDER — PROPOFOL 10 MG/ML
INJECTION, EMULSION INTRAVENOUS
Status: DISCONTINUED
Start: 2020-03-16 | End: 2020-03-16

## 2020-03-16 RX ORDER — TAMSULOSIN HYDROCHLORIDE 0.4 MG/1
0.4 CAPSULE ORAL DAILY
Status: DISCONTINUED | OUTPATIENT
Start: 2020-03-17 | End: 2020-03-19 | Stop reason: HOSPADM

## 2020-03-16 RX ORDER — SODIUM CHLORIDE 0.9 % (FLUSH) 0.9 %
10 SYRINGE (ML) INJECTION EVERY 12 HOURS SCHEDULED
Status: DISCONTINUED | OUTPATIENT
Start: 2020-03-16 | End: 2020-03-19 | Stop reason: HOSPADM

## 2020-03-16 RX ORDER — FENTANYL CITRATE 50 UG/ML
50 INJECTION, SOLUTION INTRAMUSCULAR; INTRAVENOUS
Status: DISCONTINUED | OUTPATIENT
Start: 2020-03-16 | End: 2020-03-17

## 2020-03-16 RX ORDER — FENTANYL CITRATE 50 UG/ML
25 INJECTION, SOLUTION INTRAMUSCULAR; INTRAVENOUS
Status: DISCONTINUED | OUTPATIENT
Start: 2020-03-16 | End: 2020-03-17

## 2020-03-16 RX ORDER — SUCCINYLCHOLINE/SOD CL,ISO/PF 200MG/10ML
SYRINGE (ML) INTRAVENOUS PRN
Status: DISCONTINUED | OUTPATIENT
Start: 2020-03-16 | End: 2020-03-16 | Stop reason: SDUPTHER

## 2020-03-16 RX ORDER — NICOTINE POLACRILEX 4 MG
15 LOZENGE BUCCAL PRN
Status: DISCONTINUED | OUTPATIENT
Start: 2020-03-16 | End: 2020-03-17

## 2020-03-16 RX ORDER — SENNA PLUS 8.6 MG/1
1 TABLET ORAL NIGHTLY
Status: DISCONTINUED | OUTPATIENT
Start: 2020-03-17 | End: 2020-03-17

## 2020-03-16 RX ORDER — MIDAZOLAM HYDROCHLORIDE 1 MG/ML
INJECTION INTRAMUSCULAR; INTRAVENOUS PRN
Status: DISCONTINUED | OUTPATIENT
Start: 2020-03-16 | End: 2020-03-16 | Stop reason: SDUPTHER

## 2020-03-16 RX ORDER — CHLORHEXIDINE GLUCONATE 4 G/100ML
SOLUTION TOPICAL ONCE
Status: DISCONTINUED | OUTPATIENT
Start: 2020-03-16 | End: 2020-03-16

## 2020-03-16 RX ORDER — PROTAMINE SULFATE 10 MG/ML
INJECTION, SOLUTION INTRAVENOUS PRN
Status: DISCONTINUED | OUTPATIENT
Start: 2020-03-16 | End: 2020-03-16 | Stop reason: SDUPTHER

## 2020-03-16 RX ORDER — ALBUMIN, HUMAN INJ 5% 5 %
25 SOLUTION INTRAVENOUS PRN
Status: DISCONTINUED | OUTPATIENT
Start: 2020-03-16 | End: 2020-03-17

## 2020-03-16 RX ORDER — MAGNESIUM SULFATE IN WATER 40 MG/ML
2 INJECTION, SOLUTION INTRAVENOUS PRN
Status: DISCONTINUED | OUTPATIENT
Start: 2020-03-16 | End: 2020-03-17

## 2020-03-16 RX ORDER — MEPERIDINE HYDROCHLORIDE 25 MG/ML
25 INJECTION INTRAMUSCULAR; INTRAVENOUS; SUBCUTANEOUS
Status: ACTIVE | OUTPATIENT
Start: 2020-03-16 | End: 2020-03-16

## 2020-03-16 RX ORDER — ASPIRIN 300 MG/1
300 SUPPOSITORY RECTAL ONCE
Status: COMPLETED | OUTPATIENT
Start: 2020-03-16 | End: 2020-03-16

## 2020-03-16 RX ORDER — ETOMIDATE 2 MG/ML
INJECTION INTRAVENOUS PRN
Status: DISCONTINUED | OUTPATIENT
Start: 2020-03-16 | End: 2020-03-16 | Stop reason: SDUPTHER

## 2020-03-16 RX ORDER — SODIUM CHLORIDE, SODIUM LACTATE, POTASSIUM CHLORIDE, CALCIUM CHLORIDE 600; 310; 30; 20 MG/100ML; MG/100ML; MG/100ML; MG/100ML
INJECTION, SOLUTION INTRAVENOUS CONTINUOUS PRN
Status: DISCONTINUED | OUTPATIENT
Start: 2020-03-16 | End: 2020-03-16 | Stop reason: SDUPTHER

## 2020-03-16 RX ORDER — DEXTROSE MONOHYDRATE 50 MG/ML
100 INJECTION, SOLUTION INTRAVENOUS PRN
Status: DISCONTINUED | OUTPATIENT
Start: 2020-03-16 | End: 2020-03-17

## 2020-03-16 RX ORDER — VECURONIUM BROMIDE 1 MG/ML
INJECTION, POWDER, LYOPHILIZED, FOR SOLUTION INTRAVENOUS PRN
Status: DISCONTINUED | OUTPATIENT
Start: 2020-03-16 | End: 2020-03-16 | Stop reason: SDUPTHER

## 2020-03-16 RX ORDER — ATORVASTATIN CALCIUM 40 MG/1
40 TABLET, FILM COATED ORAL DAILY
Status: DISCONTINUED | OUTPATIENT
Start: 2020-03-17 | End: 2020-03-19 | Stop reason: HOSPADM

## 2020-03-16 RX ORDER — SODIUM CHLORIDE 0.9 % (FLUSH) 0.9 %
10 SYRINGE (ML) INJECTION EVERY 12 HOURS SCHEDULED
Status: DISCONTINUED | OUTPATIENT
Start: 2020-03-16 | End: 2020-03-16

## 2020-03-16 RX ORDER — SODIUM CHLORIDE 0.9 % (FLUSH) 0.9 %
10 SYRINGE (ML) INJECTION PRN
Status: DISCONTINUED | OUTPATIENT
Start: 2020-03-16 | End: 2020-03-19 | Stop reason: HOSPADM

## 2020-03-16 RX ORDER — DEXTROSE MONOHYDRATE 25 G/50ML
12.5 INJECTION, SOLUTION INTRAVENOUS PRN
Status: DISCONTINUED | OUTPATIENT
Start: 2020-03-16 | End: 2020-03-17

## 2020-03-16 RX ORDER — ALBUMIN, HUMAN INJ 5% 5 %
SOLUTION INTRAVENOUS PRN
Status: DISCONTINUED | OUTPATIENT
Start: 2020-03-16 | End: 2020-03-16 | Stop reason: SDUPTHER

## 2020-03-16 RX ORDER — SODIUM CHLORIDE 9 MG/ML
INJECTION, SOLUTION INTRAVENOUS CONTINUOUS
Status: DISCONTINUED | OUTPATIENT
Start: 2020-03-16 | End: 2020-03-16

## 2020-03-16 RX ORDER — POTASSIUM CHLORIDE 29.8 MG/ML
20 INJECTION INTRAVENOUS PRN
Status: DISCONTINUED | OUTPATIENT
Start: 2020-03-16 | End: 2020-03-17

## 2020-03-16 RX ORDER — PANTOPRAZOLE SODIUM 40 MG/1
40 TABLET, DELAYED RELEASE ORAL DAILY
Status: DISCONTINUED | OUTPATIENT
Start: 2020-03-17 | End: 2020-03-19 | Stop reason: HOSPADM

## 2020-03-16 RX ORDER — PHENYLEPHRINE HYDROCHLORIDE 10 MG/ML
INJECTION INTRAVENOUS PRN
Status: DISCONTINUED | OUTPATIENT
Start: 2020-03-16 | End: 2020-03-16 | Stop reason: SDUPTHER

## 2020-03-16 RX ORDER — SENNA AND DOCUSATE SODIUM 50; 8.6 MG/1; MG/1
1 TABLET, FILM COATED ORAL 2 TIMES DAILY
Status: DISCONTINUED | OUTPATIENT
Start: 2020-03-16 | End: 2020-03-17

## 2020-03-16 RX ORDER — PANTOPRAZOLE SODIUM 40 MG/10ML
40 INJECTION, POWDER, LYOPHILIZED, FOR SOLUTION INTRAVENOUS DAILY
Status: COMPLETED | OUTPATIENT
Start: 2020-03-16 | End: 2020-03-16

## 2020-03-16 RX ORDER — ALBUTEROL SULFATE 2.5 MG/3ML
2.5 SOLUTION RESPIRATORY (INHALATION)
Status: DISCONTINUED | OUTPATIENT
Start: 2020-03-16 | End: 2020-03-19 | Stop reason: HOSPADM

## 2020-03-16 RX ADMIN — FENTANYL CITRATE 200 MCG: 50 INJECTION, SOLUTION INTRAMUSCULAR; INTRAVENOUS at 07:21

## 2020-03-16 RX ADMIN — PROPOFOL 10 MCG/KG/MIN: 10 INJECTION, EMULSION INTRAVENOUS at 11:08

## 2020-03-16 RX ADMIN — NITROGLYCERIN 50 MCG: 5 INJECTION, SOLUTION INTRAVENOUS at 07:41

## 2020-03-16 RX ADMIN — SODIUM CHLORIDE: 9 INJECTION, SOLUTION INTRAVENOUS at 05:58

## 2020-03-16 RX ADMIN — PHENYLEPHRINE HYDROCHLORIDE 100 MCG: 10 INJECTION INTRAVENOUS at 09:51

## 2020-03-16 RX ADMIN — ACETAMINOPHEN 1000 MG: 500 TABLET ORAL at 17:14

## 2020-03-16 RX ADMIN — FENTANYL CITRATE 100 MCG: 50 INJECTION, SOLUTION INTRAMUSCULAR; INTRAVENOUS at 06:48

## 2020-03-16 RX ADMIN — ALBUMIN (HUMAN) 25 G: 12.5 INJECTION, SOLUTION INTRAVENOUS at 11:13

## 2020-03-16 RX ADMIN — DEXTROSE MONOHYDRATE 3 G: 50 INJECTION, SOLUTION INTRAVENOUS at 17:49

## 2020-03-16 RX ADMIN — KETOROLAC TROMETHAMINE 15 MG: 30 INJECTION, SOLUTION INTRAMUSCULAR; INTRAVENOUS at 14:10

## 2020-03-16 RX ADMIN — SODIUM CHLORIDE: 9 INJECTION, SOLUTION INTRAVENOUS at 06:30

## 2020-03-16 RX ADMIN — AMIODARONE HYDROCHLORIDE 0.5 MG/MIN: 50 INJECTION, SOLUTION INTRAVENOUS at 13:27

## 2020-03-16 RX ADMIN — PHENYLEPHRINE HYDROCHLORIDE 100 MCG: 10 INJECTION INTRAVENOUS at 09:58

## 2020-03-16 RX ADMIN — ASPIRIN 300 MG: 300 SUPPOSITORY RECTAL at 12:12

## 2020-03-16 RX ADMIN — SODIUM CHLORIDE: 9 INJECTION, SOLUTION INTRAVENOUS at 06:28

## 2020-03-16 RX ADMIN — CHLORHEXIDINE GLUCONATE 0.12% ORAL RINSE 15 ML: 1.2 LIQUID ORAL at 06:02

## 2020-03-16 RX ADMIN — OXYCODONE 5 MG: 5 TABLET ORAL at 21:46

## 2020-03-16 RX ADMIN — IPRATROPIUM BROMIDE AND ALBUTEROL SULFATE 1 AMPULE: 2.5; .5 SOLUTION RESPIRATORY (INHALATION) at 16:43

## 2020-03-16 RX ADMIN — IPRATROPIUM BROMIDE AND ALBUTEROL SULFATE 1 AMPULE: 2.5; .5 SOLUTION RESPIRATORY (INHALATION) at 21:35

## 2020-03-16 RX ADMIN — KETOROLAC TROMETHAMINE 15 MG: 30 INJECTION, SOLUTION INTRAMUSCULAR; INTRAVENOUS at 22:49

## 2020-03-16 RX ADMIN — IPRATROPIUM BROMIDE AND ALBUTEROL SULFATE 1 AMPULE: 2.5; .5 SOLUTION RESPIRATORY (INHALATION) at 11:30

## 2020-03-16 RX ADMIN — PHENYLEPHRINE HYDROCHLORIDE 100 MCG: 10 INJECTION INTRAVENOUS at 10:12

## 2020-03-16 RX ADMIN — SODIUM CHLORIDE, PRESERVATIVE FREE 10 ML: 5 INJECTION INTRAVENOUS at 12:13

## 2020-03-16 RX ADMIN — ETOMIDATE 20 MG: 2 INJECTION, SOLUTION INTRAVENOUS at 06:48

## 2020-03-16 RX ADMIN — ALBUMIN (HUMAN) 25 G: 12.5 INJECTION, SOLUTION INTRAVENOUS at 21:48

## 2020-03-16 RX ADMIN — FENTANYL CITRATE 25 MCG: 50 INJECTION, SOLUTION INTRAMUSCULAR; INTRAVENOUS at 20:26

## 2020-03-16 RX ADMIN — FENTANYL CITRATE 100 MCG: 50 INJECTION, SOLUTION INTRAMUSCULAR; INTRAVENOUS at 07:25

## 2020-03-16 RX ADMIN — INSULIN LISPRO 3 UNITS: 100 INJECTION, SOLUTION INTRAVENOUS; SUBCUTANEOUS at 15:10

## 2020-03-16 RX ADMIN — AMINOCAPROIC ACID 5000 MG: 250 INJECTION, SOLUTION INTRAVENOUS at 06:56

## 2020-03-16 RX ADMIN — INSULIN HUMAN 10 UNITS: 100 INJECTION, SOLUTION PARENTERAL at 09:35

## 2020-03-16 RX ADMIN — SODIUM CHLORIDE 6 UNITS/HR: 9 INJECTION, SOLUTION INTRAVENOUS at 08:24

## 2020-03-16 RX ADMIN — LIDOCAINE HYDROCHLORIDE 100 MG: 20 INJECTION, SOLUTION INTRAVENOUS at 06:48

## 2020-03-16 RX ADMIN — VECURONIUM BROMIDE FOR INJECTION 20 MG: 1 INJECTION, POWDER, LYOPHILIZED, FOR SOLUTION INTRAVENOUS at 06:48

## 2020-03-16 RX ADMIN — PROTAMINE SULFATE 400 MG: 10 INJECTION, SOLUTION INTRAVENOUS at 09:47

## 2020-03-16 RX ADMIN — METOPROLOL TARTRATE 12.5 MG: 25 TABLET, FILM COATED ORAL at 06:02

## 2020-03-16 RX ADMIN — PHENYLEPHRINE HYDROCHLORIDE 100 MCG: 10 INJECTION INTRAVENOUS at 10:19

## 2020-03-16 RX ADMIN — MUPIROCIN: 20 OINTMENT TOPICAL at 13:19

## 2020-03-16 RX ADMIN — Medication 140 MG: at 06:48

## 2020-03-16 RX ADMIN — FENTANYL CITRATE 25 MCG: 50 INJECTION, SOLUTION INTRAMUSCULAR; INTRAVENOUS at 17:07

## 2020-03-16 RX ADMIN — FENTANYL CITRATE 50 MCG: 50 INJECTION, SOLUTION INTRAMUSCULAR; INTRAVENOUS at 11:56

## 2020-03-16 RX ADMIN — INSULIN LISPRO 3 UNITS: 100 INJECTION, SOLUTION INTRAVENOUS; SUBCUTANEOUS at 19:00

## 2020-03-16 RX ADMIN — ALBUMIN (HUMAN) 25 G: 12.5 INJECTION, SOLUTION INTRAVENOUS at 10:03

## 2020-03-16 RX ADMIN — FENTANYL CITRATE 400 MCG: 50 INJECTION, SOLUTION INTRAMUSCULAR; INTRAVENOUS at 07:32

## 2020-03-16 RX ADMIN — SODIUM CHLORIDE, POTASSIUM CHLORIDE, SODIUM LACTATE AND CALCIUM CHLORIDE: 600; 310; 30; 20 INJECTION, SOLUTION INTRAVENOUS at 06:30

## 2020-03-16 RX ADMIN — HEPARIN SODIUM 60000 UNITS: 10000 INJECTION, SOLUTION INTRAVENOUS; SUBCUTANEOUS at 08:14

## 2020-03-16 RX ADMIN — CALCIUM CHLORIDE 1 G: 100 INJECTION INTRAVENOUS; INTRAVENTRICULAR at 09:50

## 2020-03-16 RX ADMIN — MIDAZOLAM 2 MG: 1 INJECTION INTRAMUSCULAR; INTRAVENOUS at 10:50

## 2020-03-16 RX ADMIN — PANTOPRAZOLE SODIUM 40 MG: 40 INJECTION, POWDER, FOR SOLUTION INTRAVENOUS at 12:25

## 2020-03-16 RX ADMIN — SODIUM CHLORIDE 30 ML/HR: 9 INJECTION, SOLUTION INTRAVENOUS at 11:16

## 2020-03-16 RX ADMIN — DEXTROSE MONOHYDRATE 3 G: 50 INJECTION, SOLUTION INTRAVENOUS at 10:17

## 2020-03-16 RX ADMIN — MUPIROCIN: 20 OINTMENT TOPICAL at 21:26

## 2020-03-16 RX ADMIN — FENTANYL CITRATE 200 MCG: 50 INJECTION, SOLUTION INTRAMUSCULAR; INTRAVENOUS at 07:17

## 2020-03-16 RX ADMIN — AMINOCAPROIC ACID 1 G/HR: 250 INJECTION, SOLUTION INTRAVENOUS at 07:02

## 2020-03-16 RX ADMIN — MIDAZOLAM 6 MG: 1 INJECTION INTRAMUSCULAR; INTRAVENOUS at 06:28

## 2020-03-16 RX ADMIN — INSULIN LISPRO 3 UNITS: 100 INJECTION, SOLUTION INTRAVENOUS; SUBCUTANEOUS at 11:17

## 2020-03-16 RX ADMIN — MORPHINE SULFATE 10 MG: 10 INJECTION, SOLUTION INTRAMUSCULAR; INTRAVENOUS at 07:45

## 2020-03-16 RX ADMIN — INSULIN HUMAN 10 UNITS: 100 INJECTION, SOLUTION PARENTERAL at 08:32

## 2020-03-16 RX ADMIN — DEXTROSE MONOHYDRATE 3 G: 50 INJECTION, SOLUTION INTRAVENOUS at 06:53

## 2020-03-16 RX ADMIN — ALBUMIN (HUMAN) 25 G: 12.5 INJECTION, SOLUTION INTRAVENOUS at 15:43

## 2020-03-16 RX ADMIN — FENTANYL CITRATE 250 MCG: 50 INJECTION, SOLUTION INTRAMUSCULAR; INTRAVENOUS at 07:34

## 2020-03-16 RX ADMIN — VECURONIUM BROMIDE FOR INJECTION 10 MG: 1 INJECTION, POWDER, LYOPHILIZED, FOR SOLUTION INTRAVENOUS at 08:01

## 2020-03-16 ASSESSMENT — PULMONARY FUNCTION TESTS
PIF_VALUE: 0
PIF_VALUE: 0
PIF_VALUE: 1
PIF_VALUE: 22
PIF_VALUE: 1
PIF_VALUE: 1
PIF_VALUE: 26
PIF_VALUE: 0
PIF_VALUE: 0
PIF_VALUE: 27
PIF_VALUE: 25
PIF_VALUE: 26
PIF_VALUE: 1
PIF_VALUE: 25
PIF_VALUE: 1
PIF_VALUE: 27
PIF_VALUE: 1
PIF_VALUE: 26
PIF_VALUE: 25
PIF_VALUE: 24
PIF_VALUE: 23
PIF_VALUE: 1
PIF_VALUE: 22
PIF_VALUE: 25
PIF_VALUE: 26
PIF_VALUE: 26
PIF_VALUE: 1
PIF_VALUE: 25
PIF_VALUE: 1
PIF_VALUE: 1
PIF_VALUE: 24
PIF_VALUE: 27
PIF_VALUE: 25
PIF_VALUE: 25
PIF_VALUE: 31
PIF_VALUE: 25
PIF_VALUE: 25
PIF_VALUE: 2
PIF_VALUE: 24
PIF_VALUE: 25
PIF_VALUE: 25
PIF_VALUE: 22
PIF_VALUE: 1
PIF_VALUE: 25
PIF_VALUE: 23
PIF_VALUE: 25
PIF_VALUE: 27
PIF_VALUE: 25
PIF_VALUE: 0
PIF_VALUE: 27
PIF_VALUE: 1
PIF_VALUE: 2
PIF_VALUE: 27
PIF_VALUE: 1
PIF_VALUE: 25
PIF_VALUE: 26
PIF_VALUE: 26
PIF_VALUE: 21
PIF_VALUE: 0
PIF_VALUE: 21
PIF_VALUE: 1
PIF_VALUE: 26
PIF_VALUE: 25
PIF_VALUE: 2
PIF_VALUE: 0
PIF_VALUE: 1
PIF_VALUE: 1
PIF_VALUE: 0
PIF_VALUE: 22
PIF_VALUE: 25
PIF_VALUE: 27
PIF_VALUE: 21
PIF_VALUE: 25
PIF_VALUE: 22
PIF_VALUE: 24
PIF_VALUE: 25
PIF_VALUE: 1
PIF_VALUE: 26
PIF_VALUE: 27
PIF_VALUE: 25
PIF_VALUE: 22
PIF_VALUE: 26
PIF_VALUE: 24
PIF_VALUE: 26
PIF_VALUE: 1
PIF_VALUE: 24
PIF_VALUE: 27
PIF_VALUE: 1
PIF_VALUE: 27
PIF_VALUE: 0
PIF_VALUE: 24
PIF_VALUE: 23
PIF_VALUE: 1
PIF_VALUE: 25
PIF_VALUE: 21
PIF_VALUE: 2
PIF_VALUE: 26
PIF_VALUE: 1
PIF_VALUE: 24
PIF_VALUE: 26
PIF_VALUE: 24
PIF_VALUE: 24
PIF_VALUE: 26
PIF_VALUE: 1
PIF_VALUE: 27
PIF_VALUE: 25
PIF_VALUE: 24
PIF_VALUE: 25
PIF_VALUE: 21
PIF_VALUE: 21
PIF_VALUE: 1
PIF_VALUE: 27
PIF_VALUE: 26
PIF_VALUE: 25
PIF_VALUE: 26
PIF_VALUE: 25
PIF_VALUE: 27
PIF_VALUE: 22
PIF_VALUE: 25
PIF_VALUE: 2
PIF_VALUE: 26
PIF_VALUE: 25
PIF_VALUE: 1
PIF_VALUE: 1
PIF_VALUE: 25
PIF_VALUE: 2
PIF_VALUE: 2
PIF_VALUE: 25
PIF_VALUE: 26
PIF_VALUE: 32
PIF_VALUE: 1
PIF_VALUE: 27
PIF_VALUE: 22
PIF_VALUE: 0
PIF_VALUE: 25
PIF_VALUE: 1
PIF_VALUE: 1
PIF_VALUE: 28
PIF_VALUE: 1
PIF_VALUE: 3
PIF_VALUE: 25
PIF_VALUE: 24
PIF_VALUE: 0
PIF_VALUE: 25
PIF_VALUE: 1
PIF_VALUE: 25
PIF_VALUE: 1
PIF_VALUE: 23
PIF_VALUE: 27
PIF_VALUE: 24
PIF_VALUE: 20
PIF_VALUE: 27
PIF_VALUE: 1
PIF_VALUE: 26
PIF_VALUE: 25
PIF_VALUE: 24
PIF_VALUE: 1
PIF_VALUE: 25
PIF_VALUE: 1
PIF_VALUE: 22
PIF_VALUE: 21
PIF_VALUE: 26
PIF_VALUE: 25
PIF_VALUE: 27
PIF_VALUE: 21
PIF_VALUE: 27
PIF_VALUE: 1
PIF_VALUE: 2
PIF_VALUE: 26
PIF_VALUE: 25
PIF_VALUE: 22
PIF_VALUE: 25
PIF_VALUE: 28
PIF_VALUE: 1
PIF_VALUE: 24
PIF_VALUE: 1
PIF_VALUE: 25
PIF_VALUE: 16
PIF_VALUE: 27
PIF_VALUE: 24
PIF_VALUE: 19
PIF_VALUE: 1
PIF_VALUE: 25
PIF_VALUE: 25
PIF_VALUE: 1
PIF_VALUE: 26
PIF_VALUE: 23
PIF_VALUE: 22
PIF_VALUE: 1
PIF_VALUE: 25
PIF_VALUE: 0
PIF_VALUE: 1
PIF_VALUE: 23
PIF_VALUE: 24
PIF_VALUE: 0
PIF_VALUE: 25
PIF_VALUE: 25
PIF_VALUE: 1
PIF_VALUE: 25
PIF_VALUE: 2
PIF_VALUE: 1
PIF_VALUE: 26
PIF_VALUE: 25
PIF_VALUE: 25
PIF_VALUE: 22
PIF_VALUE: 26
PIF_VALUE: 1
PIF_VALUE: 0
PIF_VALUE: 25
PIF_VALUE: 25
PIF_VALUE: 24
PIF_VALUE: 1
PIF_VALUE: 2
PIF_VALUE: 25
PIF_VALUE: 21
PIF_VALUE: 2
PIF_VALUE: 4
PIF_VALUE: 26
PIF_VALUE: 25
PIF_VALUE: 23
PIF_VALUE: 27
PIF_VALUE: 26
PIF_VALUE: 28
PIF_VALUE: 27
PIF_VALUE: 25
PIF_VALUE: 25
PIF_VALUE: 1
PIF_VALUE: 28
PIF_VALUE: 26
PIF_VALUE: 27
PIF_VALUE: 25
PIF_VALUE: 0
PIF_VALUE: 0
PIF_VALUE: 1
PIF_VALUE: 23
PIF_VALUE: 1
PIF_VALUE: 0
PIF_VALUE: 1
PIF_VALUE: 21
PIF_VALUE: 27
PIF_VALUE: 24
PIF_VALUE: 26
PIF_VALUE: 1
PIF_VALUE: 24
PIF_VALUE: 25
PIF_VALUE: 1
PIF_VALUE: 25
PIF_VALUE: 26
PIF_VALUE: 25
PIF_VALUE: 21
PIF_VALUE: 22
PIF_VALUE: 2
PIF_VALUE: 0
PIF_VALUE: 2
PIF_VALUE: 22

## 2020-03-16 ASSESSMENT — PAIN DESCRIPTION - DESCRIPTORS
DESCRIPTORS: ACHING;DISCOMFORT;DULL
DESCRIPTORS: ACHING
DESCRIPTORS: ACHING;CONSTANT;DISCOMFORT
DESCRIPTORS: ACHING;CONSTANT;DISCOMFORT
DESCRIPTORS: ACHING
DESCRIPTORS: ACHING

## 2020-03-16 ASSESSMENT — PAIN SCALES - GENERAL
PAINLEVEL_OUTOF10: 5
PAINLEVEL_OUTOF10: 6
PAINLEVEL_OUTOF10: 5
PAINLEVEL_OUTOF10: 0
PAINLEVEL_OUTOF10: 4
PAINLEVEL_OUTOF10: 0
PAINLEVEL_OUTOF10: 5
PAINLEVEL_OUTOF10: 6
PAINLEVEL_OUTOF10: 2
PAINLEVEL_OUTOF10: 5

## 2020-03-16 ASSESSMENT — PAIN DESCRIPTION - PROGRESSION
CLINICAL_PROGRESSION: NOT CHANGED
CLINICAL_PROGRESSION: GRADUALLY WORSENING
CLINICAL_PROGRESSION: GRADUALLY IMPROVING
CLINICAL_PROGRESSION: GRADUALLY WORSENING

## 2020-03-16 ASSESSMENT — PAIN DESCRIPTION - LOCATION
LOCATION: CHEST

## 2020-03-16 ASSESSMENT — PAIN - FUNCTIONAL ASSESSMENT
PAIN_FUNCTIONAL_ASSESSMENT: PREVENTS OR INTERFERES SOME ACTIVE ACTIVITIES AND ADLS
PAIN_FUNCTIONAL_ASSESSMENT: 0-10

## 2020-03-16 ASSESSMENT — PAIN DESCRIPTION - ONSET
ONSET: ON-GOING

## 2020-03-16 ASSESSMENT — PAIN DESCRIPTION - PAIN TYPE
TYPE: SURGICAL PAIN

## 2020-03-16 ASSESSMENT — PAIN DESCRIPTION - FREQUENCY
FREQUENCY: CONTINUOUS

## 2020-03-16 NOTE — CONSULTS
Chief Complaint: Patient seen for evaluation of right internal carotid artery occlusion      HPI: This patient, who is only 38years old, has significant cardiovascular history, recently has history of shortness of breath, palpitation underwent CTA of the coronary arteries revealed significant calcium score, underwent cardiac catheterization revealed multivessel severe coronary artery disease and patient did undergo cardiac bypass surgery as outlined below and as a part of the preoperative work-up that was done revealed evidence of right internal carotid artery occlusion and vascular service was consulted for further evaluation    When I came to see the patient, patient was resting comfortably on supplemental oxygen, somewhat sleepy but arousable, responds appropriately      Patient has multiple other medical risk factors including chronic obstructive lung disease diabetes mellitus hypertension hyperlipidemia etc.      Patient denies any focal lateralizing neurological symptoms like loss of speech, vision or loss of function of extremity    Patient can walk a few blocks, and denies any symptoms of rest pain    Allergies   Allergen Reactions    Sulfa Antibiotics      ?  unsure       Current Facility-Administered Medications   Medication Dose Route Frequency Provider Last Rate Last Dose    [START ON 3/17/2020] atorvastatin (LIPITOR) tablet 40 mg  40 mg Oral Daily Sussy Morel MD        0.9 % sodium chloride infusion  30 mL/hr Intravenous Continuous Sussy Morel MD 30 mL/hr at 03/16/20 1116 30 mL/hr at 03/16/20 1116    sodium chloride flush 0.9 % injection 10 mL  10 mL Intravenous 2 times per day Sussy Morel MD   10 mL at 03/16/20 1213    sodium chloride flush 0.9 % injection 10 mL  10 mL Intravenous PRN Sussy Morel MD        potassium chloride 20 mEq/50 mL IVPB (Central Line)  20 mEq Intravenous PRN Sussy Morel MD        magnesium sulfate 2 g in 50 mL IVPB premix  2 g Intravenous PRN Sussy Morel MD sodium chloride bolus  250 mL Intravenous Continuous PRN Toula Brittle, MD        norepinephrine (LEVOPHED) 16 mg in dextrose 5% 250 mL infusion  2 mcg/min Intravenous Continuous PRN Toula Brittle, MD        clevidipine (CLEVIPREX) infusion  2 mg/hr Intravenous Continuous PRN Toula Brittle, MD        insulin regular (HUMULIN R;NOVOLIN R) 100 Units in sodium chloride 0.9 % 100 mL infusion  1 Units/hr Intravenous Continuous PRN Toula Brittle, MD        [START ON 3/17/2020] insulin glargine (LANTUS) injection vial 22 Units  0.15 Units/kg Subcutaneous Nightly Toula Brittle, MD        glucose (GLUTOSE) 40 % oral gel 15 g  15 g Oral PRN Toula Brittle, MD        dextrose 50 % IV solution  12.5 g Intravenous PRN Toula Brittle, MD        glucagon (rDNA) injection 1 mg  1 mg Intramuscular PRN Toula Brittle, MD        dextrose 5 % solution  100 mL/hr Intravenous PRN Toula Brittle, MD        [START ON 3/17/2020] senna (SENOKOT) tablet 8.6 mg  1 tablet Oral Nightly Toula Brittle, MD        [START ON 3/17/2020] tamsulosin (FLOMAX) capsule 0.4 mg  0.4 mg Oral Daily Toula Brittle, MD        [START ON 3/17/2020] insulin lispro (HUMALOG) injection vial 0-18 Units  0-18 Units Subcutaneous Q4H Toula Brittle, MD        insulin lispro (HUMALOG) injection vial 0-18 Units  0-18 Units Subcutaneous Q4H Toula Brittle, MD   3 Units at 03/16/20 1117    [START ON 3/17/2020] clopidogrel (PLAVIX) tablet 75 mg  75 mg Oral Daily Toula Brittle, MD        calcium gluconate 1 g in dextrose 5 % 100 mL IVPB  1 g Intravenous PRN Toula Brittle, MD        albumin human 5 % IV solution             albuterol (PROVENTIL) nebulizer solution 2.5 mg  2.5 mg Nebulization Q2H PRN ZIA Reed CNP        albumin human 5 % IV solution 12.5 g  12.5 g Intravenous Once ZIA Zarate CNP   Stopped at 03/16/20 1346    amiodarone (CORDARONE) 450 mg in dextrose 5 % 250 mL infusion  0.5 mg/min Intravenous Continuous ZIA Reed - CNP 16.7 mL/hr at 20 1327 0.5 mg/min at 20 1327       Past Medical History:   Diagnosis Date    CAD (coronary artery disease)     [facility]chronic obstructive pulmonary disease) ([facility]     Diabetes mellitus ([facility]     Hyperlipidemia     Hypertension     Mediastinal mass     Neuropathy     Obesity     329 #       Past Surgical History:   Procedure Laterality Date    CARDIAC CATHETERIZATION  2020    Dr. Catherine Dalal- multi vessel disease    MITRAL VALVE REPAIR N/A 3/16/2020    CORONARY ARTERY BYPASS POSSIBLE LEFT RADIAL ARTERY HARVEST, PATRICE performed by Zhao Mccoy MD at [address] [place] [address]  2017    resection of mediastinal mass    TONSILLECTOMY      as a child    TRANSESOPHAGEAL ECHOCARDIOGRAM  2020    Dr. Evelyn Clay       Family History   Problem Relation Age of Onset    Heart Disease Mother         MI age 39     High Blood Pressure Mother     Diabetes Father     Heart Disease Father         MI    High Blood Pressure Father     High Cholesterol Father     Cancer Father     Stroke Father     Kidney Disease Father         dialysis    High Blood Pressure Sister     Other Brother     COPD Brother     High Blood Pressure Brother        Social History     Socioeconomic History    Marital status: Single     Spouse name: Not on file    Number of children: Not on file    Years of education: Not on file    Highest education level: Not on file   Occupational History    Occupation: [employer] [address]     Employer: NOT EMPLOYED   Social Needs    Financial resource strain: Not on file    Food insecurity     Worry: Not on file     Inability: Not on file    Transportation needs     Medical: Not on file     Non-medical: Not on file   Tobacco Use    Smoking status: Current Every Day Smoker     Packs/day: 1.00     Years: 25.00     Pack years: 25.00     Types: Cigarettes     Start date: 1994    Smokeless tobacco: Current User     Types: Chew, Snuff Substance and Sexual Activity    Alcohol use: No    Drug use: No    Sexual activity: Not on file   Lifestyle    Physical activity     Days per week: Not on file     Minutes per session: Not on file    Stress: Not on file   Relationships    Social connections     Talks on phone: Not on file     Gets together: Not on file     Attends Judaism service: Not on file     Active member of club or organization: Not on file     Attends meetings of clubs or organizations: Not on file     Relationship status: Not on file    Intimate partner violence     Fear of current or ex partner: Not on file     Emotionally abused: Not on file     Physically abused: Not on file     Forced sexual activity: Not on file   Other Topics Concern    Not on file   Social History Narrative    Not on file       Review of Systems:  Skin:  No abnormal pigmentation or rash. Eyes:  No blurring, diplopia or vision loss. Ears/Nose/Throat:  No hearing loss or vertigo. Respiratory:  No cough, pleuritic chest pain, dyspnea, or wheezing. Chronic obstructive lung disease    Cardiovascular: No angina, palpitations . Coronary artery disease with symptoms of palpitation and difficulty breathing, cardiac catheterization revealed severe coronary artery disease status post cardiac bypass surgery, hypertension, hyperlipidemia    Gastrointestinal:  No nausea or vomiting; no abdominal pain or rectal bleeding. Musculoskeletal:  No arthritis or weakness. Neurologic:  No paralysis, paresis, seizures or headaches. Hematologic/Lymphatic/Immunologic:  No anemia, abnormal bleeding/bruising. Endocrine:  No heat or cold intolerance. No polyphagia, polydipsia or polyuria. Diabetes mellitus      Physical Exam:  BP (!) 102/53   Pulse 111   Temp 99.1 °F (37.3 °C) (Bladder)   Resp (!) 32   Ht 6' 4\" (1.93 m)   Wt (!) 330 lb (149.7 kg)   SpO2 100%   BMI 40.17 kg/m²   General appearance:  Alert, awake, oriented x 3. No distress.   Skin:  Warm and dry. Head:  Normocephalic. No masses, lesions or tenderness. Eyes:  Conjunctivae appear normal; PERRL. Ears:  External ears normal.  Nose/Sinuses:  Septum midline, mucosa normal; no drainage. Oropharynx:  Clear, no exudate noted. Neck:  No jugular venous distention, lymphadenopathy or thyromegaly. No evidence of carotid bruit, patient does have a central venous access catheter on the right side    Chest: Median sternotomy scar noted      Lungs:  Clear to ausculation bilaterally. No rhonchi, crackles, wheezes. Heart:  Regular rate and rhythm. No rub or murmur. .    Abdomen:  Soft, non-tender. No masses, organomegaly. Musculoskeletal: No joint effusions, tenderness swelling or warmth. Neuro: Speech is intact. Moving all extremities. No focal motor or sensory deficits. Extremities:  Both feet are warm to touch. The color of both feet is normal.          Pulses Right  Left    Brachial 3 3    Radial    3=normal   Femoral 2 2  2=diminished   Popliteal    1=barely palpable   Dorsalis pedis 1 1  0=absent   Posterior tibial    4=aneurysmal           Other pertinent information:1. The past medical records were reviewed.     2.    Lab Results   Component Value Date    WBC 17.0 (H) 03/16/2020    HGB 12.8 03/16/2020    HCT 38.8 03/16/2020    MCV 91.5 03/16/2020     03/16/2020      Lab Results   Component Value Date     03/16/2020    K 4.17 03/16/2020     03/16/2020    CO2 23 03/16/2020    BUN 14 03/16/2020    CREATININE 0.6 (L) 03/16/2020    GLUCOSE 148 (H) 03/16/2020    CALCIUM 8.5 (L) 03/16/2020    CALCIUM 8.6 03/16/2020    PROT 7.6 02/05/2020    LABALBU 4.1 02/05/2020    BILITOT 0.5 02/05/2020    ALKPHOS 81 02/05/2020    AST 11 02/05/2020    ALT 14 02/05/2020    LABGLOM >60 03/16/2020    GFRAA >60 03/16/2020     Lab Results   Component Value Date    APTT 30.1 03/16/2020      Lab Results   Component Value Date    INR 1.2 03/16/2020    INR 1.1 03/12/2020    PROTIME 13.0 (H) 03/16/2020    PROTIME 12.2 03/12/2020        3. XR CHEST PORTABLE   Final Result   New postoperative changes with likely bibasilar atelectasis and low   lung volumes. XR CHEST PORTABLE    (Results Pending)     4. The history physical was reviewed    5. Carotid ultrasound was personally reviewed by me, absent Doppler signals of the right internal carotid consistent with occlusion right internal carotid with minimal disease only on the left side    6. The ankle-brachial index was normal    Assessment:    1. Complete occlusion right internal carotid arteries with minimal disease on the left, asymptomatic      2. Coronary artery disease post cardiac bypass surgery    3.   Multiple comorbid risk factors, diabetes mellitus, hypertension, hyperlipidemia, chronic obstructive lung disease, obesity        Plan:     Discussed in detail patient, all options, risks benefits and alternatives were explained, patient is reassured no vascular perspective, was discharged, will follow-up as an outpatient, if there is any doubt about a trickle flow on a repeat ultrasound under my supervision, at that time may consider CTA of the carotids based upon the patient's overall medical condition, for now follow conservatively, and when permitted by cardiac surgeon, consider low-dose 81 mg aspirin daily    All his questions were answered    Thank you for letting me participate in the care of your patient              Electronically signed by Prashant Mills MD on 3/16/2020 at 1:53 PM

## 2020-03-16 NOTE — ANESTHESIA PROCEDURE NOTES
Procedure Performed: PATRICE     Start Time:        End Time:      Preanesthesia Checklist:  Patient identified, IV assessed, risks and benefits discussed, monitors and equipment assessed, procedure being performed at surgeon's request and anesthesia consent obtained. General Procedure Information  Diagnostic Indications for Echo:  assessment of surgical repair, hemodynamic monitoring and assessment of valve function  Physician Requesting Echo: Olga Galvez MD  Intubated  Bite block placed  Heart visualized  Probe Insertion:  Easy  Probe Type:  3D and mulitplane  Modalities:  3D, color flow mapping, pulse wave Doppler and continuous wave Doppler    Echocardiographic and Doppler Measurements    Ventricles    Right Ventricle:  Cavity size normal.  Hypertrophy not present. Thrombus not present. Global function normal.    Left Ventricle:  Cavity size normal.  Hypertrophy not present. Thrombus not present. Global Function normal.  Ejection Fraction 55%. Ventricular Regional Function:  1- Basal Anteroseptal:  normal  2- Basal Anterior:  normal  3- Basal Anterolateral:  normal  4- Basal Inferolateral:  normal  5- Basal Inferior:  normal  6- Basal Inferoseptal:  normal  7- Mid Anteroseptal:  normal  8- Mid Anterior:  normal  9- Mid Anterolateral:  normal  10- Mid Inferolateral:  normal  11- Mid Inferior:  normal  12- Mid Inferoseptal:  normal  13- Apical Anterior:  normal  14- Apical Lateral:  normal  15- Apical Inferior:  normal  16- Apical Septal:  normal  17- Hayes:  normal      Valves    Aortic Valve: Annulus normal.  Stenosis not present. Regurgitation none. Leaflets normal.  Leaflet motions normal.      Mitral Valve: Annulus normal.  Stenosis not present. Regurgitation mild. Leaflets normal.  Leaflet motions normal.      Tricuspid Valve: Annulus normal.  Stenosis not present. Regurgitation mild. Leaflets normal.  Leaflet motions normal.    Pulmonic Valve: Annulus normal.  Stenosis not present.

## 2020-03-16 NOTE — PROGRESS NOTES
Q4H PRN  acetaminophen (TYLENOL) suppository 650 mg, Q4H PRN  oxyCODONE (ROXICODONE) immediate release tablet 5 mg, Q4H PRN    Or  oxyCODONE (ROXICODONE) immediate release tablet 10 mg, Q4H PRN  fentaNYL (SUBLIMAZE) injection 25 mcg, Q1H PRN    Or  fentaNYL (SUBLIMAZE) injection 50 mcg, Q1H PRN  sennosides-docusate sodium (SENOKOT-S) 8.6-50 MG tablet 1 tablet, BID  magnesium hydroxide (MILK OF MAGNESIA) 400 MG/5ML suspension 30 mL, Daily PRN  ondansetron (ZOFRAN) injection 4 mg, Q8H PRN  [START ON 3/17/2020] pantoprazole (PROTONIX) tablet 40 mg, Daily  pantoprazole (PROTONIX) injection 40 mg, Daily    And  sodium chloride (PF) 0.9 % injection 10 mL, Daily  chlorhexidine (PERIDEX) 0.12 % solution 15 mL, BID  [START ON 3/17/2020] magnesium oxide (MAG-OX) tablet 400 mg, Daily  mupirocin (BACTROBAN) 2 % ointment, BID  propofol injection, Continuous PRN  [START ON 3/17/2020] aspirin EC tablet 81 mg, Daily  aspirin suppository 300 mg, Once  meperidine (DEMEROL) injection 25 mg, Once PRN  ipratropium-albuterol (DUONEB) nebulizer solution 1 ampule, Q4H [place]  albumin human 5 % IV solution 25 g, PRN  0.9 % sodium chloride bolus, Continuous PRN  norepinephrine (LEVOPHED) 16 mg in dextrose 5% 250 mL infusion, Continuous PRN  clevidipine (CLEVIPREX) infusion, Continuous PRN  insulin regular (HUMULIN R;NOVOLIN R) 100 Units in sodium chloride 0.9 % 100 mL infusion, Continuous PRN  [START ON 3/17/2020] insulin glargine (LANTUS) injection vial 22 Units, Nightly  glucose (GLUTOSE) 40 % oral gel 15 g, PRN  dextrose 50 % IV solution, PRN  glucagon (rDNA) injection 1 mg, PRN  dextrose 5 % solution, PRN  [START ON 3/17/2020] senna (SENOKOT) tablet 8.6 mg, Nightly  [START ON 3/17/2020] tamsulosin (FLOMAX) capsule 0.4 mg, Daily  [START ON 3/17/2020] insulin lispro (HUMALOG) injection vial 0-18 Units, Q4H  insulin lispro (HUMALOG) injection vial 0-18 Units, Q4H  [START ON 3/17/2020] clopidogrel (PLAVIX) tablet 75 mg, Daily  calcium gluconate 1 g in dextrose 5 % 100 mL IVPB, PRN  albumin human 5 % IV solution,   albuterol (PROVENTIL) nebulizer solution 2.5 mg, Q2H PRN  albumin human 5 % IV solution 12.5 g, Once        Review of systems:     Unable to obtain complete 10 system review due to intubated status. Physical exam:    Constitutional: orally intubated; no acute distress. He was just extubated prior to my arrival.  See above. Eyes: Closed  ENT: clear, no bleeding. No external masses. Lips normal formation. Neck: supple, full ROM, no JVD, no bruits, no lymphadenopathy. No masses. trachea midline. Heart: regular rate & rhythm, normal S1 & S2, no abnormal murmur, S4 gallop. No heave. Lungs: orally intubated, diminished BS noted. Clear bilaterally. Abd: soft, non-tender. Normal bowel sounds. Neuro: Full ROM X 4, EOMI, no tremors. EXT: trace bilateral lower extremity edema  Skin: warm, dry, intact. Good turgor. Psych: A&O x 3, normal behavior, not anxious. PATRICE 3/3/2020:   Summary   Normal left ventricular systolic function. Ejection fraction is visually estimated at 60%. Normal right ventricular size and function. Mild mitral regurgitation. RS: NSR      Assessment/Recommendations  1. CAD (S/p CABG x 4 (LIMA-LAD, Radial-OM, GSV-RCA sequence onto PDA, 3/16/2020) POD #0. Postop CABG. Hemodynamically stable. Pressors are off.  2. HTN  3. Hyperlipidemia  4. T2DM  5. COPD  6. Mild MR (PATRICE 3/3/2020)    - monitor telemetry  - continue statin  - continue DAPT  - per CTS, for nitrate tomorrow (for radial graft protection)  - monitor lytes, creatinine, H/H closely  - will follow along    Agree      Will discuss with Dr. Renetta Castro, [facility]  Cardiology     I have personally seen and evaluated the patient. I personally obtained the history and performed the physical exam.  They are currently pain free. I personally reviewed all of the above labs and data.   All of the above assessments and recommendations are from me. All of the above cardiac medical decisions are from me.     Electronically signed by Karina Avery DO on 3/16/2020 at 1:37 PM

## 2020-03-16 NOTE — PROGRESS NOTES
CVICU Admission Note    Name: Selby Hodgkins  MRN: [id number]    CC: Postoperative Critical Care Management     Indication for Surgery/Procedure: CAD   LVEF:  Normal    RVF:  Normal     Important/Relevant PMH/PSH: HTN, HPL, COPD, DM with neuropathy, right ICA chronic occlusion, obesity, current smoker     Procedure/Surgeries: 3/16/2020 Sternotomy/CABG x 4 (LIMA-LAD, Radial-OM, GSV-RCA sequence onto PDA)/EVH LLE/ERA LUE/Rigid internal fixation of the sternum with KLS plates x 2    Pacing wires: Ventricular       Physical Exam:    /83   Pulse 104   Temp 98.7 °F (37.1 °C) (Temporal)   Resp 19   Ht 6' 4\" (1.93 m)   Wt (!) 330 lb (149.7 kg)   SpO2 98%   BMI 40.17 kg/m²       Post operative CXR:  Atelectasis, No pneumothorax noted, Mildly increased vascular markings bilateral, No significant pleural effusion. ETT/lines/drains appear to be in proper position. Final Radiology report pending. General Appearance: Arrived to ICU intubated and sedated. Hemodynamically stable, currently no pressors. Eyes: PERRL  Pulmonary: Diminished bibasilar. No wheezes, no accessory muscle use noted   Ventilator: Mode: AC/VC, 60% FiO2, 8 PEEP, 600 Vt   Cardiovascular: RRR, no heaves or thrills palpated   Telemetry: SR   Abdomen: Soft, OG to LIWS  Extremities: LUE radial harvest (cap refill <3 seconds) all other extremities palpable pulses, no edema   Neurologic/Psych: Sedation   Skin: Warm and dry    Incision: MSI dressing C/D/I, LUE radial harvest site with ace wrap- soft to palpation, LLE SVG sites with ace wrap     Assessment/Plan: Day of Surgery     1. CAD S/p CABGx4 (LIMA-LAD, Radial-OM, GSV-RCA sequence onto PDA)  - DAPT, statin  - Plan for nitrate tomorrow for radial graft protection as hemodynamics allow   - Monitor chest tube output and hemodynamics closely  - IVF resuscitation PRN     2.  Acute Postoperative Respiratory Insufficiency  - 2/2 surgery  - Intubated on lung protective ventilation  - Wean vent settings and extubate patient once awake, following commands, VEGAS, and no signs of bleeding  - ABGs per protocol and PRN   - H/o COPD-- duonebs and PRN albuterol     3.  Acute Post Operative Pain   - PRN fentanyl for pain management until able to take PO     4. DM Type 2   - Hgb A1C 8.4%, on home insulin   - SSI q 4 hours for glycemic control, start RHI infusion for BG >180 per protocol        Electronically signed by ZIA Pate - CNP on 3/16/2020 at 10:54 AM

## 2020-03-16 NOTE — PROGRESS NOTES
PT EXTUBATED TO A 6L NASAL CANNULA FOLLOWING GOOD LEAK, NO STRIDOR HEARD AT THIS TIME, O2 SATURATION 99%

## 2020-03-16 NOTE — ANESTHESIA POSTPROCEDURE EVALUATION
Department of Anesthesiology  Postprocedure Note    Patient: Katherine Guadarrama  MRN: [id number]  [place]: 1975  Date of evaluation: 3/16/2020  Time:  12:14 PM     Procedure Summary     Date:  03/16/20 Room / Location:  [facility] OR 01 / [facility]    Anesthesia Start:  4711 Anesthesia Stop:  1059    Procedure:  CORONARY ARTERY BYPASS POSSIBLE LEFT RADIAL ARTERY HARVEST, PATRICE (N/A ) Diagnosis:  (CAD)    Surgeon:  Minor Marroquin MD Responsible Provider:  Helio Jeong DO    Anesthesia Type:  general ASA Status:  4          Anesthesia Type: general    Ze Phase I: Ze Score: 10    Ze Phase II:      Last vitals: Reviewed and per EMR flowsheets.        Anesthesia Post Evaluation    Patient location during evaluation: ICU  Patient participation: complete - patient cannot participate  Level of consciousness: sedated and ventilated  Airway patency: patent  Nausea & Vomiting: no nausea and no vomiting  Complications: no  Cardiovascular status: blood pressure returned to baseline  Respiratory status: acceptable  Hydration status: euvolemic

## 2020-03-16 NOTE — BRIEF OP NOTE
Brief Postoperative Note    Billy Lora  YOB: 1975  [id number]    Pre-operative Diagnosis: CAD    Post-operative Diagnosis: Same    Operation: Sternotomy/CABG x 4 (LIMA-LAD, Radial-OM, GSV-RCA sequence onto PDA)/EVH LLE/ERA LUE/Rigid internal fixation of the sternum with KLS plates x 2    Anesthesia: General    Surgeon: Wyatt valera    Assistants: Chriss/Jeb/Milagros/Mina    Estimated Blood Loss: 1817    Complications: None    Specimens:   * No specimens in log *    Implants:  Implant Name Type Inv. Item Serial No.  Lot No. LRB No. Used   PLATE STERNAL TI 6 HL 1.8MM Screw/Plate/Nail/Ubaldo PLATE STERNAL TI 6 HL 1.8MM  [employer] LP  N/A 1   PLATE STERNL LK 8 HL LADDER 1.8MM Screw/Plate/Nail/Ubaldo PLATE STERNL LK 8 HL LADDER 1.8MM  [employer] LP  N/A 1   SCREW LK STNAL 2.3X13MM Screw/Plate/Nail/Ubaldo SCREW LK STNAL 2.3X13MM  [employer] LP  N/A 4   SCREW LK STNAL 2.3X15MM Screw/Plate/Nail/Ubaldo SCREW LK STNAL 2.3X15MM  [employer] LP  N/A 4   SCREW LK STNAL 2.3X17MM Screw/Plate/Nail/Ubaldo SCREW LK STNAL 2.3X17MM  K[employer] LP  N/A 6         Drains:   Chest Tube 1 Anterior Mediastinal 32 [place] (Active)       Chest Tube 2 Anterior Mediastinal 32 [place] (Active)       Chest Tube 3 Anterior Pleural 28 [place] (Active)       Chest Tube 4 Right Pericardial 24 [place] (Active)   Dressing Status Clean;Dry; Intact 3/16/2020  9:53 AM   Dressing Type Dry dressing 3/16/2020  9:53 AM   Site Assessment Clean;Dry; Intact 3/16/2020  9:53 AM       Urethral Catheter Temperature probe 16 fr (Active)       Findings: see dictation    Teo Elam MD  Date: 3/16/2020  Time: 10:21 AM

## 2020-03-17 ENCOUNTER — APPOINTMENT (OUTPATIENT)
Dept: GENERAL RADIOLOGY | Age: 45
DRG: 166 | End: 2020-03-17
Attending: THORACIC SURGERY (CARDIOTHORACIC VASCULAR SURGERY)
Payer: COMMERCIAL

## 2020-03-17 LAB
ACTIVATED CLOTTING TIME: 108 SECONDS (ref 99–130)
ACTIVATED CLOTTING TIME: 127 SECONDS (ref 99–130)
ACTIVATED CLOTTING TIME: 491 SECONDS (ref 99–130)
ACTIVATED CLOTTING TIME: 567 SECONDS (ref 99–130)
ACTIVATED CLOTTING TIME: 916 SECONDS (ref 99–130)
ANION GAP SERPL CALCULATED.3IONS-SCNC: 11 MMOL/L (ref 7–16)
BUN BLDV-MCNC: 14 MG/DL (ref 6–20)
CALCIUM SERPL-MCNC: 8.2 MG/DL (ref 8.6–10.2)
CHLORIDE BLD-SCNC: 100 MMOL/L (ref 98–107)
CO2: 24 MMOL/L (ref 22–29)
CREAT SERPL-MCNC: 0.5 MG/DL (ref 0.7–1.2)
GFR AFRICAN AMERICAN: >60
GFR NON-AFRICAN AMERICAN: >60 ML/MIN/1.73
GLUCOSE BLD-MCNC: 156 MG/DL (ref 74–99)
HCT VFR BLD CALC: 32.2 % (ref 37–54)
HEMOGLOBIN: 10.7 G/DL (ref 12.5–16.5)
MAGNESIUM: 2 MG/DL (ref 1.6–2.6)
MCH RBC QN AUTO: 30.2 PG (ref 26–35)
MCHC RBC AUTO-ENTMCNC: 33.2 % (ref 32–34.5)
MCV RBC AUTO: 91 FL (ref 80–99.9)
METER GLUCOSE: 132 MG/DL (ref 74–99)
METER GLUCOSE: 154 MG/DL (ref 74–99)
METER GLUCOSE: 217 MG/DL (ref 74–99)
METER GLUCOSE: 294 MG/DL (ref 74–99)
PDW BLD-RTO: 13 FL (ref 11.5–15)
PLATELET # BLD: 158 E9/L (ref 130–450)
PMV BLD AUTO: 9.4 FL (ref 7–12)
POTASSIUM SERPL-SCNC: 3.5 MMOL/L (ref 3.5–5)
POTASSIUM SERPL-SCNC: 4.2 MMOL/L (ref 3.5–5)
RBC # BLD: 3.54 E12/L (ref 3.8–5.8)
SODIUM BLD-SCNC: 135 MMOL/L (ref 132–146)
WBC # BLD: 10.8 E9/L (ref 4.5–11.5)

## 2020-03-17 PROCEDURE — 6370000000 HC RX 637 (ALT 250 FOR IP): Performed by: NURSE PRACTITIONER

## 2020-03-17 PROCEDURE — 97166 OT EVAL MOD COMPLEX 45 MIN: CPT

## 2020-03-17 PROCEDURE — 6360000002 HC RX W HCPCS: Performed by: NURSE PRACTITIONER

## 2020-03-17 PROCEDURE — 97530 THERAPEUTIC ACTIVITIES: CPT

## 2020-03-17 PROCEDURE — 93798 PHYS/QHP OP CAR RHAB W/ECG: CPT

## 2020-03-17 PROCEDURE — 2700000000 HC OXYGEN THERAPY PER DAY

## 2020-03-17 PROCEDURE — 94669 MECHANICAL CHEST WALL OSCILL: CPT

## 2020-03-17 PROCEDURE — 2140000000 HC CCU INTERMEDIATE R&B

## 2020-03-17 PROCEDURE — 36415 COLL VENOUS BLD VENIPUNCTURE: CPT

## 2020-03-17 PROCEDURE — 94640 AIRWAY INHALATION TREATMENT: CPT

## 2020-03-17 PROCEDURE — 33518 CABG ARTERY-VEIN TWO: CPT | Performed by: THORACIC SURGERY (CARDIOTHORACIC VASCULAR SURGERY)

## 2020-03-17 PROCEDURE — 37799 UNLISTED PX VASCULAR SURGERY: CPT

## 2020-03-17 PROCEDURE — 6370000000 HC RX 637 (ALT 250 FOR IP): Performed by: THORACIC SURGERY (CARDIOTHORACIC VASCULAR SURGERY)

## 2020-03-17 PROCEDURE — 99232 SBSQ HOSP IP/OBS MODERATE 35: CPT | Performed by: INTERNAL MEDICINE

## 2020-03-17 PROCEDURE — 84132 ASSAY OF SERUM POTASSIUM: CPT

## 2020-03-17 PROCEDURE — 82962 GLUCOSE BLOOD TEST: CPT

## 2020-03-17 PROCEDURE — 85027 COMPLETE CBC AUTOMATED: CPT

## 2020-03-17 PROCEDURE — 2580000003 HC RX 258: Performed by: NURSE PRACTITIONER

## 2020-03-17 PROCEDURE — 6360000002 HC RX W HCPCS: Performed by: THORACIC SURGERY (CARDIOTHORACIC VASCULAR SURGERY)

## 2020-03-17 PROCEDURE — 2580000003 HC RX 258: Performed by: THORACIC SURGERY (CARDIOTHORACIC VASCULAR SURGERY)

## 2020-03-17 PROCEDURE — 97162 PT EVAL MOD COMPLEX 30 MIN: CPT

## 2020-03-17 PROCEDURE — 99232 SBSQ HOSP IP/OBS MODERATE 35: CPT | Performed by: NURSE PRACTITIONER

## 2020-03-17 PROCEDURE — 71045 X-RAY EXAM CHEST 1 VIEW: CPT

## 2020-03-17 PROCEDURE — 83735 ASSAY OF MAGNESIUM: CPT

## 2020-03-17 PROCEDURE — 33534 CABG ARTERIAL TWO: CPT | Performed by: THORACIC SURGERY (CARDIOTHORACIC VASCULAR SURGERY)

## 2020-03-17 PROCEDURE — 97535 SELF CARE MNGMENT TRAINING: CPT

## 2020-03-17 PROCEDURE — 80048 BASIC METABOLIC PNL TOTAL CA: CPT

## 2020-03-17 RX ORDER — AMIODARONE HYDROCHLORIDE 200 MG/1
400 TABLET ORAL 2 TIMES DAILY
Status: DISCONTINUED | OUTPATIENT
Start: 2020-03-17 | End: 2020-03-19 | Stop reason: HOSPADM

## 2020-03-17 RX ORDER — ISOSORBIDE DINITRATE 10 MG/1
10 TABLET ORAL 3 TIMES DAILY
Status: DISCONTINUED | OUTPATIENT
Start: 2020-03-17 | End: 2020-03-17

## 2020-03-17 RX ORDER — ASPIRIN 81 MG/1
81 TABLET ORAL DAILY
Status: DISCONTINUED | OUTPATIENT
Start: 2020-03-18 | End: 2020-03-19 | Stop reason: HOSPADM

## 2020-03-17 RX ORDER — NICOTINE POLACRILEX 4 MG
15 LOZENGE BUCCAL PRN
Status: DISCONTINUED | OUTPATIENT
Start: 2020-03-17 | End: 2020-03-19 | Stop reason: HOSPADM

## 2020-03-17 RX ORDER — FOLIC ACID 1 MG/1
1 TABLET ORAL DAILY
Status: DISCONTINUED | OUTPATIENT
Start: 2020-03-17 | End: 2020-03-19 | Stop reason: HOSPADM

## 2020-03-17 RX ORDER — ISOSORBIDE MONONITRATE 30 MG/1
30 TABLET, EXTENDED RELEASE ORAL DAILY
Status: DISCONTINUED | OUTPATIENT
Start: 2020-03-17 | End: 2020-03-17

## 2020-03-17 RX ORDER — SENNA AND DOCUSATE SODIUM 50; 8.6 MG/1; MG/1
1 TABLET, FILM COATED ORAL 2 TIMES DAILY
Status: DISCONTINUED | OUTPATIENT
Start: 2020-03-17 | End: 2020-03-19 | Stop reason: HOSPADM

## 2020-03-17 RX ORDER — DEXTROSE MONOHYDRATE 25 G/50ML
12.5 INJECTION, SOLUTION INTRAVENOUS PRN
Status: DISCONTINUED | OUTPATIENT
Start: 2020-03-17 | End: 2020-03-19 | Stop reason: HOSPADM

## 2020-03-17 RX ORDER — ASCORBIC ACID 500 MG
500 TABLET ORAL 2 TIMES DAILY
Status: DISCONTINUED | OUTPATIENT
Start: 2020-03-17 | End: 2020-03-19 | Stop reason: HOSPADM

## 2020-03-17 RX ORDER — DEXTROSE MONOHYDRATE 50 MG/ML
100 INJECTION, SOLUTION INTRAVENOUS PRN
Status: DISCONTINUED | OUTPATIENT
Start: 2020-03-17 | End: 2020-03-19 | Stop reason: HOSPADM

## 2020-03-17 RX ORDER — ISOSORBIDE MONONITRATE 30 MG/1
30 TABLET, EXTENDED RELEASE ORAL DAILY
Status: DISCONTINUED | OUTPATIENT
Start: 2020-03-17 | End: 2020-03-19 | Stop reason: HOSPADM

## 2020-03-17 RX ORDER — POTASSIUM CHLORIDE 20 MEQ/1
20 TABLET, EXTENDED RELEASE ORAL PRN
Status: DISCONTINUED | OUTPATIENT
Start: 2020-03-17 | End: 2020-03-19 | Stop reason: HOSPADM

## 2020-03-17 RX ORDER — FERROUS SULFATE 325(65) MG
325 TABLET ORAL 2 TIMES DAILY WITH MEALS
Status: DISCONTINUED | OUTPATIENT
Start: 2020-03-17 | End: 2020-03-19 | Stop reason: HOSPADM

## 2020-03-17 RX ADMIN — DEXTROSE MONOHYDRATE 3 G: 50 INJECTION, SOLUTION INTRAVENOUS at 09:33

## 2020-03-17 RX ADMIN — IPRATROPIUM BROMIDE AND ALBUTEROL SULFATE 1 AMPULE: 2.5; .5 SOLUTION RESPIRATORY (INHALATION) at 07:48

## 2020-03-17 RX ADMIN — CLOPIDOGREL 75 MG: 75 TABLET, FILM COATED ORAL at 08:31

## 2020-03-17 RX ADMIN — OXYCODONE 10 MG: 5 TABLET ORAL at 18:32

## 2020-03-17 RX ADMIN — INSULIN LISPRO 9 UNITS: 100 INJECTION, SOLUTION INTRAVENOUS; SUBCUTANEOUS at 23:28

## 2020-03-17 RX ADMIN — ATORVASTATIN CALCIUM 40 MG: 40 TABLET, FILM COATED ORAL at 08:31

## 2020-03-17 RX ADMIN — SODIUM CHLORIDE, PRESERVATIVE FREE 10 ML: 5 INJECTION INTRAVENOUS at 08:32

## 2020-03-17 RX ADMIN — IPRATROPIUM BROMIDE AND ALBUTEROL SULFATE 1 AMPULE: 2.5; .5 SOLUTION RESPIRATORY (INHALATION) at 11:47

## 2020-03-17 RX ADMIN — DEXTROSE MONOHYDRATE 3 G: 50 INJECTION, SOLUTION INTRAVENOUS at 23:36

## 2020-03-17 RX ADMIN — INSULIN LISPRO 6 UNITS: 100 INJECTION, SOLUTION INTRAVENOUS; SUBCUTANEOUS at 11:19

## 2020-03-17 RX ADMIN — ACETAMINOPHEN 1000 MG: 500 TABLET ORAL at 00:09

## 2020-03-17 RX ADMIN — Medication 500 MG: at 18:29

## 2020-03-17 RX ADMIN — POTASSIUM CHLORIDE 20 MEQ: 400 INJECTION, SOLUTION INTRAVENOUS at 07:01

## 2020-03-17 RX ADMIN — DEXTROSE MONOHYDRATE 3 G: 50 INJECTION, SOLUTION INTRAVENOUS at 02:25

## 2020-03-17 RX ADMIN — AMIODARONE HYDROCHLORIDE 400 MG: 200 TABLET ORAL at 23:25

## 2020-03-17 RX ADMIN — AMIODARONE HYDROCHLORIDE 0.5 MG/MIN: 50 INJECTION, SOLUTION INTRAVENOUS at 02:36

## 2020-03-17 RX ADMIN — SENNOSIDES AND DOCUSATE SODIUM 1 TABLET: 8.6; 5 TABLET ORAL at 23:25

## 2020-03-17 RX ADMIN — TAMSULOSIN HYDROCHLORIDE 0.4 MG: 0.4 CAPSULE ORAL at 08:30

## 2020-03-17 RX ADMIN — ACETAMINOPHEN 1000 MG: 500 TABLET ORAL at 11:22

## 2020-03-17 RX ADMIN — FOLIC ACID 1 MG: 1 TABLET ORAL at 18:29

## 2020-03-17 RX ADMIN — KETOROLAC TROMETHAMINE 15 MG: 30 INJECTION, SOLUTION INTRAMUSCULAR; INTRAVENOUS at 05:25

## 2020-03-17 RX ADMIN — MAGNESIUM GLUCONATE 500 MG ORAL TABLET 400 MG: 500 TABLET ORAL at 08:31

## 2020-03-17 RX ADMIN — MUPIROCIN: 20 OINTMENT TOPICAL at 08:30

## 2020-03-17 RX ADMIN — SENNOSIDES AND DOCUSATE SODIUM 1 TABLET: 8.6; 5 TABLET ORAL at 08:31

## 2020-03-17 RX ADMIN — ISOSORBIDE MONONITRATE 30 MG: 30 TABLET ORAL at 08:31

## 2020-03-17 RX ADMIN — OXYCODONE 5 MG: 5 TABLET ORAL at 14:01

## 2020-03-17 RX ADMIN — PANTOPRAZOLE SODIUM 40 MG: 40 TABLET, DELAYED RELEASE ORAL at 08:31

## 2020-03-17 RX ADMIN — INSULIN LISPRO 3 UNITS: 100 INJECTION, SOLUTION INTRAVENOUS; SUBCUTANEOUS at 07:41

## 2020-03-17 RX ADMIN — OXYCODONE 5 MG: 5 TABLET ORAL at 02:41

## 2020-03-17 RX ADMIN — OXYCODONE 10 MG: 5 TABLET ORAL at 07:45

## 2020-03-17 RX ADMIN — CALCIUM GLUCONATE 1 G: 98 INJECTION, SOLUTION INTRAVENOUS at 08:29

## 2020-03-17 RX ADMIN — METOPROLOL TARTRATE 12.5 MG: 25 TABLET, FILM COATED ORAL at 11:19

## 2020-03-17 RX ADMIN — AMIODARONE HYDROCHLORIDE 400 MG: 200 TABLET ORAL at 08:31

## 2020-03-17 RX ADMIN — INSULIN GLARGINE 22 UNITS: 100 INJECTION, SOLUTION SUBCUTANEOUS at 23:28

## 2020-03-17 RX ADMIN — Medication 500 MG: at 23:26

## 2020-03-17 RX ADMIN — METOPROLOL TARTRATE 12.5 MG: 25 TABLET, FILM COATED ORAL at 23:26

## 2020-03-17 RX ADMIN — ACETAMINOPHEN 1000 MG: 500 TABLET ORAL at 16:04

## 2020-03-17 RX ADMIN — ACETAMINOPHEN 1000 MG: 500 TABLET ORAL at 06:26

## 2020-03-17 RX ADMIN — FERROUS SULFATE TAB 325 MG (65 MG ELEMENTAL FE) 325 MG: 325 (65 FE) TAB at 18:30

## 2020-03-17 RX ADMIN — SODIUM CHLORIDE, PRESERVATIVE FREE 10 ML: 5 INJECTION INTRAVENOUS at 23:27

## 2020-03-17 RX ADMIN — ASPIRIN 81 MG: 81 TABLET, COATED ORAL at 08:31

## 2020-03-17 RX ADMIN — KETOROLAC TROMETHAMINE 15 MG: 30 INJECTION, SOLUTION INTRAMUSCULAR; INTRAVENOUS at 11:37

## 2020-03-17 RX ADMIN — OXYCODONE 10 MG: 5 TABLET ORAL at 23:25

## 2020-03-17 ASSESSMENT — PAIN DESCRIPTION - PAIN TYPE
TYPE: ACUTE PAIN;SURGICAL PAIN
TYPE: SURGICAL PAIN
TYPE: SURGICAL PAIN
TYPE: ACUTE PAIN;SURGICAL PAIN
TYPE: ACUTE PAIN;SURGICAL PAIN
TYPE: SURGICAL PAIN

## 2020-03-17 ASSESSMENT — PAIN SCALES - GENERAL
PAINLEVEL_OUTOF10: 7
PAINLEVEL_OUTOF10: 5
PAINLEVEL_OUTOF10: 4
PAINLEVEL_OUTOF10: 0
PAINLEVEL_OUTOF10: 8
PAINLEVEL_OUTOF10: 2
PAINLEVEL_OUTOF10: 5
PAINLEVEL_OUTOF10: 2
PAINLEVEL_OUTOF10: 8
PAINLEVEL_OUTOF10: 2
PAINLEVEL_OUTOF10: 0
PAINLEVEL_OUTOF10: 4
PAINLEVEL_OUTOF10: 5

## 2020-03-17 ASSESSMENT — PAIN DESCRIPTION - FREQUENCY
FREQUENCY: CONTINUOUS
FREQUENCY: CONTINUOUS

## 2020-03-17 ASSESSMENT — PAIN DESCRIPTION - LOCATION
LOCATION: CHEST

## 2020-03-17 ASSESSMENT — PAIN DESCRIPTION - DESCRIPTORS
DESCRIPTORS: ACHING
DESCRIPTORS: ACHING;DISCOMFORT;SORE
DESCRIPTORS: ACHING;DISCOMFORT;SORE

## 2020-03-17 ASSESSMENT — PAIN - FUNCTIONAL ASSESSMENT
PAIN_FUNCTIONAL_ASSESSMENT: PREVENTS OR INTERFERES SOME ACTIVE ACTIVITIES AND ADLS
PAIN_FUNCTIONAL_ASSESSMENT: PREVENTS OR INTERFERES WITH MANY ACTIVE NOT PASSIVE ACTIVITIES

## 2020-03-17 ASSESSMENT — PAIN DESCRIPTION - ORIENTATION
ORIENTATION: MID

## 2020-03-17 ASSESSMENT — PAIN DESCRIPTION - PROGRESSION
CLINICAL_PROGRESSION: GRADUALLY WORSENING
CLINICAL_PROGRESSION: GRADUALLY IMPROVING
CLINICAL_PROGRESSION: GRADUALLY WORSENING
CLINICAL_PROGRESSION: GRADUALLY WORSENING

## 2020-03-17 NOTE — OP NOTE
radial artery to the obtuse marginal, greater saphenous vein to  the right coronary artery and then sequenced onto the PDA. 3.  Endoscopic harvesting, left lower extremity greater saphenous vein. 4.  Endoscopic harvesting, left upper extremity radial artery. 5.  Rigid internal fixation of sternum using KLS plates x2.  6.  22 modifier. HISTORY:  This is a 81-year-old man who had a cardiac cath which showed  severe multivessel coronary artery disease. He was referred for  coronary artery bypass graft and the patient had an echo which showed  moderate mitral regurgitation. We did a PATRICE which showed that this was  only a mild ischemic mitral regurgitation. Therefore, we elected to  leave this alone. He was deemed a candidate for coronary artery bypass  grafting. The patient was described the procedure in full including the  risks and complications including, but not limited to bleeding,  infection, need for reoperation, hemothorax, pneumothorax, stroke,  myocardial infarction, and death and the patient agreed to proceed. FINDINGS:  Preoperative PATRICE revealed preserved ejection fraction without  any significant valvular abnormalities. Intraoperatively, left internal  mammary artery was a good conduit for bypass. The radial artery was a  good conduit for bypass. The vein was fair to good conduit for bypass. The LAD was a 2 mm vessel and a good target for bypass. The obtuse  marginal was a 1.8 mm vessel and a good target for bypass. The right  coronary artery was a 2.2 mm vessel and an excellent target for bypass. The PDA was a 1.8 mm vessel and a good target for bypass. The patient  was  from cardiopulmonary bypass without the assistance of any  inotropic support. Postoperative PATRICE revealed preserved ejection  fraction without any new valvular abnormalities. All sponge,  instrument, and needle counts were correct at the end of the case.     DESCRIPTION OF OPERATION:  After adequate informed consent was obtained  and adequate preoperative antibiotics were given, the patient was  brought to the operating room in stable condition. He was laid in  supine position and induced under general endotracheal anesthesia by  anesthesia staff. Lind catheter and adequate monitoring lines were  placed. The patient's chest, abdomen, pelvis, lower extremities, and  left upper extremity were prepped and draped in the usual sterile  fashion. Left lower extremity greater saphenous vein and left upper  extremity radial artery were harvested in an endoscopic technique and  prepared on the back table for anastomosis. These wounds were closed in  multiple layers of absorbable stitch. Standard sternotomy was  performed. Left internal mammary artery was harvested in a pedicle  fashion. The patient was systemically heparinized. The mammary was  clipped distally and transected. Excellent pulsatile flow was noted. It was injected with papaverine, clipped distally, and placed in the  left chest.    Standard retractor was placed. The pericardium was entered and tacked  to the chest wall. The patient did have previous adhesions from his  previous anterior mediastinotomy and these were taken down using Bovie  electrocautery. Pericardium was entered and tacked to the chest wall. After assuring adequate ACT, the patient was instituted on  cardiopulmonary bypass by cannulating the ascending aorta using a  21-Moroccan aortic cannula and the right atrial appendage using a  two-stage venous cannula. Retrograde catheter and root vent were  placed. The aorta was cross-clamped and the heart was arrested with  cold blood antegrade and retrograde Buckberg cardioplegia. Excellent  diastolic arrest was noted. Topical ice was used. Cardioplegia was  given intermittently throughout the remainder of the operation. The  vein was brought into the field and was grafted to the PDA using 7-0  Prolene.   It was then measured out and side-to-side anastomosis in a  sequence fashion was created between the vein and the right coronary  artery. It was then brought up to the ascending aorta and a proximal  anastomosis was created using 6-0 Prolene. The radial artery was  brought into the field and was grafted to the obtuse marginal using 7-0  Prolene. It was brought to the ascending aorta and a proximal  anastomosis was created using 7-0 Prolene. The mammary was brought into  the field and was grafted to the LAD in its midportion using 7-0  Prolene. The pedicle was tacked to the epicardium. Hotshot antegrade  and retrograde were given. The cross-clamp was released. Ventricular  pacing wire was placed. The patient returned to sinus rhythm. The patient was easily weaned  from cardiopulmonary bypass without the assistance of any inotropic  support. The patient was decannulated in the usual fashion and  protamine was given. Mediastinum was inspected for hemostasis. Hemostasis was achieved using Bovie electrocautery and stitches. With  the patient warm and hemodynamically stable in a sinus rhythm, I then  closed the chest using #6 steel wires including double wires. Because  the patient is morbidly obese and diabetic, he is at high risk for  sternal wound infection. Therefore, I did rigid internal fixation of  the sternum using KLS plates x2. Due to the previous adhesions and the  size of this man, a 22 modifier was indicated. The remainder of the  wound was closed in multiple layers of absorbable stitch. Dressings  were applied. The patient tolerated the procedure well. The patient  was transferred to the cardiothoracic intensive care unit in stable and  guarded condition. All sponge, instrument, and needle counts were  correct at the end of the case.         David Doe MD    D: 03/16/2020 10:37:14       T: 03/16/2020 11:50:02     [email or web address]  Job#: [id number]     Doc#: [id number]    CC:  MD Anyi Smith

## 2020-03-17 NOTE — PROGRESS NOTES
dressing techniques/equipment to maintain sternal precautions with self care tasks; education on recommended DME for fall prevention & bathroom safety. Pt provided with handouts on energy conservation and sternal precautions during functional activities for safe return home. Pt demonstrates G understanding. Comments: OK from RN to see patient. Upon arrival, patient supine in bed; motivated for OOB activity. Pt demo Fair+ tolerance during activity with short breaks and demo G understanding of education/techniques. At end of session, patient left seated up in chair and set up with breakfast tray. Call light within reach, all lines and tubes intact. Pt instructed on use of call light for assistance and fall prevention. Line management and environmental modifications made prior to and end of session to ensure patient safety and to increase efficiency of session. Skilled monitoring of HR, O2 saturation, blood pressure and patient's response to activity performed throughout session. Overall, pt presents with decreased activity tolerance, dynamic balance, & functional mobility limiting completion of ADLs and safe return home. Pt can benefit from continued skilled OT to increase safety and functional independence. Evaluation Complexity: Moderate: Moderate    · History: Expanded chart review of consults, imaging, and psychosocial history related to current functional performance. · Exam: 5+ performance deficits identified limiting functional independence and safe return home   · Assistance/Modification: Min/mod assistance or modifications required to perform tasks. May have comorbidities that affect occupational performance.     Assessment of current deficits   Functional mobility [x]  ADLs [x] Strength [x]  Cognition []  Functional transfers  [x] IADLs [x] Safety Awareness []  Endurance [x]  Fine Motor Coordination [] Balance [x] Vision/perception [] Sensation []   Gross Motor Coordination [] ROM [] Delirium []                  Communication []    Plan of Care: 1-5 tx/wk PRN   ADL retraining [x]   Equipment needs [x]   Neuromuscular re-education [] Energy Conservation Techniques [x]  Functional Transfer training [x] Patient and/or Family Education [x]  Functional Mobility training [x]  Environmental Modifications [x]  Cognitive re-training []   Compensatory techniques for ADLs [x]  Splinting Needs []   Positioning to improve overall function [x]   Therapeutic Activity [x]                       Therapeutic Exercise  [x]  Visual/Perceptual: []    Delirium prevention/treatment  [x]   Other:  []    Rehab Potential: good for established goals    Patient / Family Goal: to go home    Patient and/or family were instructed/educated on diagnosis, prognosis/goals and plan of care. Pt demonstrated G understanding. [] Malnutrition indicators have been identified and nursing has been notified to ensure a dietitian consult is ordered. Moderate Evaluation +   Treatment Time In:0910              Treatment Time Out: 0199                Treatment Charges: Mins Units   Ther Ex  [id number]     Manual Therapy 86273     Thera Activities 24460 10 1   ADL/Home Mgt 36574 15 1   Neuro Re-ed 73729     Orthotic manage/training  68904     Non-Billable Time     Total Timed Treatment 25 2     Evaluation time includes thorough review of current medical information, gathering information on past medical history/social history and prior level of function, completion of standardized testing/informal observation of tasks, assessment of data and development of POC/Goals.      Rao Chisholm, OTR/L [id number]

## 2020-03-17 NOTE — PROGRESS NOTES
tablet 1,000 mg, 4 times per day  [START ON 3/21/2020] acetaminophen (TYLENOL) tablet 650 mg, Q4H PRN        Review of systems:     Unable to obtain complete 10 system review due to intubated status. Physical exam:    Constitutional: orally intubated; no acute distress. He was just extubated prior to my arrival.  See above. Eyes: Closed  ENT: clear, no bleeding. No external masses. Lips normal formation. Neck: supple, full ROM, no JVD, no bruits, no lymphadenopathy. No masses. trachea midline. Heart: regular rate & rhythm, normal S1 & S2, no abnormal murmur, S4 gallop. No heave. Lungs: Poor air movement due to difficulty taking breaths due to pain from his chest tubes. Clear bilaterally. Abd: soft, non-tender. Normal bowel sounds. Neuro: Full ROM X 4, EOMI, no tremors. EXT: trace bilateral lower extremity edema  Skin: warm, dry, intact. Good turgor. Psych: A&O x 3, normal behavior, not anxious. PATRICE 3/3/2020:   Summary   Normal left ventricular systolic function. Ejection fraction is visually estimated at 60%. Normal right ventricular size and function. Mild mitral regurgitation. RS: NSR      Assessment/Recommendations  1. CAD (S/p CABG x 4 (LIMA-LAD, Radial-OM, GSV-RCA sequence onto PDA, 3/16/2020). Hemodynamically stable. Pressors are off. Heart rate is around 100 and blood pressure is borderline low. Start beta-blockers when able to tolerate. 2. HTN  3. Hyperlipidemia  4. T2DM  5. COPD  6.  Mild MR (PATRICE 3/3/2020)

## 2020-03-18 ENCOUNTER — APPOINTMENT (OUTPATIENT)
Dept: GENERAL RADIOLOGY | Age: 45
DRG: 166 | End: 2020-03-18
Attending: THORACIC SURGERY (CARDIOTHORACIC VASCULAR SURGERY)
Payer: COMMERCIAL

## 2020-03-18 LAB
ANION GAP SERPL CALCULATED.3IONS-SCNC: 11 MMOL/L (ref 7–16)
BUN BLDV-MCNC: 13 MG/DL (ref 6–20)
CALCIUM SERPL-MCNC: 8.9 MG/DL (ref 8.6–10.2)
CHLORIDE BLD-SCNC: 95 MMOL/L (ref 98–107)
CO2: 25 MMOL/L (ref 22–29)
CREAT SERPL-MCNC: 0.6 MG/DL (ref 0.7–1.2)
GFR AFRICAN AMERICAN: >60
GFR NON-AFRICAN AMERICAN: >60 ML/MIN/1.73
GLUCOSE BLD-MCNC: 243 MG/DL (ref 74–99)
HCT VFR BLD CALC: 31.6 % (ref 37–54)
HEMOGLOBIN: 10.4 G/DL (ref 12.5–16.5)
MCH RBC QN AUTO: 30.1 PG (ref 26–35)
MCHC RBC AUTO-ENTMCNC: 32.9 % (ref 32–34.5)
MCV RBC AUTO: 91.6 FL (ref 80–99.9)
METER GLUCOSE: 253 MG/DL (ref 74–99)
METER GLUCOSE: 263 MG/DL (ref 74–99)
METER GLUCOSE: 312 MG/DL (ref 74–99)
METER GLUCOSE: 314 MG/DL (ref 74–99)
PDW BLD-RTO: 12.7 FL (ref 11.5–15)
PLATELET # BLD: 164 E9/L (ref 130–450)
PMV BLD AUTO: 9.7 FL (ref 7–12)
POTASSIUM SERPL-SCNC: 4 MMOL/L (ref 3.5–5)
RBC # BLD: 3.45 E12/L (ref 3.8–5.8)
SODIUM BLD-SCNC: 131 MMOL/L (ref 132–146)
WBC # BLD: 14.2 E9/L (ref 4.5–11.5)

## 2020-03-18 PROCEDURE — 2580000003 HC RX 258: Performed by: NURSE PRACTITIONER

## 2020-03-18 PROCEDURE — 80048 BASIC METABOLIC PNL TOTAL CA: CPT

## 2020-03-18 PROCEDURE — 99231 SBSQ HOSP IP/OBS SF/LOW 25: CPT | Performed by: SURGERY

## 2020-03-18 PROCEDURE — 6370000000 HC RX 637 (ALT 250 FOR IP): Performed by: NURSE PRACTITIONER

## 2020-03-18 PROCEDURE — 6360000002 HC RX W HCPCS: Performed by: NURSE PRACTITIONER

## 2020-03-18 PROCEDURE — 71045 X-RAY EXAM CHEST 1 VIEW: CPT

## 2020-03-18 PROCEDURE — 2140000000 HC CCU INTERMEDIATE R&B

## 2020-03-18 PROCEDURE — 82962 GLUCOSE BLOOD TEST: CPT

## 2020-03-18 PROCEDURE — 2700000000 HC OXYGEN THERAPY PER DAY

## 2020-03-18 PROCEDURE — 93798 PHYS/QHP OP CAR RHAB W/ECG: CPT

## 2020-03-18 PROCEDURE — 36415 COLL VENOUS BLD VENIPUNCTURE: CPT

## 2020-03-18 PROCEDURE — 6370000000 HC RX 637 (ALT 250 FOR IP): Performed by: PHYSICIAN ASSISTANT

## 2020-03-18 PROCEDURE — 99232 SBSQ HOSP IP/OBS MODERATE 35: CPT | Performed by: NURSE PRACTITIONER

## 2020-03-18 PROCEDURE — 94640 AIRWAY INHALATION TREATMENT: CPT

## 2020-03-18 PROCEDURE — 85027 COMPLETE CBC AUTOMATED: CPT

## 2020-03-18 PROCEDURE — 94669 MECHANICAL CHEST WALL OSCILL: CPT

## 2020-03-18 RX ADMIN — ATORVASTATIN CALCIUM 40 MG: 40 TABLET, FILM COATED ORAL at 08:51

## 2020-03-18 RX ADMIN — SENNOSIDES AND DOCUSATE SODIUM 1 TABLET: 8.6; 5 TABLET ORAL at 20:59

## 2020-03-18 RX ADMIN — ASPIRIN 81 MG: 81 TABLET, COATED ORAL at 08:51

## 2020-03-18 RX ADMIN — FERROUS SULFATE TAB 325 MG (65 MG ELEMENTAL FE) 325 MG: 325 (65 FE) TAB at 08:51

## 2020-03-18 RX ADMIN — INSULIN LISPRO 9 UNITS: 100 INJECTION, SOLUTION INTRAVENOUS; SUBCUTANEOUS at 20:44

## 2020-03-18 RX ADMIN — TAMSULOSIN HYDROCHLORIDE 0.4 MG: 0.4 CAPSULE ORAL at 08:51

## 2020-03-18 RX ADMIN — SENNOSIDES AND DOCUSATE SODIUM 1 TABLET: 8.6; 5 TABLET ORAL at 08:51

## 2020-03-18 RX ADMIN — OXYCODONE 10 MG: 5 TABLET ORAL at 20:51

## 2020-03-18 RX ADMIN — MUPIROCIN: 20 OINTMENT TOPICAL at 01:08

## 2020-03-18 RX ADMIN — INSULIN GLARGINE 22 UNITS: 100 INJECTION, SOLUTION SUBCUTANEOUS at 20:44

## 2020-03-18 RX ADMIN — SODIUM CHLORIDE, PRESERVATIVE FREE 10 ML: 5 INJECTION INTRAVENOUS at 08:51

## 2020-03-18 RX ADMIN — OXYCODONE 10 MG: 5 TABLET ORAL at 08:50

## 2020-03-18 RX ADMIN — OXYCODONE 10 MG: 5 TABLET ORAL at 16:23

## 2020-03-18 RX ADMIN — INSULIN LISPRO 12 UNITS: 100 INJECTION, SOLUTION INTRAVENOUS; SUBCUTANEOUS at 16:25

## 2020-03-18 RX ADMIN — FOLIC ACID 1 MG: 1 TABLET ORAL at 08:51

## 2020-03-18 RX ADMIN — DEXTROSE MONOHYDRATE 3 G: 50 INJECTION, SOLUTION INTRAVENOUS at 06:37

## 2020-03-18 RX ADMIN — ACETAMINOPHEN 1000 MG: 500 TABLET ORAL at 18:12

## 2020-03-18 RX ADMIN — IPRATROPIUM BROMIDE AND ALBUTEROL SULFATE 1 AMPULE: 2.5; .5 SOLUTION RESPIRATORY (INHALATION) at 21:01

## 2020-03-18 RX ADMIN — INSULIN LISPRO 12 UNITS: 100 INJECTION, SOLUTION INTRAVENOUS; SUBCUTANEOUS at 12:22

## 2020-03-18 RX ADMIN — OXYCODONE 10 MG: 5 TABLET ORAL at 03:39

## 2020-03-18 RX ADMIN — MUPIROCIN: 20 OINTMENT TOPICAL at 08:52

## 2020-03-18 RX ADMIN — IPRATROPIUM BROMIDE AND ALBUTEROL SULFATE 1 AMPULE: 2.5; .5 SOLUTION RESPIRATORY (INHALATION) at 16:42

## 2020-03-18 RX ADMIN — MUPIROCIN: 20 OINTMENT TOPICAL at 20:51

## 2020-03-18 RX ADMIN — FERROUS SULFATE TAB 325 MG (65 MG ELEMENTAL FE) 325 MG: 325 (65 FE) TAB at 18:12

## 2020-03-18 RX ADMIN — CLOPIDOGREL 75 MG: 75 TABLET, FILM COATED ORAL at 08:51

## 2020-03-18 RX ADMIN — AMIODARONE HYDROCHLORIDE 400 MG: 200 TABLET ORAL at 20:51

## 2020-03-18 RX ADMIN — ISOSORBIDE MONONITRATE 30 MG: 30 TABLET ORAL at 08:51

## 2020-03-18 RX ADMIN — METOPROLOL TARTRATE 25 MG: 25 TABLET, FILM COATED ORAL at 20:51

## 2020-03-18 RX ADMIN — PANTOPRAZOLE SODIUM 40 MG: 40 TABLET, DELAYED RELEASE ORAL at 08:51

## 2020-03-18 RX ADMIN — Medication 500 MG: at 08:51

## 2020-03-18 RX ADMIN — AMIODARONE HYDROCHLORIDE 400 MG: 200 TABLET ORAL at 08:51

## 2020-03-18 RX ADMIN — MAGNESIUM GLUCONATE 500 MG ORAL TABLET 400 MG: 500 TABLET ORAL at 08:51

## 2020-03-18 RX ADMIN — INSULIN LISPRO 9 UNITS: 100 INJECTION, SOLUTION INTRAVENOUS; SUBCUTANEOUS at 06:38

## 2020-03-18 RX ADMIN — ACETAMINOPHEN 1000 MG: 500 TABLET ORAL at 06:36

## 2020-03-18 RX ADMIN — METOPROLOL TARTRATE 12.5 MG: 25 TABLET, FILM COATED ORAL at 08:52

## 2020-03-18 RX ADMIN — SODIUM CHLORIDE, PRESERVATIVE FREE 10 ML: 5 INJECTION INTRAVENOUS at 20:52

## 2020-03-18 RX ADMIN — ACETAMINOPHEN 1000 MG: 500 TABLET ORAL at 12:22

## 2020-03-18 RX ADMIN — Medication 500 MG: at 20:51

## 2020-03-18 RX ADMIN — ACETAMINOPHEN 1000 MG: 500 TABLET ORAL at 01:09

## 2020-03-18 ASSESSMENT — PAIN SCALES - GENERAL
PAINLEVEL_OUTOF10: 3
PAINLEVEL_OUTOF10: 4
PAINLEVEL_OUTOF10: 4
PAINLEVEL_OUTOF10: 5
PAINLEVEL_OUTOF10: 5
PAINLEVEL_OUTOF10: 4
PAINLEVEL_OUTOF10: 7
PAINLEVEL_OUTOF10: 5
PAINLEVEL_OUTOF10: 3
PAINLEVEL_OUTOF10: 5
PAINLEVEL_OUTOF10: 4

## 2020-03-18 ASSESSMENT — PAIN DESCRIPTION - PAIN TYPE
TYPE: ACUTE PAIN
TYPE: ACUTE PAIN

## 2020-03-18 ASSESSMENT — PAIN DESCRIPTION - FREQUENCY
FREQUENCY: CONTINUOUS
FREQUENCY: CONTINUOUS

## 2020-03-18 ASSESSMENT — PAIN DESCRIPTION - LOCATION
LOCATION: CHEST
LOCATION: CHEST

## 2020-03-18 ASSESSMENT — PAIN DESCRIPTION - ORIENTATION
ORIENTATION: MID
ORIENTATION: MID

## 2020-03-18 ASSESSMENT — PAIN DESCRIPTION - ONSET: ONSET: ON-GOING

## 2020-03-18 ASSESSMENT — PAIN DESCRIPTION - DESCRIPTORS
DESCRIPTORS: ACHING;DISCOMFORT
DESCRIPTORS: ACHING;DISCOMFORT

## 2020-03-18 NOTE — PROGRESS NOTES
Vascular:    Patient seen by me a few days ago, on the date of cardiac bypass surgery for carotid ultrasound evidence of complete occlusion right internal carotid artery with minimal disease on the left side    Patient today doing much better, sitting in a chair, more awake alert oriented and tells me that he is feeling better    Patient denies any neurological symptoms    Neurologic exam is normal, moving all extremities, no neuro deficit, speech is intact    In discussion with patient, patient tells me that he is known to have chronic occlusion right internal carotid artery based on the testing done at [facility], 4 years ago    I had a detailed discussion the patient, patient was reassured, was recommended to 81 mg aspirin daily when permitted by his cardiac surgical team and to see me once a year, and to call me PRN anytime he has neurological symptoms, explained    All his questions were answered

## 2020-03-18 NOTE — CARE COORDINATION
SOCIAL WORK/CASEMANAGEMENT TRANSITION OF CARE PLANNING: pt ambulated 200' x 2 this a.m. the plan is home with [facility] per pt choice and a referral was made today. Pt said his sister and brother in law will be staying at his home for the 24/7 care. Pt was under the impression that [facility] would have someone stay with him. I explained to him they will send hhc out once prescribed but for a about a hour tops to follow the [facility] protocol.  Brianne Cole  3/18/2020

## 2020-03-18 NOTE — PROGRESS NOTES
APTT 30.1   FASTING LIPID PANEL:  Lab Results   Component Value Date    CHOL 213 02/05/2020    HDL 59 02/05/2020    LDLCALC 132 02/05/2020    TRIG 112 02/05/2020 12/20/2019 Echocardiogram:   Normal diastolic function. Ejection fraction is visually estimated at 55-65%. Normal left atrium. Normal mitral valve structure   Moderate mitral regurgitation   Structurally normal aortic valve. No pulmonary hypertension   No pericardial effusion. 01/31/2020 Coronary CTA:  Extensive atherosclerotic disease of the LAD, without hemodynamically significant stenosis. Essentially nonvisualization of the circumflex artery beyond its origin highly suspicious for severe stenosis or occlusion. Findings compatible severe stenosis of the right mid coronary artery, significant motion in this region is noted. The findings could be related to severe motion artifact. 02/11/2020 Cardiac Catheterization (Dr. Mario Alberto Ceron):   1. LEFT MAIN:  Mild diffuse luminal irregularities. 2.  LAD:  Proximal eccentric 40% stenosis, mild eccentric 60% to 70%  diffuse stenosis. 3.  D1:  Small vessel. No angiographically  significant stenosis. 4.  D2:  Ostial 90% stenosis. 5.  D3:  Small vessel. 6.  CIRCUMFLEX:  Mid diffuse 99% subtotal occlusion with TAE-2 flow. 7.  OM1:  Ostial diffuse 99% subtotal occlusion with TAE-2 flow. 8.  Right coronary artery:  Mid diffuse 85% stenosis, ostial and  proximal  PDA 85% stenosis. 03/03/2020 PATRICE:   Normal left ventricular systolic function. Ejection fraction is visually estimated at 60%. Normal right ventricular size and function. Mild mitral regurgitation. 03/18/2020 CXR:  Stable postoperative chest with improving CHF. Telemetry: SR  12 lead EKG: NA       ASSESSMENT:   1. CAD (S/p CABG x 4 (LIMA-LAD, Radial-OM, GSV-RCA sequence onto PDA, 3/16/2020). 2. HTN: Stable on BB  3. HLD, on statin   4. T2DM  5. COPD  6. Mild MR (PATRICE 3/3/2020)  7.  Complete occlusion right internal

## 2020-03-18 NOTE — PROGRESS NOTES
POD#2  Awake, alert. Complains of pain from CTs     Vitals:    03/18/20 0705 03/18/20 0800 03/18/20 1045 03/18/20 1135   BP:  128/74  131/69   Pulse:  102  100   Resp:  18  16   Temp:  98.3 °F (36.8 °C)  98.2 °F (36.8 °C)   TempSrc:  Temporal  Oral   SpO2: 93% 94% 93%    Weight:       Height:           Intake/Output Summary (Last 24 hours) at 3/18/2020 1147  Last data filed at 3/18/2020 1135  Gross per 24 hour   Intake 907 ml   Output 1665 ml   Net -758 ml     Recent Labs     03/18/20  0614   HGB 10.4*   HCT 31.6*   BUN 13   CREATININE 0.6*        PE  Cardiac: RRR  Lungs: decreased bases  Chest incision clean. Sternum stable, CTs and pacing wires present and secure   Abd: Soft, +BS  Ext: Incisions intact, + edema, left radial site c/d/i, site soft, good  strength     A/P: Stable s/p CABG x 4  POD#2   Acute blood loss anemia secondary to open heart surgery-- stable   Maintaining NSR- Yecenia PO BB and amio- will up-titrate Lopressor today   Scr stable at 0.6 today   Cont CTs today, likely remove tomm  On 2L o2, cont to wean as tolerated to keep sp02>92%.   Cont EZPAP with duonebs, IS and ambulation with PT/OT    Increase activity as tolerated   Encourage incentive spirometry  This patient's case and care plan was discussed with the attending surgeon

## 2020-03-19 ENCOUNTER — TELEPHONE (OUTPATIENT)
Dept: VASCULAR SURGERY | Age: 45
End: 2020-03-19

## 2020-03-19 VITALS
RESPIRATION RATE: 20 BRPM | HEIGHT: 76 IN | BODY MASS INDEX: 38.36 KG/M2 | HEART RATE: 106 BPM | WEIGHT: 315 LBS | DIASTOLIC BLOOD PRESSURE: 69 MMHG | OXYGEN SATURATION: 94 % | TEMPERATURE: 97.5 F | SYSTOLIC BLOOD PRESSURE: 115 MMHG

## 2020-03-19 LAB
ANION GAP SERPL CALCULATED.3IONS-SCNC: 15 MMOL/L (ref 7–16)
BUN BLDV-MCNC: 11 MG/DL (ref 6–20)
CALCIUM SERPL-MCNC: 9 MG/DL (ref 8.6–10.2)
CHLORIDE BLD-SCNC: 96 MMOL/L (ref 98–107)
CO2: 24 MMOL/L (ref 22–29)
CREAT SERPL-MCNC: 0.6 MG/DL (ref 0.7–1.2)
GFR AFRICAN AMERICAN: >60
GFR NON-AFRICAN AMERICAN: >60 ML/MIN/1.73
GLUCOSE BLD-MCNC: 243 MG/DL (ref 74–99)
HCT VFR BLD CALC: 30.8 % (ref 37–54)
HEMOGLOBIN: 10.2 G/DL (ref 12.5–16.5)
MCH RBC QN AUTO: 30.5 PG (ref 26–35)
MCHC RBC AUTO-ENTMCNC: 33.1 % (ref 32–34.5)
MCV RBC AUTO: 92.2 FL (ref 80–99.9)
METER GLUCOSE: 229 MG/DL (ref 74–99)
METER GLUCOSE: 236 MG/DL (ref 74–99)
PDW BLD-RTO: 12.6 FL (ref 11.5–15)
PLATELET # BLD: 180 E9/L (ref 130–450)
PMV BLD AUTO: 10 FL (ref 7–12)
POTASSIUM SERPL-SCNC: 4.1 MMOL/L (ref 3.5–5)
RBC # BLD: 3.34 E12/L (ref 3.8–5.8)
SODIUM BLD-SCNC: 135 MMOL/L (ref 132–146)
WBC # BLD: 13.1 E9/L (ref 4.5–11.5)

## 2020-03-19 PROCEDURE — 6360000002 HC RX W HCPCS: Performed by: NURSE PRACTITIONER

## 2020-03-19 PROCEDURE — 93798 PHYS/QHP OP CAR RHAB W/ECG: CPT

## 2020-03-19 PROCEDURE — 6370000000 HC RX 637 (ALT 250 FOR IP): Performed by: NURSE PRACTITIONER

## 2020-03-19 PROCEDURE — 94640 AIRWAY INHALATION TREATMENT: CPT

## 2020-03-19 PROCEDURE — 99231 SBSQ HOSP IP/OBS SF/LOW 25: CPT | Performed by: NURSE PRACTITIONER

## 2020-03-19 PROCEDURE — 36415 COLL VENOUS BLD VENIPUNCTURE: CPT

## 2020-03-19 PROCEDURE — 6370000000 HC RX 637 (ALT 250 FOR IP): Performed by: PHYSICIAN ASSISTANT

## 2020-03-19 PROCEDURE — 94669 MECHANICAL CHEST WALL OSCILL: CPT

## 2020-03-19 PROCEDURE — 82962 GLUCOSE BLOOD TEST: CPT

## 2020-03-19 PROCEDURE — 85027 COMPLETE CBC AUTOMATED: CPT

## 2020-03-19 PROCEDURE — 2580000003 HC RX 258: Performed by: NURSE PRACTITIONER

## 2020-03-19 PROCEDURE — 80048 BASIC METABOLIC PNL TOTAL CA: CPT

## 2020-03-19 RX ORDER — DILTIAZEM HYDROCHLORIDE 120 MG/1
120 CAPSULE, COATED, EXTENDED RELEASE ORAL DAILY
Qty: 30 CAPSULE | Refills: 5 | Status: SHIPPED | OUTPATIENT
Start: 2020-03-19 | End: 2020-12-18 | Stop reason: ALTCHOICE

## 2020-03-19 RX ORDER — AMIODARONE HYDROCHLORIDE 200 MG/1
400 TABLET ORAL SEE ADMIN INSTRUCTIONS
Qty: 56 TABLET | Refills: 0 | Status: SHIPPED | OUTPATIENT
Start: 2020-03-19 | End: 2020-03-19

## 2020-03-19 RX ORDER — ASCORBIC ACID 500 MG
500 TABLET ORAL 2 TIMES DAILY
Qty: 60 TABLET | Refills: 0 | Status: SHIPPED | OUTPATIENT
Start: 2020-03-19 | End: 2020-06-01 | Stop reason: ALTCHOICE

## 2020-03-19 RX ORDER — BISACODYL 10 MG
10 SUPPOSITORY, RECTAL RECTAL DAILY
Status: DISCONTINUED | OUTPATIENT
Start: 2020-03-19 | End: 2020-03-19 | Stop reason: HOSPADM

## 2020-03-19 RX ORDER — ISOSORBIDE MONONITRATE 30 MG/1
30 TABLET, EXTENDED RELEASE ORAL DAILY
Qty: 30 TABLET | Refills: 5 | Status: SHIPPED | OUTPATIENT
Start: 2020-03-19 | End: 2020-04-28 | Stop reason: ALTCHOICE

## 2020-03-19 RX ORDER — FUROSEMIDE 10 MG/ML
20 INJECTION INTRAMUSCULAR; INTRAVENOUS ONCE
Status: COMPLETED | OUTPATIENT
Start: 2020-03-19 | End: 2020-03-19

## 2020-03-19 RX ORDER — PANTOPRAZOLE SODIUM 40 MG/1
40 TABLET, DELAYED RELEASE ORAL DAILY
Qty: 14 TABLET | Refills: 0 | Status: SHIPPED | OUTPATIENT
Start: 2020-03-19 | End: 2020-04-14

## 2020-03-19 RX ORDER — CLOPIDOGREL BISULFATE 75 MG/1
75 TABLET ORAL DAILY
Qty: 60 TABLET | Refills: 0 | Status: SHIPPED | OUTPATIENT
Start: 2020-03-19 | End: 2020-05-26 | Stop reason: ALTCHOICE

## 2020-03-19 RX ORDER — AMIODARONE HYDROCHLORIDE 200 MG/1
400 TABLET ORAL SEE ADMIN INSTRUCTIONS
Qty: 56 TABLET | Refills: 0 | Status: SHIPPED | OUTPATIENT
Start: 2020-03-19 | End: 2020-04-28 | Stop reason: ALTCHOICE

## 2020-03-19 RX ORDER — INSULIN GLARGINE 100 [IU]/ML
20 INJECTION, SOLUTION SUBCUTANEOUS 2 TIMES DAILY
Status: DISCONTINUED | OUTPATIENT
Start: 2020-03-19 | End: 2020-03-19 | Stop reason: HOSPADM

## 2020-03-19 RX ORDER — ATORVASTATIN CALCIUM 40 MG/1
40 TABLET, FILM COATED ORAL DAILY
Qty: 30 TABLET | Refills: 3 | Status: SHIPPED | OUTPATIENT
Start: 2020-03-19 | End: 2020-05-26

## 2020-03-19 RX ORDER — FOLIC ACID 1 MG/1
1 TABLET ORAL DAILY
Qty: 30 TABLET | Refills: 0 | Status: SHIPPED | OUTPATIENT
Start: 2020-03-19 | End: 2020-04-28 | Stop reason: ALTCHOICE

## 2020-03-19 RX ORDER — DOCUSATE SODIUM 100 MG/1
100 CAPSULE, LIQUID FILLED ORAL 2 TIMES DAILY PRN
Qty: 40 CAPSULE | Refills: 0 | Status: SHIPPED | OUTPATIENT
Start: 2020-03-19 | End: 2020-04-18

## 2020-03-19 RX ORDER — OXYCODONE HYDROCHLORIDE AND ACETAMINOPHEN 5; 325 MG/1; MG/1
1 TABLET ORAL EVERY 6 HOURS PRN
Qty: 28 TABLET | Refills: 0 | Status: SHIPPED | OUTPATIENT
Start: 2020-03-19 | End: 2020-03-26

## 2020-03-19 RX ORDER — FERROUS SULFATE 325(65) MG
325 TABLET ORAL 2 TIMES DAILY WITH MEALS
Qty: 60 TABLET | Refills: 0 | Status: SHIPPED | OUTPATIENT
Start: 2020-03-19 | End: 2020-06-01 | Stop reason: ALTCHOICE

## 2020-03-19 RX ORDER — DILTIAZEM HYDROCHLORIDE 120 MG/1
120 CAPSULE, COATED, EXTENDED RELEASE ORAL DAILY
Status: DISCONTINUED | OUTPATIENT
Start: 2020-03-19 | End: 2020-03-19 | Stop reason: HOSPADM

## 2020-03-19 RX ADMIN — MAGNESIUM GLUCONATE 500 MG ORAL TABLET 400 MG: 500 TABLET ORAL at 09:40

## 2020-03-19 RX ADMIN — FUROSEMIDE 20 MG: 10 INJECTION, SOLUTION INTRAVENOUS at 09:41

## 2020-03-19 RX ADMIN — METOPROLOL TARTRATE 25 MG: 25 TABLET, FILM COATED ORAL at 09:41

## 2020-03-19 RX ADMIN — CLOPIDOGREL 75 MG: 75 TABLET, FILM COATED ORAL at 09:40

## 2020-03-19 RX ADMIN — FOLIC ACID 1 MG: 1 TABLET ORAL at 09:41

## 2020-03-19 RX ADMIN — ISOSORBIDE MONONITRATE 30 MG: 30 TABLET ORAL at 09:40

## 2020-03-19 RX ADMIN — IPRATROPIUM BROMIDE AND ALBUTEROL SULFATE 1 AMPULE: 2.5; .5 SOLUTION RESPIRATORY (INHALATION) at 10:06

## 2020-03-19 RX ADMIN — ATORVASTATIN CALCIUM 40 MG: 40 TABLET, FILM COATED ORAL at 09:39

## 2020-03-19 RX ADMIN — SENNOSIDES AND DOCUSATE SODIUM 1 TABLET: 8.6; 5 TABLET ORAL at 09:41

## 2020-03-19 RX ADMIN — MUPIROCIN: 20 OINTMENT TOPICAL at 09:39

## 2020-03-19 RX ADMIN — TAMSULOSIN HYDROCHLORIDE 0.4 MG: 0.4 CAPSULE ORAL at 09:39

## 2020-03-19 RX ADMIN — ASPIRIN 81 MG: 81 TABLET, COATED ORAL at 09:40

## 2020-03-19 RX ADMIN — ACETAMINOPHEN 1000 MG: 500 TABLET ORAL at 01:15

## 2020-03-19 RX ADMIN — OXYCODONE 10 MG: 5 TABLET ORAL at 07:45

## 2020-03-19 RX ADMIN — PANTOPRAZOLE SODIUM 40 MG: 40 TABLET, DELAYED RELEASE ORAL at 09:41

## 2020-03-19 RX ADMIN — METFORMIN HYDROCHLORIDE 850 MG: 850 TABLET ORAL at 09:39

## 2020-03-19 RX ADMIN — INSULIN LISPRO 6 UNITS: 100 INJECTION, SOLUTION INTRAVENOUS; SUBCUTANEOUS at 06:28

## 2020-03-19 RX ADMIN — FERROUS SULFATE TAB 325 MG (65 MG ELEMENTAL FE) 325 MG: 325 (65 FE) TAB at 09:41

## 2020-03-19 RX ADMIN — IPRATROPIUM BROMIDE AND ALBUTEROL SULFATE 1 AMPULE: 2.5; .5 SOLUTION RESPIRATORY (INHALATION) at 13:17

## 2020-03-19 RX ADMIN — Medication 500 MG: at 09:40

## 2020-03-19 RX ADMIN — INSULIN LISPRO 6 UNITS: 100 INJECTION, SOLUTION INTRAVENOUS; SUBCUTANEOUS at 12:13

## 2020-03-19 RX ADMIN — ENOXAPARIN SODIUM 40 MG: 40 INJECTION SUBCUTANEOUS at 09:39

## 2020-03-19 RX ADMIN — INSULIN GLARGINE 20 UNITS: 100 INJECTION, SOLUTION SUBCUTANEOUS at 09:39

## 2020-03-19 RX ADMIN — AMIODARONE HYDROCHLORIDE 400 MG: 200 TABLET ORAL at 09:41

## 2020-03-19 RX ADMIN — SODIUM CHLORIDE, PRESERVATIVE FREE 10 ML: 5 INJECTION INTRAVENOUS at 09:39

## 2020-03-19 RX ADMIN — DILTIAZEM HYDROCHLORIDE 120 MG: 120 CAPSULE, COATED, EXTENDED RELEASE ORAL at 09:41

## 2020-03-19 ASSESSMENT — PAIN SCALES - GENERAL
PAINLEVEL_OUTOF10: 0
PAINLEVEL_OUTOF10: 0
PAINLEVEL_OUTOF10: 2
PAINLEVEL_OUTOF10: 0
PAINLEVEL_OUTOF10: 6

## 2020-03-19 NOTE — PROGRESS NOTES
OT BEDSIDE TREATMENT NOTE      Date:3/19/2020  Patient Name: Grady Nguyen  MRN: [id number]  : 1975  Room: [address] [place] [address]     Attempted to see pt this date, with pt refusing therapy services stating \"I already walked enough today, I walked like 4 times, im good\". Pt educated on importance of therpy, and role of OT. Will attempt at a later time, date.      [address] HOYOS/L [id number]

## 2020-03-19 NOTE — PROGRESS NOTES
Inpatient Cardiology Progress note     PATIENT IS BEING FOLLOWED FOR: CAD s/p CABG     Jennifer Cote is a 38year old  male who follows with Dr. Sudheer Rocha. SUBJECTIVE: Denies dyspnea. Complains of chest discomfort with deep inspiration that is improved since the chest tubes were removed earlier today. OBJECTIVE: No apparent distress     ROS:  Consist: Denies fevers, chills or night sweats  Heart: Denies palpitations, lightheadedness, dizziness or syncope  Lungs: Denies SOB, cough, wheezing, orthopnea or PND  GI: Denies abdominal pain, vomiting or diarrhea    PHYSICAL EXAM:   /77   Pulse 105   Temp 98.6 °F (37 °C) (Temporal)   Resp 18   Ht 6' 4\" (1.93 m)   Wt (!) 331 lb 3.2 oz (150.2 kg)   SpO2 94% Comment: 94% walking; 96% after walking  BMI 40.31 kg/m²    CONST: Well developed, obese, middle aged male who appears of his stated age. Awake, alert and cooperative. No apparent distress  HEENT:   Head- Normocephalic, atraumatic   Eyes- Conjunctivae pink, anicteric  Throat- Oral mucosa pink and moist  Neck-  No stridor, trachea midline, no jugular venous distention sitting up in chair. No carotid bruit  CHEST: Chest symmetrical and non-tender to palpation. No accessory muscle use or intercostal retractions. RESPIRATORY:  Lung sounds - clear throughout anterior and lateral fields   CARDIOVASCULAR:     Heart Inspection- shows no noted pulsations. Sternal incision DANIEL WNL   Heart Palpation- no heaves or thrills; PMI is non-displaced   Heart Ausculation- Regular rate and rhythm, no murmur. No s3, s4 or rub   PV: No lower extremity edema. Pedal pulses palpable, no clubbing or cyanosis   ABDOMEN: Soft, obese, non-tender to light palpation. Bowel sounds present. No palpable masses no organomegaly; no abdominal bruit  MS: Good muscle strength and tone. No atrophy or abnormal movements.    : Deferred  SKIN: Warm and dry no statis dermatitis or ulcers   NEURO / PSYCH: Oriented to person, place and time. Speech clear and appropriate. Follows all commands.  Pleasant affect       Intake/Output Summary (Last 24 hours) at 3/19/2020 1003  Last data filed at 3/19/2020 0644  Gross per 24 hour   Intake 1520 ml   Output 1725 ml   Net -205 ml       Weight:   Wt Readings from Last 3 Encounters:   03/19/20 (!) 331 lb 3.2 oz (150.2 kg)   03/12/20 (!) 330 lb (149.7 kg)   03/03/20 (!) 325 lb (147.4 kg)     Current Inpatient Medications:   magnesium hydroxide  30 mL Oral Daily    bisacodyl  10 mg Rectal Daily    enoxaparin  40 mg Subcutaneous Daily    metFORMIN  850 mg Oral BID WC    insulin glargine  20 Units Subcutaneous BID    dilTIAZem  120 mg Oral Daily    metoprolol tartrate  25 mg Oral BID    amiodarone  400 mg Oral BID    isosorbide mononitrate  30 mg Oral Daily    insulin lispro  0-18 Units Subcutaneous 4x Daily AC & HS    aspirin  81 mg Oral Daily    sennosides-docusate sodium  1 tablet Oral BID    vitamin C  500 mg Oral BID    ferrous sulfate  325 mg Oral BID WC    folic acid  1 mg Oral Daily    atorvastatin  40 mg Oral Daily    sodium chloride flush  10 mL Intravenous 2 times per day    pantoprazole  40 mg Oral Daily    magnesium oxide  400 mg Oral Daily    mupirocin   Nasal BID    ipratropium-albuterol  1 ampule Inhalation Q4H WA    tamsulosin  0.4 mg Oral Daily    clopidogrel  75 mg Oral Daily    acetaminophen  1,000 mg Oral 4 times per day       IV Infusions (if any):   dextrose         DIAGNOSTIC/ LABORATORY DATA:  Labs:   CBC:   Recent Labs     03/18/20  0614 03/19/20  0618   WBC 14.2* 13.1*   HGB 10.4* 10.2*   HCT 31.6* 30.8*    180     BMP:   Recent Labs     03/18/20  0614 03/19/20  0618   * 135   K 4.0 4.1   CO2 25 24   BUN 13 11   CREATININE 0.6* 0.6*   LABGLOM >60 >60   CALCIUM 8.9 9.0     Mag:   Recent Labs     03/16/20  1110 03/17/20  0502   MG 2.4 2.0   HgA1c:   Lab Results   Component Value Date    LABA1C 8.4 (H) 03/12/2020   PT/INR:   Recent Labs 03/16/20  1110   PROTIME 13.0*   INR 1.2     APTT:  Recent Labs     03/16/20  1110   APTT 30.1   FASTING LIPID PANEL:  Lab Results   Component Value Date    CHOL 213 02/05/2020    HDL 59 02/05/2020    LDLCALC 132 02/05/2020    TRIG 112 02/05/2020 12/20/2019 Echocardiogram:   Normal diastolic function. Ejection fraction is visually estimated at 55-65%. Normal left atrium. Normal mitral valve structure   Moderate mitral regurgitation   Structurally normal aortic valve. No pulmonary hypertension   No pericardial effusion. 01/31/2020 Coronary CTA:  Extensive atherosclerotic disease of the LAD, without hemodynamically significant stenosis. Essentially nonvisualization of the circumflex artery beyond its origin highly suspicious for severe stenosis or occlusion. Findings compatible severe stenosis of the right mid coronary artery, significant motion in this region is noted. The findings could be related to severe motion artifact. 02/11/2020 Cardiac Catheterization (Dr. Amos Hernández):   1. LEFT MAIN:  Mild diffuse luminal irregularities. 2.  LAD:  Proximal eccentric 40% stenosis, mild eccentric 60% to 70%  diffuse stenosis. 3.  D1:  Small vessel. No angiographically  significant stenosis. 4.  D2:  Ostial 90% stenosis. 5.  D3:  Small vessel. 6.  CIRCUMFLEX:  Mid diffuse 99% subtotal occlusion with TAE-2 flow. 7.  OM1:  Ostial diffuse 99% subtotal occlusion with TAE-2 flow. 8.  Right coronary artery:  Mid diffuse 85% stenosis, ostial and  proximal  PDA 85% stenosis. 03/03/2020 PATRICE:   Normal left ventricular systolic function. Ejection fraction is visually estimated at 60%. Normal right ventricular size and function. Mild mitral regurgitation. 03/18/2020 CXR:  Stable postoperative chest with improving CHF. Telemetry: SR  12 lead EKG: NA       ASSESSMENT:   1. CAD (S/p CABG x 4 (LIMA-LAD, Radial-OM, GSV-RCA sequence onto PDA, 3/16/2020). 2. HTN: Stable on BB  3. HLD, on statin   4. T2DM  5. COPD  6. Mild MR (PATRICE 3/3/2020)  7. Complete occlusion right internal carotid arteries with minimal disease on the left, asymptomatic; Vascular Surgery on case with plans to follow up with repeat CTA of carotid; Patient states GEMMA is a chronic occlusion diagnosed 4 years ago in [place]   8. Leukocytosis/Afebrile  9. Acute post operative blood loss anemia: Stable, on PO Iron supplementation   10. Hyponatremia: Resolved         PLAN:  1. Continue current management as per CT Surgery   2. Monitor BP/HR; Continue current management   3.  Will discuss case with Dr. Elvira Gaxiola     Electronically signed by ZIA Muse CNP on 3/19/2020 at 10:03 AM

## 2020-03-19 NOTE — CARE COORDINATION
SOCIAL WORK/CASEMANAGEMENT TRANSITION OF CARE PLANNING: notified [facility] of discharge.  Edilberto Roach  3/19/2020

## 2020-03-19 NOTE — PROGRESS NOTES
surgery  --stable      3. NSR  --continue BB with hold parameters  --continue oral amiodarone for afib prophylaxis--with plans to taper on dc        4. Prolonged postoperative respiratory insufficiency  --wean oxygen to keep SpO2 greater than or equal to 92%  --continue duonebs with ezpap  --encourage C&DB, SMI  --currently on RA  --diurese       5. Constipation  --no BM since prior to surgery  --Continue MOM and senna-s. --Increase MOM to daily until +BM. --Will give daily suppository until +BM.   --Encouraged continued increase in oral intake and activity. 6. DM Type 2  --pre op HgA1c 8.4, on home insulin and metformin  --add metformin today, increase lantus and make BID, continue high scale SSI  --will make outpatient endocrinology apt        7. Post operative deconditioning  --Increase activity as tolerated  --PT/OT  --currently ambulating 300ft        DVT prophylaxis:  --continue bilateral knee high RO hose  --continue PCDs  --continue progressive ambulation  --Add lovenox for dvt prophylaxis and continue knee high RO hose/pcds/progressive ambulation      Dispo: home once ambulating 400ft on RA and tolerating--hopefully tomorrow        This patient's case and care plan was discussed with the attending surgeon    Addendum: 3/19 6052: +BM.  Will dc home this afternoon

## 2020-03-20 NOTE — DISCHARGE SUMMARY
Physician Discharge Summary     Patient ID:  Lefty Walsh  [id number]  52 y.o.  1975    Admit date: 3/16/2020    Discharge date and time: 3/19/2020  6:30 PM     Admitting Physician: Yovanny Swift MD     Discharge Physician: Yovanny Swift MD    Admission Diagnoses: CAD in native artery [I25.10]    Discharge Diagnoses:   POSTOPERATIVE DIAGNOSES:  Severe multivessel coronary artery disease,  mild ischemic mitral regurgitation, morbid obesity with BMI of 40, COPD,  diabetes, hypertension, hyperlipidemia, peripheral neuropathy, history  of mediastinal mass resection. OPERATION:  1. Sternotomy. 2.  Coronary artery bypass grafting x4; left internal mammary artery to  the LAD, radial artery to the obtuse marginal, greater saphenous vein to  the right coronary artery and then sequenced onto the PDA. 3.  Endoscopic harvesting, left lower extremity greater saphenous vein. 4.  Endoscopic harvesting, left upper extremity radial artery. 5.  Rigid internal fixation of sternum using KLS plates x2.  6.  22 modifier. Discharged Condition: stable    Indication for Admission: Severe multivessel coronary artery disease, mild ischemic  mitral regurgitation, morbid obesity with BMI of 40, COPD, diabetes,  hypertension, hyperlipidemia, peripheral neuropathy, history of  mediastinal mass resection. Hospital Course: Course as above, and on 3/16/2020 he underwent coronary artery bypass grafting x 4 with use of radial artery. Postoperatively, he was transferred to the CVICU in guarded stable condition and extubated that same operative day. He was transferred to the monitored stepdown unit on POD 1. Imdur was initiated as well as cardizem prior to discharge and to continue for 6 months to prevent radial graft vasospasm. Beta blockade was initiated. His mediastinal and pleural chest tubes, and epicardial pacing wires were discontinued in the usual fashion. Outpatient endocrinology appointment made for uncontrolled DM type 2.  He was weaned off of supplemental oxygen, and met all ambulatory requirements. He was deemed ready for discharge and was discharged to home on POD 3 (3/19/2020) in good condition. Consults: cardiology    Discharge Exam:  Vitals          Vitals:     03/19/20 0011 03/19/20 0405 03/19/20 0644 03/19/20 0720   BP: 114/66 127/62       Pulse: 78 94       Resp: 18 18       Temp: 97.1 °F (36.2 °C) 97.6 °F (36.4 °C)       TempSrc: Temporal Temporal       SpO2: 95% 93%   93%   Weight:     (!) 331 lb 3.2 oz (150.2 kg)     Height:                 O2: none        Intake/Output Summary (Last 24 hours) at 3/19/2020 0816  Last data filed at 3/19/2020 0644      Gross per 24 hour   Intake 1520 ml   Output 1725 ml   Net -205 ml            UO: 350mL/8hr   CT output:   Pleural x 2: 55mL/8hr (105mL/24hrs)              Recent Labs     03/17/20  0502 03/18/20  0614 03/19/20  0618   WBC 10.8 14.2* 13.1*   HGB 10.7* 10.4* 10.2*   HCT 32.2* 31.6* 30.8*    164 180            Recent Labs     03/17/20  0502 03/18/20  0614 03/19/20  0618   BUN 14 13 11   CREATININE 0.5* 0.6* 0.6*         Telemetry: NSR           PE  Cardiac: RRR  Lungs: decreased bases  Chest incision with intact dermabond prineo DSD, C/D/I, approximated, no erythema. Sternum stable. Prior chest tube site incisions C/D/I, no erythema with intact sutures. Chest tubes x 2 and Epicardial pacing wires present and secure. Abd: Soft, nontender, +BS  Ext: Incisions C/D/I, approximated, no erythema, + minimal edema.  LUE radial harvest incisions C/D/I, approximated, no erythema, no paresthesia, good hand       Disposition: home    Patient Instructions:    Catherine Domingo   Home Medication Instructions HEI:[id number]    Printed on:03/20/20 9764   Medication Information                      albuterol sulfate  (90 Base) MCG/ACT inhaler  Inhale 2 puffs into the lungs every 6 hours as needed for Wheezing             Alcohol Swabs 70 % PADS               amiodarone (CORDARONE) 200 MG tablet  Take 2 tablets by mouth See Admin Instructions Take 400 mg orally twice daily for seven days, then take 200 mg orally twice daily for seven days, then take 200 mg orally daily for fourteen days, then discontinue             aspirin EC 81 MG EC tablet  Take 1 tablet by mouth daily             atorvastatin (LIPITOR) 40 MG tablet  Take 1 tablet by mouth daily             BD PEN NEEDLE PRAMOD U/F 32G X 4 MM MISC               blood glucose test strips (ASCENSIA AUTODISC VI;ONE TOUCH ULTRA TEST VI) strip               blood glucose test strips (EXACTECH TEST) strip  Use AS DIRECTED             Cholecalciferol (VITAMIN D3 ULTRA POTENCY) 1.25 MG (43255 UT) TABS  Take 50,000 Int'l Units/day by mouth once a week             clopidogrel (PLAVIX) 75 MG tablet  Take 1 tablet by mouth daily             dilTIAZem (CARDIZEM CD) 120 MG extended release capsule  Take 1 capsule by mouth daily Take daily for 6 months             docusate sodium (COLACE) 100 MG capsule  Take 1 capsule by mouth 2 times daily as needed for Constipation             DULERA 100-5 MCG/ACT inhaler  Inhale 2 puffs into the lungs 2 times daily             ferrous sulfate (IRON 325) 325 (65 Fe) MG tablet  Take 1 tablet by mouth 2 times daily (with meals)             folic acid (FOLVITE) 1 MG tablet  Take 1 tablet by mouth daily             FreeStyle Lancets MISC  USE AS DIRECTED             insulin aspart (NOVOLOG) 100 UNIT/ML injection vial  Use tid with sliding scale             insulin glargine (BASAGLAR KWIKPEN) 100 UNIT/ML injection pen  Inject 110 Units into the skin nightly             Insulin Pen Needle (PEN NEEDLES 29GX1/2\") 29G X 12MM MISC  USE AS DIRECTED             isosorbide mononitrate (IMDUR) 30 MG extended release tablet  Take 1 tablet by mouth daily Take daily for 6 months             magnesium oxide (MAG-OX) 400 (241.3 Mg) MG TABS tablet  Take 1 tablet by mouth daily for 14 days             metFORMIN (GLUCOPHAGE) 850 MG tablet  Take 1 tablet by mouth 3 times daily             metoprolol tartrate (LOPRESSOR) 25 MG tablet  Take 1 tablet by mouth 2 times daily             oxyCODONE-acetaminophen (PERCOCET) 5-325 MG per tablet  Take 1 tablet by mouth every 6 hours as needed for Pain for up to 7 days. Intended supply: 7 days. Take lowest dose possible to manage pain             pantoprazole (PROTONIX) 40 MG tablet  Take 1 tablet by mouth daily for 14 days             vitamin C (VITAMIN C) 500 MG tablet  Take 1 tablet by mouth 2 times daily               Activity: sternal precautions  Diet: cardiac diet  Wound Care: as directed    Follow-up with   Future Appointments   Date Time Provider Larisa Marie   3/26/2020 10:40 AM René Ho MD [address] [place] [address]   4/21/2020  9:00 AM Ami Grier MD CARDIO SURG [facility]   5/5/2020  8:20 AM René Ho MD [facility]   3/18/2021  8:30 AM London Reynaga MD [facility]       Smoking cessation education provided prior to discharge    Discussed with patient the benefits of participation in cardiac rehab after discharge once approved safe by the surgeon and that a referral will be made at the follow up appointment. Pt verbalized comprehension.     Signed:  Saw Henderson, MSN, APRN, FNP-BC, AGACNP-BC

## 2020-03-26 ENCOUNTER — OFFICE VISIT (OUTPATIENT)
Dept: PRIMARY CARE CLINIC | Age: 45
End: 2020-03-26
Payer: COMMERCIAL

## 2020-03-26 VITALS
BODY MASS INDEX: 38.36 KG/M2 | HEART RATE: 87 BPM | DIASTOLIC BLOOD PRESSURE: 84 MMHG | WEIGHT: 315 LBS | HEIGHT: 76 IN | OXYGEN SATURATION: 98 % | TEMPERATURE: 97.4 F | RESPIRATION RATE: 18 BRPM | SYSTOLIC BLOOD PRESSURE: 134 MMHG

## 2020-03-26 PROCEDURE — 99215 OFFICE O/P EST HI 40 MIN: CPT | Performed by: FAMILY MEDICINE

## 2020-03-26 PROCEDURE — 1111F DSCHRG MED/CURRENT MED MERGE: CPT | Performed by: FAMILY MEDICINE

## 2020-03-26 RX ORDER — AMOXICILLIN 250 MG
1 CAPSULE ORAL DAILY
COMMUNITY
End: 2020-03-26 | Stop reason: SDUPTHER

## 2020-03-26 RX ORDER — AMOXICILLIN 250 MG
1 CAPSULE ORAL DAILY
Qty: 30 TABLET | Refills: 0 | Status: SHIPPED
Start: 2020-03-26 | End: 2020-04-14

## 2020-03-26 NOTE — PROGRESS NOTES
3/26/2020     Sandy Dear    : 1975 Sex: male   Age: 38 y.o. Chief Complaint   Patient presents with   [address] from [facility]       HPI: This 38y.o. -year-old male  presents today for evaluation and management of his  chronic medical problems. The patient is status post recent multivessel bypass. The patient states he has a pending appoint with his cardiovascular surgeon in April. Current medication list reviewed. The patient is tolerating all medications well without adverse events or known side effects. The patient does understand the risk and benefits of the prescribed medications. The patient is up-to-date on all age-appropriate wellness issues. ROS:   Const: Denies changes in appetite, chills, fever, night sweats and weight loss. Eyes:  Denies discharge, a recent change in visual acuity, blurred vision and double vision. ENMT: Denies discharge of the ears, hearing loss, pain of the ears. Denies nasal or sinus symptoms other than stated above. Denies mouth or throat symptoms. CV:  Denies chest pain, dyspnea on exertion, orthopnea, palpitations and PND  Resp: Denies chest pain, cough, SOB and wheezing. GI: Denies abdominal pain, constipation, diarrhea, heartburn, indigestion, nausea and vomiting. : Denies dysuria, frequency, hematuria, nocturia and urgency. Musculo: Denies arthralgias and myalgia  Skin:  Denies lesions, pruritus and rash. Neuro: Denies dizziness, lightheadedness, numbness, tingling and weakness. Psych:  Denies anxiety and depression  Endocrine: Denies anxiety and depression. Hema/Lymph: Denies hematologic symptoms  Allergy/Immuno:  Denies allergic/immunologic symptoms.   Pertinent positives reviewed and noted      Current Outpatient Medications:     isosorbide mononitrate (IMDUR) 30 MG extended release tablet, Take 1 tablet by mouth daily Take daily for 6 months, Disp: 30 tablet, Rfl: 5    atorvastatin (LIPITOR) 40 MG tablet, Take 1 the lungs 2 times daily, Disp: 1 Inhaler, Rfl: 3    FreeStyle Lancets MISC, USE AS DIRECTED, Disp: 100 each, Rfl: 3    insulin aspart (NOVOLOG) 100 UNIT/ML injection vial, Use tid with sliding scale, Disp: 5 Package, Rfl: 5    insulin glargine (BASAGLAR KWIKPEN) 100 UNIT/ML injection pen, Inject 110 Units into the skin nightly, Disp: 5 pen, Rfl: 3    metFORMIN (GLUCOPHAGE) 850 MG tablet, Take 1 tablet by mouth 3 times daily, Disp: 30 tablet, Rfl: 3    blood glucose test strips (EXACTECH TEST) strip, Use AS DIRECTED, Disp: 100 each, Rfl: 5    Insulin Pen Needle (PEN NEEDLES 29GX1/2\") 29G X 12MM MISC, USE AS DIRECTED, Disp: 100 each, Rfl: 5    Alcohol Swabs 70 % PADS, , Disp: , Rfl:     blood glucose test strips (ASCENSIA AUTODISC VI;ONE TOUCH ULTRA TEST VI) strip, , Disp: , Rfl:     BD PEN NEEDLE PRAMOD U/F 32G X 4 MM MISC, , Disp: , Rfl: 0    oxyCODONE-acetaminophen (PERCOCET) 5-325 MG per tablet, Take 1 tablet by mouth every 6 hours as needed for Pain for up to 7 days. Intended supply: 7 days.  Take lowest dose possible to manage pain (Patient not taking: Reported on 3/26/2020), Disp: 28 tablet, Rfl: 0    Allergies   Allergen Reactions    Sulfa Antibiotics      ?  unsure       Past Medical History:   Diagnosis Date    CAD (coronary artery disease)     [facility]     Diabetes mellitus [facility]     Hyperlipidemia     Hypertension     Internal carotid artery occlusion, right 3/16/2020    Mediastinal mass     Neuropathy     Obesity     329 #     Social History     Socioeconomic History    Marital status: Single     Spouse name: Not on file    Number of children: Not on file    Years of education: Not on file    Highest education level: Not on file   Occupational History    Occupation: [employer] [address] [place] [address]     Employer: NOT EMPLOYED   Social Needs    Financial resource strain: Not on file    Food insecurity     Worry: Not on file  High Blood Pressure Sister     Other Brother     COPD Brother     High Blood Pressure Brother         Vitals:    03/26/20 1051   BP: 134/84   Pulse: 87   Resp: 18   Temp: 97.4 °F (36.3 °C)   TempSrc: Temporal   SpO2: 98%   Weight: (!) 337 lb (152.9 kg)   Height: 6' 4\" (1.93 m)        Exam: Const: Appears healthy and well developed. No signs of acute distress present. Eyes: PERRL  ENMT: Tympanic membranes are intact. Nasal mucosa intact without noted erythema Septum is in the midline. Posterior pharynx shows no exudate, irritation or redness. Neck:  Supple without adenopathy. Adequate range of motion   Resp: No rales, rhonchi, wheezes appreciated over the lungs bilaterally. CV: S1, S2 within normal limits. Regular rate and rhythm noted. Without murmur, gallop or rub. Extremities:  Pulses intact. Without noted edema. Abdomen: Positive bowel sounds. Palpation reveals softness, with no distension, organomegaly or tenderness. No abdominal masses palpable. Skin: Skin is warm and dry. Staples intact distal medial left thigh. Sutures intact abdominal region. Healed surgical incision noted sternal region. Musculo: Unchanged upon examination. Neuro: Alert and oriented X3. Cranial nerves grossly intact. Psych: Mood is normal.  Affect is normal.   Vital signs reviewed. Controlled Substances Monitoring:     RX Monitoring 9/6/2019   Periodic Controlled Substance Monitoring No signs of potential drug abuse or diversion identified. Plan Per Assessment:  Renaldo Esqueda was seen today for follow-up from hospital.    Diagnoses and all orders for this visit:    Coronary artery disease involving native artery of transplanted heart without angina pectoris  -     ID DISCHARGE MEDS RECONCILED W/ CURRENT OUTPATIENT MED LIST        Return in 2 months (on 5/26/2020) for MEDICATION CHECK, FOLLOW UP CHRONIC MEDICAL PROBLEMS.     Dagmar Avila MD    Note was generated with the assistance of voice recognition software. Document was reviewed however may contain grammatical errors. Post-Discharge Transitional Care Management Services or Hospital Follow Up      José Miguel Parker   YOB: 1975    Date of Office Visit:  3/26/2020  Date of Hospital Admission: 3/16/20  Date of Hospital Discharge: 3/19/20  Risk of hospital readmission (high >=14%.  Medium >=10%) :Readmission Risk Score: 10      Care management risk score Rising risk (score 2-5) and Complex Care (Scores >=6): 1     Non face to face  following discharge, date last encounter closed (first attempt may have been earlier):     Call initiated 2 business days of discharge:     Patient Active Problem List   Diagnosis    [facility]controlled type 2 diabetes mellitus with hyperglycemia ([facility]    Uncontrolled type 2 diabetes mellitus ([facility]    Mixed hyperlipidemia    Essential hypertension    CAD in native artery    Pre-op testing    Internal carotid artery occlusion, right       Allergies   Allergen Reactions    Sulfa Antibiotics      ?  unsure       Medications listed as ordered at the time of discharge from hospital   Spike Aviles   Home Medication Instructions CYN:    Printed on:03/26/20 0098   Medication Information                      albuterol sulfate  (90 Base) MCG/ACT inhaler  Inhale 2 puffs into the lungs every 6 hours as needed for Wheezing             Alcohol Swabs 70 % PADS               amiodarone (CORDARONE) 200 MG tablet  Take 2 tablets by mouth See Admin Instructions Take 400 mg orally twice daily for seven days, then take 200 mg orally twice daily for seven days, then take 200 mg orally daily for fourteen days, then discontinue             aspirin EC 81 MG EC tablet  Take 1 tablet by mouth daily             atorvastatin (LIPITOR) 40 MG tablet  Take 1 tablet by mouth daily             BD PEN NEEDLE PRAMOD U/F 32G X 4 MM MISC               blood glucose test strips (ASCENSIA AUTODISC VI;ONE TOUCH ULTRA TEST VI) strip blood glucose test strips (EXACTECH TEST) strip  Use AS DIRECTED             Cholecalciferol (VITAMIN D3 ULTRA POTENCY) 1.25 MG (21842 UT) TABS  Take 50,000 Int'l Units/day by mouth once a week             clopidogrel (PLAVIX) 75 MG tablet  Take 1 tablet by mouth daily             dilTIAZem (CARDIZEM CD) 120 MG extended release capsule  Take 1 capsule by mouth daily Take daily for 6 months             docusate sodium (COLACE) 100 MG capsule  Take 1 capsule by mouth 2 times daily as needed for Constipation             DULERA 100-5 MCG/ACT inhaler  Inhale 2 puffs into the lungs 2 times daily             ferrous sulfate (IRON 325) 325 (65 Fe) MG tablet  Take 1 tablet by mouth 2 times daily (with meals)             folic acid (FOLVITE) 1 MG tablet  Take 1 tablet by mouth daily             FreeStyle Lancets MISC  USE AS DIRECTED             insulin aspart (NOVOLOG) 100 UNIT/ML injection vial  Use tid with sliding scale             insulin glargine (BASAGLAR KWIKPEN) 100 UNIT/ML injection pen  Inject 110 Units into the skin nightly             Insulin Pen Needle (PEN NEEDLES 29GX1/2\") 29G X 12MM MISC  USE AS DIRECTED             isosorbide mononitrate (IMDUR) 30 MG extended release tablet  Take 1 tablet by mouth daily Take daily for 6 months             magnesium oxide (MAG-OX) 400 (241.3 Mg) MG TABS tablet  Take 1 tablet by mouth daily for 14 days             metFORMIN (GLUCOPHAGE) 850 MG tablet  Take 1 tablet by mouth 3 times daily             metoprolol tartrate (LOPRESSOR) 25 MG tablet  Take 1 tablet by mouth 2 times daily             oxyCODONE-acetaminophen (PERCOCET) 5-325 MG per tablet  Take 1 tablet by mouth every 6 hours as needed for Pain for up to 7 days. Intended supply: 7 days.  Take lowest dose possible to manage pain             pantoprazole (PROTONIX) 40 MG tablet  Take 1 tablet by mouth daily for 14 days             vitamin C (VITAMIN C) 500 MG tablet  Take 1 tablet by mouth 2 times daily

## 2020-04-14 ENCOUNTER — VIRTUAL VISIT (OUTPATIENT)
Dept: ENDOCRINOLOGY | Age: 45
End: 2020-04-14
Payer: COMMERCIAL

## 2020-04-14 PROCEDURE — G8417 CALC BMI ABV UP PARAM F/U: HCPCS | Performed by: INTERNAL MEDICINE

## 2020-04-14 PROCEDURE — 99204 OFFICE O/P NEW MOD 45 MIN: CPT | Performed by: INTERNAL MEDICINE

## 2020-04-14 PROCEDURE — 4004F PT TOBACCO SCREEN RCVD TLK: CPT | Performed by: INTERNAL MEDICINE

## 2020-04-14 PROCEDURE — 2022F DILAT RTA XM EVC RTNOPTHY: CPT | Performed by: INTERNAL MEDICINE

## 2020-04-14 PROCEDURE — 1111F DSCHRG MED/CURRENT MED MERGE: CPT | Performed by: INTERNAL MEDICINE

## 2020-04-14 PROCEDURE — G8427 DOCREV CUR MEDS BY ELIG CLIN: HCPCS | Performed by: INTERNAL MEDICINE

## 2020-04-14 PROCEDURE — 3052F HG A1C>EQUAL 8.0%<EQUAL 9.0%: CPT | Performed by: INTERNAL MEDICINE

## 2020-04-14 RX ORDER — HYDROCODONE BITARTRATE AND ACETAMINOPHEN 10; 325 MG/1; MG/1
1 TABLET ORAL EVERY 6 HOURS PRN
COMMUNITY
End: 2022-02-02

## 2020-04-20 VITALS
SYSTOLIC BLOOD PRESSURE: 128 MMHG | WEIGHT: 315 LBS | HEIGHT: 76 IN | DIASTOLIC BLOOD PRESSURE: 62 MMHG | BODY MASS INDEX: 38.36 KG/M2

## 2020-04-20 RX ORDER — FERROUS SULFATE 325(65) MG
TABLET ORAL
Qty: 60 TABLET | Refills: 0 | OUTPATIENT
Start: 2020-04-20

## 2020-04-20 RX ORDER — FOLIC ACID 1 MG/1
TABLET ORAL
Qty: 30 TABLET | Refills: 0 | OUTPATIENT
Start: 2020-04-20

## 2020-04-20 RX ORDER — AMIODARONE HYDROCHLORIDE 200 MG/1
TABLET ORAL
Qty: 56 TABLET | Refills: 0 | OUTPATIENT
Start: 2020-04-20

## 2020-04-21 ENCOUNTER — VIRTUAL VISIT (OUTPATIENT)
Dept: CARDIOTHORACIC SURGERY | Age: 45
End: 2020-04-21

## 2020-04-21 ENCOUNTER — TELEPHONE (OUTPATIENT)
Dept: CARDIOLOGY CLINIC | Age: 45
End: 2020-04-21

## 2020-04-21 PROCEDURE — 99024 POSTOP FOLLOW-UP VISIT: CPT | Performed by: PHYSICIAN ASSISTANT

## 2020-04-21 ASSESSMENT — ENCOUNTER SYMPTOMS
COUGH: 0
SHORTNESS OF BREATH: 0

## 2020-04-21 NOTE — TELEPHONE ENCOUNTER
Contacted patient to schedule virtual visit 2 mo HFU with Dr. Harjinder Nieto. We see that you have an appointment scheduled, given the current COVID-19 pandemic, Dr. Harjinder Nieto would like to change that visit to a video visit. In order to schedule this appointment you will need a camera-enabled device (smart phone, tablet or computer) and reliable WiFi. Do you wish to schedule this appointment? Patient accepts. We want to confirm that, for purposes of billing, this is a virtual visit with your provider for which we will submit a claim for reimbursement with your insurance company. You will be responsible for any copays, coinsurance amounts or other amounts not covered by your insurance company. If you do not accept this, unfortunately we will not be able to schedule a virtual visit with the provider. Do you accept? Patient accepts.

## 2020-04-23 VITALS — BODY MASS INDEX: 38.36 KG/M2 | HEIGHT: 76 IN | WEIGHT: 315 LBS

## 2020-04-23 SDOH — ECONOMIC STABILITY: FOOD INSECURITY: ADDITIONAL INFORMATION: NO

## 2020-04-23 ASSESSMENT — PATIENT HEALTH QUESTIONNAIRE - PHQ9
1. LITTLE INTEREST OR PLEASURE IN DOING THINGS: 2
SUM OF ALL RESPONSES TO PHQ9 QUESTIONS 1 & 2: 3
2. FEELING DOWN, DEPRESSED OR HOPELESS: 1
SUM OF ALL RESPONSES TO PHQ QUESTIONS 1-9: 11
SUM OF ALL RESPONSES TO PHQ QUESTIONS 1-9: 11

## 2020-04-28 ENCOUNTER — VIRTUAL VISIT (OUTPATIENT)
Dept: CARDIOLOGY CLINIC | Age: 45
End: 2020-04-28
Payer: COMMERCIAL

## 2020-04-28 VITALS — HEIGHT: 76 IN | WEIGHT: 315 LBS | BODY MASS INDEX: 38.36 KG/M2

## 2020-04-28 PROCEDURE — 4004F PT TOBACCO SCREEN RCVD TLK: CPT | Performed by: INTERNAL MEDICINE

## 2020-04-28 PROCEDURE — G8417 CALC BMI ABV UP PARAM F/U: HCPCS | Performed by: INTERNAL MEDICINE

## 2020-04-28 PROCEDURE — G8427 DOCREV CUR MEDS BY ELIG CLIN: HCPCS | Performed by: INTERNAL MEDICINE

## 2020-04-28 PROCEDURE — 99213 OFFICE O/P EST LOW 20 MIN: CPT | Performed by: INTERNAL MEDICINE

## 2020-04-28 RX ORDER — LISINOPRIL 40 MG/1
40 TABLET ORAL DAILY
Qty: 90 TABLET | Refills: 1
Start: 2020-04-28 | End: 2020-04-29 | Stop reason: SDUPTHER

## 2020-04-28 NOTE — PROGRESS NOTES
by mouth 3 times daily, Disp: 90 tablet, Rfl: 3    HYDROcodone-acetaminophen (NORCO)  MG per tablet, Take 1 tablet by mouth every 6 hours as needed for Pain., Disp: , Rfl:     metoprolol tartrate (LOPRESSOR) 25 MG tablet, Take 1 tablet by mouth 2 times daily, Disp: 60 tablet, Rfl: 2    dilTIAZem (CARDIZEM CD) 120 MG extended release capsule, Take 1 capsule by mouth daily Take daily for 6 months, Disp: 30 capsule, Rfl: 5    clopidogrel (PLAVIX) 75 MG tablet, Take 1 tablet by mouth daily, Disp: 60 tablet, Rfl: 0    aspirin EC 81 MG EC tablet, Take 1 tablet by mouth daily, Disp: 90 tablet, Rfl: 3    albuterol sulfate  (90 Base) MCG/ACT inhaler, Inhale 2 puffs into the lungs every 6 hours as needed for Wheezing, Disp: 1 Inhaler, Rfl: 3    Cholecalciferol (VITAMIN D3 ULTRA POTENCY) 1.25 MG (77983 UT) TABS, Take 50,000 Int'l Units/day by mouth once a week (Patient taking differently: Take 50,000 Int'l Units/day by mouth once a week WED.), Disp: 30 tablet, Rfl: 2    DULERA 100-5 MCG/ACT inhaler, Inhale 2 puffs into the lungs 2 times daily, Disp: 1 Inhaler, Rfl: 3    FreeStyle Lancets MISC, USE AS DIRECTED, Disp: 100 each, Rfl: 3    insulin aspart (NOVOLOG) 100 UNIT/ML injection vial, Use tid with sliding scale, Disp: 5 Package, Rfl: 5    insulin glargine (BASAGLAR KWIKPEN) 100 UNIT/ML injection pen, Inject 110 Units into the skin nightly (Patient taking differently: Inject 60 Units into the skin nightly ), Disp: 5 pen, Rfl: 3    blood glucose test strips (EXACTECH TEST) strip, Use AS DIRECTED, Disp: 100 each, Rfl: 5    Insulin Pen Needle (PEN NEEDLES 29GX1/2\") 29G X 12MM MISC, USE AS DIRECTED, Disp: 100 each, Rfl: 5    Alcohol Swabs 70 % PADS, , Disp: , Rfl:     blood glucose test strips (ASCENSIA AUTODISC VI;ONE TOUCH ULTRA TEST VI) strip, , Disp: , Rfl:     BD PEN NEEDLE PRAMOD U/F 32G X 4 MM MISC, , Disp: , Rfl: 0    atorvastatin (LIPITOR) 40 MG tablet, Take 1 tablet by mouth daily (Patient not taking: Reported on 4/28/2020), Disp: 30 tablet, Rfl: 3    ferrous sulfate (IRON 325) 325 (65 Fe) MG tablet, Take 1 tablet by mouth 2 times daily (with meals) (Patient not taking: Reported on 4/28/2020), Disp: 60 tablet, Rfl: 0    vitamin C (VITAMIN C) 500 MG tablet, Take 1 tablet by mouth 2 times daily (Patient not taking: Reported on 4/28/2020), Disp: 60 tablet, Rfl: 0      Note: This report was completed utilizing computer voice recognition software. Every effort has been made to ensure accuracy, however; inadvertent computerized transcription errors may be present. Pursuant to the emergency declaration under the [address] [place] [address] authority and the [employer] Act, this Virtual  Visit was conducted, with patient's consent, to reduce the patient's risk of exposure to COVID-19 and provide continuity of care for an established patient. Adriana Farias is a 38 y.o. male being evaluated by a Virtual Visit (video visit) encounter to address concerns as mentioned above. A caregiver was present when appropriate. Due to this being a TeleHealth encounter (During KYR-95 public health emergency), evaluation of the following organ systems was limited: Vitals/Constitutional/EENT/Resp/CV/GI//MS/Neuro/Skin/Heme-Lymph-Imm. Pursuant to the emergency declaration under the [address] [place] [address], [address] [place] [address] authority and the [employer] and [employer] Act, this Virtual Visit was conducted with patient's (and/or legal guardian's) consent, to reduce the patient's risk of exposure to COVID-19 and provide necessary medical care. The patient (and/or legal guardian) has also been advised to contact this office for worsening conditions or problems, and seek emergency medical treatment and/or call 911 if deemed necessary.      Patient identification was verified at the start of the visit    Total time spent for this encounter: 15 minutes    Services were provided through a video synchronous discussion virtually to substitute for in-person clinic visit. Patient and provider were located at their individual home/office. --Christina Pool MD on 4/28/2020 at 11:02 AM    An electronic signature was used to authenticate this note.

## 2020-04-29 ENCOUNTER — TELEPHONE (OUTPATIENT)
Dept: CARDIOLOGY CLINIC | Age: 45
End: 2020-04-29

## 2020-04-29 RX ORDER — LISINOPRIL 40 MG/1
20 TABLET ORAL DAILY
Qty: 90 TABLET | Refills: 1
Start: 2020-04-29 | End: 2020-06-01 | Stop reason: ALTCHOICE

## 2020-04-29 NOTE — TELEPHONE ENCOUNTER
Contacted patient. Per Dr. Val Chamorro, patient should only take 1/2 lisinopril (20 mg) daily. Patient acknowledged understanding.   Change made on med list.

## 2020-04-30 RX ORDER — BLOOD SUGAR DIAGNOSTIC
1 STRIP MISCELLANEOUS DAILY
Qty: 100 EACH | Refills: 3 | Status: SHIPPED
Start: 2020-04-30 | End: 2020-06-08 | Stop reason: SDUPTHER

## 2020-04-30 NOTE — TELEPHONE ENCOUNTER
Patient needs pended med refilled.       Electronically signed by Maury Winkler LPN on 2/11/7062 at 5:40 PM

## 2020-05-11 RX ORDER — CLOPIDOGREL BISULFATE 75 MG/1
TABLET ORAL
Qty: 60 TABLET | Refills: 0 | OUTPATIENT
Start: 2020-05-11

## 2020-05-12 RX ORDER — ALBUTEROL SULFATE 90 UG/1
2 AEROSOL, METERED RESPIRATORY (INHALATION) EVERY 6 HOURS PRN
Qty: 1 INHALER | Refills: 3 | Status: SHIPPED
Start: 2020-05-12 | End: 2020-06-08 | Stop reason: SDUPTHER

## 2020-05-26 ENCOUNTER — VIRTUAL VISIT (OUTPATIENT)
Dept: PRIMARY CARE CLINIC | Age: 45
End: 2020-05-26
Payer: COMMERCIAL

## 2020-05-26 PROBLEM — I10 HYPERTENSIVE DISORDER: Status: ACTIVE | Noted: 2019-08-06

## 2020-05-26 PROCEDURE — 2022F DILAT RTA XM EVC RTNOPTHY: CPT | Performed by: FAMILY MEDICINE

## 2020-05-26 PROCEDURE — G8427 DOCREV CUR MEDS BY ELIG CLIN: HCPCS | Performed by: FAMILY MEDICINE

## 2020-05-26 PROCEDURE — 99213 OFFICE O/P EST LOW 20 MIN: CPT | Performed by: FAMILY MEDICINE

## 2020-05-26 PROCEDURE — 3052F HG A1C>EQUAL 8.0%<EQUAL 9.0%: CPT | Performed by: FAMILY MEDICINE

## 2020-05-26 RX ORDER — NEBULIZER AND COMPRESSOR
1 EACH MISCELLANEOUS DAILY
Qty: 1 KIT | Refills: 0 | Status: SHIPPED
Start: 2020-05-26 | End: 2020-07-01 | Stop reason: SDDI

## 2020-05-26 RX ORDER — ISOSORBIDE MONONITRATE 30 MG/1
30 TABLET, EXTENDED RELEASE ORAL DAILY
COMMUNITY
End: 2020-07-01 | Stop reason: ALTCHOICE

## 2020-05-26 RX ORDER — ATORVASTATIN CALCIUM 80 MG/1
TABLET, FILM COATED ORAL
COMMUNITY
Start: 2020-05-04 | End: 2020-11-27 | Stop reason: SDUPTHER

## 2020-06-01 ENCOUNTER — TELEPHONE (OUTPATIENT)
Dept: PRIMARY CARE CLINIC | Age: 45
End: 2020-06-01

## 2020-06-01 NOTE — TELEPHONE ENCOUNTER
Patient states that his blood pressure has been elevated since his open heart surgery 2 months ago. He would like to know if he can start taking his bp medications with all of  His new medications?       Please advise

## 2020-06-08 ENCOUNTER — OFFICE VISIT (OUTPATIENT)
Dept: PRIMARY CARE CLINIC | Age: 45
End: 2020-06-08
Payer: COMMERCIAL

## 2020-06-08 VITALS
BODY MASS INDEX: 38.36 KG/M2 | WEIGHT: 315 LBS | TEMPERATURE: 97.6 F | HEART RATE: 93 BPM | RESPIRATION RATE: 18 BRPM | SYSTOLIC BLOOD PRESSURE: 144 MMHG | OXYGEN SATURATION: 96 % | DIASTOLIC BLOOD PRESSURE: 88 MMHG | HEIGHT: 76 IN

## 2020-06-08 PROCEDURE — 4004F PT TOBACCO SCREEN RCVD TLK: CPT | Performed by: FAMILY MEDICINE

## 2020-06-08 PROCEDURE — 99213 OFFICE O/P EST LOW 20 MIN: CPT | Performed by: FAMILY MEDICINE

## 2020-06-08 PROCEDURE — G8417 CALC BMI ABV UP PARAM F/U: HCPCS | Performed by: FAMILY MEDICINE

## 2020-06-08 PROCEDURE — G8427 DOCREV CUR MEDS BY ELIG CLIN: HCPCS | Performed by: FAMILY MEDICINE

## 2020-06-08 RX ORDER — BLOOD SUGAR DIAGNOSTIC
1 STRIP MISCELLANEOUS DAILY
Qty: 100 EACH | Refills: 3 | Status: SHIPPED
Start: 2020-06-08 | End: 2020-08-28 | Stop reason: SDUPTHER

## 2020-06-08 RX ORDER — ALBUTEROL SULFATE 90 UG/1
2 AEROSOL, METERED RESPIRATORY (INHALATION) EVERY 6 HOURS PRN
Qty: 1 INHALER | Refills: 3 | Status: SHIPPED
Start: 2020-06-08 | End: 2020-08-28 | Stop reason: SDUPTHER

## 2020-06-10 RX ORDER — METOPROLOL TARTRATE 50 MG/1
50 TABLET, FILM COATED ORAL 2 TIMES DAILY
COMMUNITY
End: 2020-06-10 | Stop reason: SDUPTHER

## 2020-06-10 RX ORDER — METOPROLOL TARTRATE 50 MG/1
50 TABLET, FILM COATED ORAL 2 TIMES DAILY
Qty: 30 TABLET | Refills: 3 | Status: SHIPPED
Start: 2020-06-10 | End: 2020-07-27 | Stop reason: SDUPTHER

## 2020-06-10 NOTE — TELEPHONE ENCOUNTER
Last Appointment:  6/8/2020  Future Appointments   Date Time Provider Larisa Salazar   6/23/2020 10:20 AM Estela Anne MD [facility]   6/24/2020  9:40 AM Yumiko Quiñones MD [facility]   8/25/2020  9:00 AM Lorna Ta MD [facility]   3/18/2021  8:30 AM Tamra King MD [facility]      Patient reports he was given increase dosage in Metoprolol Tartrate 50 mg BID. Pended new order.     Electronically signed by Thomasenia Aschoff, LPN on 1/06/6495 at 4:91 AM

## 2020-06-11 ENCOUNTER — TELEPHONE (OUTPATIENT)
Dept: PRIMARY CARE CLINIC | Age: 45
End: 2020-06-11

## 2020-06-12 ENCOUNTER — TELEPHONE (OUTPATIENT)
Dept: PRIMARY CARE CLINIC | Age: 45
End: 2020-06-12

## 2020-07-01 ENCOUNTER — OFFICE VISIT (OUTPATIENT)
Dept: CARDIOLOGY CLINIC | Age: 45
End: 2020-07-01
Payer: COMMERCIAL

## 2020-07-01 VITALS
DIASTOLIC BLOOD PRESSURE: 84 MMHG | HEART RATE: 76 BPM | SYSTOLIC BLOOD PRESSURE: 136 MMHG | BODY MASS INDEX: 38.24 KG/M2 | WEIGHT: 314 LBS | HEIGHT: 76 IN

## 2020-07-01 PROCEDURE — 93000 ELECTROCARDIOGRAM COMPLETE: CPT | Performed by: INTERNAL MEDICINE

## 2020-07-01 PROCEDURE — 4004F PT TOBACCO SCREEN RCVD TLK: CPT | Performed by: INTERNAL MEDICINE

## 2020-07-01 PROCEDURE — G8417 CALC BMI ABV UP PARAM F/U: HCPCS | Performed by: INTERNAL MEDICINE

## 2020-07-01 PROCEDURE — G8427 DOCREV CUR MEDS BY ELIG CLIN: HCPCS | Performed by: INTERNAL MEDICINE

## 2020-07-01 PROCEDURE — 99213 OFFICE O/P EST LOW 20 MIN: CPT | Performed by: INTERNAL MEDICINE

## 2020-07-01 RX ORDER — ERGOCALCIFEROL 1.25 MG/1
CAPSULE ORAL
COMMUNITY
Start: 2020-06-08 | End: 2020-11-27 | Stop reason: SDUPTHER

## 2020-07-01 RX ORDER — LISINOPRIL 40 MG/1
40 TABLET ORAL DAILY
Qty: 90 TABLET | Refills: 3 | Status: SHIPPED
Start: 2020-07-01 | End: 2020-12-18 | Stop reason: ALTCHOICE

## 2020-07-01 RX ORDER — NITROGLYCERIN 0.4 MG/1
TABLET SUBLINGUAL
COMMUNITY
Start: 2020-06-13 | End: 2020-07-01 | Stop reason: HOSPADM

## 2020-07-01 NOTE — PROGRESS NOTES
[facility]  Sergo Talamantes. Greyson Navarro M.D. ANITRA Delaney. [employer] Hermon Kitty, M.D. Lela Hagan M.D. MD Bethany Garcia   1975  Aretha Hooper MD        This 71-year-old man had outpatient cardiac assessment today. He has a history of obesity, COPD, diabetes mellitus, hyperlipidemia, hypertension, carotid stenosis and chronic ischemic heart disease. He had CABG x 4 03/16/2020. This included a radial to OM graft. Since discharge he has recovered well. He has not returned to work as a  however. He has been maintained on Cardizem because of the radial artery graft.  :  Medical History:  1. COPD. 2. Diabetes mellitus. 3. Hyperlipidemia.  Total cholesterol 188, HDL 48,  (03/2019).   Repeat lipid panel 02/05/2030: Total cholesterol 213, triglycerides 112, HDL 59,   4. Hypertension. 5. No history of MI, CHF, CVA. 6. Neuropathy. 7. Obesity.  BMI 40/2020.  8. Resection of a left anterior mediastinal mass, 04/28/2017 (Dr. Mario Basilio). 9. Right carotid stenosis by ultrasound [facility], 2017. 10. Cigarette abuse.    11. Lexiscan stress MPS, 12/20/2019. Fixed inferolateral defect.  Mild inferior hypokinesis. EF 50%.  No reversible defect.    12. Echo, 12/20/2019. No LV dilatation.  Mild good concentric LVH.  Normal EF.  No WMA.  Mild to moderate central mitral regurgitation jet.    13. Coronary CT angiogram 01/31/2020: Calcium score 367.  Left main no stenosis.  LAD extensive calcified and noncalcified plaque without stenosis.  Left circumflex not well demonstrated.  Possibly occluded.  Right coronary artery not well demonstrated.  Possible severe stenosis mid RCA.  EF 60%. 14. Ambulatory monitor 01/10 through 01/16/2020: Sinus rhythm.  No atrial fibrillation.  No prolonged pauses.  Occasional PVCs without complex repetitive forms.   15. Cardiac catheterization 02/11/2020: LM 0% stenosis. LAD 70% stenosis. D2 90%. Circumflex 99%. OM 99%. Dominant RCA 85% stenosis. PDA 85% stenosis. 16. CABG x 4 03/16/2020: LIMA-LAD, radial OM, GSV RCA sequential to PDA  17. Outpatient cardiac assessment 07/01/2020: Stable cardiac status. Lisinopril restarted          Review of Systems:  Constitutional: negative for fever and chills  Respiratory: negative for cough and hemoptysis  Cardiovascular:   Gastrointestinal: negative for abdominal pain, diarrhea, nausea and vomiting  Genitourinary:negative for dysuria and hematuria  Derm: negative for rash and skin lesion(s)  Neurological: negative for seizures and tremors  Endocrine: negative for diabetic symptoms including polydipsia and polyuria  Musculoskeletal: negative for CTD  Psychiatric: negative for psychosis and major depression    On examination, he is an alert pleasant middle-age  male in no distress skin is warm and dry. Respirations are unlabored. /84   Pulse 76   Ht 6' 4\" (1.93 m)   Wt (!) 314 lb (142.4 kg)   BMI 38.22 kg/m²  HEENT negative for scleral icterus. Extraocular muscles intact. No facial asymmetry or central cyanosis. Neck without masses or goiter. No bruit or JVD. Cardiac apex not displaced. Rhythm regular. Abdomen normal.  Extremities without edema. Lungs are clear    EKG today shows sinus rhythm 76/min. Left axis. Mild nonspecific lateral T abnormality. The patient is recovering well from surgery. Long-term he will benefit by restarting an ACE inhibitor. He was previously on lisinopril 40 mg/day. He is asked to use 20 mg daily for 1 week and then increase to 40 mg/day. He is asked to follow-up with Dr. Estrella Zee for blood pressure checks. He is at elevated risk due to the presence of ischemic heart disease as well as peripheral vascular disease and therefore Lipitor 80 mg/day is appropriate.   He should have repeat lipid panel check in the next 1-2 months to ensure that he has this half of the losartan at home achieved the appropriate reduction (LDL less than 70). He will have cardiac follow-up in 1 year. At completion of today's visit, medications include the following:    Current Outpatient Medications:     vitamin D (ERGOCALCIFEROL) 1.25 MG (10958 UT) CAPS capsule, take 1 capsule by mouth every week, Disp: , Rfl:     metoprolol tartrate (LOPRESSOR) 50 MG tablet, Take 1 tablet by mouth 2 times daily, Disp: 30 tablet, Rfl: 3    DULERA 100-5 MCG/ACT inhaler, Inhale 2 puffs into the lungs 2 times daily, Disp: 1 Inhaler, Rfl: 3    blood glucose test strips (FREESTYLE INSULINX TEST) strip, 1 each by In Vitro route daily As needed. , Disp: 100 each, Rfl: 3    albuterol sulfate  (90 Base) MCG/ACT inhaler, Inhale 2 puffs into the lungs every 6 hours as needed for Wheezing, Disp: 1 Inhaler, Rfl: 3    atorvastatin (LIPITOR) 80 MG tablet, take 1 tablet by mouth once daily, Disp: , Rfl:     metFORMIN (GLUCOPHAGE) 850 MG tablet, Take 1 tablet by mouth 3 times daily, Disp: 90 tablet, Rfl: 3    HYDROcodone-acetaminophen (NORCO)  MG per tablet, Take 1 tablet by mouth every 6 hours as needed for Pain., Disp: , Rfl:     dilTIAZem (CARDIZEM CD) 120 MG extended release capsule, Take 1 capsule by mouth daily Take daily for 6 months, Disp: 30 capsule, Rfl: 5    aspirin EC 81 MG EC tablet, Take 1 tablet by mouth daily, Disp: 90 tablet, Rfl: 3    FreeStyle Lancets MISC, USE AS DIRECTED, Disp: 100 each, Rfl: 3    insulin aspart (NOVOLOG) 100 UNIT/ML injection vial, Use tid with sliding scale, Disp: 5 Package, Rfl: 5    insulin glargine (BASAGLAR KWIKPEN) 100 UNIT/ML injection pen, Inject 110 Units into the skin nightly (Patient taking differently: Inject 60 Units into the skin nightly ), Disp: 5 pen, Rfl: 3    Insulin Pen Needle (PEN NEEDLES 29GX1/2\") 29G X 12MM MISC, USE AS DIRECTED, Disp: 100 each, Rfl: 5    Alcohol Swabs 70 % PADS, , Disp: , Rfl:     BD PEN NEEDLE PRAMOD U/F 32G X 4 MM MISC, , Disp: , Rfl: 0    Cholecalciferol (VITAMIN D3 ULTRA POTENCY) 1.25 MG (26402 UT) TABS, Take 50,000 Int'l Units/day by mouth once a week (Patient not taking: Reported on 7/1/2020), Disp: 4 tablet, Rfl: 2      Note: This report was completed utilizing computer voice recognition software. Every effort has been made to ensure accuracy, however; inadvertent computerized transcription errors may be present.     --Rahul Calderon MD on 7/1/2020 at 3:00 PM

## 2020-07-02 RX ORDER — LANCETS 28 GAUGE
EACH MISCELLANEOUS
Qty: 100 EACH | Refills: 3 | Status: SHIPPED
Start: 2020-07-02 | End: 2020-11-09 | Stop reason: SDUPTHER

## 2020-07-27 ENCOUNTER — OFFICE VISIT (OUTPATIENT)
Dept: PRIMARY CARE CLINIC | Age: 45
End: 2020-07-27
Payer: COMMERCIAL

## 2020-07-27 VITALS
RESPIRATION RATE: 18 BRPM | BODY MASS INDEX: 37.63 KG/M2 | WEIGHT: 309 LBS | TEMPERATURE: 98.1 F | DIASTOLIC BLOOD PRESSURE: 90 MMHG | HEART RATE: 92 BPM | HEIGHT: 76 IN | OXYGEN SATURATION: 97 % | SYSTOLIC BLOOD PRESSURE: 130 MMHG

## 2020-07-27 PROCEDURE — 99213 OFFICE O/P EST LOW 20 MIN: CPT | Performed by: FAMILY MEDICINE

## 2020-07-27 PROCEDURE — G8417 CALC BMI ABV UP PARAM F/U: HCPCS | Performed by: FAMILY MEDICINE

## 2020-07-27 PROCEDURE — 4004F PT TOBACCO SCREEN RCVD TLK: CPT | Performed by: FAMILY MEDICINE

## 2020-07-27 PROCEDURE — G8427 DOCREV CUR MEDS BY ELIG CLIN: HCPCS | Performed by: FAMILY MEDICINE

## 2020-07-27 RX ORDER — ISOSORBIDE MONONITRATE 30 MG/1
TABLET, EXTENDED RELEASE ORAL
COMMUNITY
Start: 2020-07-21 | End: 2020-11-27

## 2020-07-27 RX ORDER — METOPROLOL TARTRATE 50 MG/1
50 TABLET, FILM COATED ORAL 2 TIMES DAILY
Qty: 60 TABLET | Refills: 3 | Status: SHIPPED
Start: 2020-07-27 | End: 2020-11-27 | Stop reason: SDUPTHER

## 2020-07-27 RX ORDER — CEPHALEXIN 500 MG/1
TABLET ORAL
COMMUNITY
Start: 2020-07-21 | End: 2020-10-20 | Stop reason: ALTCHOICE

## 2020-07-27 NOTE — PROGRESS NOTES
2020     Esther Oh    : 1975 Sex: male   Age: 38 y.o. Chief Complaint   Patient presents with    Hypertension    Hyperlipidemia       HPI: This 38y.o. -year-old male  presents today for evaluation and management of his  chronic medical problems. The patient is status post recent emergency room visit. The patient states he was in rehab when he fell injuring his left lower extremity. The patient developed swelling and redness of the left lower extremity and went to the hospital 5 days later. The patient was diagnosed with cellulitis and placed on Cipro antibiotic therapy. Current medication list reviewed. The patient is tolerating all medications well without adverse events or known side effects. The patient does understand the risk and benefits of the prescribed medications. The patient is not up-to-date on all age-appropriate wellness issues. ROS:   Const: Denies changes in appetite, chills, fever, night sweats and weight loss. Eyes:  Denies discharge, a recent change in visual acuity, blurred vision and double vision. ENMT: Denies discharge of the ears, hearing loss, pain of the ears. Denies nasal or sinus symptoms other than stated above. Denies mouth or throat symptoms. CV:  Denies chest pain, dyspnea on exertion, orthopnea, palpitations and PND  Resp: Denies chest pain, cough, SOB and wheezing. GI: Denies abdominal pain, constipation, diarrhea, heartburn, indigestion, nausea and vomiting. : Denies dysuria, frequency, hematuria, nocturia and urgency. Musculo: Denies arthralgias and myalgia  Skin:  Denies lesions, pruritus and rash. Neuro: Denies dizziness, lightheadedness, numbness, tingling and weakness. Psych:  Denies anxiety and depression  Endocrine: Denies anxiety and depression. Hema/Lymph: Denies hematologic symptoms  Allergy/Immuno:  Denies allergic/immunologic symptoms.   Pertinent positives reviewed and noted      Current Outpatient Medications:    isosorbide mononitrate (IMDUR) 30 MG extended release tablet, Daily, Disp: , Rfl:     Cephalexin 500 MG TABS, Every 8 Hours, Disp: , Rfl:     metoprolol tartrate (LOPRESSOR) 50 MG tablet, Take 1 tablet by mouth 2 times daily, Disp: 60 tablet, Rfl: 3    FreeStyle Lancets MISC, USE AS DIRECTED, Disp: 100 each, Rfl: 3    vitamin D (ERGOCALCIFEROL) 1.25 MG (86566 UT) CAPS capsule, take 1 capsule by mouth every week, Disp: , Rfl:     lisinopril (PRINIVIL;ZESTRIL) 40 MG tablet, Take 1 tablet by mouth daily, Disp: 90 tablet, Rfl: 3    DULERA 100-5 MCG/ACT inhaler, Inhale 2 puffs into the lungs 2 times daily, Disp: 1 Inhaler, Rfl: 3    blood glucose test strips (FREESTYLE INSULINX TEST) strip, 1 each by In Vitro route daily As needed. , Disp: 100 each, Rfl: 3    albuterol sulfate  (90 Base) MCG/ACT inhaler, Inhale 2 puffs into the lungs every 6 hours as needed for Wheezing, Disp: 1 Inhaler, Rfl: 3    atorvastatin (LIPITOR) 80 MG tablet, take 1 tablet by mouth once daily, Disp: , Rfl:     metFORMIN (GLUCOPHAGE) 850 MG tablet, Take 1 tablet by mouth 3 times daily, Disp: 90 tablet, Rfl: 3    HYDROcodone-acetaminophen (NORCO)  MG per tablet, Take 1 tablet by mouth every 6 hours as needed for Pain., Disp: , Rfl:     dilTIAZem (CARDIZEM CD) 120 MG extended release capsule, Take 1 capsule by mouth daily Take daily for 6 months, Disp: 30 capsule, Rfl: 5    aspirin EC 81 MG EC tablet, Take 1 tablet by mouth daily, Disp: 90 tablet, Rfl: 3    insulin aspart (NOVOLOG) 100 UNIT/ML injection vial, Use tid with sliding scale, Disp: 5 Package, Rfl: 5    insulin glargine (BASAGLAR KWIKPEN) 100 UNIT/ML injection pen, Inject 110 Units into the skin nightly (Patient taking differently: Inject 60 Units into the skin nightly ), Disp: 5 pen, Rfl: 3    Insulin Pen Needle (PEN NEEDLES 29GX1/2\") 29G X 12MM MISC, USE AS DIRECTED, Disp: 100 each, Rfl: 5    Alcohol Swabs 70 % PADS, , Disp: , Rfl:     BD PEN NEEDLE PRAMOD U/F 32G X 4 MM MISC, , Disp: , Rfl: 0    Cholecalciferol (VITAMIN D3 ULTRA POTENCY) 1.25 MG (21325 UT) TABS, Take 50,000 Int'l Units/day by mouth once a week (Patient not taking: Reported on 2020), Disp: 4 tablet, Rfl: 2    Allergies   Allergen Reactions    Sulfa Antibiotics      ?  unsure       Past Medical History:   Diagnosis Date    CAD (coronary artery disease)     [facility]     [facility] mellitus ([facility]     Hyperlipidemia     Hypertension     Internal carotid artery occlusion, right 3/16/2020    Mediastinal mass     Neuropathy     Obesity     329 #     Social History     Socioeconomic History    Marital status: Single     Spouse name: Not on file    Number of children: Not on file    Years of education: Not on file    Highest education level: Not on file   Occupational History    Occupation: [employer] [address] [place] [address]     Employer: NOT EMPLOYED   Social Needs    Financial resource strain: Not on file    Food insecurity     Worry: Not on file     Inability: Not on file    Transportation needs     Medical: Not on file     Non-medical: Not on file   Tobacco Use    Smoking status: Former Smoker     Packs/day: 1.00     Years: 25.00     Pack years: 25.00     Types: Cigarettes     Start date: 1994     Last attempt to quit: 3/14/2020     Years since quittin.3    Smokeless tobacco: Current User     Types: Chew, Snuff   Substance and Sexual Activity    Alcohol use: No    Drug use: No    Sexual activity: Not on file   Lifestyle    Physical activity     Days per week: Not on file     Minutes per session: Not on file    Stress: Not on file   Relationships    Social connections     Talks on phone: Not on file     Gets together: Not on file     Attends [employer] service: Not on file     Active member of club or organization: Not on file     Attends meetings of clubs or organizations: Not on file     Relationship status: Not on file   [facility] Intimate partner violence     Fear of current or ex partner: Not on file     Emotionally abused: Not on file     Physically abused: Not on file     Forced sexual activity: Not on file   Other Topics Concern    Not on file   Social History Narrative    Not on file     Past Surgical History:   Procedure Laterality Date    CARDIAC CATHETERIZATION  2020    Dr. Evelyn Landrum- multi vessel disease    MITRAL VALVE REPAIR N/A 3/16/2020    CORONARY ARTERY BYPASS POSSIBLE LEFT RADIAL ARTERY HARVEST, PATRICE performed by Benito Jenkins MD at [address]  2017    resection of mediastinal mass    TONSILLECTOMY      as a child    TRANSESOPHAGEAL ECHOCARDIOGRAM  2020    Dr. Albarado Pouch History   Problem Relation Age of Onset    Heart Disease Mother         MI age 39     High Blood Pressure Mother     Diabetes Father     Heart Disease Father         MI    High Blood Pressure Father     High Cholesterol Father     Cancer Father     Stroke Father     Kidney Disease Father         dialysis    High Blood Pressure Sister     Other Brother     COPD Brother     High Blood Pressure Brother         Vitals:    20 0828 20 0835   BP: (!) 160/102 (!) 130/90   Site:  Right Upper Arm   Pulse: 92    Resp: 18    Temp: 98.1 °F (36.7 °C)    TempSrc: Temporal    SpO2: 97%    Weight: (!) 309 lb (140.2 kg)    Height: 6' 4\" (1.93 m)         Exam: Const: Appears healthy and well developed. No signs of acute distress present. Eyes: PERRL  ENMT: Tympanic membranes are intact. Nasal mucosa intact without noted erythema Septum is in the midline. Posterior pharynx shows no exudate, irritation or redness. Neck:  Supple without adenopathy. Adequate range of motion   Resp: No rales, rhonchi, wheezes appreciated over the lungs bilaterally. CV: S1, S2 within normal limits. Regular rate and rhythm noted. Without murmur, gallop or rub. Extremities:  Pulses intact.   Without noted edema. Abdomen: Positive bowel sounds. Palpation reveals softness, with no distension, organomegaly or tenderness. No abdominal masses palpable. Skin: Skin is warm and dry. Musculo: Superficial healing laceration of left lower extremity noted upon examination. Minimal erythema noted. Neuro: Alert and oriented X3. Cranial nerves grossly intact. Psych: Mood is normal.  Affect is normal.   Vital signs reviewed. Controlled Substances Monitoring:     RX Monitoring 9/6/2019   Periodic Controlled Substance Monitoring No signs of potential drug abuse or diversion identified. Plan Per Assessment:  Dimple Erickson was seen today for hypertension and hyperlipidemia. Diagnoses and all orders for this visit:    Essential hypertension    Cellulitis of left lower leg    Other orders  -     metoprolol tartrate (LOPRESSOR) 50 MG tablet; Take 1 tablet by mouth 2 times daily      Repeat blood pressure. Return in about 4 weeks (around 8/24/2020) for MEDICATION CHECK, FOLLOW UP 0919 Jacqueline Mensah MD    Note was generated with the assistance of voice recognition software. Document was reviewed however may contain grammatical errors.

## 2020-08-19 RX ORDER — PEN NEEDLE, DIABETIC 32GX 5/32"
1 NEEDLE, DISPOSABLE MISCELLANEOUS 2 TIMES DAILY
Qty: 100 EACH | Refills: 5 | Status: SHIPPED
Start: 2020-08-19 | End: 2021-03-17 | Stop reason: SDUPTHER

## 2020-08-28 ENCOUNTER — OFFICE VISIT (OUTPATIENT)
Dept: PRIMARY CARE CLINIC | Age: 45
End: 2020-08-28
Payer: COMMERCIAL

## 2020-08-28 ENCOUNTER — HOSPITAL ENCOUNTER (OUTPATIENT)
Age: 45
Discharge: HOME | End: 2020-08-30
Payer: COMMERCIAL

## 2020-08-28 VITALS
RESPIRATION RATE: 18 BRPM | DIASTOLIC BLOOD PRESSURE: 80 MMHG | WEIGHT: 315 LBS | SYSTOLIC BLOOD PRESSURE: 124 MMHG | HEART RATE: 87 BPM | TEMPERATURE: 96.9 F | HEIGHT: 76 IN | OXYGEN SATURATION: 97 % | BODY MASS INDEX: 38.36 KG/M2

## 2020-08-28 LAB
ALBUMIN SERPL-MCNC: 4.1 G/DL (ref 3.5–5.2)
ALP BLD-CCNC: 83 U/L (ref 40–129)
ALT SERPL-CCNC: 15 U/L (ref 0–40)
ANION GAP SERPL CALCULATED.3IONS-SCNC: 13 MMOL/L (ref 7–16)
AST SERPL-CCNC: 11 U/L (ref 0–39)
BASOPHILS ABSOLUTE: 0.06 E9/L (ref 0–0.2)
BASOPHILS RELATIVE PERCENT: 0.9 % (ref 0–2)
BILIRUB SERPL-MCNC: 0.5 MG/DL (ref 0–1.2)
BUN BLDV-MCNC: 13 MG/DL (ref 6–20)
CALCIUM SERPL-MCNC: 9.5 MG/DL (ref 8.6–10.2)
CHLORIDE BLD-SCNC: 96 MMOL/L (ref 98–107)
CO2: 23 MMOL/L (ref 22–29)
CREAT SERPL-MCNC: 0.5 MG/DL (ref 0.7–1.2)
EOSINOPHILS ABSOLUTE: 0.22 E9/L (ref 0.05–0.5)
EOSINOPHILS RELATIVE PERCENT: 3.3 % (ref 0–6)
GFR AFRICAN AMERICAN: >60
GFR NON-AFRICAN AMERICAN: >60 ML/MIN/1.73
GLUCOSE BLD-MCNC: 415 MG/DL (ref 74–99)
HBA1C MFR BLD: 13.8 % (ref 4–5.6)
HCT VFR BLD CALC: 47.7 % (ref 37–54)
HEMOGLOBIN: 15.7 G/DL (ref 12.5–16.5)
IMMATURE GRANULOCYTES #: 0.01 E9/L
IMMATURE GRANULOCYTES %: 0.2 % (ref 0–5)
LYMPHOCYTES ABSOLUTE: 2.41 E9/L (ref 1.5–4)
LYMPHOCYTES RELATIVE PERCENT: 36.5 % (ref 20–42)
MCH RBC QN AUTO: 30.4 PG (ref 26–35)
MCHC RBC AUTO-ENTMCNC: 32.9 % (ref 32–34.5)
MCV RBC AUTO: 92.3 FL (ref 80–99.9)
MONOCYTES ABSOLUTE: 0.53 E9/L (ref 0.1–0.95)
MONOCYTES RELATIVE PERCENT: 8 % (ref 2–12)
NEUTROPHILS ABSOLUTE: 3.37 E9/L (ref 1.8–7.3)
NEUTROPHILS RELATIVE PERCENT: 51.1 % (ref 43–80)
PDW BLD-RTO: 12.9 FL (ref 11.5–15)
PLATELET # BLD: 247 E9/L (ref 130–450)
PMV BLD AUTO: 9.2 FL (ref 7–12)
POTASSIUM SERPL-SCNC: 4.4 MMOL/L (ref 3.5–5)
RBC # BLD: 5.17 E12/L (ref 3.8–5.8)
SODIUM BLD-SCNC: 132 MMOL/L (ref 132–146)
TOTAL PROTEIN: 7.2 G/DL (ref 6.4–8.3)
WBC # BLD: 6.6 E9/L (ref 4.5–11.5)

## 2020-08-28 PROCEDURE — 85025 COMPLETE CBC W/AUTO DIFF WBC: CPT

## 2020-08-28 PROCEDURE — 3052F HG A1C>EQUAL 8.0%<EQUAL 9.0%: CPT | Performed by: FAMILY MEDICINE

## 2020-08-28 PROCEDURE — G8427 DOCREV CUR MEDS BY ELIG CLIN: HCPCS | Performed by: FAMILY MEDICINE

## 2020-08-28 PROCEDURE — 4004F PT TOBACCO SCREEN RCVD TLK: CPT | Performed by: FAMILY MEDICINE

## 2020-08-28 PROCEDURE — 99214 OFFICE O/P EST MOD 30 MIN: CPT | Performed by: FAMILY MEDICINE

## 2020-08-28 PROCEDURE — 83036 HEMOGLOBIN GLYCOSYLATED A1C: CPT

## 2020-08-28 PROCEDURE — 80053 COMPREHEN METABOLIC PANEL: CPT

## 2020-08-28 PROCEDURE — G8417 CALC BMI ABV UP PARAM F/U: HCPCS | Performed by: FAMILY MEDICINE

## 2020-08-28 PROCEDURE — 36415 COLL VENOUS BLD VENIPUNCTURE: CPT

## 2020-08-28 PROCEDURE — 2022F DILAT RTA XM EVC RTNOPTHY: CPT | Performed by: FAMILY MEDICINE

## 2020-08-28 RX ORDER — SYRINGE-NEEDLE,INSULIN,0.5 ML 27GX1/2"
SYRINGE, EMPTY DISPOSABLE MISCELLANEOUS
COMMUNITY
End: 2020-08-28 | Stop reason: SDUPTHER

## 2020-08-28 RX ORDER — SYRINGE-NEEDLE,INSULIN,0.5 ML 27GX1/2"
1 SYRINGE, EMPTY DISPOSABLE MISCELLANEOUS 3 TIMES DAILY
Qty: 100 EACH | Refills: 5 | Status: SHIPPED
Start: 2020-08-28 | End: 2021-03-17 | Stop reason: SDUPTHER

## 2020-08-28 RX ORDER — BLOOD SUGAR DIAGNOSTIC
1 STRIP MISCELLANEOUS DAILY
Qty: 100 EACH | Refills: 3 | Status: SHIPPED
Start: 2020-08-28 | End: 2020-11-27 | Stop reason: SDUPTHER

## 2020-08-28 RX ORDER — ASPIRIN 81 MG/1
81 TABLET ORAL DAILY
Qty: 90 TABLET | Refills: 3 | Status: SHIPPED
Start: 2020-08-28 | End: 2021-03-17 | Stop reason: SDUPTHER

## 2020-08-28 RX ORDER — ALBUTEROL SULFATE 90 UG/1
2 AEROSOL, METERED RESPIRATORY (INHALATION) EVERY 6 HOURS PRN
Qty: 1 INHALER | Refills: 3 | Status: SHIPPED
Start: 2020-08-28 | End: 2020-11-27 | Stop reason: SDUPTHER

## 2020-08-28 NOTE — PROGRESS NOTES
2020     Jennifer Fields    : 1975 Sex: male   Age: 38 y.o. Chief Complaint   Patient presents with    Hyperlipidemia    Hypertension    Diabetes       HPI: This 38y.o. -year-old male  presents today for evaluation and management of his  chronic medical problems. Current medication list reviewed. The patient is tolerating all medications well without adverse events or known side effects. The patient does understand the risk and benefits of the prescribed medications. The patient is up-to-date on all age-appropriate wellness issues. The patient presents today stating approximately 5 weeks ago he was in the kitchen when he had a syncopal episode. The patient denied any associated chest pain or heart palpitations. The patient has had almost daily lightheadedness since that time. Patient also presents today with a sensation as if there is a gap in his sternum. The patient is status post bypass surgery. ROS:   Const: Denies changes in appetite, chills, fever, night sweats and weight loss. Eyes:  Denies discharge, a recent change in visual acuity, blurred vision and double vision. ENMT: Denies discharge of the ears, hearing loss, pain of the ears. Denies nasal or sinus symptoms other than stated above. Denies mouth or throat symptoms. CV:  Denies chest pain, dyspnea on exertion, orthopnea, palpitations and PND  Resp: Denies chest pain, cough, SOB and wheezing. GI: Denies abdominal pain, constipation, diarrhea, heartburn, indigestion, nausea and vomiting. : Denies dysuria, frequency, hematuria, nocturia and urgency. Musculo: Denies arthralgias and myalgia  Skin:  Denies lesions, pruritus and rash. Neuro: Denies dizziness, lightheadedness, numbness, tingling and weakness. Psych:  Denies anxiety and depression  Endocrine: Denies anxiety and depression. Hema/Lymph: Denies hematologic symptoms  Allergy/Immuno:  Denies allergic/immunologic symptoms.   Pertinent positives reviewed and Needle (PEN NEEDLES 29GX1/2\") 29G X 12MM MISC, USE AS DIRECTED, Disp: 100 each, Rfl: 5    Alcohol Swabs 70 % PADS, , Disp: , Rfl:     Cephalexin 500 MG TABS, Every 8 Hours, Disp: , Rfl:     metoprolol tartrate (LOPRESSOR) 50 MG tablet, Take 1 tablet by mouth 2 times daily, Disp: 60 tablet, Rfl: 3    Cholecalciferol (VITAMIN D3 ULTRA POTENCY) 1.25 MG (97919 UT) TABS, Take 50,000 Int'l Units/day by mouth once a week (Patient not taking: Reported on 2020), Disp: 4 tablet, Rfl: 2    Allergies   Allergen Reactions    Sulfa Antibiotics      ?  unsure       Past Medical History:   Diagnosis Date    CAD (coronary artery disease)     [facility]) ([facility]     Diabetes mellitus ([facility]     Hyperlipidemia     Hypertension     Internal carotid artery occlusion, right 3/16/2020    Mediastinal mass     Neuropathy     Obesity     329 #     Social History     Socioeconomic History    Marital status: Single     Spouse name: Not on file    Number of children: Not on file    Years of education: Not on file    Highest education level: Not on file   Occupational History    Occupation: [employer] [address] [place] [address]     Employer: NOT EMPLOYED   Social Needs    Financial resource strain: Not on file    Food insecurity     Worry: Not on file     Inability: Not on file    Transportation needs     Medical: Not on file     Non-medical: Not on file   Tobacco Use    Smoking status: Former Smoker     Packs/day: 1.00     Years: 25.00     Pack years: 25.00     Types: Cigarettes     Start date: 1994     Last attempt to quit: 3/14/2020     Years since quittin.4    Smokeless tobacco: Current User     Types: Chew, Snuff   Substance and Sexual Activity    Alcohol use: No    Drug use: No    Sexual activity: Not on file   Lifestyle    Physical activity     Days per week: Not on file     Minutes per session: Not on file    Stress: Not on file   Relationships    Social connections     Talks on phone: Not on file     Gets together: Not on file     Attends Gnosticist service: Not on file     Active member of club or organization: Not on file     Attends meetings of clubs or organizations: Not on file     Relationship status: Not on file    Intimate partner violence     Fear of current or ex partner: Not on file     Emotionally abused: Not on file     Physically abused: Not on file     Forced sexual activity: Not on file   Other Topics Concern    Not on file   Social History Narrative    Not on file     Past Surgical History:   Procedure Laterality Date    CARDIAC CATHETERIZATION  2020    Dr. Nancy Love- multi vessel disease    MITRAL VALVE REPAIR N/A 3/16/2020    CORONARY ARTERY BYPASS POSSIBLE LEFT RADIAL ARTERY HARVEST, PATRICE performed by Meli Dent MD at [address]  2017    resection of mediastinal mass    TONSILLECTOMY      as a child    TRANSESOPHAGEAL ECHOCARDIOGRAM  2020    Dr. Denis Melchor History   Problem Relation Age of Onset    Heart Disease Mother         MI age 39    [facility] High Blood Pressure Mother     Diabetes Father     Heart Disease Father         MI    High Blood Pressure Father     High Cholesterol Father     Cancer Father     Stroke Father     Kidney Disease Father         dialysis    High Blood Pressure Sister     Other Brother     COPD Brother     High Blood Pressure Brother         Vitals:    20 0718   BP: 124/80   Pulse: 87   Resp: 18   Temp: 96.9 °F (36.1 °C)   TempSrc: Temporal   SpO2: 97%   Weight: (!) 317 lb (143.8 kg)   Height: 6' 4\" (1.93 m)        Exam: Const: Appears healthy and well developed. No signs of acute distress present. Eyes: PERRL  ENMT: Tympanic membranes are intact. Nasal mucosa intact without noted erythema Septum is in the midline. Posterior pharynx shows no exudate, irritation or redness. Neck:  Supple without adenopathy.   Adequate range of motion   Resp: No rales, rhonchi, wheezes appreciated over the lungs bilaterally. CV: S1, S2 within normal limits. Regular rate and rhythm noted. Without murmur, gallop or rub. Extremities:  Pulses intact. Without noted edema. Abdomen: Positive bowel sounds. Palpation reveals softness, with no distension, organomegaly or tenderness. No abdominal masses palpable. Skin: Skin is warm and dry. Well-healed surgical incision noted. Musculo: Unchanged upon examination. Neuro: Alert and oriented X3. Cranial nerves grossly intact. Psych: Mood is normal.  Affect is normal.   Vital signs reviewed. Controlled Substances Monitoring:     RX Monitoring 9/6/2019   Periodic Controlled Substance Monitoring No signs of potential drug abuse or diversion identified. Plan Per Assessment:  Dimple Erickson was seen today for hyperlipidemia, hypertension and diabetes. Diagnoses and all orders for this visit:    Essential hypertension  -     Hemoglobin A1C; Future  -     Comprehensive Metabolic Panel; Future    Mixed hyperlipidemia    Uncontrolled type 2 diabetes mellitus with hyperglycemia (HCC)    Syncope, unspecified syncope type  -     CBC Auto Differential; Future    CAD in native artery  -     XR CHEST STANDARD (2 VW); Future    Other orders  -     Insulin Syringe-Needle U-100 (INSULIN SYRINGE 1CC/30GX1/2\") 30G X 1/2\" 1 ML MISC; 1 box by Does not apply route three times daily 3 times daily  -     blood glucose test strips (FREESTYLE INSULINX TEST) strip; 1 each by In Vitro route daily As needed. -     aspirin EC 81 MG EC tablet; Take 1 tablet by mouth daily  -     albuterol sulfate  (90 Base) MCG/ACT inhaler; Inhale 2 puffs into the lungs every 6 hours as needed for Wheezing        Return in about 3 months (around 11/28/2020) for MEDICATION CHECK, FOLLOW UP CHRONIC MEDICAL PROBLEMS. Mariajose Hammonds MD    Note was generated with the assistance of voice recognition software.   Document was reviewed however may contain grammatical

## 2020-08-31 ENCOUNTER — OFFICE VISIT (OUTPATIENT)
Dept: PRIMARY CARE CLINIC | Age: 45
End: 2020-08-31
Payer: COMMERCIAL

## 2020-08-31 VITALS
RESPIRATION RATE: 18 BRPM | OXYGEN SATURATION: 98 % | DIASTOLIC BLOOD PRESSURE: 88 MMHG | BODY MASS INDEX: 38.36 KG/M2 | HEART RATE: 71 BPM | HEIGHT: 76 IN | SYSTOLIC BLOOD PRESSURE: 138 MMHG | WEIGHT: 315 LBS | TEMPERATURE: 97.5 F

## 2020-08-31 PROCEDURE — G8417 CALC BMI ABV UP PARAM F/U: HCPCS | Performed by: FAMILY MEDICINE

## 2020-08-31 PROCEDURE — G8427 DOCREV CUR MEDS BY ELIG CLIN: HCPCS | Performed by: FAMILY MEDICINE

## 2020-08-31 PROCEDURE — 2022F DILAT RTA XM EVC RTNOPTHY: CPT | Performed by: FAMILY MEDICINE

## 2020-08-31 PROCEDURE — 3046F HEMOGLOBIN A1C LEVEL >9.0%: CPT | Performed by: FAMILY MEDICINE

## 2020-08-31 PROCEDURE — 99213 OFFICE O/P EST LOW 20 MIN: CPT | Performed by: FAMILY MEDICINE

## 2020-08-31 PROCEDURE — 4004F PT TOBACCO SCREEN RCVD TLK: CPT | Performed by: FAMILY MEDICINE

## 2020-08-31 NOTE — PROGRESS NOTES
needed. , Disp: 100 each, Rfl: 3    aspirin EC 81 MG EC tablet, Take 1 tablet by mouth daily, Disp: 90 tablet, Rfl: 3    albuterol sulfate  (90 Base) MCG/ACT inhaler, Inhale 2 puffs into the lungs every 6 hours as needed for Wheezing, Disp: 1 Inhaler, Rfl: 3    BD PEN NEEDLE PRAMOD U/F 32G X 4 MM MISC, Inject 1 each as directed 2 times daily, Disp: 100 each, Rfl: 5    isosorbide mononitrate (IMDUR) 30 MG extended release tablet, Daily, Disp: , Rfl:     Cephalexin 500 MG TABS, Every 8 Hours, Disp: , Rfl:     FreeStyle Lancets MISC, USE AS DIRECTED, Disp: 100 each, Rfl: 3    vitamin D (ERGOCALCIFEROL) 1.25 MG (66217 UT) CAPS capsule, take 1 capsule by mouth every week, Disp: , Rfl:     lisinopril (PRINIVIL;ZESTRIL) 40 MG tablet, Take 1 tablet by mouth daily, Disp: 90 tablet, Rfl: 3    DULERA 100-5 MCG/ACT inhaler, Inhale 2 puffs into the lungs 2 times daily, Disp: 1 Inhaler, Rfl: 3    atorvastatin (LIPITOR) 80 MG tablet, take 1 tablet by mouth once daily, Disp: , Rfl:     metFORMIN (GLUCOPHAGE) 850 MG tablet, Take 1 tablet by mouth 3 times daily, Disp: 90 tablet, Rfl: 3    HYDROcodone-acetaminophen (NORCO)  MG per tablet, Take 1 tablet by mouth every 6 hours as needed for Pain., Disp: , Rfl:     dilTIAZem (CARDIZEM CD) 120 MG extended release capsule, Take 1 capsule by mouth daily Take daily for 6 months, Disp: 30 capsule, Rfl: 5    insulin aspart (NOVOLOG) 100 UNIT/ML injection vial, Use tid with sliding scale, Disp: 5 Package, Rfl: 5    insulin glargine (BASAGLAR KWIKPEN) 100 UNIT/ML injection pen, Inject 110 Units into the skin nightly (Patient taking differently: Inject 60 Units into the skin nightly ), Disp: 5 pen, Rfl: 3    Insulin Pen Needle (PEN NEEDLES 29GX1/2\") 29G X 12MM MISC, USE AS DIRECTED, Disp: 100 each, Rfl: 5    Alcohol Swabs 70 % PADS, , Disp: , Rfl:     metoprolol tartrate (LOPRESSOR) 50 MG tablet, Take 1 tablet by mouth 2 times daily, Disp: 60 tablet, Rfl: 3   Cholecalciferol (VITAMIN D3 ULTRA POTENCY) 1.25 MG (93815 UT) TABS, Take 50,000 Int'l Units/day by mouth once a week (Patient not taking: Reported on 2020), Disp: 4 tablet, Rfl: 2    Allergies   Allergen Reactions    Sulfa Antibiotics      ?  unsure       Past Medical History:   Diagnosis Date    CAD (coronary artery disease)     [facility]     Diabetes mellitus ([facility]     Hyperlipidemia     Hypertension     Internal carotid artery occlusion, right 3/16/2020    Mediastinal mass     Neuropathy     Obesity     329 #     Social History     Socioeconomic History    Marital status: Single     Spouse name: Not on file    Number of children: Not on file    Years of education: Not on file    Highest education level: Not on file   Occupational History    Occupation: [employer] [address] [place] [address]     Employer: NOT EMPLOYED   Social Needs    Financial resource strain: Not on file    Food insecurity     Worry: Not on file     Inability: Not on file    Transportation needs     Medical: Not on file     Non-medical: Not on file   Tobacco Use    Smoking status: Former Smoker     Packs/day: 1.00     Years: 25.00     Pack years: 25.00     Types: Cigarettes     Start date: 1994     Last attempt to quit: 3/14/2020     Years since quittin.4    Smokeless tobacco: Current User     Types: Chew, Snuff   Substance and Sexual Activity    Alcohol use: No    Drug use: No    Sexual activity: Not on file   Lifestyle    Physical activity     Days per week: Not on file     Minutes per session: Not on file    Stress: Not on file   Relationships    Social connections     Talks on phone: Not on file     Gets together: Not on file     Attends [employer]ism service: Not on file     Active member of club or organization: Not on file     Attends meetings of clubs or organizations: Not on file     Relationship status: Not on file    Intimate partner violence     Fear of current or ex partner: Not on file     Emotionally abused: Not on file     Physically abused: Not on file     Forced sexual activity: Not on file   Other Topics Concern    Not on file   Social History Narrative    Not on file     Past Surgical History:   Procedure Laterality Date    CARDIAC CATHETERIZATION  2020    Dr. Ying Boland- multi vessel disease    MITRAL VALVE REPAIR N/A 3/16/2020    CORONARY ARTERY BYPASS POSSIBLE LEFT RADIAL ARTERY HARVEST, PATRICE performed by Uriah Lynch MD at [address]  2017    resection of mediastinal mass    TONSILLECTOMY      as a child    TRANSESOPHAGEAL ECHOCARDIOGRAM  2020    Dr. Sheila Haynes History   Problem Relation Age of Onset    Heart Disease Mother         MI age 39     High Blood Pressure Mother     Diabetes Father     Heart Disease Father         MI    High Blood Pressure Father     High Cholesterol Father     Cancer Father     Stroke Father     Kidney Disease Father         dialysis    High Blood Pressure Sister     Other Brother     COPD Brother     High Blood Pressure Brother         Vitals:    20 1159   BP: 138/88   Pulse: 71   Resp: 18   Temp: 97.5 °F (36.4 °C)   TempSrc: Temporal   SpO2: 98%   Weight: (!) 325 lb (147.4 kg)   Height: 6' 4\" (1.93 m)        Exam: Const: Appears healthy and well developed. No signs of acute distress present. Eyes: PERRL  ENMT: Tympanic membranes are intact. Nasal mucosa intact without noted erythema Septum is in the midline. Posterior pharynx shows no exudate, irritation or redness. Neck:  Supple without adenopathy. Adequate range of motion   Resp: No rales, rhonchi, wheezes appreciated over the lungs bilaterally. CV: S1, S2 within normal limits. Regular rate and rhythm noted. Without murmur, gallop or rub. Extremities:  Pulses intact. Without noted edema. Abdomen: Positive bowel sounds.   Palpation reveals softness, with no distension, organomegaly or

## 2020-10-20 ENCOUNTER — OFFICE VISIT (OUTPATIENT)
Dept: FAMILY MEDICINE CLINIC | Age: 45
End: 2020-10-20
Payer: COMMERCIAL

## 2020-10-20 VITALS
OXYGEN SATURATION: 97 % | TEMPERATURE: 97.1 F | DIASTOLIC BLOOD PRESSURE: 90 MMHG | HEART RATE: 78 BPM | WEIGHT: 315 LBS | SYSTOLIC BLOOD PRESSURE: 138 MMHG | BODY MASS INDEX: 42.6 KG/M2

## 2020-10-20 PROCEDURE — G8417 CALC BMI ABV UP PARAM F/U: HCPCS | Performed by: PHYSICIAN ASSISTANT

## 2020-10-20 PROCEDURE — G8427 DOCREV CUR MEDS BY ELIG CLIN: HCPCS | Performed by: PHYSICIAN ASSISTANT

## 2020-10-20 PROCEDURE — 99214 OFFICE O/P EST MOD 30 MIN: CPT | Performed by: PHYSICIAN ASSISTANT

## 2020-10-20 PROCEDURE — 4004F PT TOBACCO SCREEN RCVD TLK: CPT | Performed by: PHYSICIAN ASSISTANT

## 2020-10-20 PROCEDURE — G8484 FLU IMMUNIZE NO ADMIN: HCPCS | Performed by: PHYSICIAN ASSISTANT

## 2020-10-20 RX ORDER — TRIAMCINOLONE ACETONIDE 5 MG/G
CREAM TOPICAL
Qty: 1 TUBE | Refills: 0 | Status: SHIPPED | OUTPATIENT
Start: 2020-10-20 | End: 2020-10-27

## 2020-10-20 RX ORDER — VENLAFAXINE HYDROCHLORIDE 150 MG/1
CAPSULE, EXTENDED RELEASE ORAL
COMMUNITY
Start: 2020-09-24 | End: 2020-11-27

## 2020-10-20 RX ORDER — AMOXICILLIN AND CLAVULANATE POTASSIUM 875; 125 MG/1; MG/1
1 TABLET, FILM COATED ORAL 2 TIMES DAILY
Qty: 20 TABLET | Refills: 0 | Status: SHIPPED | OUTPATIENT
Start: 2020-10-20 | End: 2020-10-30

## 2020-10-20 ASSESSMENT — ENCOUNTER SYMPTOMS
NAUSEA: 0
VOMITING: 0
ABDOMINAL PAIN: 0
COUGH: 0
DIARRHEA: 0
SHORTNESS OF BREATH: 0
PHOTOPHOBIA: 0
BACK PAIN: 0
SORE THROAT: 0

## 2020-10-20 NOTE — PROGRESS NOTES
10/20/20  Carla Rubin : 1975 Sex: male  Age 41 y.o. Subjective:  Chief Complaint   Patient presents with    Otalgia     bilateral     Rash     knees          46-year-old male with a history of CAD, COPD, diabetes, hyperlipidemia and hypertension presents to the walk-in clinic for evaluation of 2 complaints. Patient states 10 days ago he developed right ear pain. He states the pain somewhat subsided after 6 days but has continued and now he is having discomfort in the left ear 2. He states her right ear feels clogged and he has decreased hearing. He denies any fever or chills. No cough or congestion. No shortness of breath or chest pain. No loss of taste or smell. Patient is also being evaluated today for a rash on his bilateral knees that started 2 days ago. He describes it as being very itchy. Patient denies any new soaps, lotions, detergents or medications. He denies any over-the-counter medication except Tylenol for the ear discomfort. Patient is a diabetic with an A1c greater than 9. Review of Systems   Constitutional: Negative for chills and fever. HENT: Positive for ear pain. Negative for congestion and sore throat. Eyes: Negative for photophobia and visual disturbance. Respiratory: Negative for cough and shortness of breath. Cardiovascular: Negative for chest pain. Gastrointestinal: Negative for abdominal pain, diarrhea, nausea and vomiting. Genitourinary: Negative for difficulty urinating, dysuria, frequency and urgency. Musculoskeletal: Negative for back pain, neck pain and neck stiffness. Skin: Positive for rash. Neurological: Negative for dizziness, syncope, weakness, light-headedness and headaches. Hematological: Negative for adenopathy. Does not bruise/bleed easily. Psychiatric/Behavioral: Negative for agitation and confusion. All other systems reviewed and are negative.         PMH:     Past Medical History:   Diagnosis Date    CAD (coronary artery disease)     [facility]) [facility]     Diabetes mellitus ([facility]     Hyperlipidemia     Hypertension     Internal carotid artery occlusion, right 3/16/2020    Mediastinal mass     Neuropathy     Obesity     329 #       Past Surgical History:   Procedure Laterality Date    CARDIAC CATHETERIZATION  2020    Dr. Vikas Mederos- multi vessel disease    MITRAL VALVE REPAIR N/A 3/16/2020    CORONARY ARTERY BYPASS POSSIBLE LEFT RADIAL ARTERY HARVEST, PATRICE performed by Estrella Murphy MD at [address]  2017    resection of mediastinal mass    TONSILLECTOMY      as a child    TRANSESOPHAGEAL ECHOCARDIOGRAM  2020    Dr. Martinez [facility]       Family History   Problem Relation Age of Onset    Heart Disease Mother         MI age 39     High Blood Pressure Mother     Diabetes Father     Heart Disease Father         MI    High Blood Pressure Father     High Cholesterol Father     Cancer Father     Stroke Father     Kidney Disease Father         dialysis    High Blood Pressure Sister     Other Brother     COPD Brother     High Blood Pressure Brother        Medications:     Current Outpatient Medications:     venlafaxine (EFFEXOR XR) 150 MG extended release capsule, take 1 capsule by mouth once daily, Disp: , Rfl:     triamcinolone (ARISTOCORT) 0.5 % cream, Apply topically 3 times daily. , Disp: 1 Tube, Rfl: 0    amoxicillin-clavulanate (AUGMENTIN) 875-125 MG per tablet, Take 1 tablet by mouth 2 times daily for 10 days, Disp: 20 tablet, Rfl: 0    metFORMIN (GLUCOPHAGE) 850 MG tablet, Take 1 tablet by mouth 3 times daily, Disp: 90 tablet, Rfl: 3    blood glucose test strips (FREESTYLE INSULINX TEST) strip, 1 each by In Vitro route daily As needed. , Disp: 100 each, Rfl: 3    aspirin EC 81 MG EC tablet, Take 1 tablet by mouth daily, Disp: 90 tablet, Rfl: 3    albuterol sulfate  (90 Base) MCG/ACT inhaler, Inhale 2 puffs into the lungs every 6 hours as needed for Wheezing, Disp: 1 Inhaler, Rfl: 3    BD PEN NEEDLE PRAMOD U/F 32G X 4 MM MISC, Inject 1 each as directed 2 times daily, Disp: 100 each, Rfl: 5    isosorbide mononitrate (IMDUR) 30 MG extended release tablet, Daily, Disp: , Rfl:     FreeStyle Lancets MISC, USE AS DIRECTED, Disp: 100 each, Rfl: 3    vitamin D (ERGOCALCIFEROL) 1.25 MG (35898 UT) CAPS capsule, take 1 capsule by mouth every week, Disp: , Rfl:     lisinopril (PRINIVIL;ZESTRIL) 40 MG tablet, Take 1 tablet by mouth daily, Disp: 90 tablet, Rfl: 3    DULERA 100-5 MCG/ACT inhaler, Inhale 2 puffs into the lungs 2 times daily, Disp: 1 Inhaler, Rfl: 3    atorvastatin (LIPITOR) 80 MG tablet, take 1 tablet by mouth once daily, Disp: , Rfl:     HYDROcodone-acetaminophen (NORCO)  MG per tablet, Take 1 tablet by mouth every 6 hours as needed for Pain., Disp: , Rfl:     dilTIAZem (CARDIZEM CD) 120 MG extended release capsule, Take 1 capsule by mouth daily Take daily for 6 months, Disp: 30 capsule, Rfl: 5    insulin aspart (NOVOLOG) 100 UNIT/ML injection vial, Use tid with sliding scale, Disp: 5 Package, Rfl: 5    insulin glargine (BASAGLAR KWIKPEN) 100 UNIT/ML injection pen, Inject 110 Units into the skin nightly (Patient taking differently: Inject 60 Units into the skin nightly ), Disp: 5 pen, Rfl: 3    Insulin Pen Needle (PEN NEEDLES 29GX1/2\") 29G X 12MM MISC, USE AS DIRECTED, Disp: 100 each, Rfl: 5    Alcohol Swabs 70 % PADS, , Disp: , Rfl:     Insulin Syringe-Needle U-100 (INSULIN SYRINGE 1CC/30GX1/2\") 30G X 1/2\" 1 ML MISC, 1 box by Does not apply route three times daily 3 times daily, Disp: 100 each, Rfl: 5    metoprolol tartrate (LOPRESSOR) 50 MG tablet, Take 1 tablet by mouth 2 times daily, Disp: 60 tablet, Rfl: 3    Cholecalciferol (VITAMIN D3 ULTRA POTENCY) 1.25 MG (72972 UT) TABS, Take 50,000 Int'l Units/day by mouth once a week (Patient not taking: Reported on 7/1/2020), Disp: 4 tablet, Rfl: 2    Allergies: Allergies   Allergen Reactions    Sulfa Antibiotics      ?  unsure       Social History:     Social History     Tobacco Use    Smoking status: Former Smoker     Packs/day: 1.00     Years: 25.00     Pack years: 25.00     Types: Cigarettes     Start date: 1994     Last attempt to quit: 3/14/2020     Years since quittin.6    Smokeless tobacco: Current User     Types: Chew, Snuff   Substance Use Topics    Alcohol use: No    Drug use: No       Patient lives at home. Physical Exam:     Vitals:    10/20/20 0925 10/20/20 0929   BP: (!) 138/90 (!) 138/90   Pulse: 78    Temp: 97.1 °F (36.2 °C)    TempSrc: Temporal    SpO2: 97%    Weight: (!) 350 lb (158.8 kg)        Exam:  Physical Exam  Vitals signs and nursing note reviewed. Constitutional:       General: He is not in acute distress. Appearance: He is well-developed. HENT:      Head: Normocephalic and atraumatic. Right Ear: Ear canal normal. No mastoid tenderness. Tympanic membrane is erythematous. Tympanic membrane is not perforated. Left Ear: Tympanic membrane, ear canal and external ear normal.   Eyes:      Conjunctiva/sclera: Conjunctivae normal.      Pupils: Pupils are equal, round, and reactive to light. Neck:      Musculoskeletal: Normal range of motion. Cardiovascular:      Rate and Rhythm: Normal rate and regular rhythm. Pulmonary:      Effort: Pulmonary effort is normal. No respiratory distress. Breath sounds: Normal breath sounds. Abdominal:      General: Bowel sounds are normal.      Palpations: Abdomen is soft. Tenderness: There is no abdominal tenderness. Musculoskeletal: Normal range of motion. Skin:     General: Skin is warm and dry. Findings: Rash present. Rash is macular and papular. Comments: Patient has evidence of a maculopapular rash to his bilateral anterior knees. There is no vesicle formation. No sloughing of the skin. No secondary infection. No fluctuance or induration.   No

## 2020-11-03 PROBLEM — I10 ESSENTIAL HYPERTENSION: Status: RESOLVED | Noted: 2019-08-06 | Resolved: 2020-11-03

## 2020-11-09 RX ORDER — LANCETS 28 GAUGE
EACH MISCELLANEOUS
Qty: 100 EACH | Refills: 3 | Status: SHIPPED
Start: 2020-11-09 | End: 2021-03-17 | Stop reason: SDUPTHER

## 2020-11-27 ENCOUNTER — OFFICE VISIT (OUTPATIENT)
Dept: PRIMARY CARE CLINIC | Age: 45
End: 2020-11-27
Payer: COMMERCIAL

## 2020-11-27 VITALS
OXYGEN SATURATION: 98 % | DIASTOLIC BLOOD PRESSURE: 72 MMHG | WEIGHT: 315 LBS | RESPIRATION RATE: 16 BRPM | HEIGHT: 76 IN | BODY MASS INDEX: 38.36 KG/M2 | SYSTOLIC BLOOD PRESSURE: 128 MMHG | HEART RATE: 82 BPM

## 2020-11-27 DIAGNOSIS — E11.65 UNCONTROLLED TYPE 2 DIABETES MELLITUS WITH HYPERGLYCEMIA (HCC): ICD-10-CM

## 2020-11-27 LAB
ALBUMIN SERPL-MCNC: 4 G/DL (ref 3.5–5.2)
ALP BLD-CCNC: 83 U/L (ref 40–129)
ALT SERPL-CCNC: 16 U/L (ref 0–40)
ANION GAP SERPL CALCULATED.3IONS-SCNC: 12 MMOL/L (ref 7–16)
AST SERPL-CCNC: 13 U/L (ref 0–39)
BILIRUB SERPL-MCNC: 0.4 MG/DL (ref 0–1.2)
BUN BLDV-MCNC: 12 MG/DL (ref 6–20)
CALCIUM SERPL-MCNC: 9.6 MG/DL (ref 8.6–10.2)
CHLORIDE BLD-SCNC: 100 MMOL/L (ref 98–107)
CO2: 24 MMOL/L (ref 22–29)
CREAT SERPL-MCNC: 0.6 MG/DL (ref 0.7–1.2)
GFR AFRICAN AMERICAN: >60
GFR NON-AFRICAN AMERICAN: >60 ML/MIN/1.73
GLUCOSE BLD-MCNC: 186 MG/DL (ref 74–99)
HBA1C MFR BLD: 8.6 % (ref 4–5.6)
POTASSIUM SERPL-SCNC: 4 MMOL/L (ref 3.5–5)
SODIUM BLD-SCNC: 136 MMOL/L (ref 132–146)
TOTAL PROTEIN: 7.3 G/DL (ref 6.4–8.3)

## 2020-11-27 PROCEDURE — 4004F PT TOBACCO SCREEN RCVD TLK: CPT | Performed by: FAMILY MEDICINE

## 2020-11-27 PROCEDURE — G8417 CALC BMI ABV UP PARAM F/U: HCPCS | Performed by: FAMILY MEDICINE

## 2020-11-27 PROCEDURE — 99214 OFFICE O/P EST MOD 30 MIN: CPT | Performed by: FAMILY MEDICINE

## 2020-11-27 PROCEDURE — 2022F DILAT RTA XM EVC RTNOPTHY: CPT | Performed by: FAMILY MEDICINE

## 2020-11-27 PROCEDURE — G8484 FLU IMMUNIZE NO ADMIN: HCPCS | Performed by: FAMILY MEDICINE

## 2020-11-27 PROCEDURE — G8427 DOCREV CUR MEDS BY ELIG CLIN: HCPCS | Performed by: FAMILY MEDICINE

## 2020-11-27 PROCEDURE — 3046F HEMOGLOBIN A1C LEVEL >9.0%: CPT | Performed by: FAMILY MEDICINE

## 2020-11-27 RX ORDER — ATORVASTATIN CALCIUM 80 MG/1
TABLET, FILM COATED ORAL
Qty: 30 TABLET | Refills: 3 | Status: SHIPPED
Start: 2020-11-27 | End: 2021-03-17 | Stop reason: SDUPTHER

## 2020-11-27 RX ORDER — METOPROLOL TARTRATE 50 MG/1
50 TABLET, FILM COATED ORAL 2 TIMES DAILY
Qty: 60 TABLET | Refills: 3 | Status: SHIPPED
Start: 2020-11-27 | End: 2021-04-05 | Stop reason: SDUPTHER

## 2020-11-27 RX ORDER — BUSPIRONE HYDROCHLORIDE 10 MG/1
10 TABLET ORAL NIGHTLY
COMMUNITY
Start: 2020-10-27 | End: 2021-03-17

## 2020-11-27 RX ORDER — ALBUTEROL SULFATE 90 UG/1
2 AEROSOL, METERED RESPIRATORY (INHALATION) EVERY 6 HOURS PRN
Qty: 1 INHALER | Refills: 3 | Status: SHIPPED
Start: 2020-11-27 | End: 2021-03-17 | Stop reason: SDUPTHER

## 2020-11-27 RX ORDER — BLOOD SUGAR DIAGNOSTIC
1 STRIP MISCELLANEOUS DAILY
Qty: 100 EACH | Refills: 5 | Status: SHIPPED
Start: 2020-11-27 | End: 2020-12-18 | Stop reason: SDUPTHER

## 2020-11-27 RX ORDER — INSULIN GLARGINE 100 [IU]/ML
60 INJECTION, SOLUTION SUBCUTANEOUS NIGHTLY
Qty: 9 PEN | Refills: 3 | Status: SHIPPED
Start: 2020-11-27 | End: 2021-03-17 | Stop reason: SDUPTHER

## 2020-11-27 RX ORDER — ERGOCALCIFEROL 1.25 MG/1
CAPSULE ORAL
Qty: 5 CAPSULE | Refills: 3 | Status: SHIPPED
Start: 2020-11-27 | End: 2021-05-13 | Stop reason: SDUPTHER

## 2020-11-27 NOTE — PROGRESS NOTES
2020     Tyrese Lomax    : 1975 Sex: male   Age: 41 y.o. Chief Complaint   Patient presents with    Hyperlipidemia    Diabetes    Hypertension       HPI: This 41y.o. -year-old male  presents today for evaluation and management of his  chronic medical problems. Current medication list reviewed. The patient is tolerating all medications well without adverse events or known side effects. The patient does understand the risk and benefits of the prescribed medications. The patient is not up-to-date on all age-appropriate wellness issues. ROS:   Const: Denies changes in appetite, chills, fever, night sweats and weight loss. Eyes:  Denies discharge, a recent change in visual acuity, blurred vision and double vision. ENMT: Denies discharge of the ears, hearing loss, pain of the ears. Denies nasal or sinus symptoms other than stated above. Denies mouth or throat symptoms. CV:  Denies chest pain, dyspnea on exertion, orthopnea, palpitations and PND  Resp: Denies chest pain, cough, SOB and wheezing. GI: Denies abdominal pain, constipation, diarrhea, heartburn, indigestion, nausea and vomiting. : Denies dysuria, frequency, hematuria, nocturia and urgency. Musculo: Denies arthralgias and myalgia  Skin:  Denies lesions, pruritus and rash. Neuro: Denies dizziness, lightheadedness, numbness, tingling and weakness. Psych:  Denies anxiety and depression  Endocrine: Denies anxiety and depression. Hema/Lymph: Denies hematologic symptoms  Allergy/Immuno:  Denies allergic/immunologic symptoms.   Pertinent positives reviewed and noted      Current Outpatient Medications:     busPIRone (BUSPAR) 10 MG tablet, TAKE 1/2 TABLET BY MOUTH AT BEDTIME FOR 1 WEEK THEN INCREASE TO 1 TAB DAILY AT BEDTIME, Disp: , Rfl:     insulin glargine (BASAGLAR KWIKPEN) 100 UNIT/ML injection pen, Inject 60 Units into the skin nightly, Disp: 9 pen, Rfl: 3    metoprolol tartrate (LOPRESSOR) 50 MG tablet, Take 1 tablet by mouth 2 times daily, Disp: 60 tablet, Rfl: 3    atorvastatin (LIPITOR) 80 MG tablet, take 1 tablet by mouth once daily, Disp: 30 tablet, Rfl: 3    albuterol sulfate  (90 Base) MCG/ACT inhaler, Inhale 2 puffs into the lungs every 6 hours as needed for Wheezing, Disp: 1 Inhaler, Rfl: 3    vitamin D (ERGOCALCIFEROL) 1.25 MG (86125 UT) CAPS capsule, take 1 capsule by mouth every week, Disp: 5 capsule, Rfl: 3    metFORMIN (GLUCOPHAGE) 850 MG tablet, Take 1 tablet by mouth 3 times daily, Disp: 90 tablet, Rfl: 3    insulin aspart (NOVOLOG) 100 UNIT/ML injection vial, Use tid with sliding scale, Disp: 5 Package, Rfl: 5    DULERA 100-5 MCG/ACT inhaler, Inhale 2 puffs into the lungs 2 times daily, Disp: 1 Inhaler, Rfl: 3    blood glucose test strips (FREESTYLE INSULINX TEST) strip, 1 each by In Vitro route daily As needed. , Disp: 100 each, Rfl: 5    FreeStyle Lancets MISC, USE AS DIRECTED, Disp: 100 each, Rfl: 3    aspirin EC 81 MG EC tablet, Take 1 tablet by mouth daily, Disp: 90 tablet, Rfl: 3    BD PEN NEEDLE PRAMOD U/F 32G X 4 MM MISC, Inject 1 each as directed 2 times daily, Disp: 100 each, Rfl: 5    lisinopril (PRINIVIL;ZESTRIL) 40 MG tablet, Take 1 tablet by mouth daily, Disp: 90 tablet, Rfl: 3    HYDROcodone-acetaminophen (NORCO)  MG per tablet, Take 1 tablet by mouth every 6 hours as needed for Pain., Disp: , Rfl:     dilTIAZem (CARDIZEM CD) 120 MG extended release capsule, Take 1 capsule by mouth daily Take daily for 6 months, Disp: 30 capsule, Rfl: 5    Insulin Pen Needle (PEN NEEDLES 29GX1/2\") 29G X 12MM MISC, USE AS DIRECTED, Disp: 100 each, Rfl: 5    Alcohol Swabs 70 % PADS, , Disp: , Rfl:     Insulin Syringe-Needle U-100 (INSULIN SYRINGE 1CC/30GX1/2\") 30G X 1/2\" 1 ML MISC, 1 box by Does not apply route three times daily 3 times daily, Disp: 100 each, Rfl: 5    Cholecalciferol (VITAMIN D3 ULTRA POTENCY) 1.25 MG (07212 UT) TABS, Take 50,000 Int'l Units/day by mouth once a week (Patient not taking: Reported on 2020), Disp: 4 tablet, Rfl: 2    Allergies   Allergen Reactions    Sulfa Antibiotics      ?  unsure       Past Medical History:   Diagnosis Date    CAD (coronary artery disease)     [facility]     Diabetes mellitus ([facility]     Hyperlipidemia     Hypertension     Internal carotid artery occlusion, right 3/16/2020    Mediastinal mass     Neuropathy     Obesity     329 #     Social History     Socioeconomic History    Marital status: Single     Spouse name: Not on file    Number of children: Not on file    Years of education: Not on file    Highest education level: Not on file   Occupational History    Occupation: [employer] with [address] [place] [address]     Employer: NOT EMPLOYED   Social Needs    Financial resource strain: Not on file    Food insecurity     Worry: Not on file     Inability: Not on file    Transportation needs     Medical: Not on file     Non-medical: Not on file   Tobacco Use    Smoking status: Former Smoker     Packs/day: 1.00     Years: 25.00     Pack years: 25.00     Types: Cigarettes     Start date: 1994     Last attempt to quit: 3/14/2020     Years since quittin.7    Smokeless tobacco: Current User     Types: Chew, Snuff   Substance and Sexual Activity    Alcohol use: No    Drug use: No    Sexual activity: Not on file   Lifestyle    Physical activity     Days per week: Not on file     Minutes per session: Not on file    Stress: Not on file   Relationships    Social connections     Talks on phone: Not on file     Gets together: Not on file     Attends Worship service: Not on file     Active member of club or organization: Not on file     Attends meetings of clubs or organizations: Not on file     Relationship status: Not on file    Intimate partner violence     Fear of current or ex partner: Not on file     Emotionally abused: Not on file     Physically abused: Not on file     Forced sexual activity: Not on file   Other Topics Concern    Not on file   Social History Narrative    Not on file     Past Surgical History:   Procedure Laterality Date   [address]  2020    Dr. Cyndi Rendon- multi vessel disease    MITRAL VALVE REPAIR N/A 3/16/2020    CORONARY ARTERY BYPASS POSSIBLE LEFT RADIAL ARTERY HARVEST, PATRICE performed by Maira Choudhury MD at [address]  2017    resection of mediastinal mass    TONSILLECTOMY      as a child    TRANSESOPHAGEAL ECHOCARDIOGRAM  2020    Dr. Forrester Dear      Family History   Problem Relation Age of Onset    Heart Disease Mother         MI age 39     High Blood Pressure Mother     Diabetes Father     Heart Disease Father         MI    High Blood Pressure Father     High Cholesterol Father     Cancer Father     Stroke Father     Kidney Disease Father         dialysis    High Blood Pressure Sister     Other Brother     COPD Brother     High Blood Pressure Brother         Vitals:    20 0757   BP: 128/72   Pulse: 82   Resp: 16   SpO2: 98%   Weight: (!) 354 lb (160.6 kg)   Height: 6' 4\" (1.93 m)        Exam: Const: Appears healthy and well developed. No signs of acute distress present. Eyes: PERRL  ENMT: Tympanic membranes are intact. Nasal mucosa intact without noted erythema Septum is in the midline. Posterior pharynx shows no exudate, irritation or redness. Neck:  Supple without adenopathy. Adequate range of motion   Resp: No rales, rhonchi, wheezes appreciated over the lungs bilaterally. CV: S1, S2 within normal limits. Regular rate and rhythm noted. Without murmur, gallop or rub. Extremities:  Pulses intact. Without noted edema. Abdomen: Positive bowel sounds. Palpation reveals softness, with no distension, organomegaly or tenderness. No abdominal masses palpable. Skin: Skin is warm and dry. Musculo: Unchanged upon examination  Neuro: Alert and oriented X3. Cranial nerves grossly intact. Psych: Mood is normal.  Affect is normal.   Vital signs reviewed. Controlled Substances Monitoring:     RX Monitoring 9/6/2019   Periodic Controlled Substance Monitoring No signs of potential drug abuse or diversion identified. Plan Per Assessment:  Sosa Lala was seen today for hyperlipidemia, diabetes and hypertension. Diagnoses and all orders for this visit:    Mixed hyperlipidemia    Uncontrolled type 2 diabetes mellitus with hyperglycemia ([facility]  -     Comprehensive Metabolic Panel; Future  -     Hemoglobin A1C; Future    Essential hypertension    Other orders  -     insulin glargine (BASAGLAR KWIKPEN) 100 UNIT/ML injection pen; Inject 60 Units into the skin nightly  -     metoprolol tartrate (LOPRESSOR) 50 MG tablet; Take 1 tablet by mouth 2 times daily  -     atorvastatin (LIPITOR) 80 MG tablet; take 1 tablet by mouth once daily  -     albuterol sulfate  (90 Base) MCG/ACT inhaler; Inhale 2 puffs into the lungs every 6 hours as needed for Wheezing  -     vitamin D (ERGOCALCIFEROL) 1.25 MG (69389 UT) CAPS capsule; take 1 capsule by mouth every week  -     metFORMIN (GLUCOPHAGE) 850 MG tablet; Take 1 tablet by mouth 3 times daily  -     insulin aspart (NOVOLOG) 100 UNIT/ML injection vial; Use tid with sliding scale  -     DULERA 100-5 MCG/ACT inhaler; Inhale 2 puffs into the lungs 2 times daily  -     blood glucose test strips (FREESTYLE INSULINX TEST) strip; 1 each by In Vitro route daily As needed. Return in about 3 weeks (around 12/18/2020) for Annual exam..    Mingo Swift MD    Note was generated with the assistance of voice recognition software. Document was reviewed however may contain grammatical errors.

## 2020-11-30 ENCOUNTER — TELEPHONE (OUTPATIENT)
Dept: PRIMARY CARE CLINIC | Age: 45
End: 2020-11-30

## 2020-12-18 ENCOUNTER — OFFICE VISIT (OUTPATIENT)
Dept: PRIMARY CARE CLINIC | Age: 45
End: 2020-12-18
Payer: COMMERCIAL

## 2020-12-18 VITALS
DIASTOLIC BLOOD PRESSURE: 80 MMHG | SYSTOLIC BLOOD PRESSURE: 124 MMHG | HEIGHT: 76 IN | BODY MASS INDEX: 38.36 KG/M2 | TEMPERATURE: 97.7 F | WEIGHT: 315 LBS | RESPIRATION RATE: 16 BRPM | OXYGEN SATURATION: 98 % | HEART RATE: 82 BPM

## 2020-12-18 PROCEDURE — 90471 IMMUNIZATION ADMIN: CPT | Performed by: FAMILY MEDICINE

## 2020-12-18 PROCEDURE — 99213 OFFICE O/P EST LOW 20 MIN: CPT | Performed by: FAMILY MEDICINE

## 2020-12-18 PROCEDURE — 90686 IIV4 VACC NO PRSV 0.5 ML IM: CPT | Performed by: FAMILY MEDICINE

## 2020-12-18 PROCEDURE — 99396 PREV VISIT EST AGE 40-64: CPT | Performed by: FAMILY MEDICINE

## 2020-12-18 PROCEDURE — G8484 FLU IMMUNIZE NO ADMIN: HCPCS | Performed by: FAMILY MEDICINE

## 2020-12-18 RX ORDER — BLOOD SUGAR DIAGNOSTIC
1 STRIP MISCELLANEOUS 3 TIMES DAILY
Qty: 1 EACH | Refills: 5 | Status: SHIPPED
Start: 2020-12-18 | End: 2021-03-17 | Stop reason: SDUPTHER

## 2020-12-18 NOTE — PROGRESS NOTES
Vaccine Information Sheet, \"Influenza - Inactivated\"  given to Ivana Frye, or parent/legal guardian of  Ivana Frye and verbalized understanding. Patient responses:    Have you ever had a reaction to a flu vaccine? No  Do you have any current illness? No  Have you ever had Guillian Hanska Syndrome? No  Do you have a serious allergy to any of the follow: Neomycin, Polymyxin, Thimerosal, eggs or egg products? No    Flu vaccine given per order. Please see immunization tab. Risks and benefits explained. Current VIS given.

## 2020-12-18 NOTE — PROGRESS NOTES
 Sulfa Antibiotics      ?  unsure       Past Medical History:   Diagnosis Date    CAD (coronary artery disease)     [facility]) [facility]     Diabetes mellitus ([facility]     Hyperlipidemia     Hypertension     Internal carotid artery occlusion, right 3/16/2020    Mediastinal mass     Neuropathy     Obesity     329 #     Social History     Socioeconomic History    Marital status: Single     Spouse name: Not on file    Number of children: Not on file    Years of education: Not on file    Highest education level: Not on file   Occupational History    Occupation: [employer] [address] [place] [address]     Employer: NOT EMPLOYED   Social Needs    Financial resource strain: Not on file    Food insecurity     Worry: Not on file     Inability: Not on file    Transportation needs     Medical: Not on file     Non-medical: Not on file   Tobacco Use    Smoking status: Former Smoker     Packs/day: 1.00     Years: 25.00     Pack years: 25.00     Types: Cigarettes     Start date: 1994     Quit date: 3/14/2020     Years since quittin.7    Smokeless tobacco: Current User     Types: Chew, Snuff   Substance and Sexual Activity    Alcohol use: No    Drug use: No    Sexual activity: Not on file   Lifestyle    Physical activity     Days per week: Not on file     Minutes per session: Not on file    Stress: Not on file   Relationships    Social connections     Talks on phone: Not on file     Gets together: Not on file     Attends Sabianism service: Not on file     Active member of club or organization: Not on file     Attends meetings of clubs or organizations: Not on file     Relationship status: Not on file    Intimate partner violence     Fear of current or ex partner: Not on file     Emotionally abused: Not on file     Physically abused: Not on file     Forced sexual activity: Not on file   Other Topics Concern    Not on file   Social History Narrative    Not on file Past Surgical History:   Procedure Laterality Date    CARDIAC CATHETERIZATION  2020    Dr. Les Alex- multi vessel disease    MITRAL VALVE REPAIR N/A 3/16/2020    CORONARY ARTERY BYPASS POSSIBLE LEFT RADIAL ARTERY HARVEST, PATRICE performed by Candido Hull MD at [address]  2017    resection of mediastinal mass    TONSILLECTOMY      as a child    TRANSESOPHAGEAL ECHOCARDIOGRAM  2020    Dr. Deonna Basurto History   Problem Relation Age of Onset    Heart Disease Mother         MI age 39     High Blood Pressure Mother     Diabetes Father     Heart Disease Father         MI    High Blood Pressure Father     High Cholesterol Father     Cancer Father     Stroke Father     Kidney Disease Father         dialysis    High Blood Pressure Sister     Other Brother     COPD Brother     High Blood Pressure Brother         Vitals:    20 0916   BP: 124/80   Pulse: 82   Resp: 16   Temp: 97.7 °F (36.5 °C)   TempSrc: Temporal   SpO2: 98%   Weight: (!) 360 lb (163.3 kg)   Height: 6' 4\" (1.93 m)        Exam: Const: Appears healthy and well developed. No signs of acute distress present. Eyes: PERRL  ENMT: Tympanic membranes are intact. Nasal mucosa intact without noted erythema Septum is in the midline. Posterior pharynx shows no exudate, irritation or redness. Neck:  Supple without adenopathy. Adequate range of motion   Resp: No rales, rhonchi, wheezes appreciated over the lungs bilaterally. CV: S1, S2 within normal limits. Regular rate and rhythm noted. Without murmur, gallop or rub. Extremities:  Pulses intact. Without noted edema. Abdomen: Positive bowel sounds. Palpation reveals softness, with no distension, organomegaly or tenderness. No abdominal masses palpable. Skin: Skin is warm and dry. Musculo: Unchanged upon examination  Neuro: Alert and oriented X3. Cranial nerves grossly intact.    Psych: Mood is normal.  Affect is normal. Vital signs reviewed. Controlled Substances Monitoring:     RX Monitoring 9/6/2019   Periodic Controlled Substance Monitoring No signs of potential drug abuse or diversion identified. Plan Per Assessment:  Antolin Cancino was seen today for annual exam.    Diagnoses and all orders for this visit:    Wellness examination    Encounter for weight management    Other orders  -     blood glucose test strips (FREESTYLE INSULINX TEST) strip; 1 each by In Vitro route 3 times daily As needed. The patient was advised to contact his insurance company to see if they cover any of the weight loss medications. Return in about 3 months (around 3/18/2021) for MEDICATION CHECK, FOLLOW UP [facility]. Martin Kim MD    Note was generated with the assistance of voice recognition software. Document was reviewed however may contain grammatical errors.

## 2020-12-22 ENCOUNTER — TELEPHONE (OUTPATIENT)
Dept: PRIMARY CARE CLINIC | Age: 45
End: 2020-12-22

## 2020-12-22 NOTE — TELEPHONE ENCOUNTER
Pt called back stated that insurance will not cover any weight loss medication. He states he will have to pay out of pocket. Pt states you can call in what you need to to [address] [place] [address] and he will just have to pay out of pocket.

## 2021-01-12 ENCOUNTER — OFFICE VISIT (OUTPATIENT)
Dept: FAMILY MEDICINE CLINIC | Age: 46
End: 2021-01-12
Payer: COMMERCIAL

## 2021-01-12 VITALS
OXYGEN SATURATION: 97 % | BODY MASS INDEX: 43.82 KG/M2 | WEIGHT: 315 LBS | TEMPERATURE: 97.4 F | HEART RATE: 92 BPM | DIASTOLIC BLOOD PRESSURE: 88 MMHG | SYSTOLIC BLOOD PRESSURE: 130 MMHG

## 2021-01-12 DIAGNOSIS — H66.004 RECURRENT ACUTE SUPPURATIVE OTITIS MEDIA OF RIGHT EAR WITHOUT SPONTANEOUS RUPTURE OF TYMPANIC MEMBRANE: Primary | ICD-10-CM

## 2021-01-12 PROCEDURE — G8427 DOCREV CUR MEDS BY ELIG CLIN: HCPCS | Performed by: PHYSICIAN ASSISTANT

## 2021-01-12 PROCEDURE — 99214 OFFICE O/P EST MOD 30 MIN: CPT | Performed by: PHYSICIAN ASSISTANT

## 2021-01-12 PROCEDURE — 4004F PT TOBACCO SCREEN RCVD TLK: CPT | Performed by: PHYSICIAN ASSISTANT

## 2021-01-12 PROCEDURE — G8417 CALC BMI ABV UP PARAM F/U: HCPCS | Performed by: PHYSICIAN ASSISTANT

## 2021-01-12 PROCEDURE — G8482 FLU IMMUNIZE ORDER/ADMIN: HCPCS | Performed by: PHYSICIAN ASSISTANT

## 2021-01-12 RX ORDER — CEFDINIR 300 MG/1
300 CAPSULE ORAL 2 TIMES DAILY
Qty: 20 CAPSULE | Refills: 0 | Status: SHIPPED | OUTPATIENT
Start: 2021-01-12 | End: 2021-01-22

## 2021-01-12 RX ORDER — METHYLPREDNISOLONE 4 MG/1
TABLET ORAL
Qty: 1 KIT | Refills: 0 | Status: SHIPPED
Start: 2021-01-12 | End: 2021-06-22

## 2021-01-12 RX ORDER — FLUTICASONE PROPIONATE 50 MCG
2 SPRAY, SUSPENSION (ML) NASAL DAILY
Qty: 1 BOTTLE | Refills: 0 | Status: SHIPPED
Start: 2021-01-12 | End: 2021-03-08 | Stop reason: SDUPTHER

## 2021-01-12 ASSESSMENT — ENCOUNTER SYMPTOMS
DIARRHEA: 0
VOMITING: 0
COUGH: 0
BACK PAIN: 0
SORE THROAT: 0
ABDOMINAL PAIN: 0
SHORTNESS OF BREATH: 0
NAUSEA: 0
PHOTOPHOBIA: 0

## 2021-01-12 NOTE — PROGRESS NOTES
21  Danny  : 1975 Sex: male  Age 41 y.o. Subjective:  Chief Complaint   Patient presents with    Otalgia     right          43-year-old male with a history of CAD, COPD, diabetes, hyperlipidemia and hypertension presents to the walk-in clinic for evaluation of right ear pain. Patient states this is the fourth or fifth time that he has had a right ear infection in the last 12 months. Patient was last seen for this complaint in October and states that his symptoms did resolve after treatment. He states pain returned yesterday afternoon and has been progressively worsening. He denies significant congestion. No cough. No shortness of breath or chest pain. No fever chills. No lightheadedness, dizziness or syncope. No hearing loss or tinnitus. Review of Systems   Constitutional: Negative for chills and fever. HENT: Positive for ear pain. Negative for congestion and sore throat. Eyes: Negative for photophobia and visual disturbance. Respiratory: Negative for cough and shortness of breath. Cardiovascular: Negative for chest pain. Gastrointestinal: Negative for abdominal pain, diarrhea, nausea and vomiting. Genitourinary: Negative for difficulty urinating, dysuria, frequency and urgency. Musculoskeletal: Negative for back pain, neck pain and neck stiffness. Skin: Negative for rash. Neurological: Negative for dizziness, syncope, weakness, light-headedness and headaches. Hematological: Negative for adenopathy. Does not bruise/bleed easily. Psychiatric/Behavioral: Negative for agitation and confusion. All other systems reviewed and are negative.         PMH:     Past Medical History:   Diagnosis Date    CAD (coronary artery disease)     [facility] [address] [facility]     Diabetes mellitus ([facility] [address] [facility]     Hyperlipidemia     Hypertension     Internal carotid artery occlusion, right 3/16/2020    Mediastinal mass     Neuropathy  Obesity     329 #       Past Surgical History:   Procedure Laterality Date    CARDIAC CATHETERIZATION  2020    Dr. Chandra Rosales- multi vessel disease    MITRAL VALVE REPAIR N/A 3/16/2020    CORONARY ARTERY BYPASS POSSIBLE LEFT RADIAL ARTERY HARVEST, PATRICE performed by Dustin Kitchen MD at [address]  2017    resection of mediastinal mass    TONSILLECTOMY      as a child    TRANSESOPHAGEAL ECHOCARDIOGRAM  2020    Dr. Tegan Gonzalez History   Problem Relation Age of Onset    Heart Disease Mother         MI age 39     High Blood Pressure Mother     Diabetes Father     Heart Disease Father         MI    High Blood Pressure Father     High Cholesterol Father     Cancer Father     Stroke Father     Kidney Disease Father         dialysis    High Blood Pressure Sister     Other Brother     COPD Brother     High Blood Pressure Brother        Medications:     Current Outpatient Medications:     cefdinir (OMNICEF) 300 MG capsule, Take 1 capsule by mouth 2 times daily for 10 days, Disp: 20 capsule, Rfl: 0    methylPREDNISolone (MEDROL DOSEPACK) 4 MG tablet, Take by mouth., Disp: 1 kit, Rfl: 0    fluticasone (FLONASE) 50 MCG/ACT nasal spray, 2 sprays by Each Nostril route daily, Disp: 1 Bottle, Rfl: 0    blood glucose test strips (FREESTYLE INSULINX TEST) strip, 1 each by In Vitro route 3 times daily As needed. , Disp: 1 each, Rfl: 5    busPIRone (BUSPAR) 10 MG tablet, Take 10 mg by mouth nightly , Disp: , Rfl:     insulin glargine (BASAGLAR KWIKPEN) 100 UNIT/ML injection pen, Inject 60 Units into the skin nightly (Patient taking differently: Inject 68 Units into the skin nightly ), Disp: 9 pen, Rfl: 3    atorvastatin (LIPITOR) 80 MG tablet, take 1 tablet by mouth once daily, Disp: 30 tablet, Rfl: 3    albuterol sulfate  (90 Base) MCG/ACT inhaler, Inhale 2 puffs into the lungs every 6 hours as needed for Wheezing, Disp: 1 Inhaler, Rfl: 3   vitamin D (ERGOCALCIFEROL) 1.25 MG (29109 UT) CAPS capsule, take 1 capsule by mouth every week, Disp: 5 capsule, Rfl: 3    metFORMIN (GLUCOPHAGE) 850 MG tablet, Take 1 tablet by mouth 3 times daily, Disp: 90 tablet, Rfl: 3    insulin aspart (NOVOLOG) 100 UNIT/ML injection vial, Use tid with sliding scale, Disp: 5 Package, Rfl: 5    DULERA 100-5 MCG/ACT inhaler, Inhale 2 puffs into the lungs 2 times daily, Disp: 1 Inhaler, Rfl: 3    FreeStyle Lancets MISC, USE AS DIRECTED, Disp: 100 each, Rfl: 3    aspirin EC 81 MG EC tablet, Take 1 tablet by mouth daily, Disp: 90 tablet, Rfl: 3    BD PEN NEEDLE PRAMOD U/F 32G X 4 MM MISC, Inject 1 each as directed 2 times daily, Disp: 100 each, Rfl: 5    HYDROcodone-acetaminophen (NORCO)  MG per tablet, Take 1 tablet by mouth every 6 hours as needed for Pain., Disp: , Rfl:     Insulin Pen Needle (PEN NEEDLES 29GX1/2\") 29G X 12MM MISC, USE AS DIRECTED, Disp: 100 each, Rfl: 5    Alcohol Swabs 70 % PADS, , Disp: , Rfl:     metoprolol tartrate (LOPRESSOR) 50 MG tablet, Take 1 tablet by mouth 2 times daily, Disp: 60 tablet, Rfl: 3    Insulin Syringe-Needle U-100 (INSULIN SYRINGE 1CC/30GX1/2\") 30G X 1/2\" 1 ML MISC, 1 box by Does not apply route three times daily 3 times daily, Disp: 100 each, Rfl: 5    Cholecalciferol (VITAMIN D3 ULTRA POTENCY) 1.25 MG (99342 UT) TABS, Take 50,000 Int'l Units/day by mouth once a week (Patient not taking: Reported on 2020), Disp: 4 tablet, Rfl: 2    Allergies: Allergies   Allergen Reactions    Sulfa Antibiotics      ?  unsure       Social History:     Social History     Tobacco Use    Smoking status: Former Smoker     Packs/day: 1.00     Years: 25.00     Pack years: 25.00     Types: Cigarettes     Start date: 1994     Quit date: 3/14/2020     Years since quittin.8    Smokeless tobacco: Current User     Types: Chew, Snuff   Substance Use Topics    Alcohol use: No    Drug use: No       Patient lives at home. Physical Exam:     Vitals:    01/12/21 0824   BP: 130/88   Pulse: 92   Temp: 97.4 °F (36.3 °C)   TempSrc: Temporal   SpO2: 97%   Weight: (!) 360 lb (163.3 kg)       Exam:  Physical Exam  Vitals signs and nursing note reviewed. Constitutional:       General: He is not in acute distress. Appearance: He is well-developed. HENT:      Head: Normocephalic and atraumatic. Right Ear: There is no impacted cerumen. No foreign body. No mastoid tenderness. Tympanic membrane is erythematous and retracted. Tympanic membrane is not perforated. Left Ear: Tympanic membrane, ear canal and external ear normal.   Eyes:      Conjunctiva/sclera: Conjunctivae normal.      Pupils: Pupils are equal, round, and reactive to light. Neck:      Musculoskeletal: Normal range of motion. Cardiovascular:      Rate and Rhythm: Normal rate and regular rhythm. Pulmonary:      Effort: Pulmonary effort is normal. No respiratory distress. Breath sounds: Normal breath sounds. Abdominal:      General: Bowel sounds are normal.      Palpations: Abdomen is soft. Tenderness: There is no abdominal tenderness. Musculoskeletal: Normal range of motion. Skin:     General: Skin is warm and dry. Neurological:      Mental Status: He is alert and oriented to person, place, and time. Psychiatric:         Behavior: Behavior normal.         Thought Content: Thought content normal.         Judgment: Judgment normal.           Testing:           Medical Decision Making:       Patient upon arrival did not appear toxic or lethargic. Vital signs were reviewed. Past medical history reviewed. Allergies reviewed. Medications reviewed. Patient is presenting with the above complaint of right ear pain. On exam, patient does have erythematous right TM. He will be treated with Omnicef. The patient is a type II diabetic but does wish to be placed on a Medrol Dosepak as well. He states that he has been able to improve his A1c significantly dropping it 5 points. He understands that the Medrol Dosepak may increase his glucose while on the medication. Patient will also be given a prescription for Flonase. He will be given a referral to ENT as this has been his fourth or fifth visit in the last 12 months for recurrent right otitis media. Patient understands the plan is agreeable. Patient is to return or go to the emergency department for worsening symptoms. Clinical Impression:   Leanne Swanson was seen today for otalgia. Diagnoses and all orders for this visit:    Recurrent acute suppurative otitis media of right ear without spontaneous rupture of tympanic membrane  -     Noel Uriarte., DO, Ear, Nose, Throat, Jhoan Mckay    Other orders  -     cefdinir (OMNICEF) 300 MG capsule; Take 1 capsule by mouth 2 times daily for 10 days  -     methylPREDNISolone (MEDROL DOSEPACK) 4 MG tablet; Take by mouth. -     fluticasone (FLONASE) 50 MCG/ACT nasal spray; 2 sprays by Each Nostril route daily        The patient is to call for any concerns or return if any of the signs or symptoms worsen. The patient is to follow-up with PCP in the next 2-3 days for repeat evaluation repeat assessment or go directly to the emergency department. SIGNATURE: Anne-Marie Stauffer PA-C

## 2021-03-08 ENCOUNTER — PROCEDURE VISIT (OUTPATIENT)
Dept: AUDIOLOGY | Age: 46
End: 2021-03-08
Payer: COMMERCIAL

## 2021-03-08 ENCOUNTER — OFFICE VISIT (OUTPATIENT)
Dept: ENT CLINIC | Age: 46
End: 2021-03-08
Payer: COMMERCIAL

## 2021-03-08 VITALS — WEIGHT: 315 LBS | BODY MASS INDEX: 38.36 KG/M2 | HEIGHT: 76 IN | TEMPERATURE: 97.9 F

## 2021-03-08 DIAGNOSIS — R09.82 POST-NASAL DRAINAGE: ICD-10-CM

## 2021-03-08 DIAGNOSIS — H69.83 DYSFUNCTION OF BOTH EUSTACHIAN TUBES: ICD-10-CM

## 2021-03-08 DIAGNOSIS — H69.83 ETD (EUSTACHIAN TUBE DYSFUNCTION), BILATERAL: Primary | ICD-10-CM

## 2021-03-08 DIAGNOSIS — H65.493 CHRONIC OTITIS MEDIA OF BOTH EARS WITH EFFUSION: Primary | ICD-10-CM

## 2021-03-08 PROCEDURE — G8417 CALC BMI ABV UP PARAM F/U: HCPCS | Performed by: NURSE PRACTITIONER

## 2021-03-08 PROCEDURE — G8482 FLU IMMUNIZE ORDER/ADMIN: HCPCS | Performed by: NURSE PRACTITIONER

## 2021-03-08 PROCEDURE — G8427 DOCREV CUR MEDS BY ELIG CLIN: HCPCS | Performed by: NURSE PRACTITIONER

## 2021-03-08 PROCEDURE — 99203 OFFICE O/P NEW LOW 30 MIN: CPT | Performed by: NURSE PRACTITIONER

## 2021-03-08 PROCEDURE — 4004F PT TOBACCO SCREEN RCVD TLK: CPT | Performed by: NURSE PRACTITIONER

## 2021-03-08 PROCEDURE — 92567 TYMPANOMETRY: CPT | Performed by: AUDIOLOGIST

## 2021-03-08 RX ORDER — AMOXICILLIN AND CLAVULANATE POTASSIUM 875; 125 MG/1; MG/1
1 TABLET, FILM COATED ORAL 2 TIMES DAILY
Qty: 14 TABLET | Refills: 0 | Status: SHIPPED | OUTPATIENT
Start: 2021-03-08 | End: 2021-03-15

## 2021-03-08 RX ORDER — FLUTICASONE PROPIONATE 50 MCG
2 SPRAY, SUSPENSION (ML) NASAL DAILY
Qty: 2 BOTTLE | Refills: 1 | Status: SHIPPED
Start: 2021-03-08 | End: 2021-04-26 | Stop reason: SDUPTHER

## 2021-03-08 ASSESSMENT — ENCOUNTER SYMPTOMS
STRIDOR: 0
RHINORRHEA: 1
EYES NEGATIVE: 1
RESPIRATORY NEGATIVE: 1
SHORTNESS OF BREATH: 0

## 2021-03-08 NOTE — PROGRESS NOTES
[facility] Otolaryngology  Dr. Bruno Blanc D.O. Ms.Ed. New Consult       Patient Name:  Jesenia Nicole  :  1975     CHIEF C/O:    Chief Complaint   Patient presents with    Ear Problem     NP states chronic recurrent B/L ear infection, tinnitues worse right ear        HISTORY OBTAINED FROM:  patient    HISTORY OF PRESENT ILLNESS:       Apurva Crandall is a 41y.o. year old male, here today for ear infections. He states he has been suffering from persistent symptoms for the last 2 to 3 years which have worsened over the last 1 year. He continues to have recurrent infections mostly in the right ear with a constant pressure in both ears. He also complains of loud tinnitus in the right ear that is a single high-frequency nonpulsatile ringing. He does notice some hearing loss bilaterally with a history of some noise exposure. He also has a history of ear infections as a child with no previous surgeries. He does complain of chronic congestion with rhinorrhea and postnasal drainage. He is a former smoker who quit 1 year ago. He denies any recent fevers or recent antibiotics. He states he was on Flonase 4 months ago with his last antibiotic but only took for 1 to 2 weeks.         Past Medical History:   Diagnosis Date    CAD (coronary artery disease)     COPD (chronic obstructive pulmonary disease) ([address]     Diabetes mellitus ([facility] [address]     Hyperlipidemia     Hypertension     Internal carotid artery occlusion, right 3/16/2020    Mediastinal mass     Neuropathy     Obesity     329 #     Past Surgical History:   Procedure Laterality Date    CARDIAC CATHETERIZATION  2020    Dr. Claudine Martinez- multi vessel disease    MITRAL VALVE REPAIR N/A 3/16/2020    CORONARY ARTERY BYPASS POSSIBLE LEFT RADIAL ARTERY HARVEST, PATRICE performed by Lidia Galeana MD at [address] [place] [address] Avenue  2017    resection of mediastinal mass    TONSILLECTOMY      as a child    TRANSESOPHAGEAL ECHOCARDIOGRAM  2020    Dr. Andrei Verdugo 4\" (1.93 m)   Wt (!) 350 lb (158.8 kg)   BMI 42.60 kg/m²   Physical Exam  Constitutional:       Appearance: Normal appearance. HENT:      Head: Normocephalic. Right Ear: Ear canal and external ear normal. A middle ear effusion is present. Tympanic membrane is erythematous and bulging. Left Ear: Ear canal and external ear normal. A middle ear effusion is present. Nose: Nose normal. No rhinorrhea. Right Turbinates: Not swollen or pale. Left Turbinates: Not swollen or pale. Mouth/Throat:      Lips: Pink. Mouth: Mucous membranes are moist.     Eyes:      Conjunctiva/sclera: Conjunctivae normal.      Pupils: Pupils are equal, round, and reactive to light. Neck:      Musculoskeletal: Normal range of motion. No neck rigidity or muscular tenderness. Cardiovascular:      Rate and Rhythm: Normal rate and regular rhythm. Pulses: Normal pulses. Pulmonary:      Effort: Pulmonary effort is normal. No respiratory distress. Breath sounds: No stridor. Musculoskeletal: Normal range of motion. Skin:     General: Skin is warm and dry. Neurological:      General: No focal deficit present. Mental Status: He is alert and oriented to person, place, and time. Psychiatric:         Mood and Affect: Mood normal.         Behavior: Behavior normal.         Thought Content: Thought content normal.         Judgment: Judgment normal.           Tympanogram reviewed with patient. Flat curve in the right ear, with type As curve in the left ear with negative shift. IMPRESSION/PLAN:    Myles Bae was seen today for ear problem. Diagnoses and all orders for this visit:    Chronic otitis media of both ears with effusion    Dysfunction of both eustachian tubes    Post-nasal drainage    Other orders  -     amoxicillin-clavulanate (AUGMENTIN) 875-125 MG per tablet;  Take 1 tablet by mouth 2 times daily for 7 days  -     fluticasone (FLONASE) 50 MCG/ACT nasal spray; 2 sprays by Each Nostril route daily      Thu seen today for complaint of recurrent ear infections and chronic sinus congestion. Upon exam the right TM is bulging and erythematous with obvious middle ear effusion. Left TM also appears to have a middle ear effusion without erythema. Bilateral ear canals are without erythema or edema. Sinuses are patent bilaterally without considerable swelling or pallor appearance there is no rhinorrhea but postnasal drainage is observed in the oropharynx. At this time he will be placed on Flonase, 2 sprays each nostril once daily as well as Augmentin, 1 tablet twice daily for 7 days for right otitis media. He will follow-up in 6 weeks. He is instructed to call for any new or worsening of pain in either ear prior to that next appointment.     Swetha Jamil, JENNIFER, FNP-C  [facility], Nose and Throat    The information contained in this note has been dictated using drug and medical speech recognition software and may contain errors

## 2021-03-08 NOTE — PROGRESS NOTES
This patient was referred for audiometric/tympanometric testing by HILDA Clancy due to persistent ear infections, bilaterally, but worse in the right ear. He also reports tinnitus in the right ear and hearing loss. .     Tympanometry revealed essentially normal middle ear peak pressure and compliance, in the left ear and flat tympanogram in the right ear. The results were reviewed with the patient. Recommendations for follow up will be made pending physician consult.     Rios Aviles

## 2021-03-17 ENCOUNTER — OFFICE VISIT (OUTPATIENT)
Dept: PRIMARY CARE CLINIC | Age: 46
End: 2021-03-17
Payer: COMMERCIAL

## 2021-03-17 ENCOUNTER — TELEPHONE (OUTPATIENT)
Dept: VASCULAR SURGERY | Age: 46
End: 2021-03-17

## 2021-03-17 VITALS
OXYGEN SATURATION: 90 % | DIASTOLIC BLOOD PRESSURE: 72 MMHG | RESPIRATION RATE: 16 BRPM | BODY MASS INDEX: 38.36 KG/M2 | HEART RATE: 69 BPM | SYSTOLIC BLOOD PRESSURE: 128 MMHG | HEIGHT: 76 IN | WEIGHT: 315 LBS

## 2021-03-17 DIAGNOSIS — E11.65 UNCONTROLLED TYPE 2 DIABETES MELLITUS WITH HYPERGLYCEMIA (HCC): ICD-10-CM

## 2021-03-17 DIAGNOSIS — E78.2 MIXED HYPERLIPIDEMIA: ICD-10-CM

## 2021-03-17 DIAGNOSIS — I10 ESSENTIAL HYPERTENSION: ICD-10-CM

## 2021-03-17 LAB
ALBUMIN SERPL-MCNC: 4.1 G/DL (ref 3.5–5.2)
ALP BLD-CCNC: 87 U/L (ref 40–129)
ALT SERPL-CCNC: 18 U/L (ref 0–40)
ANION GAP SERPL CALCULATED.3IONS-SCNC: 11 MMOL/L (ref 7–16)
AST SERPL-CCNC: 11 U/L (ref 0–39)
BILIRUB SERPL-MCNC: 0.4 MG/DL (ref 0–1.2)
BUN BLDV-MCNC: 14 MG/DL (ref 6–20)
CALCIUM SERPL-MCNC: 9.8 MG/DL (ref 8.6–10.2)
CHLORIDE BLD-SCNC: 97 MMOL/L (ref 98–107)
CHOLESTEROL, TOTAL: 185 MG/DL (ref 0–199)
CO2: 27 MMOL/L (ref 22–29)
CREAT SERPL-MCNC: 0.7 MG/DL (ref 0.7–1.2)
GFR AFRICAN AMERICAN: >60
GFR NON-AFRICAN AMERICAN: >60 ML/MIN/1.73
GLUCOSE BLD-MCNC: 452 MG/DL (ref 74–99)
HBA1C MFR BLD: 10 % (ref 4–5.6)
HDLC SERPL-MCNC: 50 MG/DL
LDL CHOLESTEROL CALCULATED: 111 MG/DL (ref 0–99)
MICROALBUMIN UR-MCNC: 113 MG/L
POTASSIUM SERPL-SCNC: 4.8 MMOL/L (ref 3.5–5)
SODIUM BLD-SCNC: 135 MMOL/L (ref 132–146)
TOTAL PROTEIN: 7 G/DL (ref 6.4–8.3)
TRIGL SERPL-MCNC: 121 MG/DL (ref 0–149)
VLDLC SERPL CALC-MCNC: 24 MG/DL

## 2021-03-17 PROCEDURE — G8427 DOCREV CUR MEDS BY ELIG CLIN: HCPCS | Performed by: FAMILY MEDICINE

## 2021-03-17 PROCEDURE — 3046F HEMOGLOBIN A1C LEVEL >9.0%: CPT | Performed by: FAMILY MEDICINE

## 2021-03-17 PROCEDURE — 2022F DILAT RTA XM EVC RTNOPTHY: CPT | Performed by: FAMILY MEDICINE

## 2021-03-17 PROCEDURE — G8482 FLU IMMUNIZE ORDER/ADMIN: HCPCS | Performed by: FAMILY MEDICINE

## 2021-03-17 PROCEDURE — 99214 OFFICE O/P EST MOD 30 MIN: CPT | Performed by: FAMILY MEDICINE

## 2021-03-17 PROCEDURE — 4004F PT TOBACCO SCREEN RCVD TLK: CPT | Performed by: FAMILY MEDICINE

## 2021-03-17 PROCEDURE — G8417 CALC BMI ABV UP PARAM F/U: HCPCS | Performed by: FAMILY MEDICINE

## 2021-03-17 RX ORDER — SYRINGE-NEEDLE,INSULIN,0.5 ML 27GX1/2"
1 SYRINGE, EMPTY DISPOSABLE MISCELLANEOUS 3 TIMES DAILY
Qty: 100 EACH | Refills: 5 | Status: SHIPPED
Start: 2021-03-17 | End: 2021-06-22 | Stop reason: SDUPTHER

## 2021-03-17 RX ORDER — INSULIN DETEMIR 100 [IU]/ML
68 INJECTION, SOLUTION SUBCUTANEOUS NIGHTLY
Qty: 5 PEN | Refills: 3 | Status: SHIPPED
Start: 2021-03-17 | End: 2021-04-13 | Stop reason: SDUPTHER

## 2021-03-17 RX ORDER — PEN NEEDLE, DIABETIC 32GX 5/32"
1 NEEDLE, DISPOSABLE MISCELLANEOUS 2 TIMES DAILY
Qty: 100 EACH | Refills: 5 | Status: SHIPPED | OUTPATIENT
Start: 2021-03-17 | End: 2022-06-23

## 2021-03-17 RX ORDER — PEN NEEDLE, DIABETIC 29 G X1/2"
NEEDLE, DISPOSABLE MISCELLANEOUS
Qty: 100 EACH | Refills: 5 | Status: SHIPPED
Start: 2021-03-17 | End: 2021-04-13 | Stop reason: SDUPTHER

## 2021-03-17 RX ORDER — ALBUTEROL SULFATE 90 UG/1
2 AEROSOL, METERED RESPIRATORY (INHALATION) EVERY 6 HOURS PRN
Qty: 1 INHALER | Refills: 3 | Status: SHIPPED
Start: 2021-03-17 | End: 2021-06-22 | Stop reason: SDUPTHER

## 2021-03-17 RX ORDER — ASPIRIN 81 MG/1
81 TABLET ORAL DAILY
Qty: 90 TABLET | Refills: 3 | Status: SHIPPED
Start: 2021-03-17 | End: 2021-06-22 | Stop reason: SDUPTHER

## 2021-03-17 RX ORDER — BLOOD SUGAR DIAGNOSTIC
1 STRIP MISCELLANEOUS 3 TIMES DAILY
Qty: 1 EACH | Refills: 5 | Status: SHIPPED
Start: 2021-03-17 | End: 2021-06-22 | Stop reason: SDUPTHER

## 2021-03-17 RX ORDER — LANCETS 28 GAUGE
EACH MISCELLANEOUS
Qty: 100 EACH | Refills: 3 | Status: SHIPPED
Start: 2021-03-17 | End: 2021-06-22 | Stop reason: SDUPTHER

## 2021-03-17 RX ORDER — ATORVASTATIN CALCIUM 80 MG/1
TABLET, FILM COATED ORAL
Qty: 30 TABLET | Refills: 3 | Status: SHIPPED
Start: 2021-03-17 | End: 2021-06-22 | Stop reason: SDUPTHER

## 2021-03-17 ASSESSMENT — PATIENT HEALTH QUESTIONNAIRE - PHQ9
SUM OF ALL RESPONSES TO PHQ9 QUESTIONS 1 & 2: 0
SUM OF ALL RESPONSES TO PHQ QUESTIONS 1-9: 0
SUM OF ALL RESPONSES TO PHQ QUESTIONS 1-9: 0
1. LITTLE INTEREST OR PLEASURE IN DOING THINGS: 0
2. FEELING DOWN, DEPRESSED OR HOPELESS: 0

## 2021-03-17 NOTE — PROGRESS NOTES
3/17/2021     Bebo Brambila    : 1975 Sex: male   Age: 41 y.o. Chief Complaint   Patient presents with    Hypertension    Hyperlipidemia    Diabetes       HPI: This 41y.o. -year-old male  presents today for evaluation and management of his  chronic medical problems. Current medication list reviewed. The patient is tolerating all medications well without adverse events or known side effects. The patient does understand the risk and benefits of the prescribed medications. The patient is not up-to-date on all age-appropriate wellness issues. The patient continues to follow-up with pain management. The patient does have a pending appointment with his cardiothoracic surgeon. ROS:   Const: Denies changes in appetite, chills, fever, night sweats and weight loss. Eyes:  Denies discharge, a recent change in visual acuity, blurred vision and double vision. ENMT: Denies discharge of the ears, hearing loss, pain of the ears. Denies nasal or sinus symptoms other than stated above. Denies mouth or throat symptoms. CV:  Denies chest pain, dyspnea on exertion, orthopnea, palpitations and PND  Resp: Denies chest pain, cough, SOB and wheezing. GI: Denies abdominal pain, constipation, diarrhea, heartburn, indigestion, nausea and vomiting. : Denies dysuria, frequency, hematuria, nocturia and urgency. Musculo: Denies arthralgias and myalgia  Skin:  Denies lesions, pruritus and rash. Neuro: Denies dizziness, lightheadedness, numbness, tingling and weakness. Psych:  Denies anxiety and depression  Endocrine: Denies polyuria, polydipsia, polyphagia, weight gain, dry skin, constipation, fatigue, cold intolerance, heat intolerance or tremors. Hema/Lymph: Denies hematologic symptoms  Allergy/Immuno:  Denies allergic/immunologic symptoms.   Pertinent positives reviewed and noted      Current Outpatient Medications:     albuterol sulfate  (90 Base) MCG/ACT inhaler, Inhale 2 puffs into the lungs every 6 Alert and oriented X3. Cranial nerves grossly intact. Psych: Mood is normal.  Affect is normal.   Vital signs reviewed. Controlled Substances Monitoring:     RX Monitoring 9/6/2019   Periodic Controlled Substance Monitoring No signs of potential drug abuse or diversion identified. Plan Per Assessment:  Moshe Hackett was seen today for hypertension, hyperlipidemia and diabetes. Diagnoses and all orders for this visit:    Type 2 diabetes, uncontrolled, with neuropathy ([address]  -     Lipid Panel; Future  -     Comprehensive Metabolic Panel; Future  -     Hemoglobin A1C; Future  -     MICROALBUMIN, UR; Future    Mixed hyperlipidemia    Essential hypertension    Uncontrolled type 2 diabetes mellitus with hyperglycemia (HCC)  -     FreeStyle Lancets MISC; USE AS DIRECTED    Other orders  -     albuterol sulfate  (90 Base) MCG/ACT inhaler; Inhale 2 puffs into the lungs every 6 hours as needed for Wheezing  -     aspirin EC 81 MG EC tablet; Take 1 tablet by mouth daily  -     atorvastatin (LIPITOR) 80 MG tablet; take 1 tablet by mouth once daily  -     BD PEN NEEDLE PRAMOD U/F 32G X 4 MM MISC; Inject 1 each as directed 2 times daily  -     blood glucose test strips (FREESTYLE INSULINX TEST) strip; 1 each by In Vitro route 3 times daily As needed. -     Cholecalciferol (VITAMIN D3 ULTRA POTENCY) 1.25 MG (61505 UT) TABS; Take 50,000 Int'l Units/day by mouth once a week  -     DULERA 100-5 MCG/ACT inhaler; Inhale 2 puffs into the lungs 2 times daily  -     insulin aspart (NOVOLOG) 100 UNIT/ML injection vial; Use tid with sliding scale  -     insulin detemir (LEVEMIR FLEXTOUCH) 100 UNIT/ML injection pen;  Inject 68 Units into the skin nightly  -     Insulin Pen Needle (PEN NEEDLES 29GX1/2\") 29G X 12MM MISC; USE AS DIRECTED  -     Insulin Syringe-Needle U-100 (INSULIN SYRINGE 1CC/30GX1/2\") 30G X 1/2\" 1 ML MISC; 1 box by Does not apply route three times daily 3 times daily  -     metFORMIN (GLUCOPHAGE) 850 MG tablet; Take 1 tablet by mouth 3 times daily        Return in about 3 months (around 6/17/2021) for MEDICATION CHECK, FOLLOW UP [address] [place] [address].[facility], MD    Note was generated with the assistance of voice recognition software. Document was reviewed however may contain grammatical errors.

## 2021-03-17 NOTE — TELEPHONE ENCOUNTER
Called to confirm appointment for 3/18/21 at 21 [phone], left message with date, time, and phone number for patient.

## 2021-03-18 ENCOUNTER — TELEPHONE (OUTPATIENT)
Dept: PRIMARY CARE CLINIC | Age: 46
End: 2021-03-18

## 2021-03-18 NOTE — TELEPHONE ENCOUNTER
The patient was advised of his recent test results. His hemoglobin A1c was up to 10. Microalbuminuria noted. The patient was advised to increase dietary compliance and call in 1 week with his blood sugar readings. At that time I advised the patient medication changes may be in order.

## 2021-04-01 ENCOUNTER — TELEPHONE (OUTPATIENT)
Dept: CARDIOTHORACIC SURGERY | Age: 46
End: 2021-04-01

## 2021-04-01 ENCOUNTER — OFFICE VISIT (OUTPATIENT)
Dept: VASCULAR SURGERY | Age: 46
End: 2021-04-01
Payer: COMMERCIAL

## 2021-04-01 ENCOUNTER — TELEPHONE (OUTPATIENT)
Dept: VASCULAR SURGERY | Age: 46
End: 2021-04-01

## 2021-04-01 VITALS
WEIGHT: 315 LBS | DIASTOLIC BLOOD PRESSURE: 80 MMHG | BODY MASS INDEX: 38.36 KG/M2 | HEIGHT: 76 IN | SYSTOLIC BLOOD PRESSURE: 144 MMHG

## 2021-04-01 DIAGNOSIS — Z95.2 S/P AVR: Primary | ICD-10-CM

## 2021-04-01 DIAGNOSIS — Z98.890 S/P MVR (MITRAL VALVE REPAIR): Primary | ICD-10-CM

## 2021-04-01 DIAGNOSIS — I65.21 INTERNAL CAROTID ARTERY OCCLUSION, RIGHT: Primary | ICD-10-CM

## 2021-04-01 PROCEDURE — 4004F PT TOBACCO SCREEN RCVD TLK: CPT | Performed by: SURGERY

## 2021-04-01 PROCEDURE — 99214 OFFICE O/P EST MOD 30 MIN: CPT | Performed by: SURGERY

## 2021-04-01 PROCEDURE — G8417 CALC BMI ABV UP PARAM F/U: HCPCS | Performed by: SURGERY

## 2021-04-01 PROCEDURE — G8427 DOCREV CUR MEDS BY ELIG CLIN: HCPCS | Performed by: SURGERY

## 2021-04-01 NOTE — TELEPHONE ENCOUNTER
Called and talked to pt. With date and time of us. Kaya. At [facility] on 4-9-21.

## 2021-04-01 NOTE — PROGRESS NOTES
Chief Complaint:   Chief Complaint   Patient presents with    1 Year Follow Up         HPI: Patient came to the office, for evaluation follow-up of carotid artery disease, known to have chronic occlusion right internal carotid artery, did undergo cardiac bypass surgery last year, overall doing well    Patient recently gained weight, 40 pounds, trying to lose weight    Patient to stop smoking completely    Patient is concerned regarding the sternal wound, and feels that there is a defect in the middle of the chest as if the bones did not come together, concerned, would like to see his cardiothoracic surgeon, denies any chest pain or shortness of breath      Patient denies any focal lateralizing neurological symptoms like loss of speech, vision or loss of function of extremity    Patient can walk a few blocks , and denies any symptoms of rest pain    Allergies   Allergen Reactions    Sulfa Antibiotics      ?  unsure       Current Outpatient Medications   Medication Sig Dispense Refill    albuterol sulfate  (90 Base) MCG/ACT inhaler Inhale 2 puffs into the lungs every 6 hours as needed for Wheezing 1 Inhaler 3    aspirin EC 81 MG EC tablet Take 1 tablet by mouth daily 90 tablet 3    atorvastatin (LIPITOR) 80 MG tablet take 1 tablet by mouth once daily 30 tablet 3    BD PEN NEEDLE PRAMOD U/F 32G X 4 MM MISC Inject 1 each as directed 2 times daily 100 each 5    blood glucose test strips (FREESTYLE INSULINX TEST) strip 1 each by In Vitro route 3 times daily As needed.  1 each 5    Cholecalciferol (VITAMIN D3 ULTRA POTENCY) 1.25 MG (90139 UT) TABS Take 50,000 Int'l Units/day by mouth once a week 4 tablet 2    DULERA 100-5 MCG/ACT inhaler Inhale 2 puffs into the lungs 2 times daily 1 Inhaler 3    FreeStyle Lancets MISC USE AS DIRECTED 100 each 3    insulin aspart (NOVOLOG) 100 UNIT/ML injection vial Use tid with sliding scale 5 Package 5    insulin detemir (LEVEMIR FLEXTOUCH) 100 UNIT/ML injection pen Inject 68 Units into the skin nightly 5 pen 3    Insulin Pen Needle (PEN NEEDLES 29GX1/2\") 29G X 12MM MISC USE AS DIRECTED 100 each 5    Insulin Syringe-Needle U-100 (INSULIN SYRINGE 1CC/30GX1/2\") 30G X 1/2\" 1 ML MISC 1 box by Does not apply route three times daily 3 times daily 100 each 5    metFORMIN (GLUCOPHAGE) 850 MG tablet Take 1 tablet by mouth 3 times daily 90 tablet 3    metoprolol tartrate (LOPRESSOR) 50 MG tablet Take 1 tablet by mouth 2 times daily 60 tablet 3    HYDROcodone-acetaminophen (NORCO)  MG per tablet Take 1 tablet by mouth every 6 hours as needed for Pain.  Alcohol Swabs 70 % PADS       fluticasone (FLONASE) 50 MCG/ACT nasal spray 2 sprays by Each Nostril route daily (Patient not taking: Reported on 4/1/2021) 2 Bottle 1    methylPREDNISolone (MEDROL DOSEPACK) 4 MG tablet Take by mouth. (Patient not taking: Reported on 3/8/2021) 1 kit 0    vitamin D (ERGOCALCIFEROL) 1.25 MG (89594 UT) CAPS capsule take 1 capsule by mouth every week (Patient not taking: Reported on 4/1/2021) 5 capsule 3     No current facility-administered medications for this visit.         Past Medical History:   Diagnosis Date    CAD (coronary artery disease)     [facility]     Diabetes mellitus [facility] [facility]     Hyperlipidemia     Hypertension     Internal carotid artery occlusion, right 3/16/2020    Mediastinal mass     Neuropathy     Obesity     329 #       Past Surgical History:   Procedure Laterality Date    CARDIAC CATHETERIZATION  02/11/2020    Dr. Viri Mart- multi vessel disease    MITRAL VALVE REPAIR N/A 3/16/2020    CORONARY ARTERY BYPASS POSSIBLE LEFT RADIAL ARTERY HARVEST, PATRICE performed by Ariana Mosqueda MD at [address]  04/28/2017    resection of mediastinal mass    TONSILLECTOMY      as a child    TRANSESOPHAGEAL ECHOCARDIOGRAM  03/03/2020    Dr. Cass Aranda       Family History   Problem Relation Age of Onset    Heart Disease Mother         MI age Review of Systems:    Eyes:  No blurring, diplopia or vision loss. Respiratory:  No cough, pleuritic chest pain, dyspnea, or wheezing. Chronic obstructive lung disease, history of tobacco use  Cardiovascular: No angina, palpitations . Coronary arteries post cardiac bypass surgery, hypertension, hyperlipidemia  Musculoskeletal:  No arthritis or weakness. Neurologic:  No paralysis, paresis, paresthesia, seizures or headache. Endocrinology: Diabetes mellitus      Physical Exam:  General appearance:  Alert, awake, oriented x 3. No distress. Eyes:  Conjunctivae appear normal; PERRL  Neck:  No jugular venous distention, lymphadenopathy or thyromegaly. No evidence of carotid bruit  Chest: Subtle discontinued T in the sternum noted in the lower end of the sternum, no obvious evidence of hernia  Lungs:  Clear to ausculation bilaterally. No rhonchi, crackles, wheezes  Heart:  Regular rate and rhythm. No rub or murmur  Abdomen:  Soft, non-tender. No masses, organomegaly. Musculoskeletal : No joint effusions, tenderness swelling    Neuro: Speech is intact. Moving all extremities. No focal motor or sensory deficits      Extremities:  Both feet are warm to touch. The color of both feet is normal.        Pulses Right  Left    Brachial 3 3    Radial    3=normal   Femoral 2 2  2=diminished   Popliteal    1=barely palpable   Dorsalis pedis    0=absent   Posterior tibial    4=aneurysmal             Other pertinent information:1. The past medical records were reviewed. Assessment:    1.  Internal carotid artery occlusion, right              Plan:       Discussed the patient, options, risks benefits were explained, patient was recommended to continue the low-dose aspirin, call me immediately for any focal lateralizing neurological symptoms, consider losing some weight and a follow-up carotid ultrasound    At the patient's concern regarding the possible defect in the sternum on palpation, patient was recommended to make sure that he does follow-up with his cardiothoracic surgeon, contact phone number given    Patient was instructed to continue walking program and to call if any worsening of symptoms and to call if any focal lateralizing neurological symptoms like loss of speech, vision or loss of function of extremity. All the questions were answered. Orders Placed This Encounter   Procedures    US CAROTID ARTERY BILATERAL             Indicated follow-up: Return in about 1 year (around 4/1/2022), or if symptoms worsen or fail to improve.

## 2021-04-05 RX ORDER — METOPROLOL TARTRATE 50 MG/1
50 TABLET, FILM COATED ORAL 2 TIMES DAILY
Qty: 60 TABLET | Refills: 3 | Status: SHIPPED
Start: 2021-04-05 | End: 2021-06-22 | Stop reason: SDUPTHER

## 2021-04-05 NOTE — TELEPHONE ENCOUNTER
Last Appointment:  3/17/2021  Future Appointments   Date Time Provider Larisa Salazar   4/9/2021  8:00 AM SEB US RM 1 SEBZ US SEB Radiolog   4/13/2021  9:00 AM Haresh Farley MD CARDIO SURG [facility]   4/26/2021  8:45 AM ZIA Pemberton - CNP [facility] [facility]   6/23/2021  8:30 AM Alison Fontanez MD [facility]   4/7/2022  9:00 AM Suzanna Dalal MD [facility]/MED [facility]      Patient needs pended med refilled.     Electronically signed by Rosalie Mims LPN on 6/8/1084 at 2:27 PM

## 2021-04-08 DIAGNOSIS — Z95.1 S/P CABG X 4: Primary | ICD-10-CM

## 2021-04-09 ENCOUNTER — TELEPHONE (OUTPATIENT)
Dept: VASCULAR SURGERY | Age: 46
End: 2021-04-09

## 2021-04-09 ENCOUNTER — HOSPITAL ENCOUNTER (OUTPATIENT)
Dept: ULTRASOUND IMAGING | Age: 46
Discharge: HOME | End: 2021-04-11
Payer: COMMERCIAL

## 2021-04-09 DIAGNOSIS — I65.21 INTERNAL CAROTID ARTERY OCCLUSION, RIGHT: ICD-10-CM

## 2021-04-09 PROCEDURE — 93880 EXTRACRANIAL BILAT STUDY: CPT

## 2021-04-13 RX ORDER — PEN NEEDLE, DIABETIC 29 G X1/2"
NEEDLE, DISPOSABLE MISCELLANEOUS
Qty: 100 EACH | Refills: 5 | Status: SHIPPED
Start: 2021-04-13 | End: 2021-06-22 | Stop reason: SDUPTHER

## 2021-04-13 RX ORDER — INSULIN DETEMIR 100 [IU]/ML
68 INJECTION, SOLUTION SUBCUTANEOUS NIGHTLY
Qty: 5 PEN | Refills: 3 | Status: SHIPPED
Start: 2021-04-13 | End: 2021-06-22 | Stop reason: SDUPTHER

## 2021-04-13 NOTE — TELEPHONE ENCOUNTER
Last Appointment:  3/17/2021  Future Appointments   Date Time Provider Larisa Salazar   4/18/2021  8:00 AM SEB CT2 BD SEBZ CT SEB Radiolog   4/20/2021  9:00 AM Jaime Cortez MD CARDIO SURG [facility]   4/26/2021  8:45 AM ZIA Grimes - CNP [place] [facility]   6/23/2021  8:30 AM Js Engle MD [facility]   4/7/2022  9:00 AM Madelaine Lion MD [facility]

## 2021-04-18 ENCOUNTER — HOSPITAL ENCOUNTER (OUTPATIENT)
Dept: CT IMAGING | Age: 46
Discharge: HOME | End: 2021-04-20
Payer: COMMERCIAL

## 2021-04-18 DIAGNOSIS — Z95.1 S/P CABG X 4: ICD-10-CM

## 2021-04-18 PROCEDURE — 71250 CT THORAX DX C-: CPT

## 2021-04-20 ENCOUNTER — OFFICE VISIT (OUTPATIENT)
Dept: CARDIOTHORACIC SURGERY | Age: 46
End: 2021-04-20
Payer: COMMERCIAL

## 2021-04-20 VITALS
DIASTOLIC BLOOD PRESSURE: 90 MMHG | WEIGHT: 315 LBS | BODY MASS INDEX: 38.36 KG/M2 | SYSTOLIC BLOOD PRESSURE: 140 MMHG | HEIGHT: 76 IN | HEART RATE: 79 BPM

## 2021-04-20 DIAGNOSIS — E11.65 UNCONTROLLED TYPE 2 DIABETES MELLITUS WITH HYPERGLYCEMIA (HCC): ICD-10-CM

## 2021-04-20 DIAGNOSIS — E66.01 MORBID OBESITY (HCC): ICD-10-CM

## 2021-04-20 DIAGNOSIS — S22.23XK CLOSED STERNAL MANUBRIAL DISSOCIATION FRACTURE WITH NONUNION, SUBSEQUENT ENCOUNTER: Primary | ICD-10-CM

## 2021-04-20 PROCEDURE — G8427 DOCREV CUR MEDS BY ELIG CLIN: HCPCS | Performed by: THORACIC SURGERY (CARDIOTHORACIC VASCULAR SURGERY)

## 2021-04-20 PROCEDURE — 2022F DILAT RTA XM EVC RTNOPTHY: CPT | Performed by: THORACIC SURGERY (CARDIOTHORACIC VASCULAR SURGERY)

## 2021-04-20 PROCEDURE — 3046F HEMOGLOBIN A1C LEVEL >9.0%: CPT | Performed by: THORACIC SURGERY (CARDIOTHORACIC VASCULAR SURGERY)

## 2021-04-20 PROCEDURE — 99214 OFFICE O/P EST MOD 30 MIN: CPT | Performed by: THORACIC SURGERY (CARDIOTHORACIC VASCULAR SURGERY)

## 2021-04-20 PROCEDURE — G8417 CALC BMI ABV UP PARAM F/U: HCPCS | Performed by: THORACIC SURGERY (CARDIOTHORACIC VASCULAR SURGERY)

## 2021-04-20 PROCEDURE — 4004F PT TOBACCO SCREEN RCVD TLK: CPT | Performed by: THORACIC SURGERY (CARDIOTHORACIC VASCULAR SURGERY)

## 2021-04-20 ASSESSMENT — ENCOUNTER SYMPTOMS
SHORTNESS OF BREATH: 0
ABDOMINAL PAIN: 0
DIARRHEA: 0
COUGH: 0

## 2021-04-20 NOTE — LETTER
[address] [facility]  [address] [address] [facility] [address]  Phone: [phone]  Fax: [phone]    Yamileth Brothers MD        2021     Krystin Chapman, [address] [place] [address]    Patient: Debbie Juares  MR Number: [id number]  YOB: 1975  Date of Visit: 2021    Dear Dr. Krystin Chapman:     Thank you for the request for consultation for Debbie Juares     Past Medical History:   Diagnosis Date    CAD (coronary artery disease)     [facility] [address] [facility]     Diabetes mellitus [facility] [facility]     Hyperlipidemia     Hypertension     Internal carotid artery occlusion, right 3/16/2020    Mediastinal mass     Neuropathy     Obesity     329 #       Past Surgical History:   Procedure Laterality Date    CARDIAC CATHETERIZATION  2020    Dr. Princess Rosa- multi vessel disease    MITRAL VALVE REPAIR N/A 3/16/2020    CORONARY ARTERY BYPASS POSSIBLE LEFT RADIAL ARTERY HARVEST, PATRICE performed by Yamileth Brothers MD at [address] [place] [address] Avenue  2017    resection of mediastinal mass    TONSILLECTOMY      as a child    TRANSESOPHAGEAL ECHOCARDIOGRAM  2020    Dr. Miquel Gonzales       Family History   Problem Relation Age of Onset    Heart Disease Mother         MI age 39     High Blood Pressure Mother     Diabetes Father     Heart Disease Father         MI    High Blood Pressure Father     High Cholesterol Father     Cancer Father     Stroke Father     Kidney Disease Father         dialysis    High Blood Pressure Sister     Other Brother     COPD Brother     High Blood Pressure Brother        Allergies   Allergen Reactions    Sulfa Antibiotics      ?  unsure         Current Outpatient Medications:     insulin detemir (LEVEMIR FLEXTOUCH) 100 UNIT/ML injection pen, Inject 68 Units into the skin nightly, Disp: 5 pen, Rfl: 3    Insulin Pen Needle (PEN NEEDLES 29GX1/2\") 29G X 12MM MISC, USE AS DIRECTED, Disp: 100 each, Rfl: 5    metoprolol tartrate (LOPRESSOR) 50 MG tablet, Take 1 tablet by mouth 2 times daily, Disp: 60 tablet, Rfl: 3    albuterol sulfate  (90 Base) MCG/ACT inhaler, Inhale 2 puffs into the lungs every 6 hours as needed for Wheezing, Disp: 1 Inhaler, Rfl: 3    aspirin EC 81 MG EC tablet, Take 1 tablet by mouth daily, Disp: 90 tablet, Rfl: 3    atorvastatin (LIPITOR) 80 MG tablet, take 1 tablet by mouth once daily, Disp: 30 tablet, Rfl: 3    BD PEN NEEDLE PRAMOD U/F 32G X 4 MM MISC, Inject 1 each as directed 2 times daily, Disp: 100 each, Rfl: 5    blood glucose test strips (FREESTYLE INSULINX TEST) strip, 1 each by In Vitro route 3 times daily As needed. , Disp: 1 each, Rfl: 5    DULERA 100-5 MCG/ACT inhaler, Inhale 2 puffs into the lungs 2 times daily, Disp: 1 Inhaler, Rfl: 3    FreeStyle Lancets MISC, USE AS DIRECTED, Disp: 100 each, Rfl: 3    insulin aspart (NOVOLOG) 100 UNIT/ML injection vial, Use tid with sliding scale, Disp: 5 Package, Rfl: 5    metFORMIN (GLUCOPHAGE) 850 MG tablet, Take 1 tablet by mouth 3 times daily, Disp: 90 tablet, Rfl: 3    fluticasone (FLONASE) 50 MCG/ACT nasal spray, 2 sprays by Each Nostril route daily, Disp: 2 Bottle, Rfl: 1    methylPREDNISolone (MEDROL DOSEPACK) 4 MG tablet, Take by mouth., Disp: 1 kit, Rfl: 0    vitamin D (ERGOCALCIFEROL) 1.25 MG (22770 UT) CAPS capsule, take 1 capsule by mouth every week, Disp: 5 capsule, Rfl: 3    HYDROcodone-acetaminophen (NORCO)  MG per tablet, Take 1 tablet by mouth every 6 hours as needed for Pain., Disp: , Rfl:     Alcohol Swabs 70 % PADS, , Disp: , Rfl:     Cholecalciferol (VITAMIN D3 ULTRA POTENCY) 1.25 MG (70646 UT) TABS, Take 50,000 Int'l Units/day by mouth once a week, Disp: 4 tablet, Rfl: 2    Insulin Syringe-Needle U-100 (INSULIN SYRINGE 1CC/30GX1/2\") 30G X 1/2\" 1 ML MISC, 1 box by Does not apply route three times daily 3 times daily, Disp: 100 each, Rfl: 5    Social History     Tobacco Use  Smoking status: Former Smoker     Packs/day: 1.00     Years: 25.00     Pack years: 25.00     Types: Cigarettes     Start date: 1994     Quit date: 3/14/2020     Years since quittin.1    Smokeless tobacco: Current User     Types: Chew, Snuff   Substance Use Topics    Alcohol use: No       Vitals:    21 0905 21 0909   BP: (!) 141/90 (!) 140/90   Site: Left Upper Arm    Position: Sitting    Pulse: 79    Weight: (!) 350 lb (158.8 kg)    Height: 6' 4\" (1.93 m)        Subjective:      Patient ID: Gallo Eastman is a 41 y.o. male. CC: sternal discomfort    This is a 38yo male  as of 3/2020 is s/p:  OPERATION:  1.  Sternotomy. 2.  Coronary artery bypass grafting x4; left internal mammary artery to  the LAD, radial artery to the obtuse marginal, greater saphenous vein to  the right coronary artery and then sequenced onto the PDA. 3.  Endoscopic harvesting, left lower extremity greater saphenous vein. 4.  Endoscopic harvesting, left upper extremity radial artery. 5.  Rigid internal fixation of sternum using KLS plates x2. He is a terribly uncontrolled diabetic with A1C 10. He presents today a year after CABG with complaints of sternal discomfort and a feeling of unsteadiness. He underwent CT of the chest which showed manubrial non-union, with adequate healing of the mid and lower sternum. HPI    Review of Systems   Constitutional: Negative for chills and fever. Respiratory: Negative for cough and shortness of breath. Cardiovascular: Negative for chest pain and palpitations. Gastrointestinal: Negative for abdominal pain and diarrhea. Neurological: Negative for syncope and light-headedness. Objective:   Physical Exam  Constitutional:       Appearance: Normal appearance. Cardiovascular:      Rate and Rhythm: Normal rate and regular rhythm. Pulmonary:      Effort: Pulmonary effort is normal. No respiratory distress.       Comments: Healed MSI scar with palpable click along the upper sternum  Abdominal:      Palpations: Abdomen is soft. Tenderness: There is no guarding. Skin:     General: Skin is warm and dry. Neurological:      Mental Status: He is alert. Psychiatric:         Mood and Affect: Mood normal.         Behavior: Behavior normal.         Assessment:      Chronic non union of the manubrium      Plan:      It does not bother him much so I do not feel anything invasive would be appropriate. The body of the sternum appears to be intact. Follow up PRN. If you have questions, please do not hesitate to call me. I look forward to following Spike along with you.     Sincerely,        Kerri Wills MD

## 2021-04-20 NOTE — PROGRESS NOTES
Subjective:      Patient ID: Debbie Juares is a 41 y.o. male. CC: sternal discomfort    This is a 38yo male  as of 3/2020 is s/p:  OPERATION:  1.  Sternotomy. 2.  Coronary artery bypass grafting x4; left internal mammary artery to  the LAD, radial artery to the obtuse marginal, greater saphenous vein to  the right coronary artery and then sequenced onto the PDA. 3.  Endoscopic harvesting, left lower extremity greater saphenous vein. 4.  Endoscopic harvesting, left upper extremity radial artery. 5.  Rigid internal fixation of sternum using KLS plates x2. He is a terribly uncontrolled diabetic with A1C 10. He presents today a year after CABG with complaints of sternal discomfort and a feeling of unsteadiness. He underwent CT of the chest which showed manubrial non-union, with adequate healing of the mid and lower sternum. HPI    Review of Systems   Constitutional: Negative for chills and fever. Respiratory: Negative for cough and shortness of breath. Cardiovascular: Negative for chest pain and palpitations. Gastrointestinal: Negative for abdominal pain and diarrhea. Neurological: Negative for syncope and light-headedness. Objective:   Physical Exam  Constitutional:       Appearance: Normal appearance. Cardiovascular:      Rate and Rhythm: Normal rate and regular rhythm. Pulmonary:      Effort: Pulmonary effort is normal. No respiratory distress. Comments: Healed MSI scar with palpable click along the upper sternum  Abdominal:      Palpations: Abdomen is soft. Tenderness: There is no guarding. Skin:     General: Skin is warm and dry. Neurological:      Mental Status: He is alert. Psychiatric:         Mood and Affect: Mood normal.         Behavior: Behavior normal.         Assessment:      Chronic non union of the manubrium  Uncontrolled diabetes      Plan:      It does not bother him much so I do not feel anything invasive would be appropriate.   The body of the sternum appears to be intact. Strict BG control advised, diabetes education pamphlet provided  Follow up PRN.

## 2021-04-20 NOTE — PATIENT INSTRUCTIONS
Patient Education        Learning About Type 2 Diabetes  What is type 2 diabetes? Type 2 diabetes is a condition in which you have too much sugar (glucose) in your blood. Glucose is a type of sugar produced in your body when carbohydrates and other foods are digested. It provides energy to cells throughout the body. Normally, blood sugar levels increase after you eat a meal. When blood sugar rises, cells in the pancreas release insulin, which causes the body to absorb sugar from the blood and lowers the blood sugar level to normal.  When you have type 2 diabetes, sugar stays in the blood rather than entering the body's cells to be used for energy. This results in high blood sugar. It happens when your body can't use insulin the right way. Over time, high blood sugar can harm many parts of the body, such as your eyes, heart, blood vessels, nerves, and kidneys. It can also increase your risk for other health problems (complications). What can you expect with type 2 diabetes? Skeetomeka Hagan keep hearing about how important it is to keep your blood sugar within a target range. That's because over time, high blood sugar can lead to serious problems. It can:  · Harm your eyes, nerves, and kidneys. · Damage your blood vessels, leading to heart disease and stroke. · Reduce blood flow and cause nerve damage to parts of your body, especially your feet. This can cause slow healing and pain when you walk. · Make your immune system weak and less able to fight infections. When people hear the word \"diabetes,\" they often think of problems like these. But daily care and treatment can help prevent or delay these problems. The goal is to keep your blood sugar in a target range. That's the best way to reduce your chance of having more problems from diabetes. What are the symptoms? Some people who have type 2 diabetes may not have any symptoms early on.  Many people with the disease don't even know they have it at first. But with time, diabetes starts to cause symptoms. You have most symptoms of type 2 diabetes when your blood sugar is either too high or too low. The most common symptoms of high blood sugar include:  · Thirst.  · Needing to urinate often. · Weight loss. · Blurry vision. The symptoms of low blood sugar include:  · Sweating. · Shakiness. · Weakness. · Hunger. · Confusion. You're not likely to get symptoms of low blood sugar unless you take insulin or use certain diabetes medicines that lower blood sugar. How can you help prevent type 2 diabetes? There are things you can do to help prevent type 2 diabetes. Stay at a healthy weight. Exercise regularly, and eat healthy foods. Even small changes can make a difference. If you have prediabetes, the medicine metformin can help prevent type 2 diabetes. How is type 2 diabetes treated? Treatment for type 2 diabetes will change over time to meet your needs. But the focus of your treatment will usually be to keep your blood sugar levels in your target range. This will help prevent problems such as eye, kidney, heart, blood vessel, and nerve disease. Some people may need medicines to help their bodies make insulin or decrease insulin resistance. Some medicines slow down how quickly the body absorbs carbohydrates. Treatment to manage type 2 diabetes includes:  · Making healthy food choices and being active. · Losing weight, if you need to. · Seeing your doctor regularly. · Keeping your blood sugar in your target range. · Taking medicines, if you need them. · Quitting smoking, if you smoke. · Keeping your blood pressure and cholesterol under control. Follow-up care is a key part of your treatment and safety. Be sure to make and go to all appointments, and call your doctor if you are having problems. It's also a good idea to know your test results and keep a list of the medicines you take. Where can you learn more? Go to [email or web address] and sign in to your [employer] account. Enter U443 in the [employer] box to learn more about \"Learning About Type 2 Diabetes. \"     If you do not have an account, please click on the \"Sign Up Now\" link. Current as of: August 31, 2020               Content Version: 12.8  © 0245-1764 [employer], Inc[employer]. Care instructions adapted under license by [facility] ([facility]). If you have questions about a medical condition or this instruction, always ask your healthcare professional. [address] any warranty or liability for your use of this information.

## 2021-04-26 ENCOUNTER — TELEPHONE (OUTPATIENT)
Dept: ENT CLINIC | Age: 46
End: 2021-04-26

## 2021-04-26 ENCOUNTER — OFFICE VISIT (OUTPATIENT)
Dept: ENT CLINIC | Age: 46
End: 2021-04-26
Payer: COMMERCIAL

## 2021-04-26 ENCOUNTER — PROCEDURE VISIT (OUTPATIENT)
Dept: AUDIOLOGY | Age: 46
End: 2021-04-26
Payer: COMMERCIAL

## 2021-04-26 VITALS
WEIGHT: 315 LBS | HEIGHT: 76 IN | OXYGEN SATURATION: 100 % | SYSTOLIC BLOOD PRESSURE: 140 MMHG | DIASTOLIC BLOOD PRESSURE: 90 MMHG | TEMPERATURE: 98 F | BODY MASS INDEX: 38.36 KG/M2 | HEART RATE: 82 BPM

## 2021-04-26 DIAGNOSIS — H90.A11 CONDUCTIVE HEARING LOSS OF RIGHT EAR WITH RESTRICTED HEARING OF LEFT EAR: ICD-10-CM

## 2021-04-26 DIAGNOSIS — H69.83 ETD (EUSTACHIAN TUBE DYSFUNCTION), BILATERAL: Primary | ICD-10-CM

## 2021-04-26 DIAGNOSIS — Z01.818 PREOP TESTING: ICD-10-CM

## 2021-04-26 DIAGNOSIS — H90.11 CONDUCTIVE HEARING LOSS OF RIGHT EAR WITH UNRESTRICTED HEARING OF LEFT EAR: ICD-10-CM

## 2021-04-26 DIAGNOSIS — R09.82 POST-NASAL DRAINAGE: ICD-10-CM

## 2021-04-26 DIAGNOSIS — J32.4 CHRONIC PANSINUSITIS: Primary | ICD-10-CM

## 2021-04-26 DIAGNOSIS — H69.83 DYSFUNCTION OF BOTH EUSTACHIAN TUBES: ICD-10-CM

## 2021-04-26 PROCEDURE — 99214 OFFICE O/P EST MOD 30 MIN: CPT | Performed by: NURSE PRACTITIONER

## 2021-04-26 PROCEDURE — 92567 TYMPANOMETRY: CPT | Performed by: AUDIOLOGIST

## 2021-04-26 PROCEDURE — 92557 COMPREHENSIVE HEARING TEST: CPT | Performed by: AUDIOLOGIST

## 2021-04-26 PROCEDURE — G8427 DOCREV CUR MEDS BY ELIG CLIN: HCPCS | Performed by: NURSE PRACTITIONER

## 2021-04-26 PROCEDURE — 4004F PT TOBACCO SCREEN RCVD TLK: CPT | Performed by: NURSE PRACTITIONER

## 2021-04-26 PROCEDURE — G8417 CALC BMI ABV UP PARAM F/U: HCPCS | Performed by: NURSE PRACTITIONER

## 2021-04-26 RX ORDER — FLUTICASONE PROPIONATE 50 MCG
2 SPRAY, SUSPENSION (ML) NASAL DAILY
Qty: 2 BOTTLE | Refills: 1 | Status: SHIPPED
Start: 2021-04-26 | End: 2021-08-18

## 2021-04-26 RX ORDER — AZELASTINE 1 MG/ML
2 SPRAY, METERED NASAL 2 TIMES DAILY
Qty: 1 BOTTLE | Refills: 1 | Status: SHIPPED
Start: 2021-04-26 | End: 2021-06-21

## 2021-04-26 ASSESSMENT — ENCOUNTER SYMPTOMS
EYES NEGATIVE: 1
RESPIRATORY NEGATIVE: 1
RHINORRHEA: 0
STRIDOR: 0
SHORTNESS OF BREATH: 0

## 2021-04-26 NOTE — PROGRESS NOTES
[facility] Otolaryngology  Dr. Wally Patrick. [facility]. Patient Name:  Debbie Juares  :  1975     CHIEF C/O:    Chief Complaint   Patient presents with    Ear Problem     recurrent ear infections, tinnitus        HISTORY OBTAINED FROM:  patient    HISTORY OF PRESENT ILLNESS:       Skyla Suh is a 41y.o. year old male, here today for ear infections. He was last seen 6 weeks ago and placed on Flonase for eustachian tube dysfunction and Augmentin for possible otitis media. He states that since starting the medication he has noticed no significant improvement of his symptoms. He continues to have muffled hearing worse on the right with ringing in both ears but worse on the right. 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 He continues to have persistent nasal congestion that is also worse on the right. He continues having postnasal drainage without considerable rhinorrhea. He does complain of mild maxillary sinus pressure without recent fevers. He denies previous ear ear or sinus surgery.             Past Medical History:   Diagnosis Date    CAD (coronary artery disease)     [facility]     Diabetes mellitus ([facility]     Hyperlipidemia     Hypertension     Internal carotid artery occlusion, right 3/16/2020    Mediastinal mass     Neuropathy     Obesity     329 #     Past Surgical History:   Procedure Laterality Date    CARDIAC CATHETERIZATION  2020    Dr. Princess Rosa- multi vessel disease    MITRAL VALVE REPAIR N/A 3/16/2020    CORONARY ARTERY BYPASS POSSIBLE LEFT RADIAL ARTERY HARVEST, PATRICE performed by Yamileth Brothers MD at [address] [place] [address]  2017    resection of mediastinal mass    TONSILLECTOMY      as a child    TRANSESOPHAGEAL ECHOCARDIOGRAM  2020    Dr. Miquel Gonzales       Current Outpatient Medications:     azelastine (ASTELIN) 0.1 % nasal spray, 2 sprays by Nasal route 2 times daily Use in each nostril as directed, Disp: 1 Bottle, Rfl: 1    fluticasone (FLONASE) 50 MCG/ACT nasal spray, 2 sprays by Each Nostril route daily, Disp: 2 Bottle, Rfl: 1    insulin detemir (LEVEMIR FLEXTOUCH) 100 UNIT/ML injection pen, Inject 68 Units into the skin nightly, Disp: 5 pen, Rfl: 3    Insulin Pen Needle (PEN NEEDLES 29GX1/2\") 29G X 12MM MISC, USE AS DIRECTED, Disp: 100 each, Rfl: 5    metoprolol tartrate (LOPRESSOR) 50 MG tablet, Take 1 tablet by mouth 2 times daily, Disp: 60 tablet, Rfl: 3    albuterol sulfate  (90 Base) MCG/ACT inhaler, Inhale 2 puffs into the lungs every 6 hours as needed for Wheezing, Disp: 1 Inhaler, Rfl: 3    aspirin EC 81 MG EC tablet, Take 1 tablet by mouth daily, Disp: 90 tablet, Rfl: 3    atorvastatin (LIPITOR) 80 MG tablet, take 1 tablet by mouth once daily, Disp: 30 tablet, Rfl: 3    BD PEN NEEDLE PRAMOD U/F 32G X 4 MM MISC, Inject 1 each as directed 2 times daily, Disp: 100 each, Rfl: 5    blood glucose test strips (FREESTYLE INSULINX TEST) strip, 1 each by In Vitro route 3 times daily As needed. , Disp: 1 each, Rfl: 5    DULERA 100-5 MCG/ACT inhaler, Inhale 2 puffs into the lungs 2 times daily, Disp: 1 Inhaler, Rfl: 3    FreeStyle Lancets MISC, USE AS DIRECTED, Disp: 100 each, Rfl: 3    insulin aspart (NOVOLOG) 100 UNIT/ML injection vial, Use tid with sliding scale, Disp: 5 Package, Rfl: 5    metFORMIN (GLUCOPHAGE) 850 MG tablet, Take 1 tablet by mouth 3 times daily, Disp: 90 tablet, Rfl: 3    methylPREDNISolone (MEDROL DOSEPACK) 4 MG tablet, Take by mouth., Disp: 1 kit, Rfl: 0    vitamin D (ERGOCALCIFEROL) 1.25 MG (56869 UT) CAPS capsule, take 1 capsule by mouth every week, Disp: 5 capsule, Rfl: 3    HYDROcodone-acetaminophen (NORCO)  MG per tablet, Take 1 tablet by mouth every 6 hours as needed for Pain., Disp: , Rfl:     Alcohol Swabs 70 % PADS, , Disp: , Rfl:     Cholecalciferol (VITAMIN D3 ULTRA POTENCY) 1.25 MG (10005 UT) TABS, Take 50,000 Int'l Units/day by mouth once a week, Disp: 4 tablet, Rfl: 2    Insulin Syringe-Needle U-100 (INSULIN SYRINGE 1CC/30GX1/2\") 30G X 1/2\" 1 ML MISC, 1 box by Does not apply route three times daily 3 times daily, Disp: 100 each, Rfl: 5  Sulfa antibiotics  Social History     Tobacco Use    Smoking status: Former Smoker     Packs/day: 1.00     Years: 25.00     Pack years: 25.00     Types: Cigarettes     Start date: 1994     Quit date: 3/14/2020     Years since quittin.1    Smokeless tobacco: Current User     Types: Chew, Snuff   Substance Use Topics    Alcohol use: No    Drug use: No     Family History   Problem Relation Age of Onset    Heart Disease Mother         MI age 39     High Blood Pressure Mother     Diabetes Father     Heart Disease Father         MI    High Blood Pressure Father     High Cholesterol Father     Cancer Father     Stroke Father     Kidney Disease Father         dialysis    High Blood Pressure Sister     Other Brother     COPD Brother     High Blood Pressure Brother        Review of Systems   Constitutional: Negative. Negative for activity change and appetite change. HENT: Positive for congestion, ear pain (Pressure bilaterally), hearing loss, postnasal drip and tinnitus. Negative for ear discharge and rhinorrhea. Eyes: Negative. Respiratory: Negative. Negative for shortness of breath and stridor. Cardiovascular: Negative. Negative for chest pain and palpitations. Endocrine: Negative. Genitourinary: Negative. Musculoskeletal: Negative. Skin: Negative. Neurological: Negative. Negative for dizziness. Hematological: Negative. Psychiatric/Behavioral: Negative.         BP (!) 140/90 (Site: Left Upper Arm, Position: Sitting, Cuff Size: Large Adult)   Pulse 82   Temp 98 °F (36.7 °C)   Ht 6' 4\" (1.93 m)   Wt (!) 350 lb (158.8 kg)   SpO2 100%   BMI 42.60 kg/m²   Physical Exam  Constitutional: Appearance: Normal appearance. HENT:      Head: Normocephalic. Right Ear: Ear canal and external ear normal. A middle ear effusion is present. Tympanic membrane is not erythematous or bulging. Left Ear: Ear canal and external ear normal. A middle ear effusion is present. Nose: Nose normal. No rhinorrhea. Right Turbinates: Swollen and pale. Left Turbinates: Pale. Not swollen. Mouth/Throat:      Lips: Pink. Mouth: Mucous membranes are moist.     Eyes:      Conjunctiva/sclera: Conjunctivae normal.      Pupils: Pupils are equal, round, and reactive to light. Neck:      Musculoskeletal: Normal range of motion. No neck rigidity or muscular tenderness. Cardiovascular:      Rate and Rhythm: Normal rate and regular rhythm. Pulses: Normal pulses. Pulmonary:      Effort: Pulmonary effort is normal. No respiratory distress. Breath sounds: No stridor. Musculoskeletal: Normal range of motion. Skin:     General: Skin is warm and dry. Neurological:      General: No focal deficit present. Mental Status: He is alert and oriented to person, place, and time. Psychiatric:         Mood and Affect: Mood normal.         Behavior: Behavior normal.         Thought Content: Thought content normal.         Judgment: Judgment normal.       Audiogram and tympanogram reviewed with patient. Audiogram reveals 25 dB hearing loss in the right ear with 100% discrimination at 60 dB, 15 dB hearing loss in the left ear with 100% discrimination at 50 dB. There is separation of the right ear from the left ear of conductive nature. Tympanogram is unable to be obtained due to no seal.    IMPRESSION/PLAN:  Jacinta Alejandra was seen today for ear problem. Diagnoses and all orders for this visit:    Chronic pansinusitis  -     CT SINUS WO CONTRAST;  Future    Post-nasal drainage    Dysfunction of both eustachian tubes  -     LISANDRO Crook, Audiology, [place]    Conductive hearing loss of right ear with unrestricted hearing of left ear  -     CT IAC POSTERIOR FOSSA W WO CONTRAST; Future  -     Devora  Ruth Doan, AU.D, Audiology, [place]    Preop testing  -     BUN & Creatinine    Other orders  -     azelastine (ASTELIN) 0.1 % nasal spray; 2 sprays by Nasal route 2 times daily Use in each nostril as directed  -     fluticasone (FLONASE) 50 MCG/ACT nasal spray; 2 sprays by Each Nostril route daily      Shahida Staples is seen today for follow-up of the ear pressure and sinus congestion. Upon exam the right ear continues to show sign of middle ear effusion with normal left TM. There is no erythema or edema of either ear canal.  Sinuses are patent with considerable swelling and pallor appearance of the right inferior turbinate. There is a spur at the base of the left nasal septum. There is no considerable rhinorrhea present with mild postnasal drainage in the oropharynx. He does suffer from mild tenderness over the right maxillary sinus. At this time a CT of the sinuses will be ordered due to continued symptoms after 6 weeks of Flonase. We will also order a CT of the IAC with and without contrast to assess his conductive hearing loss. He will continue with his Flonase, 2 sprays each nostril once daily as well as Astelin spray, 2 sprays each nostril up to twice daily. He will follow-up in 1 month for results and reevaluation. He is instructed to call with any new or worsening of symptoms prior to his next appointment.     Suzi Waller, MSN, FNP-C  8 [facility], Nose and Throat    The information contained in this note has been dictated using drug and medical speech recognition software and may contain errors

## 2021-04-26 NOTE — TELEPHONE ENCOUNTER
[facility] to authorize order with patient insurance. Patient is scheduled for Sinus and IAC CT with radiology on 5/11/21 @ 10:30am and 11:00am. Patient has been notified of date and time and that they need to arrive at 10:00am. Patient was informed he needs to be NPO 4 hours prior to procedure. Patient instructed to park in [employer] parking lot and report to registration. Detail voicemail left for patient.     Electronically signed by Petra Méndez on 4/26/21 at 12:53 PM EDT

## 2021-04-26 NOTE — PROGRESS NOTES
This patient was referred for audiometric/tympanometric testing by HILDA Dyer due to hearing loss. Muffled hearing right ear. Tinnitus bilateral but worse on right side. Audiometry revealed hearing sensitivity within normal limits through 3000 Hz sloping to a mild sensorineural hearing loss in the left ear. Right ear revealed a mild conductive loss through 3000 Hz sloping to a mild hearing loss. Reliability was good. Speech reception thresholds were in good agreement with the pure tone averages, bilaterally. Speech discrimination scores were excellent, bilaterally. Tympanometry - attempted but no seal could be obtained bilaterally. The results were reviewed with the patient and CNP. Recommendations for follow up will be made pending physician consult.     Romulo Haji/CCC-A  [place] Lic # [id number]

## 2021-05-04 DIAGNOSIS — H90.11 CONDUCTIVE HEARING LOSS OF RIGHT EAR WITH UNRESTRICTED HEARING OF LEFT EAR: ICD-10-CM

## 2021-05-04 DIAGNOSIS — J32.4 CHRONIC PANSINUSITIS: ICD-10-CM

## 2021-05-04 LAB
BUN BLDV-MCNC: 19 MG/DL (ref 6–20)
CREAT SERPL-MCNC: 0.7 MG/DL (ref 0.7–1.2)
GFR AFRICAN AMERICAN: >60
GFR NON-AFRICAN AMERICAN: >60 ML/MIN/1.73

## 2021-05-11 ENCOUNTER — HOSPITAL ENCOUNTER (OUTPATIENT)
Dept: CT IMAGING | Age: 46
Discharge: HOME | End: 2021-05-13
Payer: COMMERCIAL

## 2021-05-11 PROCEDURE — 70486 CT MAXILLOFACIAL W/O DYE: CPT

## 2021-05-11 PROCEDURE — 70482 CT ORBIT/EAR/FOSSA W/O&W/DYE: CPT

## 2021-05-11 PROCEDURE — 6360000004 HC RX CONTRAST MEDICATION: Performed by: RADIOLOGY

## 2021-05-11 RX ADMIN — IOPAMIDOL 75 ML: 755 INJECTION, SOLUTION INTRAVENOUS at 10:34

## 2021-05-13 RX ORDER — GLUCOSAMINE HCL/CHONDROITIN SU 500-400 MG
CAPSULE ORAL
Qty: 100 STRIP | Refills: 5 | Status: SHIPPED
Start: 2021-05-13 | End: 2021-06-22 | Stop reason: ALTCHOICE

## 2021-05-13 RX ORDER — ERGOCALCIFEROL 1.25 MG/1
CAPSULE ORAL
Qty: 5 CAPSULE | Refills: 3 | Status: SHIPPED
Start: 2021-05-13 | End: 2021-06-22 | Stop reason: SDUPTHER

## 2021-05-13 RX ORDER — LANCETS 30 GAUGE
1 EACH MISCELLANEOUS 3 TIMES DAILY
Qty: 100 EACH | Refills: 5 | Status: SHIPPED
Start: 2021-05-13 | End: 2021-06-22 | Stop reason: SDUPTHER

## 2021-05-13 NOTE — TELEPHONE ENCOUNTER
Pt was notified that insurance will no longer cover his test strips and to order new meter.   Order pended for approval

## 2021-06-07 ENCOUNTER — PROCEDURE VISIT (OUTPATIENT)
Dept: AUDIOLOGY | Age: 46
End: 2021-06-07
Payer: COMMERCIAL

## 2021-06-07 ENCOUNTER — OFFICE VISIT (OUTPATIENT)
Dept: ENT CLINIC | Age: 46
End: 2021-06-07
Payer: COMMERCIAL

## 2021-06-07 VITALS
BODY MASS INDEX: 38.36 KG/M2 | HEIGHT: 76 IN | TEMPERATURE: 97.2 F | HEART RATE: 85 BPM | WEIGHT: 315 LBS | OXYGEN SATURATION: 98 % | SYSTOLIC BLOOD PRESSURE: 150 MMHG | DIASTOLIC BLOOD PRESSURE: 90 MMHG

## 2021-06-07 DIAGNOSIS — J35.2 ADENOID HYPERTROPHY: ICD-10-CM

## 2021-06-07 DIAGNOSIS — H69.83 ETD (EUSTACHIAN TUBE DYSFUNCTION), BILATERAL: Primary | ICD-10-CM

## 2021-06-07 DIAGNOSIS — H69.83 DYSFUNCTION OF BOTH EUSTACHIAN TUBES: Primary | ICD-10-CM

## 2021-06-07 DIAGNOSIS — H90.11 CONDUCTIVE HEARING LOSS OF RIGHT EAR WITH UNRESTRICTED HEARING OF LEFT EAR: ICD-10-CM

## 2021-06-07 PROCEDURE — 4004F PT TOBACCO SCREEN RCVD TLK: CPT | Performed by: NURSE PRACTITIONER

## 2021-06-07 PROCEDURE — G8417 CALC BMI ABV UP PARAM F/U: HCPCS | Performed by: NURSE PRACTITIONER

## 2021-06-07 PROCEDURE — 92567 TYMPANOMETRY: CPT | Performed by: AUDIOLOGIST

## 2021-06-07 PROCEDURE — 99213 OFFICE O/P EST LOW 20 MIN: CPT | Performed by: NURSE PRACTITIONER

## 2021-06-07 PROCEDURE — G8427 DOCREV CUR MEDS BY ELIG CLIN: HCPCS | Performed by: NURSE PRACTITIONER

## 2021-06-07 ASSESSMENT — ENCOUNTER SYMPTOMS
EYES NEGATIVE: 1
RESPIRATORY NEGATIVE: 1
STRIDOR: 0
RHINORRHEA: 0
SHORTNESS OF BREATH: 0

## 2021-06-07 NOTE — PROGRESS NOTES
This patient was referred for audiometric/tympanometric testing by HILDA Araujo due to Eustachian tube dysfunction, bilateral.     Tympanometry revealed negative middle ear pressure (-291 daPa), in the right ear and negative middle ear pressure (-195 daPa), in th left ear. The results were reviewed with the patient. Recommendations for follow up will be made pending physician consult.     Romulo Arredondo [facility]  [address] [facility] [id number]  Electronically signed by Romulo Arredondo on 6/7/2021 at 11:10 AM

## 2021-06-07 NOTE — PROGRESS NOTES
[facility]  Dr. Nathan Puga. [facility]. Patient Name:  Teresa Knowles  :  1975     CHIEF C/O:    Chief Complaint   Patient presents with    Follow-up     follow up sinus CT and IAC CT results        HISTORY OBTAINED FROM:  patient    HISTORY OF PRESENT ILLNESS:       Chris Elliott is a 41y.o. year old male, here today for CT results right ear pressure. He underwent CT of the IAC posterior fossa and CT of the sinuses with results available for review today. Continues have mild pressure in the right ear with frequent popping and cracking while eating and talking. He states he he has to plug his nose and blow out to release the pressure in his ears. He admits to frequent ear infections as a child without previous ear surgeries. He states he had his tonsils removed at the age of 14 but they did not remove his adenoids at that time. He currently denies any sinus pain or pressure. He admits to a 30-year smoking history having quit 1 year ago. He does continue to use smokeless tobacco daily.       Past Medical History:   Diagnosis Date    CAD (coronary artery disease)     [facility]) [facility]     Diabetes mellitus ([facility]     Hyperlipidemia     Hypertension     Internal carotid artery occlusion, right 3/16/2020    Mediastinal mass     Neuropathy     Obesity     329 #     Past Surgical History:   Procedure Laterality Date    CARDIAC CATHETERIZATION  2020    Dr. Karen rGant- multi vessel disease    MITRAL VALVE REPAIR N/A 3/16/2020    CORONARY ARTERY BYPASS POSSIBLE LEFT RADIAL ARTERY HARVEST, PATRICE performed by Daniel Wilson MD at [address] [place] [address] Avenue  2017    resection of mediastinal mass    TONSILLECTOMY      as a child    TRANSESOPHAGEAL ECHOCARDIOGRAM  2020    Dr. Ike Chapman       Current Outpatient Medications:     vitamin D (ERGOCALCIFEROL) 1.25 MG (20515 UT) CAPS capsule, take 1 capsule by mouth every week, Disp: 5 capsule, Rfl: 3   blood glucose monitor strips, Dispense what insurance will cover. Test 3 times a day & as needed for symptoms of irregular blood glucose. Dispense sufficient amount for indicated testing frequency plus additional to accommodate PRN testing needs. , Disp: 100 strip, Rfl: 5    blood glucose monitor kit and supplies, Dispense which ever insurance will cover. Start up supplies. , Disp: 1 kit, Rfl: 0    Lancets MISC, 1 each by Does not apply route 3 times daily Dispense what insurance will cover, Disp: 100 each, Rfl: 5    azelastine (ASTELIN) 0.1 % nasal spray, 2 sprays by Nasal route 2 times daily Use in each nostril as directed, Disp: 1 Bottle, Rfl: 1    fluticasone (FLONASE) 50 MCG/ACT nasal spray, 2 sprays by Each Nostril route daily, Disp: 2 Bottle, Rfl: 1    Insulin Pen Needle (PEN NEEDLES 29GX1/2\") 29G X 12MM MISC, USE AS DIRECTED, Disp: 100 each, Rfl: 5    albuterol sulfate  (90 Base) MCG/ACT inhaler, Inhale 2 puffs into the lungs every 6 hours as needed for Wheezing, Disp: 1 Inhaler, Rfl: 3    aspirin EC 81 MG EC tablet, Take 1 tablet by mouth daily, Disp: 90 tablet, Rfl: 3    atorvastatin (LIPITOR) 80 MG tablet, take 1 tablet by mouth once daily, Disp: 30 tablet, Rfl: 3    BD PEN NEEDLE PRAMOD U/F 32G X 4 MM MISC, Inject 1 each as directed 2 times daily, Disp: 100 each, Rfl: 5    blood glucose test strips (FREESTYLE INSULINX TEST) strip, 1 each by In Vitro route 3 times daily As needed. , Disp: 1 each, Rfl: 5    DULERA 100-5 MCG/ACT inhaler, Inhale 2 puffs into the lungs 2 times daily, Disp: 1 Inhaler, Rfl: 3    FreeStyle Lancets MISC, USE AS DIRECTED, Disp: 100 each, Rfl: 3    insulin aspart (NOVOLOG) 100 UNIT/ML injection vial, Use tid with sliding scale, Disp: 5 Package, Rfl: 5    metFORMIN (GLUCOPHAGE) 850 MG tablet, Take 1 tablet by mouth 3 times daily, Disp: 90 tablet, Rfl: 3    methylPREDNISolone (MEDROL DOSEPACK) 4 MG tablet, Take by mouth., Disp: 1 kit, Rfl: 0   HYDROcodone-acetaminophen (NORCO)  MG per tablet, Take 1 tablet by mouth every 6 hours as needed for Pain., Disp: , Rfl:     Alcohol Swabs 70 % PADS, , Disp: , Rfl:     insulin detemir (LEVEMIR FLEXTOUCH) 100 UNIT/ML injection pen, Inject 68 Units into the skin nightly, Disp: 5 pen, Rfl: 3    metoprolol tartrate (LOPRESSOR) 50 MG tablet, Take 1 tablet by mouth 2 times daily, Disp: 60 tablet, Rfl: 3    Cholecalciferol (VITAMIN D3 ULTRA POTENCY) 1.25 MG (72707 UT) TABS, Take 50,000 Int'l Units/day by mouth once a week, Disp: 4 tablet, Rfl: 2    Insulin Syringe-Needle U-100 (INSULIN SYRINGE 1CC/30GX1/2\") 30G X 1/2\" 1 ML MISC, 1 box by Does not apply route three times daily 3 times daily, Disp: 100 each, Rfl: 5  Sulfa antibiotics  Social History     Tobacco Use    Smoking status: Former Smoker     Packs/day: 1.00     Years: 25.00     Pack years: 25.00     Types: Cigarettes     Start date: 1994     Quit date: 3/14/2020     Years since quittin.2    Smokeless tobacco: Current User     Types: Chew, Snuff   Vaping Use    Vaping Use: Never used   Substance Use Topics    Alcohol use: No    Drug use: No     Family History   Problem Relation Age of Onset    Heart Disease Mother         MI age 39     High Blood Pressure Mother     Diabetes Father     Heart Disease Father         MI    High Blood Pressure Father     High Cholesterol Father     Cancer Father     Stroke Father     Kidney Disease Father         dialysis    High Blood Pressure Sister     Other Brother     COPD Brother     High Blood Pressure Brother        Review of Systems   Constitutional: Negative. Negative for activity change and appetite change. HENT: Positive for ear pain (Pressure bilaterally), hearing loss and tinnitus. Negative for congestion, ear discharge, postnasal drip and rhinorrhea. Eyes: Negative. Respiratory: Negative. Negative for shortness of breath and stridor. Cardiovascular: Negative. SINUS WITHOUT CONTRAST 5/11/2021 10:27 am:       TECHNIQUE:   CT of the temporal internal auditory canal was performed with and without the   administration of intravenous contrast. Multiplanar reformatted images are   provided for review. Dose modulation, iterative reconstruction, and/or weight   based adjustment of the mA/kV was utilized to reduce the radiation dose to as   low as reasonably achievable.; CT of the sinuses was performed without the   administration of intravenous contrast. Multiplanar reformatted images are   provided for review. Dose modulation, iterative reconstruction, and/or weight   based adjustment of the mA/kV was utilized to reduce the radiation dose to as   low as reasonably achievable.       COMPARISON:   None.       HISTORY:   ORDERING SYSTEM PROVIDED HISTORY: Conductive hearing loss of right ear with   unrestricted hearing of left ear   TECHNOLOGIST PROVIDED HISTORY:   Reason for exam:->conductive hearing loss right ear       Chronic pansinusitis       FINDINGS:   CT OF THE TEMPORAL BONES WITH AND WITHOUT CONTRAST:       RIGHT TEMPORAL BONE:  The external auditory canal is clear without evidence   of bony erosion.  The scutum is intact.       There is partial opacification of the right middle ear cavity and near   complete opacification of the right mastoid air cells.  Soft tissue partially   encases the ossicular chain.  No gross erosive changes are seen, although the   stapes is suboptimally visualized.  No bony destructive changes are seen in   the completely opacified mastoid air cells. .       The inner ear structures appear unremarkable.  Normal mineralization of the   otic capsule.  The internal auditory canal and vestibular aqueduct appear   unremarkable.  The carotid canal is normal in appearance.  The jugular bulb   is unremarkable.       LEFT TEMPORAL BONE: The external auditory canal is clear without evidence of   bony erosion.  The scutum is intact.       The middle ear cavity is clear.  The ossiculoplasty chain is intact.  Minimal   effusion is seen in the inferior mastoid air cells.  No bony erosive changes   seen in the mastoid bone.       The inner ear structures appear unremarkable.  Normal mineralization of the   otic capsule.  The internal auditory canal and vestibular aqueduct appear   unremarkable.  The carotid canal is normal in appearance.  The jugular bulb   is unremarkable.       BRAIN: The visualized portion of the intracranial contents appear   unremarkable.       ORBITS: The visualized portion of the orbits demonstrate no acute abnormality.       CT OF THE SINUSES WITHOUT CONTRAST:       SINUSES: Minimal mucosal thickening is seen in the right ethmoid air cells. The paranasal sinuses otherwise clear.  There is mild rightward deviation of   the anterior nasal septum by approximately 2-3 mm.  There is pneumatization   of the middle turbinates bilaterally.  The vertical lamella of the bilateral   middle turbinates, as well as the right superior turbinate contact the nasal   septum.       The frontal recesses, sphenoid ethmoid recesses in the ostiomeatal units are   patent.       SOFT TISSUES: There is moderate hypertrophy of the adenoids, which result in   mild narrowing of the nasopharyngeal airway.           Impression   CT OF THE TEMPORAL BONES WITHOUT AND WITH CONTRAST:       1. Partial opacification of the right middle ear cavity and complete   opacification of the right mastoid air cells. 2. No bony destructive changes. 3. The ossicular chain and the scutum are intact. 4. Small left mastoid effusion.  Otherwise, the left temporal bone is   unremarkable. CT OF THE SINUSES WITHOUT AND WITH CONTRAST:       1. Minimal mucosal thickening in the right ethmoid sinuses.  The paranasal   sinuses otherwise clear. 2. The sinus outflow tracts are patent.    3. Possibly contributory to chronic rhinosinusitis include mild rightward   nasal septal deviation and bilateral leticia bullosa (anatomic variants). 4. Moderately hypertrophy adenoids, which may result in a otitis, sinusitis   and mastoiditis. 5. Bony contact between the right superior turbinate and the bilateral middle   turbinates with the nasal septum.  In some patients, such anatomic variation   may result in occasional pain and pressure. Tympanogram reviewed with patient. Type C curves bilaterally. IMPRESSION/PLAN:    Terri Xie was seen today for follow-up. Diagnoses and all orders for this visit:    Dysfunction of both eustachian tubes  -     Tympanometry    Conductive hearing loss of right ear with unrestricted hearing of left ear    Adenoid hypertrophy      To needs to have pressure bilaterally. Retracted TMs bilaterally. No signs of infection currently. Continue with Flonase spray, 2 sprays each nostril once daily as well as Astelin spray, 2 sprays each nostril up to twice daily as needed. CT of the IAC and sinuses reviewed with patient as well as Dr. Balbina Bautista revealing hypertrophy of the adenoid space, mild nasal septal deviation with bilateral leticia bullosa. He also shows congestion within the right ear and right mastoid space. Patient will follow up with Dr. Balbina Bautista for surgical consult including possible BMT with adenoidectomy and adenoid biopsy. Plan for Dr. Balbina Bautista to scope at next visit. He will follow-up in 2 weeks. Instructed call with any new or worsening symptoms prior to his next appointment.         Jacqueline Peter, MSN, FNP-C  8 [facility], Nose and Throat    The information contained in this note has been dictated using drug and medical speech recognition software and may contain errors

## 2021-06-15 ENCOUNTER — OFFICE VISIT (OUTPATIENT)
Dept: ENT CLINIC | Age: 46
End: 2021-06-15
Payer: COMMERCIAL

## 2021-06-15 VITALS — BODY MASS INDEX: 38.36 KG/M2 | HEIGHT: 76 IN | WEIGHT: 315 LBS

## 2021-06-15 DIAGNOSIS — H69.83 DYSFUNCTION OF BOTH EUSTACHIAN TUBES: ICD-10-CM

## 2021-06-15 DIAGNOSIS — H90.A11 CONDUCTIVE HEARING LOSS OF RIGHT EAR WITH RESTRICTED HEARING OF LEFT EAR: ICD-10-CM

## 2021-06-15 DIAGNOSIS — J39.2 NASOPHARYNGEAL MASS: Primary | ICD-10-CM

## 2021-06-15 PROCEDURE — G8417 CALC BMI ABV UP PARAM F/U: HCPCS | Performed by: OTOLARYNGOLOGY

## 2021-06-15 PROCEDURE — 4004F PT TOBACCO SCREEN RCVD TLK: CPT | Performed by: OTOLARYNGOLOGY

## 2021-06-15 PROCEDURE — G8427 DOCREV CUR MEDS BY ELIG CLIN: HCPCS | Performed by: OTOLARYNGOLOGY

## 2021-06-15 PROCEDURE — 99213 OFFICE O/P EST LOW 20 MIN: CPT | Performed by: OTOLARYNGOLOGY

## 2021-06-15 PROCEDURE — 31575 DIAGNOSTIC LARYNGOSCOPY: CPT | Performed by: OTOLARYNGOLOGY

## 2021-06-15 ASSESSMENT — ENCOUNTER SYMPTOMS
EYE DISCHARGE: 0
STRIDOR: 0
EYE REDNESS: 0
SHORTNESS OF BREATH: 0
COUGH: 0
ALLERGIC/IMMUNOLOGIC NEGATIVE: 1
NAUSEA: 0
COLOR CHANGE: 0
VOMITING: 0

## 2021-06-15 NOTE — PROGRESS NOTES
Subjective:     Patient ID:  Lela Funk is a 41 y.o. male. HPI:  41year old male presenting for evaluation of his ears. Pt has a history of hearing loss, worse on the right, popping, and nasal congestion. Former smoker, 20 pack year history quit 1 year ago. Notes \"always having issues\" with his ears since a child but has never had HENT surgery. Patient's medications,allergies, past medical, surgical, social and family histories were reviewed andupdated as appropriate. Review of Systems   Constitutional: Negative for chills, fatigue and fever. HENT: Positive for congestion and hearing loss. Eyes: Negative for discharge and redness. Respiratory: Negative for cough, shortness of breath and stridor. Gastrointestinal: Negative for nausea and vomiting. Endocrine: Negative. Genitourinary: Negative. Musculoskeletal: Negative. Skin: Negative for color change and rash. Allergic/Immunologic: Negative. Neurological: Negative for dizziness, speech difficulty and headaches. Hematological: Negative. Psychiatric/Behavioral: Negative for agitation and confusion. All other systems reviewed and are negative. Objective:   Physical Exam  Constitutional:       Appearance: Normal appearance. HENT:      Head: Normocephalic and atraumatic. Right Ear: External ear normal. Tympanic membrane is retracted. Left Ear: External ear normal.      Nose: Congestion present. Mouth/Throat:      Mouth: Mucous membranes are moist.   Eyes:      Pupils: Pupils are equal, round, and reactive to light. Cardiovascular:      Rate and Rhythm: Normal rate. Pulmonary:      Effort: Pulmonary effort is normal.   Musculoskeletal:      Cervical back: Normal range of motion. Skin:     General: Skin is warm and dry. Neurological:      Mental Status: He is alert.        Endoscopy Procedure Note    Pre-operative Diagnosis: ETD  Post-operative Diagnosis: nasopharyngeal mass Indications: Nasal/sinus symptoms requiring endoscopy - unable to visualize adequately by anterior or posterior rhinoscopy  Anesthesia: Lidocaine 4% and Flako-Synephrine 1/2%  Endoscopy Type:  Flexible nasopharyngolaryngoscopy  Procedure Details   The flexible nasopharyngolaryngoscope was passed through the right side(s) of the nose, and the nose, nasopharynx, oropharynx, hypopharynx and larynx were examined. Examination was performed during quiet respiration and with phonation. The following findings were noted. Findings:   Nasopharyngeal cyst/mass, rest of exam WNL  Condition:  Stable  Complications:  None                    Assessment:       Diagnosis Orders   1. Nasopharyngeal mass  79306 - [place] LARYNGOSCOPY,FLEX FIBER,DIAGNOSTIC   2. Conductive hearing loss of right ear with restricted hearing of left ear  12250 - [place] LARYNGOSCOPY,FLEX FIBER,DIAGNOSTIC   3. 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 I have seen and examined the patient and the key elements of the encounter have been performed by me. I agree with the assessment, plan and orders as documented by the  resident              Remainder of medical problems as per  resident note. Patient seen and examined. Agree with above exam, assessment and plan.       Electronically signed by Garry Swain DO on 6/28/21 at 12:16 PM EDT

## 2021-06-15 NOTE — PATIENT INSTRUCTIONS
Thank you for choosing our [facility] or [facility] practice. We are committed to your medical treatment and  care. If you need to reschedule or cancel your surgery or follow up  appointment, please call the surgery scheduler at [phone]. INSTRUCTIONS FOR SURGERY Adenoidectomy, Adenoid Biopsy, BMT    Nothing to eat or drink after midnight the night before surgery unless surgery is at [facility] or otherwise instructed by the hospital.    DO NOT TAKE ANY ASPIRIN PRODUCTS 7 days prior to surgery-unless required by your cardiologist or primary care physician. Tylenol only. No Advil, Motrin, Aleve, or Ibuprofen    Any illegal drugs in your system (including Marijuana even if legally prescribed) will result in your surgery being cancelled. Please be sure to check with our office or the hospital on time frame for the drugs to be out of your system. Should your insurance change at any time you must contact our office. Failure to do so may result in your surgery being rescheduled. If you need paperwork filled out for work, you must give the office 2 weeks to complete and submit the forms.       [address], [address] [facility] will call you a couple days prior to your surgery and give you further instructions, if any questions call them at [phone]

## 2021-06-21 DIAGNOSIS — J32.4 CHRONIC PANSINUSITIS: Primary | ICD-10-CM

## 2021-06-21 RX ORDER — AZELASTINE HYDROCHLORIDE 137 UG/1
SPRAY, METERED NASAL
Qty: 30 ML | Refills: 2 | Status: SHIPPED
Start: 2021-06-21 | End: 2021-09-13

## 2021-06-21 NOTE — TELEPHONE ENCOUNTER
Patient was last seen 6/7/2021 and seen by Dr. Mely Ward 6/15/2021, patient is scheduled to come back in office after sinus surgery in August. Patient would like a prescription refill for Astelin nasal spray.

## 2021-06-22 ENCOUNTER — OFFICE VISIT (OUTPATIENT)
Dept: PRIMARY CARE CLINIC | Age: 46
End: 2021-06-22
Payer: COMMERCIAL

## 2021-06-22 VITALS
WEIGHT: 315 LBS | HEIGHT: 76 IN | RESPIRATION RATE: 16 BRPM | DIASTOLIC BLOOD PRESSURE: 74 MMHG | SYSTOLIC BLOOD PRESSURE: 132 MMHG | HEART RATE: 87 BPM | OXYGEN SATURATION: 97 % | BODY MASS INDEX: 38.36 KG/M2

## 2021-06-22 DIAGNOSIS — Z11.4 SCREENING FOR HIV (HUMAN IMMUNODEFICIENCY VIRUS): ICD-10-CM

## 2021-06-22 DIAGNOSIS — E55.9 VITAMIN D DEFICIENCY: ICD-10-CM

## 2021-06-22 DIAGNOSIS — E78.2 MIXED HYPERLIPIDEMIA: ICD-10-CM

## 2021-06-22 DIAGNOSIS — J44.9 CHRONIC OBSTRUCTIVE PULMONARY DISEASE, UNSPECIFIED COPD TYPE (HCC): ICD-10-CM

## 2021-06-22 DIAGNOSIS — I10 ESSENTIAL HYPERTENSION: ICD-10-CM

## 2021-06-22 DIAGNOSIS — E11.65 UNCONTROLLED TYPE 2 DIABETES MELLITUS WITH HYPERGLYCEMIA (HCC): Primary | ICD-10-CM

## 2021-06-22 DIAGNOSIS — E11.65 UNCONTROLLED TYPE 2 DIABETES MELLITUS WITH HYPERGLYCEMIA (HCC): ICD-10-CM

## 2021-06-22 DIAGNOSIS — Z11.59 NEED FOR HEPATITIS C SCREENING TEST: ICD-10-CM

## 2021-06-22 DIAGNOSIS — I25.10 CAD IN NATIVE ARTERY: ICD-10-CM

## 2021-06-22 DIAGNOSIS — I65.21 CAROTID STENOSIS, RIGHT: ICD-10-CM

## 2021-06-22 LAB
ALBUMIN SERPL-MCNC: 4.2 G/DL (ref 3.5–5.2)
ALP BLD-CCNC: 97 U/L (ref 40–129)
ALT SERPL-CCNC: 19 U/L (ref 0–40)
ANION GAP SERPL CALCULATED.3IONS-SCNC: 19 MMOL/L (ref 7–16)
AST SERPL-CCNC: 16 U/L (ref 0–39)
BASOPHILS ABSOLUTE: 0.07 E9/L (ref 0–0.2)
BASOPHILS RELATIVE PERCENT: 0.9 % (ref 0–2)
BILIRUB SERPL-MCNC: 0.5 MG/DL (ref 0–1.2)
BUN BLDV-MCNC: 14 MG/DL (ref 6–20)
CALCIUM SERPL-MCNC: 9.6 MG/DL (ref 8.6–10.2)
CHLORIDE BLD-SCNC: 99 MMOL/L (ref 98–107)
CHOLESTEROL, TOTAL: 191 MG/DL (ref 0–199)
CO2: 22 MMOL/L (ref 22–29)
CREAT SERPL-MCNC: 0.6 MG/DL (ref 0.7–1.2)
CREATININE URINE POCT: 100
EOSINOPHILS ABSOLUTE: 0.16 E9/L (ref 0.05–0.5)
EOSINOPHILS RELATIVE PERCENT: 2 % (ref 0–6)
GFR AFRICAN AMERICAN: >60
GFR NON-AFRICAN AMERICAN: >60 ML/MIN/1.73
GLUCOSE BLD-MCNC: 360 MG/DL (ref 74–99)
HBA1C MFR BLD: 12.3 %
HCT VFR BLD CALC: 50.7 % (ref 37–54)
HDLC SERPL-MCNC: 51 MG/DL
HEMOGLOBIN: 16.9 G/DL (ref 12.5–16.5)
IMMATURE GRANULOCYTES #: 0.02 E9/L
IMMATURE GRANULOCYTES %: 0.3 % (ref 0–5)
LDL CHOLESTEROL CALCULATED: 118 MG/DL (ref 0–99)
LYMPHOCYTES ABSOLUTE: 2.37 E9/L (ref 1.5–4)
LYMPHOCYTES RELATIVE PERCENT: 30 % (ref 20–42)
MCH RBC QN AUTO: 30.7 PG (ref 26–35)
MCHC RBC AUTO-ENTMCNC: 33.3 % (ref 32–34.5)
MCV RBC AUTO: 92 FL (ref 80–99.9)
MICROALBUMIN/CREAT 24H UR: 150 MG/G{CREAT}
MICROALBUMIN/CREAT UR-RTO: NORMAL
MONOCYTES ABSOLUTE: 0.63 E9/L (ref 0.1–0.95)
MONOCYTES RELATIVE PERCENT: 8 % (ref 2–12)
NEUTROPHILS ABSOLUTE: 4.66 E9/L (ref 1.8–7.3)
NEUTROPHILS RELATIVE PERCENT: 58.8 % (ref 43–80)
PDW BLD-RTO: 12.8 FL (ref 11.5–15)
PLATELET # BLD: 287 E9/L (ref 130–450)
PMV BLD AUTO: 9.2 FL (ref 7–12)
POTASSIUM SERPL-SCNC: 4.3 MMOL/L (ref 3.5–5)
RBC # BLD: 5.51 E12/L (ref 3.8–5.8)
SODIUM BLD-SCNC: 140 MMOL/L (ref 132–146)
TOTAL CK: 52 U/L (ref 20–200)
TOTAL PROTEIN: 7.6 G/DL (ref 6.4–8.3)
TRIGL SERPL-MCNC: 109 MG/DL (ref 0–149)
TSH SERPL DL<=0.05 MIU/L-ACNC: 0.88 UIU/ML (ref 0.27–4.2)
VLDLC SERPL CALC-MCNC: 22 MG/DL
WBC # BLD: 7.9 E9/L (ref 4.5–11.5)

## 2021-06-22 PROCEDURE — G8926 SPIRO NO PERF OR DOC: HCPCS | Performed by: NURSE PRACTITIONER

## 2021-06-22 PROCEDURE — 82044 UR ALBUMIN SEMIQUANTITATIVE: CPT | Performed by: NURSE PRACTITIONER

## 2021-06-22 PROCEDURE — 3046F HEMOGLOBIN A1C LEVEL >9.0%: CPT | Performed by: NURSE PRACTITIONER

## 2021-06-22 PROCEDURE — 2022F DILAT RTA XM EVC RTNOPTHY: CPT | Performed by: NURSE PRACTITIONER

## 2021-06-22 PROCEDURE — 3023F SPIROM DOC REV: CPT | Performed by: NURSE PRACTITIONER

## 2021-06-22 PROCEDURE — G8427 DOCREV CUR MEDS BY ELIG CLIN: HCPCS | Performed by: NURSE PRACTITIONER

## 2021-06-22 PROCEDURE — 4004F PT TOBACCO SCREEN RCVD TLK: CPT | Performed by: NURSE PRACTITIONER

## 2021-06-22 PROCEDURE — 83036 HEMOGLOBIN GLYCOSYLATED A1C: CPT | Performed by: NURSE PRACTITIONER

## 2021-06-22 PROCEDURE — 99214 OFFICE O/P EST MOD 30 MIN: CPT | Performed by: NURSE PRACTITIONER

## 2021-06-22 PROCEDURE — G8417 CALC BMI ABV UP PARAM F/U: HCPCS | Performed by: NURSE PRACTITIONER

## 2021-06-22 RX ORDER — INSULIN DETEMIR 100 [IU]/ML
68 INJECTION, SOLUTION SUBCUTANEOUS NIGHTLY
Qty: 5 PEN | Refills: 3 | Status: SHIPPED
Start: 2021-06-22 | End: 2021-10-13 | Stop reason: SDUPTHER

## 2021-06-22 RX ORDER — ATORVASTATIN CALCIUM 80 MG/1
TABLET, FILM COATED ORAL
Qty: 30 TABLET | Refills: 3 | Status: SHIPPED
Start: 2021-06-22 | End: 2021-10-13 | Stop reason: SDUPTHER

## 2021-06-22 RX ORDER — LANCETS 28 GAUGE
EACH MISCELLANEOUS
Qty: 100 EACH | Refills: 3 | Status: SHIPPED
Start: 2021-06-22 | End: 2021-11-19

## 2021-06-22 RX ORDER — ALBUTEROL SULFATE 90 UG/1
2 AEROSOL, METERED RESPIRATORY (INHALATION) EVERY 6 HOURS PRN
Qty: 1 INHALER | Refills: 3 | Status: SHIPPED
Start: 2021-06-22 | End: 2021-10-13 | Stop reason: SDUPTHER

## 2021-06-22 RX ORDER — BLOOD SUGAR DIAGNOSTIC
1 STRIP MISCELLANEOUS 3 TIMES DAILY
Qty: 1 EACH | Refills: 5 | Status: SHIPPED
Start: 2021-06-22 | End: 2021-10-13 | Stop reason: SDUPTHER

## 2021-06-22 RX ORDER — ASPIRIN 81 MG/1
81 TABLET ORAL DAILY
Qty: 90 TABLET | Refills: 3 | Status: SHIPPED
Start: 2021-06-22 | End: 2022-03-22 | Stop reason: SDUPTHER

## 2021-06-22 RX ORDER — LANCETS 30 GAUGE
1 EACH MISCELLANEOUS 3 TIMES DAILY
Qty: 100 EACH | Refills: 5 | Status: SHIPPED
Start: 2021-06-22 | End: 2021-10-14

## 2021-06-22 RX ORDER — ERGOCALCIFEROL 1.25 MG/1
CAPSULE ORAL
Qty: 5 CAPSULE | Refills: 3 | Status: SHIPPED
Start: 2021-06-22 | End: 2021-10-13 | Stop reason: SDUPTHER

## 2021-06-22 RX ORDER — LIRAGLUTIDE 6 MG/ML
0.6 INJECTION SUBCUTANEOUS DAILY
Qty: 3 PEN | Refills: 3 | Status: SHIPPED
Start: 2021-06-22 | End: 2021-10-13 | Stop reason: SDUPTHER

## 2021-06-22 RX ORDER — SYRINGE-NEEDLE,INSULIN,0.5 ML 27GX1/2"
1 SYRINGE, EMPTY DISPOSABLE MISCELLANEOUS 3 TIMES DAILY
Qty: 100 EACH | Refills: 5 | Status: SHIPPED
Start: 2021-06-22 | End: 2021-10-14

## 2021-06-22 RX ORDER — METOPROLOL TARTRATE 50 MG/1
50 TABLET, FILM COATED ORAL 2 TIMES DAILY
Qty: 60 TABLET | Refills: 3 | Status: SHIPPED
Start: 2021-06-22 | End: 2021-10-13 | Stop reason: SDUPTHER

## 2021-06-22 RX ORDER — PEN NEEDLE, DIABETIC 29 G X1/2"
NEEDLE, DISPOSABLE MISCELLANEOUS
Qty: 100 EACH | Refills: 5 | Status: SHIPPED
Start: 2021-06-22 | End: 2022-06-23

## 2021-06-22 SDOH — ECONOMIC STABILITY: FOOD INSECURITY: WITHIN THE PAST 12 MONTHS, YOU WORRIED THAT YOUR FOOD WOULD RUN OUT BEFORE YOU GOT MONEY TO BUY MORE.: NEVER TRUE

## 2021-06-22 SDOH — ECONOMIC STABILITY: FOOD INSECURITY: WITHIN THE PAST 12 MONTHS, THE FOOD YOU BOUGHT JUST DIDN'T LAST AND YOU DIDN'T HAVE MONEY TO GET MORE.: NEVER TRUE

## 2021-06-22 ASSESSMENT — SOCIAL DETERMINANTS OF HEALTH (SDOH): HOW HARD IS IT FOR YOU TO PAY FOR THE VERY BASICS LIKE FOOD, HOUSING, MEDICAL CARE, AND HEATING?: VERY HARD

## 2021-06-22 NOTE — PROGRESS NOTES
2021     Izabella Torre 41 y.o. male    : 1975    Chief Complaint:   Diabetes, Hyperlipidemia, and Hypertension       History of Present Illness:   Izabella Torre is a 41 y.o. male who presents to the office to establish care. He was a previous Dr Ana Rosa Keys patient. HTN/HLD/CAD status post CABG in 3/2020-follows with Dr. Jerson Castro and Dr. Eyad Thompson on a yearly basis. Does not check BP or exercise. He does attempt to follow a heart healthy diet. Diabetes-patient checks his blood sugars at home and they run in the 200-300's. A1c today 12.3. Patient is taking medications as prescribed. Patient is currently taking 60 units of Levemir, and a sliding scale for NovoLog. Patient does not follow with endocrine. He is not up-to-date with his health maintenance screenings. Previous lab work reviewed.       Past Medical History:     Past Medical History:   Diagnosis Date    CAD (coronary artery disease)     [facility]     Diabetes mellitus [facility]     Hyperlipidemia     Hypertension     Internal carotid artery occlusion, right 3/16/2020    Mediastinal mass     Neuropathy     Obesity     329 #       Past Surgical History:   Procedure Laterality Date    CARDIAC CATHETERIZATION  2020    Dr. David Heath- multi vessel disease    MITRAL VALVE REPAIR N/A 3/16/2020    CORONARY ARTERY BYPASS POSSIBLE LEFT RADIAL ARTERY HARVEST, PATRICE performed by Bernadette Salazar MD at [address] [place] [address] Avenue  2017    resection of mediastinal mass    TONSILLECTOMY      as a child    TRANSESOPHAGEAL ECHOCARDIOGRAM  2020    Dr. Bowen Shukla       Family History   Problem Relation Age of Onset    Heart Disease Mother         MI age 39     High Blood Pressure Mother     Diabetes Father     Heart Disease Father         MI    High Blood Pressure Father     High Cholesterol Father     Cancer Father     Stroke Father     Kidney Disease Father         dialysis    High Blood Pressure Sister     Other Brother     COPD Brother     High Blood Pressure Brother        Social History     Tobacco Use    Smoking status: Former Smoker     Packs/day: 1.00     Years: 25.00     Pack years: 25.00     Types: Cigarettes     Start date: 1994     Quit date: 3/14/2020     Years since quittin.2    Smokeless tobacco: Current User     Types: Chew, Snuff   Vaping Use    Vaping Use: Never used   Substance Use Topics    Alcohol use: No    Drug use: No       Medications:     Current Outpatient Medications:     Cholecalciferol (VITAMIN D3 ULTRA POTENCY) 1.25 MG (46746 UT) TABS, Take 50,000 Int'l Units/day by mouth once a week, Disp: 4 tablet, Rfl: 2    vitamin D (ERGOCALCIFEROL) 1.25 MG (65262 UT) CAPS capsule, take 1 capsule by mouth every week, Disp: 5 capsule, Rfl: 3    metoprolol tartrate (LOPRESSOR) 50 MG tablet, Take 1 tablet by mouth 2 times daily, Disp: 60 tablet, Rfl: 3    metFORMIN (GLUCOPHAGE) 850 MG tablet, Take 1 tablet by mouth 3 times daily, Disp: 90 tablet, Rfl: 3    insulin detemir (LEVEMIR FLEXTOUCH) 100 UNIT/ML injection pen, Inject 68 Units into the skin nightly, Disp: 5 pen, Rfl: 3    FreeStyle Lancets MISC, USE AS DIRECTED, Disp: 100 each, Rfl: 3    DULERA 100-5 MCG/ACT inhaler, Inhale 2 puffs into the lungs 2 times daily, Disp: 1 Inhaler, Rfl: 3    atorvastatin (LIPITOR) 80 MG tablet, take 1 tablet by mouth once daily, Disp: 30 tablet, Rfl: 3    aspirin EC 81 MG EC tablet, Take 1 tablet by mouth daily, Disp: 90 tablet, Rfl: 3    albuterol sulfate  (90 Base) MCG/ACT inhaler, Inhale 2 puffs into the lungs every 6 hours as needed for Wheezing, Disp: 1 Inhaler, Rfl: 3    Lancets MISC, 1 each by Does not apply route 3 times daily Dispense what insurance will cover, Disp: 100 each, Rfl: 5    Insulin Syringe-Needle U-100 (INSULIN SYRINGE 1CC/30GX1/2\") 30G X 1/2\" 1 ML MISC, 1 box by Does not apply route three times daily 3 times daily, Disp: 100 each, Rfl: 5    Insulin Pen Needle (PEN NEEDLES 29GX1/2\") 29G X 12MM MISC, USE AS DIRECTED, Disp: 100 each, Rfl: 5    blood glucose test strips (FREESTYLE INSULINX TEST) strip, 1 each by In Vitro route 3 times daily As needed. , Disp: 1 each, Rfl: 5    Liraglutide (VICTOZA) 18 MG/3ML SOPN SC injection, Inject 0.6 mg into the skin daily X 1 week, inject 1.2 mg into the skin daily, Disp: 3 pen, Rfl: 3    insulin aspart (NOVOLOG) 100 UNIT/ML injection vial, Inject 5 units three times a day before meals plus sliding scale, Disp: 5 Package, Rfl: 5    Azelastine HCl 137 MCG/SPRAY SOLN, instill 2 sprays into each nostril twice a day, Disp: 30 mL, Rfl: 2    blood glucose monitor kit and supplies, Dispense which ever insurance will cover. Start up supplies. , Disp: 1 kit, Rfl: 0    fluticasone (FLONASE) 50 MCG/ACT nasal spray, 2 sprays by Each Nostril route daily, Disp: 2 Bottle, Rfl: 1    BD PEN NEEDLE PRAMOD U/F 32G X 4 MM MISC, Inject 1 each as directed 2 times daily, Disp: 100 each, Rfl: 5    HYDROcodone-acetaminophen (NORCO)  MG per tablet, Take 1 tablet by mouth every 6 hours as needed for Pain., Disp: , Rfl:     Alcohol Swabs 70 % PADS, , Disp: , Rfl:     Allergies   Allergen Reactions    Sulfa Antibiotics Other (See Comments)     ? unsure       Review of Systems:   Review of Systems   Constitutional: Negative for activity change, appetite change, chills, diaphoresis, fatigue, fever and unexpected weight change. HENT: Negative for congestion, ear pain, hearing loss, postnasal drip, rhinorrhea, sore throat, tinnitus and trouble swallowing. Eyes: Negative for redness and visual disturbance. Respiratory: Negative for apnea, cough, chest tightness, shortness of breath and wheezing. Cardiovascular: Negative for chest pain, palpitations and leg swelling. Gastrointestinal: Negative for abdominal distention, abdominal pain, blood in stool, constipation, diarrhea, nausea, rectal pain and vomiting.    Endocrine: Negative for cold intolerance and heat intolerance. Genitourinary: Negative for decreased urine volume, difficulty urinating, dysuria, flank pain, frequency, hematuria and urgency. Musculoskeletal: Negative for arthralgias, back pain, gait problem, joint swelling, myalgias, neck pain and neck stiffness. Skin: Negative for color change, pallor, rash and wound. Allergic/Immunologic: Negative for environmental allergies, food allergies and immunocompromised state. Neurological: Negative for dizziness, tremors, seizures, syncope, facial asymmetry, speech difficulty, weakness, light-headedness, numbness and headaches. Hematological: Negative for adenopathy. Does not bruise/bleed easily. Psychiatric/Behavioral: Negative for agitation, behavioral problems, confusion, decreased concentration, sleep disturbance and suicidal ideas. The patient is not nervous/anxious. Physical Exam:   Vital Signs:  /74   Pulse 87   Resp 16   Ht 6' 4\" (1.93 m)   Wt (!) 361 lb (163.7 kg)   SpO2 97%   BMI 43.94 kg/m²    Oxygen Saturation Interpretation: Normal.  Physical Exam  Vitals and nursing note reviewed. Constitutional:       Appearance: Normal appearance. He is normal weight. HENT:      Head: Normocephalic and atraumatic. Right Ear: Tympanic membrane, ear canal and external ear normal.      Left Ear: Tympanic membrane, ear canal and external ear normal.      Nose: Nose normal.      Mouth/Throat:      Mouth: Mucous membranes are moist.      Pharynx: Oropharynx is clear. Eyes:      Extraocular Movements: Extraocular movements intact. Conjunctiva/sclera: Conjunctivae normal.      Pupils: Pupils are equal, round, and reactive to light. Neck:      Thyroid: No thyroid mass, thyromegaly or thyroid tenderness. Trachea: Trachea normal.   Cardiovascular:      Rate and Rhythm: Normal rate and regular rhythm. Pulses: Normal pulses. Heart sounds: Normal heart sounds.    Pulmonary: Effort: Pulmonary effort is normal.      Breath sounds: Normal breath sounds. Abdominal:      General: Bowel sounds are normal.      Palpations: Abdomen is soft. Musculoskeletal:         General: Normal range of motion. Cervical back: Normal range of motion and neck supple. Lymphadenopathy:      Cervical: No cervical adenopathy. Skin:     General: Skin is warm and dry. Capillary Refill: Capillary refill takes less than 2 seconds. Neurological:      General: No focal deficit present. Mental Status: He is alert and oriented to person, place, and time. Sensory: Sensation is intact. Motor: Motor function is intact. Coordination: Coordination is intact. Gait: Gait is intact. Deep Tendon Reflexes: Reflexes normal.   Psychiatric:         Attention and Perception: Attention and perception normal.         Mood and Affect: Mood normal.         Speech: Speech normal.         Behavior: Behavior normal.         Thought Content:  Thought content normal.         Cognition and Memory: Cognition and memory normal.         Judgment: Judgment normal.         Health Maintenance:     Health Maintenance   Topic Date Due    COVID-19 Vaccine (1) Never done    Hepatitis B vaccine (1 of 3 - Risk 3-dose series) Never done    Diabetic retinal exam  10/09/2019    Diabetic foot exam  01/10/2020    A1C test (Diabetic or Prediabetic)  09/22/2021    Diabetic microalbuminuria test  06/22/2022    Lipid screen  06/22/2022    DTaP/Tdap/Td vaccine (2 - Td or Tdap) 07/21/2030    Pneumococcal 0-64 years Vaccine (2 of 2 - PPSV23) 09/07/2041    Flu vaccine  Completed    Hepatitis C screen  Completed    HIV screen  Completed    Hepatitis A vaccine  Aged Out    Hib vaccine  Aged Out    Meningococcal (ACWY) vaccine  Aged [employer] History   Administered Date(s) Administered    Influenza, MDCK Quadv, IM, PF (Flucelvax 4 yrs and older) 12/08/2018    Influenza, Quadv, IM, PF (6 mo and older Fluzone, Flulaval, Fluarix, and 3 yrs and older Afluria) 12/18/2020    Pneumococcal Polysaccharide ([id number]) 12/08/2018        Testing: All laboratory and radiology results have been personally reviewed by myself.   Labs:  Results for orders placed or performed in visit on 06/22/21   Lipid Panel   Result Value Ref Range    Cholesterol, Total 191 0 - 199 mg/dL    Triglycerides 109 0 - 149 mg/dL    HDL 51 >40 mg/dL    LDL Calculated 118 (H) 0 - 99 mg/dL    VLDL Cholesterol Calculated 22 mg/dL   CBC Auto Differential   Result Value Ref Range    WBC 7.9 4.5 - 11.5 E9/L    RBC 5.51 3.80 - 5.80 E12/L    Hemoglobin 16.9 (H) 12.5 - 16.5 g/dL    Hematocrit 50.7 37.0 - 54.0 %    MCV 92.0 80.0 - 99.9 fL    MCH 30.7 26.0 - 35.0 pg    MCHC 33.3 32.0 - 34.5 %    RDW 12.8 11.5 - 15.0 fL    Platelets 943 397 - 280 E9/L    MPV 9.2 7.0 - 12.0 fL    Neutrophils % 58.8 43.0 - 80.0 %    Immature Granulocytes % 0.3 0.0 - 5.0 %    Lymphocytes % 30.0 20.0 - 42.0 %    Monocytes % 8.0 2.0 - 12.0 %    Eosinophils % 2.0 0.0 - 6.0 %    Basophils % 0.9 0.0 - 2.0 %    Neutrophils Absolute 4.66 1.80 - 7.30 E9/L    Immature Granulocytes # 0.02 E9/L    Lymphocytes Absolute 2.37 1.50 - 4.00 E9/L    Monocytes Absolute 0.63 0.10 - 0.95 E9/L    Eosinophils Absolute 0.16 0.05 - 0.50 E9/L    Basophils Absolute 0.07 0.00 - 0.20 E9/L   Comprehensive Metabolic Panel   Result Value Ref Range    Sodium 140 132 - 146 mmol/L    Potassium 4.3 3.5 - 5.0 mmol/L    Chloride 99 98 - 107 mmol/L    CO2 22 22 - 29 mmol/L    Anion Gap 19 (H) 7 - 16 mmol/L    Glucose 360 (H) 74 - 99 mg/dL    BUN 14 6 - 20 mg/dL    CREATININE 0.6 (L) 0.7 - 1.2 mg/dL    GFR Non-African American >60 >=60 mL/min/1.73    GFR African American >60     Calcium 9.6 8.6 - 10.2 mg/dL    Total Protein 7.6 6.4 - 8.3 g/dL    Albumin 4.2 3.5 - 5.2 g/dL    Total Bilirubin 0.5 0.0 - 1.2 mg/dL    Alkaline Phosphatase 97 40 - 129 U/L    ALT 19 0 - 40 U/L    AST 16 0 - 39 U/L   Hepatitis C POCT Microalbumin  -     POCT glycosylated hemoglobin (Hb A1C)  -     Lipid Panel  -     CBC Auto Differential  -     Comprehensive Metabolic Panel  -     TSH without Reflex  -     CK    Type 2 diabetes, uncontrolled, with neuropathy (HCC)  -     metFORMIN (GLUCOPHAGE) 850 MG tablet; Take 1 tablet by mouth 3 times daily  -     insulin detemir (LEVEMIR FLEXTOUCH) 100 UNIT/ML injection pen; Inject 68 Units into the skin nightly  -     Lancets MISC; 1 each by Does not apply route 3 times daily Dispense what insurance will cover    Carotid stenosis, right    Mixed hyperlipidemia  -     metoprolol tartrate (LOPRESSOR) 50 MG tablet; Take 1 tablet by mouth 2 times daily  -     atorvastatin (LIPITOR) 80 MG tablet; take 1 tablet by mouth once daily  -     Lipid Panel    CAD in native artery  -     metoprolol tartrate (LOPRESSOR) 50 MG tablet; Take 1 tablet by mouth 2 times daily  -     aspirin EC 81 MG EC tablet; Take 1 tablet by mouth daily    Essential hypertension    Vitamin D deficiency  -     Cholecalciferol (VITAMIN D3 ULTRA POTENCY) 1.25 MG (34246 UT) TABS; Take 50,000 Int'l Units/day by mouth once a week  -     vitamin D (ERGOCALCIFEROL) 1.25 MG (51790 UT) CAPS capsule; take 1 capsule by mouth every week    Chronic obstructive pulmonary disease, unspecified COPD type (HCC)  -     DULERA 100-5 MCG/ACT inhaler; Inhale 2 puffs into the lungs 2 times daily  -     albuterol sulfate  (90 Base) MCG/ACT inhaler; Inhale 2 puffs into the lungs every 6 hours as needed for Wheezing    Need for hepatitis C screening test  -     Hepatitis C Antibody    Screening for HIV (human immunodeficiency virus)  -     HIV Screen    Other orders  -     Discontinue: insulin aspart (NOVOLOG) 100 UNIT/ML injection vial; Use tid with sliding scale    Lab work to be obtained today, will call with results. A1c is poorly controlled at 12.3. Add Premeal bolus of 5 units before each meal plus sliding scale.   Will add on Victoza for better control and cardiac coverage. Call in 1 week with average blood sugars. Will adjust Levemir at that time. Consider referral to endocrine if sugars remain poorly controlled. POCT microalbumin is abnormal at . We will continue to monitor this and obtain a creatinine microalbumin ratio. Increase activity and follow Mediterranean diet. The patient declines any diabetes education at this time. Keep follow-ups with specialist.       Call or go to ED immediately if symptoms worsen or persist.  Return in about 3 months for diabetes follow-up. Sooner if necessary. At that time we will discuss further health maintenance screenings. Counseled regarding above diagnosis, including possible risks and complications,especially if left uncontrolled. Counseled regarding the possible side effects, risks, benefits and alternatives to treatment; patient and/or guardian verbalizes understanding. Advised patient to call with any new medication issues. All questions answered. Reviewed age and gender appropriate health screening exams and vaccinations. Advised patient regarding importance of keeping up with recommended health maintenance and to schedule as soon as possible if overdue, as this is important in assessing for undiagnosed pathology, especially cancer. Patient verbalizes understanding and agrees.      SIGNATURE: Electronically signed by ZIA Gupta NP on 6/30/2021 at 1:56 PM

## 2021-06-23 LAB
HEPATITIS C ANTIBODY INTERPRETATION: NORMAL
HIV-1 AND HIV-2 ANTIBODIES: NORMAL

## 2021-06-23 NOTE — RESULT ENCOUNTER NOTE
The 10-year ASCVD risk score (Yovany Duval et al., 2013) is: 3.9%    Values used to calculate the score:      Age: 41 years      Sex: Male      Is Non- : No      Diabetic: Yes      Tobacco smoker: No      Systolic Blood Pressure: 664 mmHg      Is BP treated: Yes      HDL Cholesterol: 51 mg/dL      Total Cholesterol: 191 mg/dL    Lab work is normal.  Continue plan as discussed in the office.

## 2021-06-30 ASSESSMENT — ENCOUNTER SYMPTOMS
SHORTNESS OF BREATH: 0
COLOR CHANGE: 0
TROUBLE SWALLOWING: 0
NAUSEA: 0
BLOOD IN STOOL: 0
ABDOMINAL DISTENTION: 0
DIARRHEA: 0
SORE THROAT: 0
ABDOMINAL PAIN: 0
COUGH: 0
RHINORRHEA: 0
BACK PAIN: 0
CONSTIPATION: 0
CHEST TIGHTNESS: 0
WHEEZING: 0
VOMITING: 0
APNEA: 0
RECTAL PAIN: 0
EYE REDNESS: 0

## 2021-08-18 DIAGNOSIS — J32.4 CHRONIC PANSINUSITIS: Primary | ICD-10-CM

## 2021-08-18 RX ORDER — FLUTICASONE PROPIONATE 50 MCG
SPRAY, SUSPENSION (ML) NASAL
Qty: 32 G | Refills: 3 | Status: SHIPPED
Start: 2021-08-18 | End: 2021-11-19

## 2021-09-13 DIAGNOSIS — J32.4 CHRONIC PANSINUSITIS: Primary | ICD-10-CM

## 2021-09-13 RX ORDER — AZELASTINE 1 MG/ML
SPRAY, METERED NASAL
Qty: 30 ML | Refills: 2 | Status: SHIPPED
Start: 2021-09-13 | End: 2021-12-07

## 2021-09-13 NOTE — TELEPHONE ENCOUNTER
Patient is scheduled for surgery 10/18/2021. Patient would like a prescription refill for Astelin nasal spray.

## 2021-10-13 ENCOUNTER — OFFICE VISIT (OUTPATIENT)
Dept: PRIMARY CARE CLINIC | Age: 46
End: 2021-10-13
Payer: COMMERCIAL

## 2021-10-13 VITALS
HEART RATE: 90 BPM | TEMPERATURE: 96.1 F | DIASTOLIC BLOOD PRESSURE: 72 MMHG | WEIGHT: 315 LBS | BODY MASS INDEX: 38.36 KG/M2 | OXYGEN SATURATION: 97 % | RESPIRATION RATE: 16 BRPM | HEIGHT: 76 IN | SYSTOLIC BLOOD PRESSURE: 130 MMHG

## 2021-10-13 DIAGNOSIS — E55.9 VITAMIN D DEFICIENCY: ICD-10-CM

## 2021-10-13 DIAGNOSIS — E78.2 MIXED HYPERLIPIDEMIA: ICD-10-CM

## 2021-10-13 DIAGNOSIS — I73.9 PVD (PERIPHERAL VASCULAR DISEASE) (HCC): ICD-10-CM

## 2021-10-13 DIAGNOSIS — J44.9 CHRONIC OBSTRUCTIVE PULMONARY DISEASE, UNSPECIFIED COPD TYPE (HCC): ICD-10-CM

## 2021-10-13 DIAGNOSIS — S90.222A SUBUNGUAL HEMATOMA OF TOENAIL OF LEFT FOOT, INITIAL ENCOUNTER: ICD-10-CM

## 2021-10-13 DIAGNOSIS — I25.10 CAD IN NATIVE ARTERY: ICD-10-CM

## 2021-10-13 DIAGNOSIS — H66.004 RECURRENT ACUTE SUPPURATIVE OTITIS MEDIA OF RIGHT EAR WITHOUT SPONTANEOUS RUPTURE OF TYMPANIC MEMBRANE: ICD-10-CM

## 2021-10-13 DIAGNOSIS — J32.4 CHRONIC PANSINUSITIS: ICD-10-CM

## 2021-10-13 DIAGNOSIS — E11.65 UNCONTROLLED TYPE 2 DIABETES MELLITUS WITH HYPERGLYCEMIA (HCC): Primary | ICD-10-CM

## 2021-10-13 LAB
BILIRUBIN, POC: NEGATIVE
BLOOD URINE, POC: NORMAL
CLARITY, POC: CLEAR
COLOR, POC: NORMAL
CREATININE URINE POCT: 300
GLUCOSE URINE, POC: >=1000
HBA1C MFR BLD: 9.5 %
KETONES, POC: NORMAL
LEUKOCYTE EST, POC: NEGATIVE
MICROALBUMIN/CREAT 24H UR: 150 MG/G{CREAT}
MICROALBUMIN/CREAT UR-RTO: NORMAL
NITRITE, POC: NEGATIVE
PH, POC: 5
PROTEIN, POC: NORMAL
SPECIFIC GRAVITY, POC: 1.02
UROBILINOGEN, POC: NORMAL

## 2021-10-13 PROCEDURE — 99214 OFFICE O/P EST MOD 30 MIN: CPT | Performed by: NURSE PRACTITIONER

## 2021-10-13 PROCEDURE — 83036 HEMOGLOBIN GLYCOSYLATED A1C: CPT | Performed by: NURSE PRACTITIONER

## 2021-10-13 PROCEDURE — 82044 UR ALBUMIN SEMIQUANTITATIVE: CPT | Performed by: NURSE PRACTITIONER

## 2021-10-13 PROCEDURE — G8926 SPIRO NO PERF OR DOC: HCPCS | Performed by: NURSE PRACTITIONER

## 2021-10-13 PROCEDURE — 3023F SPIROM DOC REV: CPT | Performed by: NURSE PRACTITIONER

## 2021-10-13 PROCEDURE — 2022F DILAT RTA XM EVC RTNOPTHY: CPT | Performed by: NURSE PRACTITIONER

## 2021-10-13 PROCEDURE — 81002 URINALYSIS NONAUTO W/O SCOPE: CPT | Performed by: NURSE PRACTITIONER

## 2021-10-13 PROCEDURE — 4004F PT TOBACCO SCREEN RCVD TLK: CPT | Performed by: NURSE PRACTITIONER

## 2021-10-13 PROCEDURE — G8417 CALC BMI ABV UP PARAM F/U: HCPCS | Performed by: NURSE PRACTITIONER

## 2021-10-13 PROCEDURE — G8427 DOCREV CUR MEDS BY ELIG CLIN: HCPCS | Performed by: NURSE PRACTITIONER

## 2021-10-13 PROCEDURE — G8484 FLU IMMUNIZE NO ADMIN: HCPCS | Performed by: NURSE PRACTITIONER

## 2021-10-13 PROCEDURE — 3046F HEMOGLOBIN A1C LEVEL >9.0%: CPT | Performed by: NURSE PRACTITIONER

## 2021-10-13 RX ORDER — AMOXICILLIN AND CLAVULANATE POTASSIUM 875; 125 MG/1; MG/1
1 TABLET, FILM COATED ORAL 2 TIMES DAILY
Qty: 20 TABLET | Refills: 0 | Status: SHIPPED | OUTPATIENT
Start: 2021-10-13 | End: 2021-10-23

## 2021-10-13 RX ORDER — LIRAGLUTIDE 6 MG/ML
1.8 INJECTION SUBCUTANEOUS DAILY
Qty: 3 PEN | Refills: 3 | Status: SHIPPED
Start: 2021-10-13 | End: 2022-02-04

## 2021-10-13 RX ORDER — BLOOD SUGAR DIAGNOSTIC
1 STRIP MISCELLANEOUS 3 TIMES DAILY
Qty: 1 EACH | Refills: 5 | Status: SHIPPED
Start: 2021-10-13 | End: 2021-12-06 | Stop reason: SDUPTHER

## 2021-10-13 RX ORDER — METOPROLOL TARTRATE 75 MG/1
75 TABLET, FILM COATED ORAL 2 TIMES DAILY
Qty: 60 TABLET | Refills: 3 | Status: SHIPPED
Start: 2021-10-13 | End: 2021-11-16

## 2021-10-13 RX ORDER — ATORVASTATIN CALCIUM 80 MG/1
TABLET, FILM COATED ORAL
Qty: 30 TABLET | Refills: 3 | Status: SHIPPED
Start: 2021-10-13 | End: 2022-03-22 | Stop reason: SDUPTHER

## 2021-10-13 RX ORDER — ALBUTEROL SULFATE 90 UG/1
2 AEROSOL, METERED RESPIRATORY (INHALATION) EVERY 6 HOURS PRN
Qty: 1 EACH | Refills: 3 | Status: SHIPPED
Start: 2021-10-13 | End: 2022-03-22 | Stop reason: SDUPTHER

## 2021-10-13 RX ORDER — ERGOCALCIFEROL 1.25 MG/1
CAPSULE ORAL
Qty: 5 CAPSULE | Refills: 3 | Status: SHIPPED
Start: 2021-10-13 | End: 2022-03-22 | Stop reason: SDUPTHER

## 2021-10-13 RX ORDER — INSULIN DETEMIR 100 [IU]/ML
68 INJECTION, SOLUTION SUBCUTANEOUS NIGHTLY
Qty: 5 PEN | Refills: 5 | Status: SHIPPED
Start: 2021-10-13 | End: 2021-11-17 | Stop reason: SDUPTHER

## 2021-10-13 ASSESSMENT — ENCOUNTER SYMPTOMS
BLOOD IN STOOL: 0
CONSTIPATION: 0
COUGH: 0
ABDOMINAL DISTENTION: 0
DIARRHEA: 0
RHINORRHEA: 0
TROUBLE SWALLOWING: 0
WHEEZING: 0
SHORTNESS OF BREATH: 0
COLOR CHANGE: 0
CHEST TIGHTNESS: 0
APNEA: 0
BACK PAIN: 0
EYE REDNESS: 0
RECTAL PAIN: 0
VOMITING: 0
SORE THROAT: 0
ABDOMINAL PAIN: 0
NAUSEA: 0

## 2021-10-13 NOTE — PROGRESS NOTES
2021     Shyann Aviles 40 y.o. male    : 1975    Chief Complaint:   Diabetes, Hyperlipidemia, Hypertension, and Other (patient has a purple toe, turned purple 3 weeks ago)       History of Present Illness:   Enrique White is a 40 y.o. male who presents to the office for follow-up on chronic problems. Overall patient is doing well and taking all medications as prescribed. Hypertension/hyperlipidemia/CAD status post CABG in 3/2020follows with cardiology on a yearly basis. He does not check his blood pressures or exercise at home. He does attempt to follow a heart healthy diet. Blood pressure in office today is stable at 130/72. He reports that when he goes to other doctors appointments they have been trending in the 150s/90s. Diabetes mellitus type 2patient has not been checking blood sugars at home. He did receive a new glucometer but cannot operate it. A1c at last visit was 12.3. A1c today improved to 9.5. He is currently taking 68 units of Levemir, NovoLog 14 units before meals, Victoza and Metformin. He does not follow with endocrinology. He has been attempting to follow a carb controlled diet. Nasopharyngeal mass, conductive hearing loss of right ear and eustachian tube dysfunctionfollows with Dr. Khris Alcantara. He is scheduled this month for nasopharyngeal biopsy, possible right ear tube. He does complain of right ear pain that has been ongoing for 2 weeks. He has tried nothing at home for symptomatic relief. Denies any fevers, chills, headache, nasal congestion, or ear drainage. Subungual hematoma of left great toepatient states that he has had bruising under his left great toenail for a few weeks. He does not recall any trauma to the area. He does have significant neuropathy of his bilateral lower extremities. He did not seek medical treatment at that time. There is no signs of superinfection.   Neuropathy/suspected PVD-patient states that he has significant numbness and tingling in bilateral lower extremities. He also complains of associated leg pain and cyanosis when lower extremities are in the dependent position. The patient is not up-to-date with health maintenance screenings. Previous lab work was reviewed.     Past Medical History:     Past Medical History:   Diagnosis Date    CAD (coronary artery disease)     [facility] obstructive [facility]     Diabetes mellitus ([facility]     Hyperlipidemia     Hypertension     Internal carotid artery occlusion, right 3/16/2020    Mediastinal mass     Neuropathy     Obesity     329 #       Past Surgical History:   Procedure Laterality Date    CARDIAC CATHETERIZATION  2020    Dr. Anum Armando- multi vessel disease    MITRAL VALVE REPAIR N/A 3/16/2020    CORONARY ARTERY BYPASS POSSIBLE LEFT RADIAL ARTERY HARVEST, PATRICE performed by Komal Landin MD at [address] [place] [address]  2017    resection of mediastinal mass    TONSILLECTOMY      as a child    TRANSESOPHAGEAL ECHOCARDIOGRAM  2020    Dr. Megan Marshall       Family History   Problem Relation Age of Onset    Heart Disease Mother         MI age 39     High Blood Pressure Mother     Diabetes Father     Heart Disease Father         MI    High Blood Pressure Father     High Cholesterol Father     Cancer Father     Stroke Father     Kidney Disease Father         dialysis    High Blood Pressure Sister     Other Brother     COPD Brother     High Blood Pressure Brother        Social History     Tobacco Use    Smoking status: Former Smoker     Packs/day: 1.00     Years: 25.00     Pack years: 25.00     Types: Cigarettes     Start date: 1994     Quit date: 3/14/2020     Years since quittin.5    Smokeless tobacco: Current User     Types: Chew, Snuff   Vaping Use    Vaping Use: Never used   Substance Use Topics    Alcohol use: No    Drug use: No       Medications:     Current Outpatient Medications:     vitamin D (ERGOCALCIFEROL) 1.25 MG (77271 UT) CAPS capsule, take 1 capsule by mouth every week, Disp: 5 capsule, Rfl: 3    metoprolol tartrate 75 MG TABS, Take 75 mg by mouth 2 times daily, Disp: 60 tablet, Rfl: 3    metFORMIN (GLUCOPHAGE) 850 MG tablet, Take 1 tablet by mouth 3 times daily, Disp: 90 tablet, Rfl: 3    Liraglutide (VICTOZA) 18 MG/3ML SOPN SC injection, Inject 1.8 mg into the skin daily X 1 week, inject 1.2 mg into the skin daily, Disp: 3 pen, Rfl: 3    insulin detemir (LEVEMIR FLEXTOUCH) 100 UNIT/ML injection pen, Inject 68 Units into the skin nightly, Disp: 5 pen, Rfl: 5    DULERA 100-5 MCG/ACT inhaler, Inhale 2 puffs into the lungs 2 times daily, Disp: 1 each, Rfl: 3    atorvastatin (LIPITOR) 80 MG tablet, take 1 tablet by mouth once daily, Disp: 30 tablet, Rfl: 3    albuterol sulfate  (90 Base) MCG/ACT inhaler, Inhale 2 puffs into the lungs every 6 hours as needed for Wheezing, Disp: 1 each, Rfl: 3    insulin aspart (NOVOLOG) 100 UNIT/ML injection vial, Inject 14 units three times a day plus sliding scale, Disp: 6 each, Rfl: 3    blood glucose test strips (FREESTYLE INSULINX TEST) strip, 1 each by In Vitro route 3 times daily As needed. , Disp: 1 each, Rfl: 5    dapagliflozin (FARXIGA) 5 MG tablet, Take 1 tablet by mouth every morning, Disp: 30 tablet, Rfl: 5    amoxicillin-clavulanate (AUGMENTIN) 875-125 MG per tablet, Take 1 tablet by mouth 2 times daily for 10 days, Disp: 20 tablet, Rfl: 0    azelastine (ASTELIN) 0.1 % nasal spray, instill 2 sprays into each nostril twice a day, Disp: 30 mL, Rfl: 2    fluticasone (FLONASE) 50 MCG/ACT nasal spray, instill 2 sprays into each nostril once daily, Disp: 32 g, Rfl: 3    FreeStyle Lancets MISC, USE AS DIRECTED, Disp: 100 each, Rfl: 3    aspirin EC 81 MG EC tablet, Take 1 tablet by mouth daily, Disp: 90 tablet, Rfl: 3    Insulin Pen Needle (PEN NEEDLES 29GX1/2\") 29G X 12MM MISC, USE AS DIRECTED, Disp: 100 each, Rfl: 5    blood glucose monitor kit and supplies, Dispense which ever insurance will cover. Start up supplies. , Disp: 1 kit, Rfl: 0    BD PEN NEEDLE PRAMOD U/F 32G X 4 MM MISC, Inject 1 each as directed 2 times daily, Disp: 100 each, Rfl: 5    HYDROcodone-acetaminophen (NORCO)  MG per tablet, Take 1 tablet by mouth every 6 hours as needed for Pain., Disp: , Rfl:     Alcohol Swabs 70 % PADS, , Disp: , Rfl:     Cholecalciferol (VITAMIN D3 ULTRA POTENCY) 1.25 MG (57644 UT) TABS, Take 50,000 Int'l Units/day by mouth once a week, Disp: 4 tablet, Rfl: 2    Lancets MISC, 1 each by Does not apply route 3 times daily Dispense what insurance will cover, Disp: 100 each, Rfl: 5    Insulin Syringe-Needle U-100 (INSULIN SYRINGE 1CC/30GX1/2\") 30G X 1/2\" 1 ML MISC, 1 box by Does not apply route three times daily 3 times daily, Disp: 100 each, Rfl: 5    Allergies   Allergen Reactions    Sulfa Antibiotics Other (See Comments)     ? unsure       Review of Systems:   Review of Systems   Constitutional: Negative for activity change, appetite change, chills, diaphoresis, fatigue, fever and unexpected weight change. HENT: Positive for ear pain and hearing loss. Negative for congestion, postnasal drip, rhinorrhea, sore throat, tinnitus and trouble swallowing. Eyes: Negative for redness and visual disturbance. Respiratory: Negative for apnea, cough, chest tightness, shortness of breath and wheezing. Cardiovascular: Negative for chest pain, palpitations and leg swelling. Gastrointestinal: Negative for abdominal distention, abdominal pain, blood in stool, constipation, diarrhea, nausea, rectal pain and vomiting. Endocrine: Negative for cold intolerance and heat intolerance. Genitourinary: Negative for decreased urine volume, difficulty urinating, dysuria, flank pain, frequency, hematuria and urgency. Musculoskeletal: Negative for arthralgias, back pain, gait problem, joint swelling, myalgias, neck pain and neck stiffness.    Skin: Negative for color change, pallor, rash and wound. Allergic/Immunologic: Negative for environmental allergies, food allergies and immunocompromised state. Neurological: Positive for numbness. Negative for dizziness, tremors, seizures, syncope, facial asymmetry, speech difficulty, weakness, light-headedness and headaches. Hematological: Negative for adenopathy. Does not bruise/bleed easily. Psychiatric/Behavioral: Negative for agitation, behavioral problems, confusion, decreased concentration, sleep disturbance and suicidal ideas. The patient is not nervous/anxious. Physical Exam:   Vital Signs:  /72   Pulse 90   Temp 96.1 °F (35.6 °C)   Resp 16   Ht 6' 4\" (1.93 m)   Wt (!) 355 lb (161 kg)   SpO2 97%   BMI 43.21 kg/m²    Oxygen Saturation Interpretation: Normal.  Physical Exam  Vitals and nursing note reviewed. Constitutional:       Appearance: Normal appearance. He is normal weight. HENT:      Head: Normocephalic and atraumatic. Right Ear: Ear canal and external ear normal. A middle ear effusion is present. Tympanic membrane is erythematous and bulging. Left Ear: Tympanic membrane, ear canal and external ear normal.      Nose: Nose normal.      Mouth/Throat:      Mouth: Mucous membranes are moist.      Pharynx: Oropharynx is clear. Eyes:      Extraocular Movements: Extraocular movements intact. Conjunctiva/sclera: Conjunctivae normal.      Pupils: Pupils are equal, round, and reactive to light. Neck:      Thyroid: No thyroid mass, thyromegaly or thyroid tenderness. Trachea: Trachea normal.   Cardiovascular:      Rate and Rhythm: Normal rate and regular rhythm. Pulses: Normal pulses. Heart sounds: Normal heart sounds. Pulmonary:      Effort: Pulmonary effort is normal.      Breath sounds: Normal breath sounds. Abdominal:      General: Bowel sounds are normal.      Palpations: Abdomen is soft.    Musculoskeletal:         General: Normal range of motion. Cervical back: Normal range of motion and neck supple. Feet:      Comments: 90% subungual hematoma to the left great toenail. Lymphadenopathy:      Cervical: No cervical adenopathy. Skin:     General: Skin is warm and dry. Capillary Refill: Capillary refill takes less than 2 seconds. Neurological:      General: No focal deficit present. Mental Status: He is alert and oriented to person, place, and time. Sensory: Sensation is intact. Motor: Motor function is intact. Coordination: Coordination is intact. Gait: Gait is intact. Deep Tendon Reflexes: Reflexes normal.   Psychiatric:         Attention and Perception: Attention and perception normal.         Mood and Affect: Mood normal.         Speech: Speech normal.         Behavior: Behavior normal.         Thought Content: Thought content normal.         Cognition and Memory: Cognition and memory normal.         Judgment: Judgment normal.       Visual inspection:  Deformity/amputation: absent  Skin lesions/pre-ulcerative calluses: absent  Edema: right- 1+, left- negative    Sensory exam:  Monofilament sensation: abnormal - significant absence of sensation, except left middle toe, and dorsal aspect of feet.   (minimum of 5 random plantar locations tested, avoiding callused areas - > 1 area with absence of sensation is + for neuropathy)    Plus at least one of the following:  Pulses: normal,   Pinprick: Intact  Proprioception: Intact  Vibration (128 Hz): N/A      Health Maintenance:     Health Maintenance   Topic Date Due    COVID-19 Vaccine (1) Never done    Hepatitis B vaccine (1 of 3 - Risk 3-dose series) Never done    Diabetic retinal exam  10/09/2019    Diabetic foot exam  01/10/2020    Flu vaccine (1) 09/01/2021    Colon cancer screen colonoscopy  Never done    A1C test (Diabetic or Prediabetic)  01/13/2022    Lipid screen  06/22/2022    Potassium monitoring  06/22/2022    Creatinine monitoring 06/22/2022    Diabetic microalbuminuria test  10/13/2022    DTaP/Tdap/Td vaccine (2 - Td or Tdap) 07/21/2030    Pneumococcal 0-64 years Vaccine (2 of 2 - PPSV23) 09/07/2041    Hepatitis C screen  Completed    HIV screen  Completed    Hepatitis A vaccine  Aged Out    Hib vaccine  Aged Out    Meningococcal (ACWY) vaccine  Aged [employer] History   Administered Date(s) Administered    Influenza, MDCK Quadv, IM, PF (Flucelvax 2 yrs and older) 12/08/2018    Influenza, Quadv, IM, PF (6 mo and older Fluzone, Flulaval, Fluarix, and 3 yrs and older Afluria) 12/18/2020    Pneumococcal Polysaccharide ([id number]) 12/08/2018        Testing: All laboratory and radiology results have been personally reviewed by myself. Labs:  Results for orders placed or performed in visit on 10/13/21   POCT Urinalysis no Micro   Result Value Ref Range    Color, UA vanda     Clarity, UA clear     Glucose, UA POC >=1,000     Bilirubin, UA negative     Ketones, UA 15 mg/dl     Spec Grav, UA 1.025     Blood, UA POC mooderate     pH, UA 5.0     Protein, UA POC 30 mg/dl     Urobilinogen, UA 1.0 e.u/dl     Leukocytes, UA negative     Nitrite, UA negative    POCT glycosylated hemoglobin (Hb A1C)   Result Value Ref Range    Hemoglobin A1C 9.5 %   POCT microalbumin   Result Value Ref Range    Microalb, Ur 150     Creatinine Ur POCT 300     Microalbumin Creatinine Ratio          Imaging: All Radiology results interpreted by Radiologist unless otherwise noted. No results found. Assessment/Plan:   I personally reviewed the patient's allergies, past medical history, medications, and vitals sign. Kamilah Sewell was seen today for diabetes, hyperlipidemia, hypertension and other. Diagnoses and all orders for this visit:    Uncontrolled type 2 diabetes mellitus with hyperglycemia (HCC)  -     POCT Urinalysis no Micro  -     POCT glycosylated hemoglobin (Hb A1C)  -     Liraglutide (VICTOZA) 18 MG/3ML SOPN SC injection;  Inject on Ifrah Alaniz. Fasting blood sugar goal less than 150. He is to check his sugars at least 3 times a day. I advised him to come to the office or pharmacy for instructions on using glucometer. Continue all other medications as prescribed. Will increase metoprolol from 50 mg twice daily to 75 mg twice daily for better hypertensive control. Continue inhalers for COPD. I placed the patient on Augmentin for right otitis media. Conservative measures discussed. Increase yogurt consumption or take probiotic over-the-counter. Follow-up with ENT. Will obtain ultrasound of bilateral lower extremities for questionable PVD. Encourage patient to elevate lower extremities as much as possible and wear compression stockings on in the morning and off at nighttime. Call or go to ED immediately if symptoms worsen or persist.  Return in about 4 months (around 2/13/2022). Sooner if necessary. Counseled regarding above diagnosis, including possible risks and complications,especially if left uncontrolled. Counseled regarding the possible side effects, risks, benefits and alternatives to treatment; patient and/or guardian verbalizes understanding. Advised patient to call with any new medication issues. All questions answered. Reviewed age and gender appropriate health screening exams and vaccinations. Advised patient regarding importance of keeping up with recommended health maintenance and to schedule as soon as possible if overdue, as this is important in assessing for undiagnosed pathology, especially cancer. Patient verbalizes understanding and agrees. ZIA Lucero NP     **This report was transcribed using voice recognition software. Every effort was made to ensure accuracy; however, inadvertent computerized transcription errors may be present.

## 2021-10-14 ENCOUNTER — HOSPITAL ENCOUNTER (OUTPATIENT)
Age: 46
Discharge: HOME | End: 2021-10-16
Payer: COMMERCIAL

## 2021-10-14 LAB — SARS-COV-2, PCR: NOT DETECTED

## 2021-10-14 PROCEDURE — U0003 INFECTIOUS AGENT DETECTION BY NUCLEIC ACID (DNA OR RNA); SEVERE ACUTE RESPIRATORY SYNDROME CORONAVIRUS 2 (SARS-COV-2) (CORONAVIRUS DISEASE [COVID-19]), AMPLIFIED PROBE TECHNIQUE, MAKING USE OF HIGH THROUGHPUT TECHNOLOGIES AS DESCRIBED BY CMS-2020-01-R: HCPCS

## 2021-10-14 PROCEDURE — U0005 INFEC AGEN DETEC AMPLI PROBE: HCPCS

## 2021-11-12 DIAGNOSIS — I73.9 PVD (PERIPHERAL VASCULAR DISEASE) (HCC): Primary | ICD-10-CM

## 2021-11-15 ENCOUNTER — HOSPITAL ENCOUNTER (OUTPATIENT)
Dept: ULTRASOUND IMAGING | Age: 46
Discharge: HOME | End: 2021-11-17
Payer: COMMERCIAL

## 2021-11-15 ENCOUNTER — OFFICE VISIT (OUTPATIENT)
Dept: CARDIOLOGY CLINIC | Age: 46
End: 2021-11-15
Payer: COMMERCIAL

## 2021-11-15 VITALS
WEIGHT: 315 LBS | SYSTOLIC BLOOD PRESSURE: 128 MMHG | HEIGHT: 76 IN | DIASTOLIC BLOOD PRESSURE: 84 MMHG | BODY MASS INDEX: 38.36 KG/M2 | HEART RATE: 96 BPM | RESPIRATION RATE: 18 BRPM

## 2021-11-15 DIAGNOSIS — Z01.810 PREOP CARDIOVASCULAR EXAM: Primary | ICD-10-CM

## 2021-11-15 DIAGNOSIS — I10 ESSENTIAL HYPERTENSION: ICD-10-CM

## 2021-11-15 DIAGNOSIS — I25.10 CAD IN NATIVE ARTERY: ICD-10-CM

## 2021-11-15 DIAGNOSIS — E11.65 UNCONTROLLED TYPE 2 DIABETES MELLITUS WITH HYPERGLYCEMIA (HCC): ICD-10-CM

## 2021-11-15 DIAGNOSIS — I73.9 PVD (PERIPHERAL VASCULAR DISEASE) (HCC): ICD-10-CM

## 2021-11-15 DIAGNOSIS — R00.2 PALPITATION: ICD-10-CM

## 2021-11-15 DIAGNOSIS — Z72.0 TOBACCO ABUSE: ICD-10-CM

## 2021-11-15 DIAGNOSIS — E11.641 UNCONTROLLED TYPE 2 DIABETES MELLITUS WITH HYPOGLYCEMIA AND COMA (HCC): ICD-10-CM

## 2021-11-15 DIAGNOSIS — E78.2 MIXED HYPERLIPIDEMIA: ICD-10-CM

## 2021-11-15 DIAGNOSIS — I10 HYPERTENSION, UNSPECIFIED TYPE: ICD-10-CM

## 2021-11-15 DIAGNOSIS — I25.10 CORONARY ARTERY DISEASE INVOLVING NATIVE CORONARY ARTERY OF NATIVE HEART WITHOUT ANGINA PECTORIS: ICD-10-CM

## 2021-11-15 DIAGNOSIS — I65.21 CAROTID STENOSIS, RIGHT: ICD-10-CM

## 2021-11-15 PROCEDURE — 4004F PT TOBACCO SCREEN RCVD TLK: CPT | Performed by: INTERNAL MEDICINE

## 2021-11-15 PROCEDURE — 3046F HEMOGLOBIN A1C LEVEL >9.0%: CPT | Performed by: INTERNAL MEDICINE

## 2021-11-15 PROCEDURE — 2022F DILAT RTA XM EVC RTNOPTHY: CPT | Performed by: INTERNAL MEDICINE

## 2021-11-15 PROCEDURE — 93000 ELECTROCARDIOGRAM COMPLETE: CPT | Performed by: INTERNAL MEDICINE

## 2021-11-15 PROCEDURE — G8427 DOCREV CUR MEDS BY ELIG CLIN: HCPCS | Performed by: INTERNAL MEDICINE

## 2021-11-15 PROCEDURE — 93925 LOWER EXTREMITY STUDY: CPT

## 2021-11-15 PROCEDURE — 99214 OFFICE O/P EST MOD 30 MIN: CPT | Performed by: INTERNAL MEDICINE

## 2021-11-15 PROCEDURE — G8417 CALC BMI ABV UP PARAM F/U: HCPCS | Performed by: INTERNAL MEDICINE

## 2021-11-15 PROCEDURE — G8484 FLU IMMUNIZE NO ADMIN: HCPCS | Performed by: INTERNAL MEDICINE

## 2021-11-15 NOTE — PROGRESS NOTES
Out Patient CARDIOLOGY FOLLOW UP    Name: Louisa Harmon    Age: 40 y.o. Date of Service: 11/15/2021      Referring Physician: No admitting provider for patient encounter. Chief Complaint   Patient presents with    Cardiac Clearance     cardiac clearance- pt has complaints of dizziness and swelling in feet and legs        History of Present Illness: 31-year-old male with history of COPD, diabetes, strong family history of obstructive coronary artery disease, hypertension, morbid obesity, peripheral neuropathy, prior history of tobacco abuse quit last year, and coronary artery disease status post CABG x4 vessel in March 2020 with LIMA to LAD, radial to OM, SVG to RCA, and SVG to PDA. She also has history of palpitations and report prior to CABG he was having significant palpitations but after CABG palpitation has significantly improved. He presented for follow-up visit today and reports overall doing well and denies chest pain, dyspnea on exertion, fatigue, dizziness, lightness, syncope, presyncope or fall. He reports able to go up several flights of steps without any limitations such as chest pain, dyspnea on exertion or fatigue with activities. He has upcoming ENT surgery and requested preoperative evaluation. He reports he is able to achieve more than 4 METS without any limitation and subsequently patient may proceed to surgery without additional cardiac testing.      :  Medical History:  1. COPD. 2. Diabetes mellitus. 3. Hyperlipidemia.  Total cholesterol 188, HDL 48,  (03/2019).   Repeat lipid panel 02/05/2030: Total cholesterol 213, triglycerides 112, HDL 59,   4. Hypertension. 5. No history of MI, CHF, CVA. 6. Neuropathy. 7. Obesity.  BMI 40/2020.  8. Resection of a left anterior mediastinal mass, 04/28/2017 (Dr. Jean Frank). 9.   10. Cigarette abuse.  Right carotid stenosis by ultrasound [facility], 2017. 11. Lexiscan stress MPS, 12/20/2019.  Fixed inferolateral defect.  Mild inferior hypokinesis. EF 50%.  No reversible defect.    12. Echo, 12/20/2019. No LV dilatation.  Mild good concentric LVH.  Normal EF.  No WMA.  Mild to moderate central mitral regurgitation jet.    13. Coronary CT angiogram 01/31/2020: Calcium score 367.  Left main no stenosis.  LAD extensive calcified and noncalcified plaque without stenosis.  Left circumflex not well demonstrated.  Possibly occluded.  Right coronary artery not well demonstrated.  Possible severe stenosis mid RCA.  EF 60%. 14. Ambulatory monitor 01/10 through 01/16/2020: Sinus rhythm.  No atrial fibrillation.  No prolonged pauses.  Occasional PVCs without complex repetitive forms. 15. Cardiac catheterization 02/11/2020: LM 0% stenosis.  LAD 70% stenosis.  D2 90%.  Circumflex 99%.  OM 99%.  Dominant RCA 85% stenosis.  PDA 85% stenosis. 16. CABG x 4 03/16/2020: LIMALAD, radial OM, GSV RCA sequential to PDA  17. Outpatient cardiac assessment 07/01/2020: Stable cardiac status.   Lisinopril restarted     Review of Systems:   Cardiac: As per HPI  General: Denies fever or chills  Pulmonary: As per HPI  HEENT: Denies runny nose  GI: No complaints  : No complaints  Endocrine: Denies night sweats  Musculoskeletal: No complaints  Skin: Dry skin  Neuro: No complaints  Psych: Denies depression    Past Medical History:  Past Medical History:   Diagnosis Date    CAD (coronary artery disease)     [facility]     Diabetes mellitus [facility]     Hyperlipidemia     Hypertension     Internal carotid artery occlusion, right 3/16/2020    Neuropathy     Obesity        Past Surgical History:  Past Surgical History:   Procedure Laterality Date    CARDIAC CATHETERIZATION  02/11/2020    Dr. Marvin Parekh- multi vessel disease    MITRAL VALVE REPAIR N/A 3/16/2020    CORONARY ARTERY BYPASS POSSIBLE LEFT RADIAL ARTERY HARVEST, PATRICE performed by Nancie Peace MD at [address]  04/28/2017    resection of mediastinal mass    TONSILLECTOMY      TRANSESOPHAGEAL ECHOCARDIOGRAM  2020       Family History:  Family History   Problem Relation Age of Onset    Heart Disease Mother         MI age 39     High Blood Pressure Mother     Diabetes Father     Heart Disease Father         MI    High Blood Pressure Father     High Cholesterol Father     Cancer Father     Stroke Father     Kidney Disease Father         dialysis    High Blood Pressure Sister     Other Brother     COPD Brother     High Blood Pressure Brother        Social History:  Social History     Socioeconomic History    Marital status: Single     Spouse name: Not on file    Number of children: Not on file    Years of education: Not on file    Highest education level: Not on file   Occupational History    Occupation: [employer] [address] [place] [address]     Employer: NOT EMPLOYED   Tobacco Use    Smoking status: Former Smoker     Packs/day: 1.00     Years: 25.00     Pack years: 25.00     Types: Cigarettes     Start date: 1994     Quit date: 3/14/2020     Years since quittin.6    Smokeless tobacco: Current User     Types: Chew, Snuff   Vaping Use    Vaping Use: Never used   Substance and Sexual Activity    Alcohol use: No    Drug use: No    Sexual activity: Not on file   Other Topics Concern    Not on file   Social History Narrative    Not on file     Social Determinants of Health     Financial Resource Strain: High Risk    Difficulty of Paying Living Expenses: Very hard   Food Insecurity: No Food Insecurity    Worried About Running Out of Food in the Last Year: Never true    [place] of Food in the Last Year: Never true   Transportation Needs:     Lack of Transportation (Medical): Not on file    Lack of Transportation (Non-Medical):  Not on file   Physical Activity:     Days of Exercise per Week: Not on file    Minutes of Exercise per Session: Not on file   Stress:     Feeling of Stress : Not on file Base) MCG/ACT inhaler Inhale 2 puffs into the lungs every 6 hours as needed for Wheezing 10/13/21  Yes ZIA Smith NP   insulin aspart (NOVOLOG) 100 UNIT/ML injection vial Inject 14 units three times a day plus sliding scale 10/13/21  Yes ZIA Smith NP   dapagliflozin (FARXIGA) 5 MG tablet Take 1 tablet by mouth every morning 10/13/21  Yes ZIA mSith NP   azelastine (ASTELIN) 0.1 % nasal spray instill 2 sprays into each nostril twice a day 9/13/21  Yes Reji Anthony DO   aspirin EC 81 MG EC tablet Take 1 tablet by mouth daily 6/22/21  Yes ZIA Smith NP   HYDROcodone-acetaminophen (NORCO)  MG per tablet Take 1 tablet by mouth every 6 hours as needed for Pain. Yes Historical Provider, MD   blood glucose test strips (FREESTYLE INSULINX TEST) strip 1 each by In Vitro route 3 times daily As needed.   Patient not taking: Reported on 11/15/2021 10/13/21   ZIA Smith NP   fluticasone Losselizabeth Joe) 50 MCG/ACT nasal spray instill 2 sprays into each nostril once daily  Patient not taking: Reported on 11/15/2021 8/18/21   Reji Anthony DO   FreeStyle Lancets MISC USE AS DIRECTED  Patient not taking: Reported on 11/15/2021 6/22/21   ZIA Smith NP   Insulin Pen Needle (PEN NEEDLES 29GX1/2\") 29G X 12MM MISC USE AS DIRECTED  Patient not taking: Reported on 11/15/2021 6/22/21   ZIA Smith NP   BD PEN NEEDLE PRAMOD U/F 32G X 4 MM MISC Inject 1 each as directed 2 times daily  Patient not taking: Reported on 11/15/2021 3/17/21   HAYDEE Bartltet MD   insulin glargine [facility]) 100 UNIT/ML injection pen Inject 60 Units into the skin nightly  Patient taking differently: Inject 68 Units into the skin nightly  11/27/20 3/17/21  HAYDEE Bartlett MD       Current Medications:  Current Outpatient Medications   Medication Sig Dispense Refill    vitamin D (ERGOCALCIFEROL) 1.25 MG (22530 UT) CAPS capsule take 1 capsule by mouth every week 5 capsule 3  metoprolol tartrate 75 MG TABS Take 75 mg by mouth 2 times daily 60 tablet 3    metFORMIN (GLUCOPHAGE) 850 MG tablet Take 1 tablet by mouth 3 times daily 90 tablet 3    Liraglutide (VICTOZA) 18 MG/3ML SOPN SC injection Inject 1.8 mg into the skin daily X 1 week, inject 1.2 mg into the skin daily (Patient taking differently: Inject 1.8 mg into the skin daily ) 3 pen 3    insulin detemir (LEVEMIR FLEXTOUCH) 100 UNIT/ML injection pen Inject 68 Units into the skin nightly 5 pen 5    DULERA 100-5 MCG/ACT inhaler Inhale 2 puffs into the lungs 2 times daily 1 each 3    atorvastatin (LIPITOR) 80 MG tablet take 1 tablet by mouth once daily 30 tablet 3    albuterol sulfate  (90 Base) MCG/ACT inhaler Inhale 2 puffs into the lungs every 6 hours as needed for Wheezing 1 each 3    insulin aspart (NOVOLOG) 100 UNIT/ML injection vial Inject 14 units three times a day plus sliding scale 6 each 3    dapagliflozin (FARXIGA) 5 MG tablet Take 1 tablet by mouth every morning 30 tablet 5    azelastine (ASTELIN) 0.1 % nasal spray instill 2 sprays into each nostril twice a day 30 mL 2    aspirin EC 81 MG EC tablet Take 1 tablet by mouth daily 90 tablet 3    HYDROcodone-acetaminophen (NORCO)  MG per tablet Take 1 tablet by mouth every 6 hours as needed for Pain.  blood glucose test strips (FREESTYLE INSULINX TEST) strip 1 each by In Vitro route 3 times daily As needed.  (Patient not taking: Reported on 11/15/2021) 1 each 5    fluticasone (FLONASE) 50 MCG/ACT nasal spray instill 2 sprays into each nostril once daily (Patient not taking: Reported on 11/15/2021) 32 g 3    FreeStyle Lancets MISC USE AS DIRECTED (Patient not taking: Reported on 11/15/2021) 100 each 3    Insulin Pen Needle (PEN NEEDLES 29GX1/2\") 29G X 12MM MISC USE AS DIRECTED (Patient not taking: Reported on 11/15/2021) 100 each 5    BD PEN NEEDLE PRAMOD U/F 32G X 4 MM MISC Inject 1 each as directed 2 times daily (Patient not taking: Reported on 11/15/2021) 100 each 5     No current facility-administered medications for this visit. Physical Exam:  /84   Pulse 96   Resp 18   Ht 6' 4\" (1.93 m)   Wt (!) 350 lb 8 oz (159 kg)   BMI 42.66 kg/m²   Wt Readings from Last 3 Encounters:   11/15/21 (!) 350 lb 8 oz (159 kg)   10/13/21 (!) 355 lb (161 kg)   06/22/21 (!) 361 lb (163.7 kg)       Appearance: Alert and oriented x3 not in acute distress. Skin: Dry skin  Head: Atraumatic  Eyes: Intact extraocular muscles   ENMT: Mucous membranes are moist  Neck: Supple  Lungs: Clear to auscultation  Cardiac: Normal S1 and S2  Abdomen: Protuberant  Extremities: Intact range of motion  Neurologic: No focal neurological deficits  Peripheral Pulses: 2+ peripheral pulses    Intake/Output:  No intake or output data in the 24 hours ending 11/15/21 1356  [unfilled]    Laboratory Tests:  No results for input(s): NA, K, CL, CO2, BUN, CREATININE, GLUCOSE, CALCIUM in the last 72 hours. Lab Results   Component Value Date    MG 2.0 03/17/2020    MG 2.4 03/16/2020     No results for input(s): ALKPHOS, ALT, AST, PROT, BILITOT, BILIDIR, LABALBU in the last 72 hours. No results for input(s): WBC, RBC, HGB, HCT, MCV, MCH, MCHC, RDW, PLT, MPV in the last 72 hours. Lab Results   Component Value Date    CKTOTAL 52 06/22/2021     No results for input(s): CKTOTAL, CKMB, CKMBINDEX, TROPHS in the last 72 hours.   Lab Results   Component Value Date    INR 1.2 03/16/2020    INR 1.1 03/12/2020    PROTIME 13.0 (H) 03/16/2020    PROTIME 12.2 03/12/2020     Lab Results   Component Value Date    TSH 0.878 06/22/2021     Lab Results   Component Value Date    LABA1C 9.5 10/13/2021    LABA1C 12.3 06/22/2021    LABA1C 10.0 (H) 03/17/2021     No results found for: EAG  Lab Results   Component Value Date    CHOL 191 06/22/2021    CHOL 185 03/17/2021    CHOL 213 (H) 02/05/2020     Lab Results   Component Value Date    TRIG 109 06/22/2021    TRIG 121 03/17/2021    TRIG 112 02/05/2020     Lab Results   Component Value Date    HDL 51 06/22/2021    HDL 50 03/17/2021    HDL 59 02/05/2020     Lab Results   Component Value Date    LDLCALC 118 (H) 06/22/2021    LDLCALC 111 (H) 03/17/2021    LDLCALC 132 (H) 02/05/2020     Lab Results   Component Value Date    LABVLDL 22 06/22/2021    LABVLDL 24 03/17/2021    LABVLDL 22 02/05/2020     Lab Results   Component Value Date    CHOLHDLRATIO 3.9 03/07/2019     No results for input(s): PROBNP in the last 72 hours. Cardiac Tests:  EKG reviewed (EKG date: Sinus rhythm 96 bpm, incomplete right bundle branch block, nonspecific ST-T wave changes.):      Echocardiogram reviewed: 3/3/2020  Summary   Normal left ventricular systolic function. Ejection fraction is visually estimated at 60%. Normal right ventricular size and function. Mild mitral regurgitation. Stress test reviewed:      Cardiac catheterization reviewed: 2/12/2020  HEMODYNAMICS:  1. Opening aortic pressure 127/77 with a mean of 98.  2.  Left ventricular systolic pressure 716.  3.  LVEDP 4.  4.  IFR of the proximal LAD 0.85,  0.84, and 0.84.  5.  IFR of the mid LAD 0.75, 0.74, and 0.72.     ANGIOGRAPHIC RESULTS:  1. LEFT MAIN:  Mild diffuse luminal irregularities. 2.  LAD:  Proximal eccentric 40% stenosis, mild eccentric 60% to 70%  diffuse stenosis. 3.  D1:  Small vessel. No angiographically  significant stenosis. 4.  D2:  Ostial 90% stenosis. 5.  D3:  Small vessel. 6.  CIRCUMFLEX:  Mid diffuse 99% subtotal occlusion with TAE-2 flow. 7.  OM1:  Ostial diffuse 99% subtotal occlusion with TAE-2 flow. 8.  Right coronary artery:  Mid diffuse 85% stenosis, ostial and  proximal  PDA 85% stenosis. CXR reviewed:      The 10-year ASCVD risk score (Jerrod Garcia, et al., 2013) is: 4.1%    Values used to calculate the score:      Age: 40 years      Sex: Male      Is Non- : No      Diabetic: Yes      Tobacco smoker: No      Systolic Blood Pressure: 786 mmHg      Is BP treated: Yes      HDL Cholesterol: 51 mg/dL      Total Cholesterol: 191 mg/dL    ASSESSMENT / PLAN:    1. Preop cardiovascular exam prior to ENT surgery describing it as adenoid and to be in the years  Patient report able to achieve more than 4 METS and may proceed to surgery without additional cardiac testing    2. Coronary artery disease involving native coronary artery of native heart without angina pectoris  status post CABG x4 vessel in March 2020 with LIMA to LAD, radial to OM, SVG to RCA, and SVG to PDA. Continue statin, aspirin and beta-blocker  Currently denies any anginal symptoms  3. Mixed hyperlipidemia  Continue statin therapy  4. Palpitation  He reports significant palpitation that was attributed to a PAC and PVCs prior to CABG but states since CABG palpitation has resolved. 5. Tobacco abuse  He reports he quit tobacco few days prior to CABG and has not resumed. 6. Essential hypertension  Continue current blood pressure medications  7. CAD in native artery  As mentioned above  8. Carotid stenosis, right  Apparently right carotid stenosis by ultrasound [facility], 2017 that has been monitored by PCP and vascular  Follow-up with your PCP and vascular  Will discuss repeat ultrasound if not has recently been done during next visit    9. Hypertension, unspecified type  Follow-up with your PCP  10. Uncontrolled type 2 diabetes mellitus with hyperglycemia ([address]  Follow-up with your PCP    12. Type 2 diabetes, uncontrolled, with neuropathy ([address]  Follow-up with your PCP and neurology          FOLLOW-UP 1 year        Thank you for allowing me to participate in your patient's care. Please feel free to contact me if you have any questions or concerns.     Luisa Jacques MD  [facility]) Cardiology

## 2021-11-17 ENCOUNTER — HOSPITAL ENCOUNTER (OUTPATIENT)
Age: 46
Discharge: HOME | End: 2021-11-19
Payer: COMMERCIAL

## 2021-11-17 ENCOUNTER — TELEPHONE (OUTPATIENT)
Dept: PRIMARY CARE CLINIC | Age: 46
End: 2021-11-17

## 2021-11-17 DIAGNOSIS — E11.65 UNCONTROLLED TYPE 2 DIABETES MELLITUS WITH HYPERGLYCEMIA (HCC): ICD-10-CM

## 2021-11-17 PROCEDURE — U0003 INFECTIOUS AGENT DETECTION BY NUCLEIC ACID (DNA OR RNA); SEVERE ACUTE RESPIRATORY SYNDROME CORONAVIRUS 2 (SARS-COV-2) (CORONAVIRUS DISEASE [COVID-19]), AMPLIFIED PROBE TECHNIQUE, MAKING USE OF HIGH THROUGHPUT TECHNOLOGIES AS DESCRIBED BY CMS-2020-01-R: HCPCS

## 2021-11-17 PROCEDURE — U0005 INFEC AGEN DETEC AMPLI PROBE: HCPCS

## 2021-11-17 RX ORDER — INSULIN GLARGINE 100 [IU]/ML
68 INJECTION, SOLUTION SUBCUTANEOUS NIGHTLY
Qty: 5 PEN | Refills: 3 | Status: SHIPPED
Start: 2021-11-17 | End: 2021-12-06 | Stop reason: SDUPTHER

## 2021-11-17 RX ORDER — INSULIN DETEMIR 100 [IU]/ML
68 INJECTION, SOLUTION SUBCUTANEOUS NIGHTLY
Qty: 5 PEN | Refills: 5 | Status: SHIPPED
Start: 2021-11-17 | End: 2021-11-17

## 2021-11-17 NOTE — TELEPHONE ENCOUNTER
Patient is out of  Levir. He was told by pharmacy that it was on order for the next day for the last week. Yesterday he was told it was on back order and no one around this area had it.        Please advise

## 2021-11-17 NOTE — TELEPHONE ENCOUNTER
Last Appointment:  10/13/2021  Future Appointments   Date Time Provider Larisa Salazar   2/14/2022 10:30 AM Tad Lemons   4/7/2022  9:00 AM Debra Michael MD [facility]

## 2021-11-18 LAB — SARS-COV-2, PCR: NOT DETECTED

## 2021-11-19 NOTE — PROGRESS NOTES
[facility] PRE-ADMISSION TESTING INSTRUCTIONS    The Preadmission Testing patient is instructed accordingly using the following criteria (check applicable):    ARRIVAL INSTRUCTIONS:  [x] Parking the day of Surgery is located in the Main Entrance lot. Upon entering the door, make an immediate right to the surgery reception desk    [x] Bring photo ID and insurance card    [] Bring in a copy of Living will or Durable Power of  papers. [x] Please be sure to arrange for responsible adult to provide transportation to and from the hospital    [x] Please arrange for responsible adult to be with you for the 24 hour period post procedure due to having anesthesia      GENERAL INSTRUCTIONS:    [x] Nothing by mouth after midnight, including gum, candy, mints or water    [] You may brush your teeth, but do not swallow any water    [x] Take medications as instructed with 1-2 oz of water    [] Stop herbal supplements and vitamins 5 days prior to procedure    [] Follow preop dosing of blood thinners per physician instructions    [] Take 1/2 dose of evening insulin, but no insulin after midnight    [] No oral diabetic medications after midnight    [] If diabetic and have low blood sugar or feel symptomatic, take 1-2oz apple juice only    [] Bring inhalers day of surgery    [] Bring C-PAP/ Bi-Pap day of surgery    [] Bring urine specimen day of surgery    [x] Shower or bath with soap, lather and rinse well, AM of Surgery, no lotion, powders or creams to surgical site    [] Follow bowel prep as instructed per surgeon    [] No tobacco products within 24 hours of surgery     [] No alcohol or illegal drug use within 24 hours of surgery.     [x] Jewelry, body piercing's, eyeglasses, contact lenses and dentures are not permitted into surgery (bring cases)      [] Please do not wear any nail polish, make up or hair products on the day of surgery    [x] You can expect a call the business day prior to procedure to notify you if your arrival time changes    [x] If you receive a survey after surgery we would greatly appreciate your comments    [] Parent/guardian of a minor must accompany their child and remain on the premises  the entire time they are under our care     [] Pediatric patients may bring favorite toy, blanket or comfort item with them    [] A caregiver or family member must remain with the patient during their stay if they are mentally handicapped, have dementia, disoriented or unable to use a call light or would be a safety concern if left unattended    [x] Please notify surgeon if you develop any illness between now and time of surgery (cold, cough, sore throat, fever, nausea, vomiting) or any signs of infections  including skin, wounds, and dental.    [x]  The Outpatient Pharmacy is available to fill your prescription here on your day of surgery, ask your preop nurse for details    [] Other instructions    EDUCATIONAL MATERIALS PROVIDED:    [] PAT Preoperative Education Packet/Booklet     [] Medication List    [] Transfusion bracelet applied with instructions    [] Shower with soap, lather and rinse well, and use CHG wipes provided the evening before surgery as instructed    [] Incentive spirometer with instructions        Have you been tested for COVID  No           Have you been told you were positive for COVID No  Have you had any known exposure to someone that is positive for COVID No  Do you have a cough                   No              Do you have shortness of breath No                 Do you have a sore throat            No                Are you having chills                    No                Are you having muscle aches. No                    Please come to the hospital wearing a mask and have your significant other wear a mask as well. Both of you should check your temperature before leaving to come here,  if it is 100 or higher please call [phone] for instruction.

## 2021-11-21 ENCOUNTER — ANESTHESIA EVENT (OUTPATIENT)
Dept: OPERATING ROOM | Age: 46
End: 2021-11-21
Payer: COMMERCIAL

## 2021-11-21 NOTE — H&P
Patient ID:  Carlos Weems is a 41 y.o. male.     HPI:  41year old male presenting for evaluation of his ears. Pt has a history of hearing loss, worse on the right, popping, and nasal congestion. Former smoker, 20 pack year history quit 1 year ago. Notes \"always having issues\" with his ears since a child but has never had HENT surgery.         Patient's medications,allergies, past medical, surgical, social and family histories were reviewed andupdated as appropriate.           Review of Systems   Constitutional: Negative for chills, fatigue and fever. HENT: Positive for congestion and hearing loss. Eyes: Negative for discharge and redness. Respiratory: Negative for cough, shortness of breath and stridor. Gastrointestinal: Negative for nausea and vomiting. Endocrine: Negative. Genitourinary: Negative. Musculoskeletal: Negative. Skin: Negative for color change and rash. Allergic/Immunologic: Negative. Neurological: Negative for dizziness, speech difficulty and headaches. Hematological: Negative. Psychiatric/Behavioral: Negative for agitation and confusion. All other systems reviewed and are negative.                    Objective:   Physical Exam  Constitutional:       Appearance: Normal appearance. HENT:      Head: Normocephalic and atraumatic. Right Ear: External ear normal. Tympanic membrane is retracted. Left Ear: External ear normal.      Nose: Congestion present. Mouth/Throat:      Mouth: Mucous membranes are moist.   Eyes:      Pupils: Pupils are equal, round, and reactive to light. Cardiovascular:      Rate and Rhythm: Normal rate. Pulmonary:      Effort: Pulmonary effort is normal.   Musculoskeletal:      Cervical back: Normal range of motion. Skin:     General: Skin is warm and dry.    Neurological:      Mental Status: He is alert.       Endoscopy Procedure Note     Pre-operative Diagnosis: ETD  Post-operative Diagnosis: nasopharyngeal mass

## 2021-11-22 ENCOUNTER — ANESTHESIA (OUTPATIENT)
Dept: OPERATING ROOM | Age: 46
End: 2021-11-22
Payer: COMMERCIAL

## 2021-11-22 ENCOUNTER — HOSPITAL ENCOUNTER (OUTPATIENT)
Age: 46
Setting detail: OUTPATIENT SURGERY
Discharge: HOME | End: 2021-11-22
Attending: OTOLARYNGOLOGY | Admitting: OTOLARYNGOLOGY
Payer: COMMERCIAL

## 2021-11-22 VITALS
SYSTOLIC BLOOD PRESSURE: 155 MMHG | HEART RATE: 92 BPM | OXYGEN SATURATION: 93 % | BODY MASS INDEX: 38.36 KG/M2 | DIASTOLIC BLOOD PRESSURE: 86 MMHG | HEIGHT: 76 IN | TEMPERATURE: 97.5 F | WEIGHT: 315 LBS | RESPIRATION RATE: 18 BRPM

## 2021-11-22 VITALS — SYSTOLIC BLOOD PRESSURE: 101 MMHG | TEMPERATURE: 96.1 F | DIASTOLIC BLOOD PRESSURE: 57 MMHG | OXYGEN SATURATION: 95 %

## 2021-11-22 PROBLEM — G89.18 POST-OP PAIN: Status: ACTIVE | Noted: 2021-11-22

## 2021-11-22 LAB
ANION GAP SERPL CALCULATED.3IONS-SCNC: 11 MMOL/L (ref 7–16)
BUN BLDV-MCNC: 14 MG/DL (ref 6–20)
CALCIUM SERPL-MCNC: 9.1 MG/DL (ref 8.6–10.2)
CHLORIDE BLD-SCNC: 101 MMOL/L (ref 98–107)
CO2: 24 MMOL/L (ref 22–29)
CREAT SERPL-MCNC: 0.6 MG/DL (ref 0.7–1.2)
GFR AFRICAN AMERICAN: >60
GFR NON-AFRICAN AMERICAN: >60 ML/MIN/1.73
GLUCOSE BLD-MCNC: 276 MG/DL (ref 74–99)
HCT VFR BLD CALC: 48.3 % (ref 37–54)
HEMOGLOBIN: 16.4 G/DL (ref 12.5–16.5)
MCH RBC QN AUTO: 30.8 PG (ref 26–35)
MCHC RBC AUTO-ENTMCNC: 34 % (ref 32–34.5)
MCV RBC AUTO: 90.6 FL (ref 80–99.9)
METER GLUCOSE: 236 MG/DL (ref 74–99)
METER GLUCOSE: 264 MG/DL (ref 74–99)
PDW BLD-RTO: 12.5 FL (ref 11.5–15)
PLATELET # BLD: 282 E9/L (ref 130–450)
PMV BLD AUTO: 8.7 FL (ref 7–12)
POTASSIUM SERPL-SCNC: 4.1 MMOL/L (ref 3.5–5)
RBC # BLD: 5.33 E12/L (ref 3.8–5.8)
SODIUM BLD-SCNC: 136 MMOL/L (ref 132–146)
WBC # BLD: 8.5 E9/L (ref 4.5–11.5)

## 2021-11-22 PROCEDURE — 2709999900 HC NON-CHARGEABLE SUPPLY: Performed by: OTOLARYNGOLOGY

## 2021-11-22 PROCEDURE — 82962 GLUCOSE BLOOD TEST: CPT

## 2021-11-22 PROCEDURE — 6370000000 HC RX 637 (ALT 250 FOR IP): Performed by: ANESTHESIOLOGY

## 2021-11-22 PROCEDURE — 6370000000 HC RX 637 (ALT 250 FOR IP): Performed by: OTOLARYNGOLOGY

## 2021-11-22 PROCEDURE — 3600000003 HC SURGERY LEVEL 3 BASE: Performed by: OTOLARYNGOLOGY

## 2021-11-22 PROCEDURE — 80048 BASIC METABOLIC PNL TOTAL CA: CPT

## 2021-11-22 PROCEDURE — 42804 BIOPSY OF UPPER NOSE/THROAT: CPT | Performed by: OTOLARYNGOLOGY

## 2021-11-22 PROCEDURE — 7100000011 HC PHASE II RECOVERY - ADDTL 15 MIN: Performed by: OTOLARYNGOLOGY

## 2021-11-22 PROCEDURE — 36415 COLL VENOUS BLD VENIPUNCTURE: CPT

## 2021-11-22 PROCEDURE — 88305 TISSUE EXAM BY PATHOLOGIST: CPT

## 2021-11-22 PROCEDURE — C1894 INTRO/SHEATH, NON-LASER: HCPCS | Performed by: OTOLARYNGOLOGY

## 2021-11-22 PROCEDURE — 69705 NPS SURG DILAT EUST TUBE UNI: CPT | Performed by: OTOLARYNGOLOGY

## 2021-11-22 PROCEDURE — 7100000000 HC PACU RECOVERY - FIRST 15 MIN: Performed by: OTOLARYNGOLOGY

## 2021-11-22 PROCEDURE — 2580000003 HC RX 258: Performed by: STUDENT IN AN ORGANIZED HEALTH CARE EDUCATION/TRAINING PROGRAM

## 2021-11-22 PROCEDURE — 69436 CREATE EARDRUM OPENING: CPT | Performed by: OTOLARYNGOLOGY

## 2021-11-22 PROCEDURE — 7100000010 HC PHASE II RECOVERY - FIRST 15 MIN: Performed by: OTOLARYNGOLOGY

## 2021-11-22 PROCEDURE — 88331 PATH CONSLTJ SURG 1 BLK 1SPC: CPT

## 2021-11-22 PROCEDURE — 2720000010 HC SURG SUPPLY STERILE: Performed by: OTOLARYNGOLOGY

## 2021-11-22 PROCEDURE — 3700000001 HC ADD 15 MINUTES (ANESTHESIA): Performed by: OTOLARYNGOLOGY

## 2021-11-22 PROCEDURE — C1726 CATH, BAL DIL, NON-VASCULAR: HCPCS | Performed by: OTOLARYNGOLOGY

## 2021-11-22 PROCEDURE — 6360000002 HC RX W HCPCS: Performed by: NURSE ANESTHETIST, CERTIFIED REGISTERED

## 2021-11-22 PROCEDURE — 7100000001 HC PACU RECOVERY - ADDTL 15 MIN: Performed by: OTOLARYNGOLOGY

## 2021-11-22 PROCEDURE — 6360000002 HC RX W HCPCS: Performed by: ANESTHESIOLOGY

## 2021-11-22 PROCEDURE — 2780000010 HC IMPLANT OTHER: Performed by: OTOLARYNGOLOGY

## 2021-11-22 PROCEDURE — 85027 COMPLETE CBC AUTOMATED: CPT

## 2021-11-22 PROCEDURE — 3600000013 HC SURGERY LEVEL 3 ADDTL 15MIN: Performed by: OTOLARYNGOLOGY

## 2021-11-22 PROCEDURE — 6360000002 HC RX W HCPCS

## 2021-11-22 PROCEDURE — 3700000000 HC ANESTHESIA ATTENDED CARE: Performed by: OTOLARYNGOLOGY

## 2021-11-22 PROCEDURE — 2500000003 HC RX 250 WO HCPCS

## 2021-11-22 DEVICE — IMPLANTABLE DEVICE: Type: IMPLANTABLE DEVICE | Site: EAR | Status: FUNCTIONAL

## 2021-11-22 RX ORDER — HYDROCODONE BITARTRATE AND ACETAMINOPHEN 5; 325 MG/1; MG/1
1 TABLET ORAL ONCE
Status: COMPLETED | OUTPATIENT
Start: 2021-11-22 | End: 2021-11-22

## 2021-11-22 RX ORDER — SODIUM CHLORIDE 0.9 % (FLUSH) 0.9 %
5-40 SYRINGE (ML) INJECTION PRN
Status: DISCONTINUED | OUTPATIENT
Start: 2021-11-22 | End: 2021-11-22 | Stop reason: HOSPADM

## 2021-11-22 RX ORDER — ONDANSETRON 2 MG/ML
INJECTION INTRAMUSCULAR; INTRAVENOUS PRN
Status: DISCONTINUED | OUTPATIENT
Start: 2021-11-22 | End: 2021-11-22 | Stop reason: SDUPTHER

## 2021-11-22 RX ORDER — LABETALOL HYDROCHLORIDE 5 MG/ML
INJECTION, SOLUTION INTRAVENOUS PRN
Status: DISCONTINUED | OUTPATIENT
Start: 2021-11-22 | End: 2021-11-22 | Stop reason: SDUPTHER

## 2021-11-22 RX ORDER — DEXAMETHASONE SODIUM PHOSPHATE 4 MG/ML
INJECTION, SOLUTION INTRA-ARTICULAR; INTRALESIONAL; INTRAMUSCULAR; INTRAVENOUS; SOFT TISSUE PRN
Status: DISCONTINUED | OUTPATIENT
Start: 2021-11-22 | End: 2021-11-22 | Stop reason: SDUPTHER

## 2021-11-22 RX ORDER — SODIUM CHLORIDE 0.9 % (FLUSH) 0.9 %
5-40 SYRINGE (ML) INJECTION EVERY 12 HOURS SCHEDULED
Status: DISCONTINUED | OUTPATIENT
Start: 2021-11-22 | End: 2021-11-22 | Stop reason: HOSPADM

## 2021-11-22 RX ORDER — ROCURONIUM BROMIDE 10 MG/ML
INJECTION, SOLUTION INTRAVENOUS PRN
Status: DISCONTINUED | OUTPATIENT
Start: 2021-11-22 | End: 2021-11-22 | Stop reason: SDUPTHER

## 2021-11-22 RX ORDER — NEOSTIGMINE METHYLSULFATE 1 MG/ML
INJECTION, SOLUTION INTRAVENOUS PRN
Status: DISCONTINUED | OUTPATIENT
Start: 2021-11-22 | End: 2021-11-22 | Stop reason: SDUPTHER

## 2021-11-22 RX ORDER — GLYCOPYRROLATE 1 MG/5 ML
SYRINGE (ML) INTRAVENOUS PRN
Status: DISCONTINUED | OUTPATIENT
Start: 2021-11-22 | End: 2021-11-22 | Stop reason: SDUPTHER

## 2021-11-22 RX ORDER — PROPOFOL 10 MG/ML
INJECTION, EMULSION INTRAVENOUS PRN
Status: DISCONTINUED | OUTPATIENT
Start: 2021-11-22 | End: 2021-11-22 | Stop reason: SDUPTHER

## 2021-11-22 RX ORDER — OFLOXACIN 3 MG/ML
5 SOLUTION AURICULAR (OTIC) DAILY
Status: DISCONTINUED | OUTPATIENT
Start: 2021-11-22 | End: 2021-11-22 | Stop reason: HOSPADM

## 2021-11-22 RX ORDER — MIDAZOLAM HYDROCHLORIDE 1 MG/ML
INJECTION INTRAMUSCULAR; INTRAVENOUS PRN
Status: DISCONTINUED | OUTPATIENT
Start: 2021-11-22 | End: 2021-11-22 | Stop reason: SDUPTHER

## 2021-11-22 RX ORDER — FENTANYL CITRATE 50 UG/ML
INJECTION, SOLUTION INTRAMUSCULAR; INTRAVENOUS PRN
Status: DISCONTINUED | OUTPATIENT
Start: 2021-11-22 | End: 2021-11-22 | Stop reason: SDUPTHER

## 2021-11-22 RX ORDER — FENTANYL CITRATE 50 UG/ML
25 INJECTION, SOLUTION INTRAMUSCULAR; INTRAVENOUS EVERY 5 MIN PRN
Status: COMPLETED | OUTPATIENT
Start: 2021-11-22 | End: 2021-11-22

## 2021-11-22 RX ORDER — LIDOCAINE HYDROCHLORIDE 20 MG/ML
INJECTION, SOLUTION EPIDURAL; INFILTRATION; INTRACAUDAL; PERINEURAL PRN
Status: DISCONTINUED | OUTPATIENT
Start: 2021-11-22 | End: 2021-11-22 | Stop reason: SDUPTHER

## 2021-11-22 RX ORDER — SODIUM CHLORIDE 9 MG/ML
25 INJECTION, SOLUTION INTRAVENOUS PRN
Status: DISCONTINUED | OUTPATIENT
Start: 2021-11-22 | End: 2021-11-22 | Stop reason: HOSPADM

## 2021-11-22 RX ADMIN — Medication 3 MG: at 11:46

## 2021-11-22 RX ADMIN — FENTANYL CITRATE 25 MCG: 0.05 INJECTION, SOLUTION INTRAMUSCULAR; INTRAVENOUS at 12:20

## 2021-11-22 RX ADMIN — HYDROMORPHONE HYDROCHLORIDE 0.5 MG: 1 INJECTION, SOLUTION INTRAMUSCULAR; INTRAVENOUS; SUBCUTANEOUS at 12:40

## 2021-11-22 RX ADMIN — FENTANYL CITRATE 50 MCG: 50 INJECTION, SOLUTION INTRAMUSCULAR; INTRAVENOUS at 10:59

## 2021-11-22 RX ADMIN — MIDAZOLAM 2 MG: 1 INJECTION INTRAMUSCULAR; INTRAVENOUS at 10:35

## 2021-11-22 RX ADMIN — SODIUM CHLORIDE: 9 INJECTION, SOLUTION INTRAVENOUS at 10:34

## 2021-11-22 RX ADMIN — FENTANYL CITRATE 50 MCG: 50 INJECTION, SOLUTION INTRAMUSCULAR; INTRAVENOUS at 10:48

## 2021-11-22 RX ADMIN — Medication 0.6 MG: at 11:46

## 2021-11-22 RX ADMIN — LABETALOL HYDROCHLORIDE 5 MG: 5 INJECTION INTRAVENOUS at 10:57

## 2021-11-22 RX ADMIN — FENTANYL CITRATE 25 MCG: 0.05 INJECTION, SOLUTION INTRAMUSCULAR; INTRAVENOUS at 12:35

## 2021-11-22 RX ADMIN — LIDOCAINE HYDROCHLORIDE 40 MG: 20 INJECTION, SOLUTION EPIDURAL; INFILTRATION; INTRACAUDAL; PERINEURAL at 10:48

## 2021-11-22 RX ADMIN — HYDROCODONE BITARTRATE AND ACETAMINOPHEN 1 TABLET: 5; 325 TABLET ORAL at 13:42

## 2021-11-22 RX ADMIN — PROPOFOL 200 MG: 10 INJECTION, EMULSION INTRAVENOUS at 10:48

## 2021-11-22 RX ADMIN — ONDANSETRON 4 MG: 2 INJECTION INTRAMUSCULAR; INTRAVENOUS at 10:58

## 2021-11-22 RX ADMIN — DEXAMETHASONE SODIUM PHOSPHATE 6 MG: 4 INJECTION, SOLUTION INTRAMUSCULAR; INTRAVENOUS at 10:58

## 2021-11-22 RX ADMIN — Medication 0.2 MG: at 10:48

## 2021-11-22 RX ADMIN — FENTANYL CITRATE 25 MCG: 0.05 INJECTION, SOLUTION INTRAMUSCULAR; INTRAVENOUS at 12:30

## 2021-11-22 RX ADMIN — ROCURONIUM BROMIDE 35 MG: 10 INJECTION, SOLUTION INTRAVENOUS at 10:49

## 2021-11-22 RX ADMIN — FENTANYL CITRATE 25 MCG: 0.05 INJECTION, SOLUTION INTRAMUSCULAR; INTRAVENOUS at 12:25

## 2021-11-22 ASSESSMENT — PULMONARY FUNCTION TESTS
PIF_VALUE: 22
PIF_VALUE: 21
PIF_VALUE: 18
PIF_VALUE: 22
PIF_VALUE: 1
PIF_VALUE: 1
PIF_VALUE: 21
PIF_VALUE: 2
PIF_VALUE: 21
PIF_VALUE: 21
PIF_VALUE: 2
PIF_VALUE: 22
PIF_VALUE: 13
PIF_VALUE: 21
PIF_VALUE: 27
PIF_VALUE: 21
PIF_VALUE: 26
PIF_VALUE: 21
PIF_VALUE: 6
PIF_VALUE: 1
PIF_VALUE: 21
PIF_VALUE: 24
PIF_VALUE: 22
PIF_VALUE: 21
PIF_VALUE: 1
PIF_VALUE: 21
PIF_VALUE: 1
PIF_VALUE: 1
PIF_VALUE: 21
PIF_VALUE: 13
PIF_VALUE: 21
PIF_VALUE: 22
PIF_VALUE: 21
PIF_VALUE: 2
PIF_VALUE: 21
PIF_VALUE: 22
PIF_VALUE: 3
PIF_VALUE: 27
PIF_VALUE: 21
PIF_VALUE: 21
PIF_VALUE: 22
PIF_VALUE: 15
PIF_VALUE: 21
PIF_VALUE: 3
PIF_VALUE: 21
PIF_VALUE: 22
PIF_VALUE: 21
PIF_VALUE: 22
PIF_VALUE: 21
PIF_VALUE: 21
PIF_VALUE: 1
PIF_VALUE: 22
PIF_VALUE: 21
PIF_VALUE: 27
PIF_VALUE: 2
PIF_VALUE: 1
PIF_VALUE: 21
PIF_VALUE: 22
PIF_VALUE: 21
PIF_VALUE: 19
PIF_VALUE: 21

## 2021-11-22 ASSESSMENT — PAIN SCALES - GENERAL
PAINLEVEL_OUTOF10: 5
PAINLEVEL_OUTOF10: 4
PAINLEVEL_OUTOF10: 3
PAINLEVEL_OUTOF10: 4
PAINLEVEL_OUTOF10: 5
PAINLEVEL_OUTOF10: 0
PAINLEVEL_OUTOF10: 6
PAINLEVEL_OUTOF10: 5
PAINLEVEL_OUTOF10: 7
PAINLEVEL_OUTOF10: 5

## 2021-11-22 ASSESSMENT — PAIN DESCRIPTION - LOCATION
LOCATION: THROAT;NOSE
LOCATION: NOSE
LOCATION: NOSE;THROAT
LOCATION: THROAT;NOSE

## 2021-11-22 ASSESSMENT — PAIN DESCRIPTION - ORIENTATION
ORIENTATION: INNER

## 2021-11-22 ASSESSMENT — PAIN DESCRIPTION - PAIN TYPE
TYPE: SURGICAL PAIN

## 2021-11-22 ASSESSMENT — PAIN DESCRIPTION - PROGRESSION
CLINICAL_PROGRESSION: GRADUALLY WORSENING
CLINICAL_PROGRESSION: GRADUALLY WORSENING
CLINICAL_PROGRESSION: GRADUALLY IMPROVING
CLINICAL_PROGRESSION: NOT CHANGED

## 2021-11-22 ASSESSMENT — PAIN DESCRIPTION - DESCRIPTORS
DESCRIPTORS: BURNING
DESCRIPTORS: BURNING;DISCOMFORT
DESCRIPTORS: BURNING

## 2021-11-22 ASSESSMENT — LIFESTYLE VARIABLES: SMOKING_STATUS: 0

## 2021-11-22 NOTE — ANESTHESIA PRE PROCEDURE
Department of Anesthesiology  Preprocedure Note       Name:  Tamia Carroll   Age:  40 y.o.  :  1975                                          MRN:  [id number]         Date:  2021      Surgeon: Rosemary Diaz):  Emelia Anthony DO    Procedure: Procedure(s):  NASOPHARYNGEAL BIOPSY  POSSIBLE RIGHT EAR TUBE POSSIBLE EUSTACHIAN TUBE DILATION  POSSIBLE ADENOIDECTOMY    Medications prior to admission:   Prior to Admission medications    Medication Sig Start Date End Date Taking?  Authorizing Provider   insulin aspart (NOVOLOG) 100 UNIT/ML injection vial Inject 14 units three times a day plus sliding scale 21  Yes ZIA Granger NP   insulin glargine (LANTUS SOLOSTAR) 100 UNIT/ML injection pen Inject 68 Units into the skin nightly 21  Yes ZIA Granger NP   metoprolol tartrate (LOPRESSOR) 50 MG tablet take 1 tablet by mouth twice a day  Patient taking differently: Take 75 mg by mouth 2 times daily  21  Yes ZIA Granger NP   vitamin D (ERGOCALCIFEROL) 1.25 MG (79231 UT) CAPS capsule take 1 capsule by mouth every week 10/13/21  Yes ZIA Granger NP   metFORMIN (GLUCOPHAGE) 850 MG tablet Take 1 tablet by mouth 3 times daily 10/13/21  Yes ZIA Granger NP   Liraglutide (VICTOZA) 18 MG/3ML SOPN SC injection Inject 1.8 mg into the skin daily X 1 week, inject 1.2 mg into the skin daily  Patient taking differently: Inject 1.8 mg into the skin daily  10/13/21  Yes ZIA Granger NP   DULERA 100-5 MCG/ACT inhaler Inhale 2 puffs into the lungs 2 times daily 10/13/21  Yes ZIA Granger NP   atorvastatin (LIPITOR) 80 MG tablet take 1 tablet by mouth once daily  Patient taking differently: Take 80 mg by mouth nightly  10/13/21  Yes ZIA Granger NP   albuterol sulfate  (90 Base) MCG/ACT inhaler Inhale 2 puffs into the lungs every 6 hours as needed for Wheezing 10/13/21  Yes ZIA Granger NP   blood glucose test strips (FREESTYLE INSULINX TEST) strip 1 each by In Vitro route 3 times daily As needed. 10/13/21  Yes ZIA Cee NP   dapagliflozin (FARXIGA) 5 MG tablet Take 1 tablet by mouth every morning 10/13/21  Yes ZIA Cee NP   azelastine (ASTELIN) 0.1 % nasal spray instill 2 sprays into each nostril twice a day 9/13/21  Yes Juan Anthony,    aspirin EC 81 MG EC tablet Take 1 tablet by mouth daily 6/22/21  Yes ZIA Cee NP   Insulin Pen Needle (PEN NEEDLES 29GX1/2\") 29G X 12MM MISC USE AS DIRECTED 6/22/21  Yes ZIA Cee NP   BD PEN NEEDLE PRAMOD U/F 32G X 4 MM MISC Inject 1 each as directed 2 times daily 3/17/21  Yes Sulema Truong MD   HYDROcodone-acetaminophen (NORCO)  MG per tablet Take 1 tablet by mouth every 6 hours as needed for Pain. Yes Historical Provider, MD       Current medications:    No current facility-administered medications for this encounter. Allergies:     Allergies   Allergen Reactions    Sulfa Antibiotics Other (See Comments)     Unknown reaction       Problem List:    Patient Active Problem List   Diagnosis Code    Uncontrolled type 2 diabetes mellitus with hyperglycemia ([facility] E11.65    Uncontrolled type 2 diabetes mellitus ([facility] E11.65    Mixed hyperlipidemia E78.2    Essential hypertension I10    CAD in native artery I25.10    Carotid stenosis, right I65.21    Hypertensive disorder I10    Type 2 diabetes, uncontrolled, with neuropathy (HCC) E11.40, E11.65       Past Medical History:        Diagnosis Date    CAD (coronary artery disease)     Dr Donna Cunningham COPD (chronic obstructive pulmonary disease) ([facility]     Diabetes mellitus ([facility]     Hyperlipidemia     Hypertension     Internal carotid artery occlusion, right 03/16/2020    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 Adriana Heath- multi vessel disease    MITRAL VALVE REPAIR N/A 3/16/2020    CORONARY ARTERY BYPASS POSSIBLE LEFT RADIAL ARTERY HARVEST, PATRICE performed by Wojciech Anderson MD at [address]  2017    resection of mediastinal mass    TONSILLECTOMY      TRANSESOPHAGEAL ECHOCARDIOGRAM  2020       Social History:    Social History     Tobacco Use    Smoking status: Former Smoker     Packs/day: 1.00     Years: 25.00     Pack years: 25.00     Types: Cigarettes     Start date: 1994     Quit date: 3/14/2020     Years since quittin.6    Smokeless tobacco: Current User     Types: Chew, Snuff   Substance Use Topics    Alcohol use: No                                Ready to quit: Not Answered  Counseling given: Not Answered      Vital Signs (Current):   Vitals:    21 1115   Weight: (!) 350 lb (158.8 kg)   Height: 6' 4\" (1.93 m)                                              BP Readings from Last 3 Encounters:   11/15/21 128/84   10/13/21 130/72   21 132/74       NPO Status:                                                                                 BMI:   Wt Readings from Last 3 Encounters:   21 (!) 350 lb (158.8 kg)   11/15/21 (!) 350 lb 8 oz (159 kg)   10/13/21 (!) 355 lb (161 kg)     Body mass index is 42.6 kg/m².     CBC:   Lab Results   Component Value Date    WBC 7.9 2021    RBC 5.51 2021    HGB 16.9 2021    HCT 50.7 2021    MCV 92.0 2021    RDW 12.8 2021     2021       CMP:   Lab Results   Component Value Date     2021    K 4.3 2021    CL 99 2021    CO2 22 2021    BUN 14 2021    CREATININE 0.6 2021    GFRAA >60 2021    LABGLOM >60 2021    GLUCOSE 360 2021    PROT 7.6 2021    CALCIUM 9.6 2021    BILITOT 0.5 2021    ALKPHOS 97 2021    AST 16 2021    ALT 19 2021       POC Tests: No results for input(s): POCGLU, POCNA, POCK, POCCL, POCBUN, POCHEMO, POCHCT in the last 72 hours.    Coags:   Lab Results   Component Value Date    PROTIME 13.0 03/16/2020    INR 1.2 03/16/2020    APTT 30.1 03/16/2020       HCG (If Applicable): No results found for: PREGTESTUR, PREGSERUM, HCG, HCGQUANT     ABGs:   Lab Results   Component Value Date    PO2ART 116.9 03/16/2020    JJN0UOO 49.5 03/16/2020    GNJ2OBM 24.4 03/16/2020        Type & Screen (If Applicable):  No results found for: LABABO, LABRH    Drug/Infectious Status (If Applicable):  No results found for: HIV, HEPCAB    COVID-19 Screening (If Applicable):   Lab Results   Component Value Date    COVID19 Not Detected 11/17/2021           Anesthesia Evaluation  Patient summary reviewed no history of anesthetic complications:   Airway: Mallampati: III  TM distance: >3 FB   Neck ROM: full  Mouth opening: > = 3 FB Dental:      Comment: Poor, discolored, decayed. Pulmonary: breath sounds clear to auscultation  (+) COPD:      (-) not a current smoker                           Cardiovascular:    (+) hypertension:, CAD:, CABG/stent:,       ECG reviewed  Rhythm: regular  Rate: normal  Echocardiogram reviewed                  Neuro/Psych:   Negative Neuro/Psych ROS              GI/Hepatic/Renal: Neg GI/Hepatic/Renal ROS            Endo/Other:    (+) DiabetesType II DM, , .                 Abdominal:             Vascular:   + PVD, aortic or cerebral, . ROS comment: Right carotid artery stenosis. . Other Findings:           Anesthesia Plan      general     ASA 3     (Pt agrees to GA--IV induction and ETTube.)  Induction: intravenous. Anesthetic plan and risks discussed with patient. Plan discussed with CRNA.                 Marilyn Chan MD   11/22/2021

## 2021-11-22 NOTE — OP NOTE
Operative Note      Patient: James Anne  YOB: 1975  MRN: [id number]    Date of Procedure: 11/22/2021    Pre-Op Diagnosis: NASOPHARYNGEAL MASS EUSTACHIAN TUBE DYSFUNCTION    Post-Op Diagnosis: Same       Procedure(s):  NASOPHARYNGEAL BIOPSY  POSSIBLE RIGHT EAR TUBE POSSIBLE EUSTACHIAN TUBE DILATION    Surgeon(s):  Luan Rosa DO    Assistant:   First Assistant: Jim Newton  Resident: Moreen Krabbe, DO    Anesthesia: General    Estimated Blood Loss (mL): less than 848     Complications: None    Specimens:   ID Type Source Tests Collected by Time Destination   A : NASOPHARYNGEAL MASS FROZEN SECTION Tissue Tissue SURGICAL PATHOLOGY Kirstin Anthony, DO 11/22/2021 1109    B : NASOPHARYNGEAL MASS  Tissue Tissue SURGICAL PATHOLOGY Kirstin Anthony, DO 11/22/2021 1116        Implants:  Implant Name Type Inv. Item Serial No.  Lot No. LRB No. Used Action   TUBE VENT ID1. 32MM FREDRICK JA T MOD FOR MYR GONSALES [id number]  TUBE VENT ID1. 32MM FREDRICK JA T MOD FOR MYR GONSALES [id number]  [employer] [id number]  1 Implanted         Drains: * No LDAs found *    Findings: large nasopharyngeal mass/adenoid hypertrophy occluding right Eustachian tube, fluid behind TM on right     Detailed Description of Procedure:       INDICATIONS FOR PROCEDURE:  The patient is a 40 y.o. male with a  history of eustachian tube and middle ear effusion requiring tympanostomy  tube placement in the bilateral ears. Eustachian tube balloon dilation was recommended. The risks,benefits, and alternatives of procedure were discussed with the patient who expressed understanding and agreed to proceed.     DESCRIPTION OF PROCEDURE:  The patient was brought into the OR and placed  supine on the operative table. Anesthesia was then obtained  without complication per the anesthesia record. The patient was then  prepped and draped in usual fashion.   The procedure went forth under strict  aseptic techniques.     First, two 4% cocaine-soaked pledgets were inserted into each nasal cavity. These were allowed to work for 2 minutes and then removed. Tube placement  Right  A microscope was brought in and a speculum was placed in the right ear and the external auditory canal was cleared of cerumen with a curette. With the tympanic membrane visualized, there was not infection and was effusion present. A myringotomy knife was used to make an incision in the anterior-inferior portion of the TM. Effusion was removed with a #3 Rivera tip suction until the middle ear space was cleared. A T  tube was place in the incision. Right ETD  A 0-degree endoscope was inserted into the right nasal cavity. It was  advanced with the balloon unit to the posterior nasopharynx where the  orifice of the eustachian tube was evident on the right side. The balloon  was then inserted into the eustachian tube and advanced until a hard stop  was felt. The yellow band on the eustachian tube unit was noted to be just  exterior to the eustachian tube lumen. The eustachian tube balloon was  then inflated to 12 atmospheres pressure and held for 2 minutes. The  balloon unit was observed to dilate the eustachian tube and then was let  down. The balloon unit was then removed. A large nasopharyngeal mass/adenoid hypertrophy was seen occluding the right eustachian tube. This was biopsied with forceps and sent for frozen and deeper specimen sent for permanent. The frozen came back benign mucosa, the suction bovie was then used to reduce this tissue until the eustachian was not occluded. Sinufoam was placed in the right naris. Care was sent back to anesthesia for appropriate awakening. The patient tolerated the procedure well and without complication. All counts were reported as correct x2.         DISPOSITION:  The patient is expected to be discharged home today following  appropriate recovery in the PACU.   Dr. Shivam Beltrán was present the entire case.     Electronically signed by Skinny Stovall DO on 11/22/2021 at 11:59 AM

## 2021-11-22 NOTE — PROGRESS NOTES
INTRAOPERATIVE CONSULTATION (with FROZEN SECTION)    Nasopharyngeal mass - scant benign appearing mucosal tissue

## 2021-11-22 NOTE — H&P
Tricia Lynch was seen and re-examined preoperatively today, November 22, 2021. There was no substantial change in his physical and medical status. Patient is fit for the proposed surgical procedure. All questions were appropriately addressed and had no further questions regarding the risks, benefits, and alternatives of the procedure. Tricia Lynch and family wished to proceed.     Bianka Stubbs DO  Resident Physician  [facility]  Otolaryngology Residency  11/22/2021  9:30 AM

## 2021-11-23 ENCOUNTER — TELEPHONE (OUTPATIENT)
Dept: ENT CLINIC | Age: 46
End: 2021-11-23

## 2021-11-23 NOTE — ANESTHESIA POSTPROCEDURE EVALUATION
Department of Anesthesiology  Postprocedure Note    Patient: Katie Aponte  MRN: [id number]  YOB: 1975  Date of evaluation: 11/23/2021  Time:  3:43 PM     Procedure Summary     Date: 11/22/21 Room / Location: [facility] OR 01 / [facility]    Anesthesia Start: 6819 Anesthesia Stop: 8441    Procedures:       NASOPHARYNGEAL BIOPSY (N/A Nose)      POSSIBLE RIGHT EAR TUBE POSSIBLE EUSTACHIAN TUBE DILATION (Right Nose) Diagnosis: (NASOPHARYNGEAL MASS EUSTACHIAN TUBE DYSFUNCTION)    Surgeons: Izabella Powell DO Responsible Provider: Rico Aranda MD    Anesthesia Type: general ASA Status: 3          Anesthesia Type: general    Ze Phase I: Ze Score: 10    Ze Phase II: Ze Score: 10    Last vitals: Reviewed and per EMR flowsheets.        Anesthesia Post Evaluation    Patient location during evaluation: PACU  Level of consciousness: awake  Airway patency: patent  Nausea & Vomiting: no nausea and no vomiting  Complications: no  Cardiovascular status: hemodynamically stable  Respiratory status: acceptable

## 2021-11-23 NOTE — TELEPHONE ENCOUNTER
Patient called in with questions regarding the packing in his nose after sx. Please advise.     Electronically signed by Tina Miranda MA on 11/23/21 at 12:53 PM EST

## 2021-11-30 ENCOUNTER — OFFICE VISIT (OUTPATIENT)
Dept: ENT CLINIC | Age: 46
End: 2021-11-30

## 2021-11-30 VITALS
HEIGHT: 76 IN | SYSTOLIC BLOOD PRESSURE: 127 MMHG | OXYGEN SATURATION: 99 % | WEIGHT: 315 LBS | HEART RATE: 103 BPM | DIASTOLIC BLOOD PRESSURE: 75 MMHG | TEMPERATURE: 97.8 F | BODY MASS INDEX: 38.36 KG/M2

## 2021-11-30 DIAGNOSIS — J39.2 NASOPHARYNGEAL MASS: Primary | ICD-10-CM

## 2021-11-30 PROCEDURE — 99024 POSTOP FOLLOW-UP VISIT: CPT | Performed by: OTOLARYNGOLOGY

## 2021-11-30 ASSESSMENT — ENCOUNTER SYMPTOMS
EYE DISCHARGE: 0
NAUSEA: 0
SHORTNESS OF BREATH: 0
STRIDOR: 0
COUGH: 0
ALLERGIC/IMMUNOLOGIC NEGATIVE: 1
COLOR CHANGE: 0
VOMITING: 0
EYE REDNESS: 0

## 2021-11-30 NOTE — PROGRESS NOTES
Subjective:     Patient ID:  Elva Calderón is a 40 y.o. male. HPI:  41year old male presenting for evaluation of his ears. Pt has a history of hearing loss, worse on the right, popping, and nasal congestion. Former smoker, 20 pack year history quit 1 year ago. Notes \"always having issues\" with his ears since a child but has never had HENT surgery. 11/30/21: patient returns for results of nasopharyngeal biopsy. No issues. Patient's medications,allergies, past medical, surgical, social and family histories were reviewed andupdated as appropriate. Review of Systems   Constitutional: Negative for chills, fatigue and fever. HENT: Positive for congestion and hearing loss. Eyes: Negative for discharge and redness. Respiratory: Negative for cough, shortness of breath and stridor. Gastrointestinal: Negative for nausea and vomiting. Endocrine: Negative. Genitourinary: Negative. Musculoskeletal: Negative. Skin: Negative for color change and rash. Allergic/Immunologic: Negative. Neurological: Negative for dizziness, speech difficulty and headaches. Hematological: Negative. Psychiatric/Behavioral: Negative for agitation and confusion. All other systems reviewed and are negative. Objective:   Physical Exam  Constitutional:       Appearance: Normal appearance. HENT:      Head: Normocephalic and atraumatic. Right Ear: External ear normal.      Left Ear: External ear normal.      Ears:      Comments: Right T tube in place and patent no drainage     Nose: Congestion present. Mouth/Throat:      Mouth: Mucous membranes are moist.   Eyes:      Pupils: Pupils are equal, round, and reactive to light. Cardiovascular:      Rate and Rhythm: Normal rate. Pulmonary:      Effort: Pulmonary effort is normal.   Musculoskeletal:      Cervical back: Normal range of motion. Skin:     General: Skin is warm and dry. Neurological:      Mental Status: He is alert. Assessment:       Diagnosis Orders   1. Nasopharyngeal mass                Plan:     - Pathology still pending. Will call patient with results. - Use Ciprodex drops if drainage develops  - Continue Flonase 2 sprays each side daily    Follow up in 6 months for recheck    Electronically signed by Andriy Montiel DO on 11/30/2021 at 8:01 AM                       Lacho Pang  1975    I have discussed the case, including pertinent history and exam findings with the resident. I have seen and examined the patient and the key elements of the encounter have been performed by me. I agree with the assessment, plan and orders as documented by the  resident              Remainder of medical problems as per  resident note. Patient seen and examined. Agree with above exam, assessment and plan.       Electronically signed by Radha Estrada DO on 12/6/21 at 10:04 AM EST

## 2021-12-06 ENCOUNTER — TELEPHONE (OUTPATIENT)
Dept: ENT CLINIC | Age: 46
End: 2021-12-06

## 2021-12-06 ENCOUNTER — OFFICE VISIT (OUTPATIENT)
Dept: FAMILY MEDICINE CLINIC | Age: 46
End: 2021-12-06
Payer: COMMERCIAL

## 2021-12-06 VITALS
OXYGEN SATURATION: 98 % | BODY MASS INDEX: 38.36 KG/M2 | HEIGHT: 76 IN | SYSTOLIC BLOOD PRESSURE: 138 MMHG | WEIGHT: 315 LBS | DIASTOLIC BLOOD PRESSURE: 74 MMHG | HEART RATE: 92 BPM | TEMPERATURE: 96.8 F | RESPIRATION RATE: 18 BRPM

## 2021-12-06 DIAGNOSIS — E11.65 UNCONTROLLED TYPE 2 DIABETES MELLITUS WITH HYPERGLYCEMIA (HCC): ICD-10-CM

## 2021-12-06 DIAGNOSIS — H66.001 NON-RECURRENT ACUTE SUPPURATIVE OTITIS MEDIA OF RIGHT EAR WITHOUT SPONTANEOUS RUPTURE OF TYMPANIC MEMBRANE: Primary | ICD-10-CM

## 2021-12-06 PROCEDURE — G8427 DOCREV CUR MEDS BY ELIG CLIN: HCPCS | Performed by: NURSE PRACTITIONER

## 2021-12-06 PROCEDURE — 99213 OFFICE O/P EST LOW 20 MIN: CPT | Performed by: NURSE PRACTITIONER

## 2021-12-06 PROCEDURE — G8484 FLU IMMUNIZE NO ADMIN: HCPCS | Performed by: NURSE PRACTITIONER

## 2021-12-06 PROCEDURE — G8417 CALC BMI ABV UP PARAM F/U: HCPCS | Performed by: NURSE PRACTITIONER

## 2021-12-06 PROCEDURE — 4004F PT TOBACCO SCREEN RCVD TLK: CPT | Performed by: NURSE PRACTITIONER

## 2021-12-06 RX ORDER — BLOOD SUGAR DIAGNOSTIC
1 STRIP MISCELLANEOUS 3 TIMES DAILY
Qty: 1 EACH | Refills: 5 | Status: SHIPPED
Start: 2021-12-06 | End: 2022-05-24

## 2021-12-06 RX ORDER — INSULIN GLARGINE 100 [IU]/ML
68 INJECTION, SOLUTION SUBCUTANEOUS NIGHTLY
Qty: 7 PEN | Refills: 3 | Status: SHIPPED
Start: 2021-12-06 | End: 2022-03-22 | Stop reason: SDUPTHER

## 2021-12-06 RX ORDER — AMOXICILLIN AND CLAVULANATE POTASSIUM 875; 125 MG/1; MG/1
1 TABLET, FILM COATED ORAL 2 TIMES DAILY
Qty: 20 TABLET | Refills: 0 | Status: SHIPPED | OUTPATIENT
Start: 2021-12-06 | End: 2021-12-16

## 2021-12-06 NOTE — PATIENT INSTRUCTIONS
Patient Education        Ear Infection (Otitis Media): Care Instructions  Overview     An ear infection may start with a cold and affect the middle ear (otitis media). It can hurt a lot. Most ear infections clear up on their own in a couple of days and do not need antibiotics. Also, antibiotics do not work against viruses, which may be the cause of your infection. Regular doses of pain relievers are the best way to reduce your fever and help you feel better. Follow-up care is a key part of your treatment and safety. Be sure to make and go to all appointments, and call your doctor if you are having problems. It's also a good idea to know your test results and keep a list of the medicines you take. How can you care for yourself at home? · Take pain medicines exactly as directed. ? If the doctor gave you a prescription medicine for pain, take it as prescribed. ? If you are not taking a prescription pain medicine, take an over-the-counter medicine, such as acetaminophen (Tylenol), ibuprofen (Advil, Motrin), or naproxen (Aleve). Read and follow all instructions on the label. ? Do not take two or more pain medicines at the same time unless the doctor told you to. Many pain medicines have acetaminophen, which is Tylenol. Too much acetaminophen (Tylenol) can be harmful. · Plan to take a full dose of pain reliever before bedtime. Getting enough sleep will help you get better. · Try a warm, moist washcloth on the ear. It may help relieve pain. · If your doctor prescribed antibiotics, take them as directed. Do not stop taking them just because you feel better. You need to take the full course of antibiotics. When should you call for help? Call your doctor now or seek immediate medical care if:    · You have new or increasing ear pain.     · You have new or increasing pus or blood draining from your ear.     · You have a fever with a stiff neck or a severe headache.    Watch closely for changes in your health, and be sure to contact your doctor if:    · You have new or worse symptoms.     · You are not getting better after taking an antibiotic for 2 days. Where can you learn more? Go to [email or web address] org and sign in to your [employer] account. Enter K729 in the [facility] box to learn more about \"Ear Infection (Otitis Media): Care Instructions. \"     If you do not have an account, please click on the \"Sign Up Now\" link. Current as of: December 2, 2020               Content Version: 13.0  © 6815-1682 Healthwise, Incorporated. Care instructions adapted under license by [facility] ([facility]). If you have questions about a medical condition or this instruction, always ask your healthcare professional. [address] any warranty or liability for your use of this information.

## 2021-12-06 NOTE — TELEPHONE ENCOUNTER
Called patient in regards to pathology report patient was seen in office for post op visit 11/30/2021 pathology report was not back in the office advised patient pathology report  came back negative ok per Dr. Jose Shows to advise patient. Patient was happy to hear report was negative.

## 2021-12-06 NOTE — PROGRESS NOTES
21  Silvestre Aviles : 1975 Sex: male  Age 40 y.o. Subjective:  Chief Complaint   Patient presents with    Otalgia       HPI:   Christelle Patel , 40 y.o. male presents to the clinic for evaluation of right ear discomfort x 2 days. The patient reports rhinorrhea. The patient reports right ear surgery on 21 (tube placed and eustachian biopsy). The patient also reports mild ear drainage. The patient has been taking antibiotic ear gtts for symptoms. The patient reports unchanged symptoms over time. The patient denies trauma, dizziness, and loss of hearing. The patient also denies headache, fever, chest pain, abdominal pain, shortness of breath, and nausea / vomiting / diarrhea. ROS:   Unless otherwise stated in this report the patient's positive and negative responses for review of systems for constitutional, eyes, ENT, cardiovascular, respiratory, gastrointestinal, neurological, , musculoskeletal, and integument systems and related systems to the presenting problem are either stated in the history of present illness or were not pertinent or were negative for the symptoms and/or complaints related to the presenting medical problem. Positives and pertinent negatives as per HPI. All others reviewed and are negative.       PMH:     Past Medical History:   Diagnosis Date    CAD (coronary artery disease)     Dr Shanta Benoit COPD (chronic obstructive [facility]) ([facility] [address]     Diabetes mellitus ([facility] [address]     Hyperlipidemia     Hypertension     Internal carotid artery occlusion, right 2020    Nasopharyngeal mass 2021    For OR 21    Neuropathy     Obesity        Past Surgical History:   Procedure Laterality Date    CARDIAC CATHETERIZATION  2020    Dr. Agustin Mckeon- multi vessel disease    MITRAL VALVE REPAIR N/A 3/16/2020    CORONARY ARTERY BYPASS POSSIBLE LEFT RADIAL ARTERY HARVEST, PATRICE performed by Sheba Agarwal MD at [place] [address] NOSE SURGERY N/A 2021 NASOPHARYNGEAL BIOPSY performed by Devaughn Perdomo DO at [facility] 25  2017    resection of mediastinal mass    TONSILLECTOMY      TRANSESOPHAGEAL ECHOCARDIOGRAM  2020    TYMPANOSTOMY TUBE PLACEMENT Right 2021    POSSIBLE RIGHT EAR TUBE POSSIBLE EUSTACHIAN TUBE DILATION performed by Devaughn Perdomo DO at [facility] OR       Family History   Problem Relation Age of Onset    Heart Disease Mother         MI age 39     High Blood Pressure Mother     Diabetes Father     Heart Disease Father         MI    High Blood Pressure Father     High Cholesterol Father     Cancer Father     Stroke Father     Kidney Disease Father         dialysis    High Blood Pressure Sister     Other Brother     COPD Brother     High Blood Pressure Brother        Medications:     Current Outpatient Medications:     amoxicillin-clavulanate (AUGMENTIN) 875-125 MG per tablet, Take 1 tablet by mouth 2 times daily for 10 days, Disp: 20 tablet, Rfl: 0    insulin aspart (NOVOLOG) 100 UNIT/ML injection vial, Inject 14 units three times a day plus sliding scale, Disp: 6 each, Rfl: 3    insulin glargine (LANTUS SOLOSTAR) 100 UNIT/ML injection pen, Inject 68 Units into the skin nightly, Disp: 5 pen, Rfl: 3    metoprolol tartrate (LOPRESSOR) 50 MG tablet, take 1 tablet by mouth twice a day (Patient taking differently: Take 75 mg by mouth 2 times daily ), Disp: 60 tablet, Rfl: 5    vitamin D (ERGOCALCIFEROL) 1.25 MG (90164 UT) CAPS capsule, take 1 capsule by mouth every week, Disp: 5 capsule, Rfl: 3    metFORMIN (GLUCOPHAGE) 850 MG tablet, Take 1 tablet by mouth 3 times daily, Disp: 90 tablet, Rfl: 3    Liraglutide (VICTOZA) 18 MG/3ML SOPN SC injection, Inject 1.8 mg into the skin daily X 1 week, inject 1.2 mg into the skin daily (Patient taking differently: Inject 1.8 mg into the skin daily ), Disp: 3 pen, Rfl: 3    DULERA 100-5 MCG/ACT inhaler, Inhale 2 puffs into the lungs 2 times daily, Disp: 1 each, Rfl: 3    atorvastatin (LIPITOR) 80 MG tablet, take 1 tablet by mouth once daily (Patient taking differently: Take 80 mg by mouth nightly ), Disp: 30 tablet, Rfl: 3    albuterol sulfate  (90 Base) MCG/ACT inhaler, Inhale 2 puffs into the lungs every 6 hours as needed for Wheezing, Disp: 1 each, Rfl: 3    blood glucose test strips (FREESTYLE INSULINX TEST) strip, 1 each by In Vitro route 3 times daily As needed. , Disp: 1 each, Rfl: 5    dapagliflozin (FARXIGA) 5 MG tablet, Take 1 tablet by mouth every morning, Disp: 30 tablet, Rfl: 5    azelastine (ASTELIN) 0.1 % nasal spray, instill 2 sprays into each nostril twice a day, Disp: 30 mL, Rfl: 2    aspirin EC 81 MG EC tablet, Take 1 tablet by mouth daily, Disp: 90 tablet, Rfl: 3    Insulin Pen Needle (PEN NEEDLES 29GX1/2\") 29G X 12MM MISC, USE AS DIRECTED, Disp: 100 each, Rfl: 5    BD PEN NEEDLE PRAMOD U/F 32G X 4 MM MISC, Inject 1 each as directed 2 times daily, Disp: 100 each, Rfl: 5    HYDROcodone-acetaminophen (NORCO)  MG per tablet, Take 1 tablet by mouth every 6 hours as needed for Pain. , Disp: , Rfl:     Allergies: Allergies   Allergen Reactions    Sulfa Antibiotics Other (See Comments)     Unknown reaction       Social History:     Social History     Tobacco Use    Smoking status: Former Smoker     Packs/day: 1.00     Years: 25.00     Pack years: 25.00     Types: Cigarettes     Start date: 1994     Quit date: 3/14/2020     Years since quittin.7    Smokeless tobacco: Current User     Types: Chew, Snuff   Vaping Use    Vaping Use: Never used   Substance Use Topics    Alcohol use: No    Drug use: No       Patient lives at home.     Physical Exam:     Vitals:    21 0859   BP: 138/74   Pulse: 92   Resp: 18   Temp: 96.8 °F (36 °C)   TempSrc: Temporal   SpO2: 98%   Weight: (!) 350 lb (158.8 kg)   Height: 6' 4\" (1.93 m)       Physical Exam (PE)  Physical Exam  Constitutional:       Appearance: Normal appearance. HENT:      Head: Normocephalic. Right Ear: External ear normal. A PE tube is present. Tympanic membrane is injected. Left Ear: External ear normal.      Ears:      Comments: Right PE tube is tilted downward. Nose: Rhinorrhea present. Mouth/Throat:      Mouth: Mucous membranes are moist.      Pharynx: Oropharynx is clear. Eyes:      Pupils: Pupils are equal, round, and reactive to light. Cardiovascular:      Rate and Rhythm: Normal rate and regular rhythm. Pulses: Normal pulses. Heart sounds: Normal heart sounds. Pulmonary:      Effort: Pulmonary effort is normal.      Breath sounds: Normal breath sounds. Abdominal:      General: Bowel sounds are normal.      Palpations: Abdomen is soft. Musculoskeletal:         General: Normal range of motion. Cervical back: Normal range of motion and neck supple. Skin:     General: Skin is warm and dry. Capillary Refill: Capillary refill takes less than 2 seconds. Neurological:      General: No focal deficit present. Mental Status: He is alert and oriented to person, place, and time. Psychiatric:         Mood and Affect: Mood normal.         Behavior: Behavior normal.         Testing:   (All laboratory and radiology results have been personally reviewed by myself)  Labs:  No results found for this visit on 12/06/21. Imaging: All Radiology results interpreted by Radiologist unless otherwise noted. No orders to display       Assessment / Plan:   The patient's vitals, allergies, medications, and past medical history have been reviewed. Jerry Bates was seen today for otalgia. Diagnoses and all orders for this visit:    Non-recurrent acute suppurative otitis media of right ear without spontaneous rupture of tympanic membrane  -     amoxicillin-clavulanate (AUGMENTIN) 875-125 MG per tablet;  Take 1 tablet by mouth 2 times daily for 10 days        - Disposition: Home    - Educational material printed for patient's review and were included in patient instructions. After Visit Summary and given to patient at the end of visit. - Discussed symptomatic treatments with patient today including Tylenol prn pain. Increase fluids and rest. F/u PCP in 3-5 days if symptoms persist. ED sooner if symptoms worsen or change. ED immediately with fever, severe/worsening ear pain, mastoid redness/tenderness, CP, dyspnea, or dysphagia. Pt is in agreement with this care plan. All questions answered. SIGNATURE: HILDA Wetzel    *NOTE: This report was transcribed using voice recognition software. Every effort was made to ensure accuracy; however, inadvertent computerized transcription errors may be present.

## 2021-12-07 DIAGNOSIS — J32.4 CHRONIC PANSINUSITIS: Primary | ICD-10-CM

## 2021-12-07 RX ORDER — AZELASTINE HYDROCHLORIDE 137 UG/1
SPRAY, METERED NASAL
Qty: 30 ML | Refills: 2 | Status: SHIPPED
Start: 2021-12-07 | End: 2022-03-15

## 2021-12-07 NOTE — TELEPHONE ENCOUNTER
Patient was last seen in office 11/30/2021 patient would like a prescription refill for Astelin nasal spray.

## 2022-01-26 ENCOUNTER — NURSE TRIAGE (OUTPATIENT)
Dept: OTHER | Facility: CLINIC | Age: 47
End: 2022-01-26

## 2022-01-26 ENCOUNTER — OFFICE VISIT (OUTPATIENT)
Dept: PODIATRY | Age: 47
End: 2022-01-26
Payer: COMMERCIAL

## 2022-01-26 ENCOUNTER — OFFICE VISIT (OUTPATIENT)
Dept: FAMILY MEDICINE CLINIC | Age: 47
End: 2022-01-26
Payer: COMMERCIAL

## 2022-01-26 VITALS
BODY MASS INDEX: 38.36 KG/M2 | WEIGHT: 315 LBS | SYSTOLIC BLOOD PRESSURE: 136 MMHG | TEMPERATURE: 97.7 F | RESPIRATION RATE: 18 BRPM | HEIGHT: 76 IN | DIASTOLIC BLOOD PRESSURE: 72 MMHG

## 2022-01-26 VITALS — HEIGHT: 76 IN | BODY MASS INDEX: 38.36 KG/M2 | WEIGHT: 315 LBS

## 2022-01-26 DIAGNOSIS — E11.641 UNCONTROLLED TYPE 2 DIABETES MELLITUS WITH HYPOGLYCEMIA AND COMA (HCC): ICD-10-CM

## 2022-01-26 DIAGNOSIS — L03.031 CELLULITIS OF RIGHT TOE: ICD-10-CM

## 2022-01-26 DIAGNOSIS — S91.301A OPEN WOUND OF RIGHT FOOT, INITIAL ENCOUNTER: ICD-10-CM

## 2022-01-26 DIAGNOSIS — L08.9 TOE INFECTION: Primary | ICD-10-CM

## 2022-01-26 DIAGNOSIS — S91.301A OPEN WOUND OF RIGHT FOOT, INITIAL ENCOUNTER: Primary | ICD-10-CM

## 2022-01-26 DIAGNOSIS — G60.8 HEREDITARY SENSORY NEUROPATHY: ICD-10-CM

## 2022-01-26 LAB
C-REACTIVE PROTEIN: 1.1 MG/DL (ref 0–0.4)
HCT VFR BLD CALC: 47.6 % (ref 37–54)
HEMOGLOBIN: 15.7 G/DL (ref 12.5–16.5)
MCH RBC QN AUTO: 31 PG (ref 26–35)
MCHC RBC AUTO-ENTMCNC: 33 % (ref 32–34.5)
MCV RBC AUTO: 93.9 FL (ref 80–99.9)
PDW BLD-RTO: 12.6 FL (ref 11.5–15)
PLATELET # BLD: 313 E9/L (ref 130–450)
PMV BLD AUTO: 9.1 FL (ref 7–12)
RBC # BLD: 5.07 E12/L (ref 3.8–5.8)
SEDIMENTATION RATE, ERYTHROCYTE: 30 MM/HR (ref 0–15)
WBC # BLD: 9.5 E9/L (ref 4.5–11.5)

## 2022-01-26 PROCEDURE — 4004F PT TOBACCO SCREEN RCVD TLK: CPT | Performed by: NURSE PRACTITIONER

## 2022-01-26 PROCEDURE — G8427 DOCREV CUR MEDS BY ELIG CLIN: HCPCS | Performed by: PODIATRIST

## 2022-01-26 PROCEDURE — G8417 CALC BMI ABV UP PARAM F/U: HCPCS | Performed by: NURSE PRACTITIONER

## 2022-01-26 PROCEDURE — G8484 FLU IMMUNIZE NO ADMIN: HCPCS | Performed by: NURSE PRACTITIONER

## 2022-01-26 PROCEDURE — G8484 FLU IMMUNIZE NO ADMIN: HCPCS | Performed by: PODIATRIST

## 2022-01-26 PROCEDURE — G8417 CALC BMI ABV UP PARAM F/U: HCPCS | Performed by: PODIATRIST

## 2022-01-26 PROCEDURE — 99213 OFFICE O/P EST LOW 20 MIN: CPT | Performed by: NURSE PRACTITIONER

## 2022-01-26 PROCEDURE — 99204 OFFICE O/P NEW MOD 45 MIN: CPT | Performed by: PODIATRIST

## 2022-01-26 PROCEDURE — 4004F PT TOBACCO SCREEN RCVD TLK: CPT | Performed by: PODIATRIST

## 2022-01-26 PROCEDURE — 3046F HEMOGLOBIN A1C LEVEL >9.0%: CPT | Performed by: PODIATRIST

## 2022-01-26 PROCEDURE — 2022F DILAT RTA XM EVC RTNOPTHY: CPT | Performed by: PODIATRIST

## 2022-01-26 PROCEDURE — G8427 DOCREV CUR MEDS BY ELIG CLIN: HCPCS | Performed by: NURSE PRACTITIONER

## 2022-01-26 RX ORDER — LANCETS 28 GAUGE
EACH MISCELLANEOUS
COMMUNITY
Start: 2022-01-06

## 2022-01-26 RX ORDER — PEN NEEDLE, DIABETIC 29 G X1/2"
NEEDLE, DISPOSABLE MISCELLANEOUS
COMMUNITY
Start: 2021-12-08

## 2022-01-26 RX ORDER — DOXYCYCLINE HYCLATE 100 MG/1
100 CAPSULE ORAL 2 TIMES DAILY
Qty: 28 CAPSULE | Refills: 0 | Status: SHIPPED | OUTPATIENT
Start: 2022-01-26 | End: 2022-02-02

## 2022-01-26 ASSESSMENT — ENCOUNTER SYMPTOMS
DIARRHEA: 0
COUGH: 0
ABDOMINAL PAIN: 0
VOMITING: 0
CONSTIPATION: 0
NAUSEA: 0
WHEEZING: 0
CHEST TIGHTNESS: 0
SHORTNESS OF BREATH: 0

## 2022-01-26 NOTE — PROGRESS NOTES
New patient in office with c/o wound to second toe right foot. Sx x 2 weeks. States wound was draining a \"plum pudding\" consistency. Stan Born : 1975 Sex: male  Age: 40 y.o.      CC:    Diabetic wound right second toe    HPI:   This pleasant 50-year-old male diabetic patient referred me today for a wound right second toe. States he did have some drainage over the past few days from the right second toe below the toenail. Denies any nausea vomiting fever chills shortness of breath. Does have history of diabetes. No recent glucose testing as he does not have glucose strips at home. Has been wearing regular shoes. No recent injury or trauma. Does state redness and drainage has been present for over 1 week. No additional pedal complaints at this time. ROS:  Const: Denies constitutional symptoms  Musculo: Denies symptoms other than stated above  Skin: Denies symptoms other than stated above       Current Outpatient Medications:     doxycycline hyclate (VIBRAMYCIN) 100 MG capsule, Take 1 capsule by mouth 2 times daily for 7 days, Disp: 28 capsule, Rfl: 0    B-D INS SYR ULTRAFINE 1CC/30G 30G X 1/2\" 1 ML MISC, use as directed three times a day, Disp: , Rfl:     FreeStyle Lancets MISC, use 1 LANCET to TEST BLOOD SUGAR three to four times a day, Disp: , Rfl:     blood glucose test strips (ASCENSIA AUTODISC VI;ONE TOUCH ULTRA TEST VI) strip, 1 each by In Vitro route daily As needed. , Disp: 100 each, Rfl: 3    Azelastine HCl 137 MCG/SPRAY SOLN, instill 2 sprays into each nostril twice a day, Disp: 30 mL, Rfl: 2    insulin glargine (LANTUS SOLOSTAR) 100 UNIT/ML injection pen, Inject 68 Units into the skin nightly, Disp: 7 pen, Rfl: 3    blood glucose test strips (FREESTYLE INSULINX TEST) strip, 1 each by In Vitro route 3 times daily As needed. , Disp: 1 each, Rfl: 5    insulin aspart (NOVOLOG) 100 UNIT/ML injection vial, Inject 14 units three times a day plus sliding scale, Disp: 6 each, Rfl: 3    metoprolol tartrate (LOPRESSOR) 50 MG tablet, take 1 tablet by mouth twice a day (Patient taking differently: Take 75 mg by mouth 2 times daily ), Disp: 60 tablet, Rfl: 5    vitamin D (ERGOCALCIFEROL) 1.25 MG (14495 UT) CAPS capsule, take 1 capsule by mouth every week, Disp: 5 capsule, Rfl: 3    metFORMIN (GLUCOPHAGE) 850 MG tablet, Take 1 tablet by mouth 3 times daily, Disp: 90 tablet, Rfl: 3    Liraglutide (VICTOZA) 18 MG/3ML SOPN SC injection, Inject 1.8 mg into the skin daily X 1 week, inject 1.2 mg into the skin daily (Patient taking differently: Inject 1.8 mg into the skin daily ), Disp: 3 pen, Rfl: 3    DULERA 100-5 MCG/ACT inhaler, Inhale 2 puffs into the lungs 2 times daily, Disp: 1 each, Rfl: 3    atorvastatin (LIPITOR) 80 MG tablet, take 1 tablet by mouth once daily (Patient taking differently: Take 80 mg by mouth nightly ), Disp: 30 tablet, Rfl: 3    albuterol sulfate  (90 Base) MCG/ACT inhaler, Inhale 2 puffs into the lungs every 6 hours as needed for Wheezing, Disp: 1 each, Rfl: 3    dapagliflozin (FARXIGA) 5 MG tablet, Take 1 tablet by mouth every morning, Disp: 30 tablet, Rfl: 5    aspirin EC 81 MG EC tablet, Take 1 tablet by mouth daily, Disp: 90 tablet, Rfl: 3    Insulin Pen Needle (PEN NEEDLES 29GX1/2\") 29G X 12MM MISC, USE AS DIRECTED, Disp: 100 each, Rfl: 5    BD PEN NEEDLE PRAMOD U/F 32G X 4 MM MISC, Inject 1 each as directed 2 times daily, Disp: 100 each, Rfl: 5    HYDROcodone-acetaminophen (NORCO)  MG per tablet, Take 1 tablet by mouth every 6 hours as needed for Pain. , Disp: , Rfl:   Allergies   Allergen Reactions    Sulfa Antibiotics Other (See Comments)     Unknown reaction       Past Medical History:   Diagnosis Date    CAD (coronary artery disease)     Dr Valery Goel COPD (chronic obstructive pulmonary disease) ([facility] [address]     [facility]     Hyperlipidemia     Hypertension     Internal carotid artery occlusion, right 03/16/2020    Nasopharyngeal mass 07/2021    For OR 11-22-21    Neuropathy     Obesity            Vitals:    01/26/22 1116   Weight: (!) 350 lb (158.8 kg)   Height: 6' 4\" (1.93 m)       Work History/Social History: Foot and ankle history:     Focused Lower Extremity Physical Exam:    Neurovascular examination:    Dorsalis Pedis palpable bilateral.  Posterior tibialis weakly palpable bilateral.    Capillary Refill Time:  Immediate return  Hair growth:  Symmetrical and bilateral   Skin:  Not atrophic  Edema: No edema bilateral feet or ankles. Mild edema right second toe  Neurologic:  Light touch diminished bilateral.      Musculoskeletal/ Orthopedic examination:    Equinis: Absent bilateral  Dorsiflexion, plantarflexion, inversion, eversion bilateral 5 out of 5 muscle strength  Wiggling toes  Negative Homans  No pain foot or ankle    Dermatology examination:    Elongated and thickened with mycotic second toenail noted. Open wound medial aspect right second toe mild drainage noted. Mild surrounding erythema. Wound does not track or probe to bone. After elongated toenail was debrided no underlying drainage noted. Assessment and Plan:  Sony Knott was seen today for diabetes and wound infection. Diagnoses and all orders for this visit:    Open wound of right foot, initial encounter  -     XR FOOT RIGHT (MIN 3 VIEWS); Future  -     Culture, Wound; Future  -     Sedimentation Rate; Future  -     CBC; Future  -     C-Reactive Protein; Future    Uncontrolled type 2 diabetes mellitus with hypoglycemia and coma ([facility] [address]  -     Culture, Wound; Future  -     Sedimentation Rate; Future  -     CBC; Future  -     C-Reactive Protein; Future    Cellulitis of right toe  -     Culture, Wound; Future  -     Sedimentation Rate; Future  -     CBC; Future  -     C-Reactive Protein; Future    Hereditary sensory neuropathy    Other orders  -     doxycycline hyclate (VIBRAMYCIN) 100 MG capsule;  Take 1 capsule by mouth 2 times daily for 7 days      Sony Knott seen new referral diabetic wound right second toe  Recent hemoglobin A1c from 10/13/2021 9.5. Wound cultures obtained right second toe. Toenail was debrided due to length. Wound did not track or probe to bone. The patient was dispensed a right postop surgical shoe. It is medically necessary and within the standard of care for the patient's diagnosis. Its purpose is to control the motion of the foot and to allow a decrease in inflammation. The patient was instructed on its proper application and use. The patient was also instructed to watch for areas of rubbing, irritation, blister formation, or any other signs of abnormal pressure. If this occurs, the patient is to contact the office immediately. The ABN was reviewed and signed by the patient prior to leaving this appointment. Doxycycline 100 mg twice daily for 2 weeks  Blood work was ordered today. Sedimentation rate, C-reactive protein and CBC. Radiographs right foot ordered for [facility] in office. We did discuss high risk of amputation right second toe if nonhealing. He is understanding of this. Stressed importance any increase in redness or drainage any nausea vomiting fever chills shortness of breath go to the emergency room. Bacitracin Band-Aid once daily bandage change with surgical shoe. I will follow-up in office 1 week        Return in about 1 week (around 2/2/2022). Seen By:  Bria Addison DPM      Document was created using voice recognition software. Note was reviewed, however may contain grammatical errors.

## 2022-01-26 NOTE — TELEPHONE ENCOUNTER
Received call from Nettie Salazar  at [facility], caller not on line. Complaint:   Swelling of the toe and infection, skin     Market: [place] Mendoza Name:  Bin Tirado telephone number verified as [phone]    Connected with caller via phone, please see below triage      Received call from  Nettie Salazar at [facility] with Red Flag Complaint. Subjective: Caller states \" skin infection \"     Current Symptoms:     Pt is a diabetic with a discharge coming form the right 2nd toe     Onset:   1 week    Associated Symptoms:   Pt reports thick drainage and blood from under the toe nail with black skin around the toe       Pain Severity:  Denies   Pain     Temperature:  Denies     What has been tried: Antibiotic ointment,  Soaked the foot no improvement         Recommended disposition: seen in next  4 hours     Care advice provided, patient verbalizes understanding; denies any other questions or concerns; instructed to call back for any new or worsening symptoms. Writer provided warm transfer to Armando Jeffery at [facility] for appointment scheduling     Attention Provider: Thank you for allowing me to participate in the care of your patient. The patient was connected to triage in response to information provided to the ECC/PSC. Please do not respond through this encounter as the response is not directed to a shared pool.       Reason for Disposition   [1] Drainage from foot AND [2] new or increased    Protocols used: DIABETES - FOOT PROBLEMS AND QUESTIONS-ADULT-

## 2022-01-26 NOTE — TELEPHONE ENCOUNTER
MELY called and I advised that patient needs to be seen through [facility] today. She said she will tell him this.

## 2022-01-26 NOTE — PROGRESS NOTES
Chief Complaint:   Nail Problem (toenail )    History of Present Illness   HPI:  Vitaly Gunter is a 40 y.o. male who presents to [facility] today for right second toe infection. Patient states he has had a ulcer on his right second toe for the last 1.5 weeks. States it looked as if there was almost a blister on it today and had thick discharge from the area. He has been doing Epsom salt soaks and using ANA without any relief. Of note he is a type II diabetic last hemoglobin A1c was 9.5%. He denies any recent fever, chills, nausea, vomiting, diarrhea, chest pain, shortness of breath, numbness or tingling. He has seen podiatry, Dr. Kenzie Mart in the past however has been years since he has seen him. Prior to Visit Medications    Medication Sig Taking? Authorizing Provider   B-D INS SYR ULTRAFINE 1CC/30G 30G X 1/2\" 1 ML MISC use as directed three times a day Yes Historical Provider, MD Rudolphyle Lancets MISC use 1 LANCET to TEST BLOOD SUGAR three to four times a day Yes Historical Provider, MD   Azelastine HCl 137 MCG/SPRAY SOLN instill 2 sprays into each nostril twice a day Yes Judy Antohny,    insulin glargine (LANTUS SOLOSTAR) 100 UNIT/ML injection pen Inject 68 Units into the skin nightly Yes ZIA Anders NP   blood glucose test strips (FREESTYLE INSULINX TEST) strip 1 each by In Vitro route 3 times daily As needed.  Yes ZIA Anders NP   insulin aspart (NOVOLOG) 100 UNIT/ML injection vial Inject 14 units three times a day plus sliding scale Yes ZIA Anedrs NP   metoprolol tartrate (LOPRESSOR) 50 MG tablet take 1 tablet by mouth twice a day  Patient taking differently: Take 75 mg by mouth 2 times daily  Yes ZIA Anders NP   vitamin D (ERGOCALCIFEROL) 1.25 MG (09691 UT) CAPS capsule take 1 capsule by mouth every week Yes ZIA Anders NP   metFORMIN (GLUCOPHAGE) 850 MG tablet Take 1 tablet by mouth 3 times daily Yes ZIA Anders NP Liraglutide (VICTOZA) 18 MG/3ML SOPN SC injection Inject 1.8 mg into the skin daily X 1 week, inject 1.2 mg into the skin daily  Patient taking differently: Inject 1.8 mg into the skin daily  Yes Severo Labella, APRN - NP   DULERA 100-5 MCG/ACT inhaler Inhale 2 puffs into the lungs 2 times daily Yes Severo Labella, APRN - NP   atorvastatin (LIPITOR) 80 MG tablet take 1 tablet by mouth once daily  Patient taking differently: Take 80 mg by mouth nightly  Yes Severo Labella, APRN - NP   albuterol sulfate  (90 Base) MCG/ACT inhaler Inhale 2 puffs into the lungs every 6 hours as needed for Wheezing Yes Severo Labella, APRN - NP   dapagliflozin (FARXIGA) 5 MG tablet Take 1 tablet by mouth every morning Yes Severo Labella, APRN - NP   aspirin EC 81 MG EC tablet Take 1 tablet by mouth daily Yes Severo Labella, APRN - NP   Insulin Pen Needle (PEN NEEDLES 29GX1/2\") 29G X 12MM MISC USE AS DIRECTED Yes Severo Labella, APRN - NP   BD PEN NEEDLE PRAMOD U/F 32G X 4 MM MISC Inject 1 each as directed 2 times daily Yes Casey Diaz MD   HYDROcodone-acetaminophen (NORCO)  MG per tablet Take 1 tablet by mouth every 6 hours as needed for Pain. Yes Historical Provider, MD     Review of Systems   Review of Systems   Constitutional: Negative for activity change, chills and fever. HENT: Negative for congestion. Respiratory: Negative for cough, chest tightness, shortness of breath and wheezing. Cardiovascular: Negative for chest pain, palpitations and leg swelling. Gastrointestinal: Negative for abdominal pain, constipation, diarrhea, nausea and vomiting. Genitourinary: Negative for dysuria and urgency. Musculoskeletal: Negative for myalgias and neck pain. Skin: Positive for wound (right second toe). Neurological: Negative for dizziness, weakness, light-headedness and numbness. Psychiatric/Behavioral: The patient is not nervous/anxious.       Patient's medical, social, and family history reviewed    Past Medical History:  has a past medical history of CAD (coronary artery disease), COPD (chronic obstructive pulmonary disease) ([facility] [place] [address] Diabetes mellitus ([facility] [place] [address] Hyperlipidemia, Hypertension, Internal carotid artery occlusion, right, Nasopharyngeal mass, Neuropathy, and Obesity. Past Surgical History:  has a past surgical history that includes Thoracoscopy (04/28/2017); Cardiac catheterization (02/11/2020); transesophageal echocardiogram (03/03/2020); Mitral valve repair (N/A, 3/16/2020); Tonsillectomy; Nose surgery (N/A, 11/22/2021); and Tympanostomy tube placement (Right, 11/22/2021). Social History:  reports that he quit smoking about 22 months ago. His smoking use included cigarettes. He started smoking about 27 years ago. He has a 25.00 pack-year smoking history. His smokeless tobacco use includes chew and snuff. He reports that he does not drink alcohol and does not use drugs. Family History: family history includes COPD in his brother; Cancer in his father; Diabetes in his father; Heart Disease in his father and mother; High Blood Pressure in his brother, father, mother, and sister; High Cholesterol in his father; Kidney Disease in his father; Other in his brother; Stroke in his father. Allergies: Sulfa antibiotics    Physical Exam   Vital Signs:  /72   Temp 97.7 °F (36.5 °C) (Temporal)   Resp 18   Ht 6' 4\" (1.93 m)   Wt (!) 350 lb (158.8 kg)   BMI 42.60 kg/m²    Oxygen Saturation Interpretation: Normal.    Physical Exam  Vitals and nursing note reviewed. Constitutional:       Appearance: Normal appearance. He is well-developed. He is not toxic-appearing. HENT:      Head: Normocephalic and atraumatic. Right Ear: Hearing normal.      Left Ear: Hearing normal.      Nose: Nose normal.      Mouth/Throat:      Lips: Pink. Mouth: Mucous membranes are moist.      Pharynx: Oropharynx is clear. Uvula midline.    Eyes:      General: Lids are normal.      Conjunctiva/sclera: Conjunctivae normal.      Pupils: Pupils are equal, round, and reactive to light. Neck:      Trachea: Trachea normal.   Cardiovascular:      Rate and Rhythm: Normal rate and regular rhythm. Pulses: Normal pulses. Heart sounds: Normal heart sounds. Pulmonary:      Effort: Pulmonary effort is normal.      Breath sounds: Normal breath sounds. Abdominal:      General: Abdomen is flat. Bowel sounds are normal.      Palpations: Abdomen is soft. Musculoskeletal:         General: Normal range of motion. Cervical back: Normal range of motion. Feet:    Feet:      Right foot:      Skin integrity: Skin breakdown and erythema present. Lymphadenopathy:      Cervical: No cervical adenopathy. Skin:     General: Skin is warm and dry. Capillary Refill: Capillary refill takes less than 2 seconds. Neurological:      Mental Status: He is alert and oriented to person, place, and time. Psychiatric:         Attention and Perception: Attention normal.         Mood and Affect: Mood normal.         Speech: Speech normal.         Behavior: Behavior normal.         Thought Content: Thought content normal.         Cognition and Memory: Cognition normal.         Judgment: Judgment normal.       Test Results Section   (All laboratory and radiology results have been personally reviewed by myself)  Labs:  No results found for this visit on 01/26/22. Imaging: All Radiology results interpreted by Radiologist unless otherwise noted. No results found. Medical Decision Making   MDM:   This is a 64-year-old male with history of type 2 diabetes that presents today for right second toe infection has been ongoing for the last 1.5 weeks. Patient states he has been having discharge that was thick. He has been doing Epsom salt soaks and ANA without relief. He has not been have any fevers or chills. On physical exam he does have a wound noted to the medial aspect of his right second toe near the toenail with no active drainage.   Vital signs reviewed found to be within normal limits. I did speak with podiatry, Dr. Steven Black who will see him in the office today. Further evaluation and treatment per podiatry. Patient verbalized understands agreeable plan of care. All questions answered. Assessment / Plan   Impression(s):  [facility] was seen today for nail problem. Diagnoses and all orders for this visit:    Toe infection  -     Devora Mera Pac, DPM, Podiatry, [place]    Discharged to podiatry. Patient condition is good    Return if symptoms worsen or fail to improve. New Medications     New Prescriptions    No medications on file       Electronically signed by ZIA Aguilar CNP   DD: 1/26/22    **This report was transcribed using voice recognition software. Every effort was made to ensure accuracy; however, inadvertent computerized transcription errors may be present.

## 2022-01-29 LAB
ORGANISM: ABNORMAL
ORGANISM: ABNORMAL
WOUND/ABSCESS: ABNORMAL
WOUND/ABSCESS: ABNORMAL

## 2022-02-02 ENCOUNTER — OFFICE VISIT (OUTPATIENT)
Dept: PODIATRY | Age: 47
End: 2022-02-02
Payer: COMMERCIAL

## 2022-02-02 VITALS — BODY MASS INDEX: 38.36 KG/M2 | HEIGHT: 76 IN | WEIGHT: 315 LBS

## 2022-02-02 DIAGNOSIS — G60.8 HEREDITARY SENSORY NEUROPATHY: ICD-10-CM

## 2022-02-02 DIAGNOSIS — I25.10 CAD IN NATIVE ARTERY: ICD-10-CM

## 2022-02-02 DIAGNOSIS — S91.301D OPEN WOUND OF RIGHT FOOT, SUBSEQUENT ENCOUNTER: Primary | ICD-10-CM

## 2022-02-02 DIAGNOSIS — E78.2 MIXED HYPERLIPIDEMIA: ICD-10-CM

## 2022-02-02 DIAGNOSIS — E11.641 UNCONTROLLED TYPE 2 DIABETES MELLITUS WITH HYPOGLYCEMIA AND COMA (HCC): ICD-10-CM

## 2022-02-02 PROCEDURE — 3046F HEMOGLOBIN A1C LEVEL >9.0%: CPT | Performed by: PODIATRIST

## 2022-02-02 PROCEDURE — 2022F DILAT RTA XM EVC RTNOPTHY: CPT | Performed by: PODIATRIST

## 2022-02-02 PROCEDURE — 4004F PT TOBACCO SCREEN RCVD TLK: CPT | Performed by: PODIATRIST

## 2022-02-02 PROCEDURE — G8484 FLU IMMUNIZE NO ADMIN: HCPCS | Performed by: PODIATRIST

## 2022-02-02 PROCEDURE — G8427 DOCREV CUR MEDS BY ELIG CLIN: HCPCS | Performed by: PODIATRIST

## 2022-02-02 PROCEDURE — G8417 CALC BMI ABV UP PARAM F/U: HCPCS | Performed by: PODIATRIST

## 2022-02-02 PROCEDURE — 99213 OFFICE O/P EST LOW 20 MIN: CPT | Performed by: PODIATRIST

## 2022-02-02 RX ORDER — METOPROLOL TARTRATE 75 MG/1
TABLET, FILM COATED ORAL
Qty: 240 TABLET | Refills: 0 | Status: SHIPPED
Start: 2022-02-02 | End: 2022-03-22 | Stop reason: SDUPTHER

## 2022-02-04 DIAGNOSIS — E11.65 UNCONTROLLED TYPE 2 DIABETES MELLITUS WITH HYPERGLYCEMIA (HCC): ICD-10-CM

## 2022-02-04 RX ORDER — LIRAGLUTIDE 6 MG/ML
1.8 INJECTION SUBCUTANEOUS DAILY
Qty: 6 ML | Refills: 3 | Status: SHIPPED
Start: 2022-02-04 | End: 2022-03-22 | Stop reason: SDUPTHER

## 2022-02-07 DIAGNOSIS — E11.65 UNCONTROLLED TYPE 2 DIABETES MELLITUS WITH HYPERGLYCEMIA (HCC): Primary | ICD-10-CM

## 2022-02-07 RX ORDER — LANCETS 30 GAUGE
1 EACH MISCELLANEOUS 3 TIMES DAILY
Qty: 100 EACH | Refills: 3 | Status: SHIPPED
Start: 2022-02-07 | End: 2022-02-10 | Stop reason: SDUPTHER

## 2022-02-07 NOTE — TELEPHONE ENCOUNTER
Last Appointment:  10/13/2021  Future Appointments   Date Time Provider Larisa Romani   2/9/2022 10:00 AM Brigitte Gonzalez DPM [facility] [place]   3/22/2022  1:30 PM ZIA Day - NP [facility]   4/7/2022  9:00 AM Jovan Pretty MD [facility]   5/31/2022  7:15 AM Richard Gutierrez, [address] [place] [address]

## 2022-02-09 ENCOUNTER — OFFICE VISIT (OUTPATIENT)
Dept: PODIATRY | Age: 47
End: 2022-02-09
Payer: COMMERCIAL

## 2022-02-09 VITALS — WEIGHT: 315 LBS | BODY MASS INDEX: 38.36 KG/M2 | HEIGHT: 76 IN

## 2022-02-09 DIAGNOSIS — G60.8 HEREDITARY SENSORY NEUROPATHY: ICD-10-CM

## 2022-02-09 DIAGNOSIS — E11.641 UNCONTROLLED TYPE 2 DIABETES MELLITUS WITH HYPOGLYCEMIA AND COMA (HCC): ICD-10-CM

## 2022-02-09 DIAGNOSIS — S91.301D OPEN WOUND OF RIGHT FOOT, SUBSEQUENT ENCOUNTER: Primary | ICD-10-CM

## 2022-02-09 DIAGNOSIS — E11.65 UNCONTROLLED TYPE 2 DIABETES MELLITUS WITH HYPERGLYCEMIA (HCC): ICD-10-CM

## 2022-02-09 PROCEDURE — 99213 OFFICE O/P EST LOW 20 MIN: CPT | Performed by: PODIATRIST

## 2022-02-09 PROCEDURE — G8484 FLU IMMUNIZE NO ADMIN: HCPCS | Performed by: PODIATRIST

## 2022-02-09 PROCEDURE — G8417 CALC BMI ABV UP PARAM F/U: HCPCS | Performed by: PODIATRIST

## 2022-02-09 PROCEDURE — G8427 DOCREV CUR MEDS BY ELIG CLIN: HCPCS | Performed by: PODIATRIST

## 2022-02-09 PROCEDURE — 4004F PT TOBACCO SCREEN RCVD TLK: CPT | Performed by: PODIATRIST

## 2022-02-09 PROCEDURE — 3046F HEMOGLOBIN A1C LEVEL >9.0%: CPT | Performed by: PODIATRIST

## 2022-02-09 PROCEDURE — 2022F DILAT RTA XM EVC RTNOPTHY: CPT | Performed by: PODIATRIST

## 2022-02-09 NOTE — PROGRESS NOTES
Patient in office to follow up with wound right second toe. CC:    Follow-up diabetic wound right second toe    HPI:   Judah Meza presents today follow-up diabetic wound right second toe. Has been wearing regular shoes. Does have a surgical shoe at home. Has been applying triple antibiotic and a Band-Aid daily. Denies any redness or drainage. No pain. Denies nausea vomiting fever chills shortness of breath. No additional pedal complaints. ROS:  Const: Denies constitutional symptoms  Musculo: Denies symptoms other than stated above  Skin: Denies symptoms other than stated above       Current Outpatient Medications:     Metoprolol Tartrate 75 MG TABS, take 1 tablet by mouth twice a day, Disp: 240 tablet, Rfl: 0    Lancets MISC, 1 each by Does not apply route 3 times daily, Disp: 100 each, Rfl: 3    Liraglutide (VICTOZA) 18 MG/3ML SOPN SC injection, Inject 1.8 mg into the skin daily, Disp: 6 mL, Rfl: 3    B-D INS SYR ULTRAFINE 1CC/30G 30G X 1/2\" 1 ML MISC, use as directed three times a day, Disp: , Rfl:     FreeStyle Lancets MISC, use 1 LANCET to TEST BLOOD SUGAR three to four times a day, Disp: , Rfl:     blood glucose test strips (ASCENSIA AUTODISC VI;ONE TOUCH ULTRA TEST VI) strip, 1 each by In Vitro route daily As needed. , Disp: 100 each, Rfl: 3    Azelastine HCl 137 MCG/SPRAY SOLN, instill 2 sprays into each nostril twice a day, Disp: 30 mL, Rfl: 2    insulin glargine (LANTUS SOLOSTAR) 100 UNIT/ML injection pen, Inject 68 Units into the skin nightly, Disp: 7 pen, Rfl: 3    blood glucose test strips (FREESTYLE INSULINX TEST) strip, 1 each by In Vitro route 3 times daily As needed. , Disp: 1 each, Rfl: 5    insulin aspart (NOVOLOG) 100 UNIT/ML injection vial, Inject 14 units three times a day plus sliding scale, Disp: 6 each, Rfl: 3    vitamin D (ERGOCALCIFEROL) 1.25 MG (53920 UT) CAPS capsule, take 1 capsule by mouth every week, Disp: 5 capsule, Rfl: 3    metFORMIN (GLUCOPHAGE) 850 MG tablet, Take 1 tablet by mouth 3 times daily, Disp: 90 tablet, Rfl: 3    DULERA 100-5 MCG/ACT inhaler, Inhale 2 puffs into the lungs 2 times daily, Disp: 1 each, Rfl: 3    atorvastatin (LIPITOR) 80 MG tablet, take 1 tablet by mouth once daily (Patient taking differently: Take 80 mg by mouth nightly ), Disp: 30 tablet, Rfl: 3    albuterol sulfate  (90 Base) MCG/ACT inhaler, Inhale 2 puffs into the lungs every 6 hours as needed for Wheezing, Disp: 1 each, Rfl: 3    dapagliflozin (FARXIGA) 5 MG tablet, Take 1 tablet by mouth every morning, Disp: 30 tablet, Rfl: 5    aspirin EC 81 MG EC tablet, Take 1 tablet by mouth daily, Disp: 90 tablet, Rfl: 3    Insulin Pen Needle (PEN NEEDLES 29GX1/2\") 29G X 12MM MISC, USE AS DIRECTED, Disp: 100 each, Rfl: 5    BD PEN NEEDLE PRAMOD U/F 32G X 4 MM MISC, Inject 1 each as directed 2 times daily, Disp: 100 each, Rfl: 5  Allergies   Allergen Reactions    Sulfa Antibiotics Other (See Comments)     Unknown reaction       Past Medical History:   Diagnosis Date    CAD (coronary artery disease)     Dr Miya Plummer COPD (chronic obstructive pulmonary disease) ([facility] [address]     Diabetes mellitus ([facility] [address]     Hyperlipidemia     Hypertension     Internal carotid artery occlusion, right 03/16/2020    Nasopharyngeal mass 07/2021    For OR 11-22-21    Neuropathy     Obesity            Vitals:    02/09/22 1002   Weight: (!) 350 lb (158.8 kg)   Height: 6' 4\" (1.93 m)       Work History/Social History:    Foot and ankle history:     Focused Lower Extremity Physical Exam:    Neurovascular examination:    Dorsalis Pedis palpable bilateral.  Posterior tibialis weakly palpable bilateral.    Capillary Refill Time:  Immediate return  Hair growth:  Symmetrical and bilateral   Skin:  Not atrophic  Edema: No edema foot or ankle neurologic:  Light touch diminished bilateral.      Musculoskeletal/ Orthopedic examination:    Equinis: Absent bilateral  Dorsiflexion, plantarflexion, inversion, eversion bilateral 5 out of 5 muscle strength  Wiggling toes  Negative Homans  No pain right foot. No pain right second digit. Dermatology examination:    No significant erythema right second toe today. Superficial wound right second toe measuring approximately 0.1 cm x 0.1 cm x 0.1 cm. 90% granular 10% fibrotic distal right second toe wound noted. There is mild surrounding hyperkeratotic tissue. Wound does not track or probe to bone. Assessment and Plan:  Haylie Brothers was seen today for wound check. Diagnoses and all orders for this visit:    Open wound of right foot, subsequent encounter    Uncontrolled type 2 diabetes mellitus with hypoglycemia and coma ([facility] [address]    Hereditary sensory neuropathy        1.  No osteomyelitis or acute disease.       2.  2nd and 3rd mallet toe deformities suggested and please correlate   clinically.       3.  Atherosclerotic calcifications.         Blood work  from 1/26/2022  Sedimentation rate 30  Wound culture Staphylococcus coagulase-negative  CBC reviewed white blood cell count 9.5. C-reactive protein 1.1. Follow-up wound right second toe. Continues to improve significantly. No drainage or clinical signs of infection today. Did dispense offloading hammertoe padding. Continue bacitracin and Band-Aid daily. I did pare hyperkeratotic tissue today. Any increased redness or drainage go to the emergency room. We will follow-up in the office 2 weeks. Return in about 2 weeks (around 2/23/2022). Seen By:  Emiliana Monte DPM      Document was created using voice recognition software. Note was reviewed, however may contain grammatical errors.

## 2022-02-09 NOTE — TELEPHONE ENCOUNTER
Last Appointment:  10/13/2021  Future Appointments   Date Time Provider Larisa Romani   2/23/2022 10:30 AM Collin James DPM [facility]   3/22/2022  1:30 PM ZIA Joseph - NP [facility] [place]   4/7/2022  9:00 AM Justin Gustafson MD [facility]   5/31/2022  7:15 AM DO JAZIEL Ocasio [facility] ENT [facility]

## 2022-02-10 RX ORDER — LANCETS 30 GAUGE
1 EACH MISCELLANEOUS 3 TIMES DAILY
Qty: 100 EACH | Refills: 3 | Status: SHIPPED
Start: 2022-02-10 | End: 2022-09-27 | Stop reason: SDUPTHER

## 2022-02-23 ENCOUNTER — OFFICE VISIT (OUTPATIENT)
Dept: PODIATRY | Age: 47
End: 2022-02-23
Payer: COMMERCIAL

## 2022-02-23 ENCOUNTER — OFFICE VISIT (OUTPATIENT)
Dept: FAMILY MEDICINE CLINIC | Age: 47
End: 2022-02-23
Payer: COMMERCIAL

## 2022-02-23 VITALS
RESPIRATION RATE: 18 BRPM | DIASTOLIC BLOOD PRESSURE: 86 MMHG | BODY MASS INDEX: 38.36 KG/M2 | HEIGHT: 76 IN | TEMPERATURE: 97.3 F | SYSTOLIC BLOOD PRESSURE: 134 MMHG | WEIGHT: 315 LBS | OXYGEN SATURATION: 98 % | HEART RATE: 103 BPM

## 2022-02-23 VITALS — WEIGHT: 315 LBS | BODY MASS INDEX: 38.36 KG/M2 | HEIGHT: 76 IN

## 2022-02-23 DIAGNOSIS — M25.512 ACUTE PAIN OF LEFT SHOULDER: Primary | ICD-10-CM

## 2022-02-23 DIAGNOSIS — E11.9 TYPE 2 DIABETES MELLITUS WITHOUT COMPLICATION, UNSPECIFIED WHETHER LONG TERM INSULIN USE (HCC): ICD-10-CM

## 2022-02-23 DIAGNOSIS — M20.42 HAMMER TOES OF BOTH FEET: ICD-10-CM

## 2022-02-23 DIAGNOSIS — M20.41 HAMMER TOES OF BOTH FEET: ICD-10-CM

## 2022-02-23 DIAGNOSIS — G60.8 HEREDITARY SENSORY NEUROPATHY: Primary | ICD-10-CM

## 2022-02-23 DIAGNOSIS — L85.3 XEROSIS CUTIS: ICD-10-CM

## 2022-02-23 PROCEDURE — G8417 CALC BMI ABV UP PARAM F/U: HCPCS | Performed by: PODIATRIST

## 2022-02-23 PROCEDURE — G8417 CALC BMI ABV UP PARAM F/U: HCPCS | Performed by: NURSE PRACTITIONER

## 2022-02-23 PROCEDURE — G8427 DOCREV CUR MEDS BY ELIG CLIN: HCPCS | Performed by: PODIATRIST

## 2022-02-23 PROCEDURE — G8484 FLU IMMUNIZE NO ADMIN: HCPCS | Performed by: NURSE PRACTITIONER

## 2022-02-23 PROCEDURE — 4004F PT TOBACCO SCREEN RCVD TLK: CPT | Performed by: PODIATRIST

## 2022-02-23 PROCEDURE — 3046F HEMOGLOBIN A1C LEVEL >9.0%: CPT | Performed by: PODIATRIST

## 2022-02-23 PROCEDURE — 99213 OFFICE O/P EST LOW 20 MIN: CPT | Performed by: PODIATRIST

## 2022-02-23 PROCEDURE — G8484 FLU IMMUNIZE NO ADMIN: HCPCS | Performed by: PODIATRIST

## 2022-02-23 PROCEDURE — 99213 OFFICE O/P EST LOW 20 MIN: CPT | Performed by: NURSE PRACTITIONER

## 2022-02-23 PROCEDURE — 4004F PT TOBACCO SCREEN RCVD TLK: CPT | Performed by: NURSE PRACTITIONER

## 2022-02-23 PROCEDURE — 2022F DILAT RTA XM EVC RTNOPTHY: CPT | Performed by: PODIATRIST

## 2022-02-23 PROCEDURE — G8427 DOCREV CUR MEDS BY ELIG CLIN: HCPCS | Performed by: NURSE PRACTITIONER

## 2022-02-23 RX ORDER — NAPROXEN 500 MG/1
500 TABLET ORAL 2 TIMES DAILY WITH MEALS
Qty: 60 TABLET | Refills: 0 | Status: SHIPPED
Start: 2022-02-23 | End: 2022-06-22

## 2022-02-23 NOTE — PROGRESS NOTES
Chief Complaint:   Shoulder Pain (left x 3-4weeks)    History of Present Illness   Source of history provided by:  patient. Jimbo Caruso is a 40 y.o. old male presenting to walk-in with left shoulder pain x 3-4 weeks. Denies any known injury, states that he was reaching over to get something one day, felt a sharp pain that then turned to a burning pain. The pain is aggravated by movement and alleviated with rest. Pt states the pain in the shoulder that does not radiate down the arm or into the neck. Denies any weakness, paresthesias, swelling, neck pain or injury, HA, hand weakness, or associated CP or SOB. Reports he feels a \"rubber band clicking\" when he moves his shoulder certain ways. Review of Systems   Unless otherwise stated in this report or unable to obtain because of the patient's clinical or mental status as evidenced by the medical record, this patients's positive and negative responses for Review of Systems, constitutional, psych, eyes, ENT, cardiovascular, respiratory, gastrointestinal, neurological, genitourinary, musculoskeletal, integument systems and systems related to the presenting problem are either stated in the preceding or were not pertinent or were negative for the symptoms and/or complaints related to the medical problem. Past Medical History:  has a past medical history of CAD (coronary artery disease), COPD (chronic obstructive pulmonary disease) ([address] Diabetes mellitus ([address] Hyperlipidemia, Hypertension, Internal carotid artery occlusion, right, Nasopharyngeal mass, Neuropathy, and Obesity. Past Surgical History:  has a past surgical history that includes Thoracoscopy (04/28/2017); Cardiac catheterization (02/11/2020); transesophageal echocardiogram (03/03/2020); Mitral valve repair (N/A, 3/16/2020); Tonsillectomy; Nose surgery (N/A, 11/22/2021); and Tympanostomy tube placement (Right, 11/22/2021). Social History:  reports that he quit smoking about 1 years ago.  His smoking use included cigarettes. He started smoking about 27 years ago. He has a 25.00 pack-year smoking history. His smokeless tobacco use includes chew and snuff. He reports that he does not drink alcohol and does not use drugs. Family History: family history includes COPD in his brother; Cancer in his father; Diabetes in his father; Heart Disease in his father and mother; High Blood Pressure in his brother, father, mother, and sister; High Cholesterol in his father; Kidney Disease in his father; Other in his brother; Stroke in his father. Allergies: Sulfa antibiotics    Physical Exam   Vital Signs: /86   Pulse 103   Temp 97.3 °F (36.3 °C) (Temporal)   Resp 18   Ht 6' 4\" (1.93 m)   Wt (!) 350 lb (158.8 kg)   SpO2 98%   BMI 42.60 kg/m²  Oxygen Saturation Interpretation: Normal.    Constitutional:  Alert, development consistent with age. Neck:  Normal ROM. Supple. Non-tender. Chest: Heart RRR without pathological murmur or gallop. Lungs CTAB without W/R/R. Shoulder:  left            Tenderness: TTP over posterior shoulder without any bony point tenderness. Swelling: No obvious swelling noted. Deformity: No obvious deformity. ROM: Limited ROM due to pain. UE/ strength 5/5 bilaterally. Negative drop arm test.            Skin:  No abrasions, bruising, or erythema noted. Neurovascular:              Sensory deficit: Sensation intact proximally and distally to the injury site. Pulse deficit: Distal pulses 2+ and bounding. Capillary refill: Less then 2 sec throughout. Elbow:  left            Tenderness:  No pain with ROM or palpation. Swelling: No edema noted. ROM: FROM without deficits. No pain with supination or pronation of the hand. Skin: No rash, abrasions, or bruising noted. Lymphatics: No lymphangitis or adenopathy noted. Neurological: Alert and oriented.   Motor functions intact. Test Results Section   (All laboratory and radiology results have been personally reviewed by myself)  Labs:  No results found for this visit on 02/23/22. Radiology: All Radiology results interpreted by Radiologist unless otherwise noted. No results found. Assessment / Plan   Impression:  Miri Sheriff was seen today for shoulder pain. Diagnoses and all orders for this visit:    Acute pain of left shoulder  -     naproxen (NAPROSYN) 500 MG tablet; Take 1 tablet by mouth 2 times daily (with meals)    Scripts written for Naprosyn, side effects discussed. Advise rest, ice, and/or moist heat for additional symptomatic relief. PCP in 1 week for recheck. ED sooner if symptoms worsen or change. ED immediately with severe or worsening pain, paresthesias, weakness, CP, or SOB. Pt states understanding and is in agreement with this care plan. All questions answered. Return if symptoms worsen or fail to improve. Electronically signed by ZIA Coleman CNP   DD: 2/23/22    **This report was transcribed using voice recognition software. Every effort was made to ensure accuracy; however, inadvertent computerized transcription errors may be present.

## 2022-02-23 NOTE — PROGRESS NOTES
Patient in office for wound check right foot. CC:    Follow-up diabetic wound right second toe. No open wound today. HPI:   Beatriz Aviles presents today follow-up diabetic wound right second toe. Pleased with progression. Denies any open wounds today. No pain. Does have dry skin both heels with no cracking. Does have lotion at home. Has been wearing regular shoes. States his blood sugar has been in the low 100s and progressing well. Denies current nausea vomiting fever chills shortness of breath. ROS:  Const: Denies constitutional symptoms  Musculo: Denies symptoms other than stated above  Skin: Denies symptoms other than stated above       Current Outpatient Medications:     Metoprolol Tartrate 75 MG TABS, take 1 tablet by mouth twice a day, Disp: 240 tablet, Rfl: 0    metFORMIN (GLUCOPHAGE) 850 MG tablet, take 1 tablet by mouth three times a day, Disp: 90 tablet, Rfl: 3    Lancets MISC, 1 each by Does not apply route 3 times daily, Disp: 100 each, Rfl: 3    Liraglutide (VICTOZA) 18 MG/3ML SOPN SC injection, Inject 1.8 mg into the skin daily, Disp: 6 mL, Rfl: 3    B-D INS SYR ULTRAFINE 1CC/30G 30G X 1/2\" 1 ML MISC, use as directed three times a day, Disp: , Rfl:     FreeStyle Lancets MISC, use 1 LANCET to TEST BLOOD SUGAR three to four times a day, Disp: , Rfl:     blood glucose test strips (ASCENSIA AUTODISC VI;ONE TOUCH ULTRA TEST VI) strip, 1 each by In Vitro route daily As needed. , Disp: 100 each, Rfl: 3    Azelastine HCl 137 MCG/SPRAY SOLN, instill 2 sprays into each nostril twice a day, Disp: 30 mL, Rfl: 2    insulin glargine (LANTUS SOLOSTAR) 100 UNIT/ML injection pen, Inject 68 Units into the skin nightly, Disp: 7 pen, Rfl: 3    blood glucose test strips (FREESTYLE INSULINX TEST) strip, 1 each by In Vitro route 3 times daily As needed. , Disp: 1 each, Rfl: 5    insulin aspart (NOVOLOG) 100 UNIT/ML injection vial, Inject 14 units three times a day plus sliding scale, Disp: 6 each, Rfl: 3    vitamin D (ERGOCALCIFEROL) 1.25 MG (58922 UT) CAPS capsule, take 1 capsule by mouth every week, Disp: 5 capsule, Rfl: 3    DULERA 100-5 MCG/ACT inhaler, Inhale 2 puffs into the lungs 2 times daily, Disp: 1 each, Rfl: 3    atorvastatin (LIPITOR) 80 MG tablet, take 1 tablet by mouth once daily (Patient taking differently: Take 80 mg by mouth nightly ), Disp: 30 tablet, Rfl: 3    albuterol sulfate  (90 Base) MCG/ACT inhaler, Inhale 2 puffs into the lungs every 6 hours as needed for Wheezing, Disp: 1 each, Rfl: 3    dapagliflozin (FARXIGA) 5 MG tablet, Take 1 tablet by mouth every morning, Disp: 30 tablet, Rfl: 5    aspirin EC 81 MG EC tablet, Take 1 tablet by mouth daily, Disp: 90 tablet, Rfl: 3    Insulin Pen Needle (PEN NEEDLES 29GX1/2\") 29G X 12MM MISC, USE AS DIRECTED, Disp: 100 each, Rfl: 5    BD PEN NEEDLE PRAMOD U/F 32G X 4 MM MISC, Inject 1 each as directed 2 times daily, Disp: 100 each, Rfl: 5  Allergies   Allergen Reactions    Sulfa Antibiotics Other (See Comments)     Unknown reaction       Past Medical History:   Diagnosis Date    CAD (coronary artery disease)     Dr Mayra Swenson [facility]     Diabetes mellitus ([facility]     Hyperlipidemia     Hypertension     Internal carotid artery occlusion, right 03/16/2020    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  Posterior tibialis weakly palpable bilateral.    Capillary Refill Time:  Immediate return  Hair growth:  Symmetrical and bilateral   Skin:  Not atrophic  Edema: No edema foot or ankle neurologic:  Light touch diminished bilateral.      Musculoskeletal/ Orthopedic examination:    Equinis: Absent

## 2022-03-11 DIAGNOSIS — J32.4 CHRONIC PANSINUSITIS: Primary | ICD-10-CM

## 2022-03-14 NOTE — TELEPHONE ENCOUNTER
Patient was last seen in office 11/30/21 patient would like a prescription refill for astelin nasal spray. Patient is scheduled to come back into the office 5/21/22.

## 2022-03-15 RX ORDER — AZELASTINE HYDROCHLORIDE 137 UG/1
SPRAY, METERED NASAL
Qty: 30 ML | Refills: 2 | Status: SHIPPED
Start: 2022-03-15 | End: 2022-03-22 | Stop reason: SDUPTHER

## 2022-03-22 ENCOUNTER — OFFICE VISIT (OUTPATIENT)
Dept: PRIMARY CARE CLINIC | Age: 47
End: 2022-03-22
Payer: COMMERCIAL

## 2022-03-22 VITALS
HEIGHT: 76 IN | BODY MASS INDEX: 38.36 KG/M2 | WEIGHT: 315 LBS | HEART RATE: 64 BPM | DIASTOLIC BLOOD PRESSURE: 80 MMHG | RESPIRATION RATE: 16 BRPM | TEMPERATURE: 98.6 F | OXYGEN SATURATION: 98 % | SYSTOLIC BLOOD PRESSURE: 138 MMHG

## 2022-03-22 DIAGNOSIS — E11.65 UNCONTROLLED TYPE 2 DIABETES MELLITUS WITH HYPERGLYCEMIA (HCC): ICD-10-CM

## 2022-03-22 DIAGNOSIS — E55.9 VITAMIN D DEFICIENCY: ICD-10-CM

## 2022-03-22 DIAGNOSIS — I25.10 CAD IN NATIVE ARTERY: ICD-10-CM

## 2022-03-22 DIAGNOSIS — M54.50 LUMBAR PAIN: ICD-10-CM

## 2022-03-22 DIAGNOSIS — J44.9 CHRONIC OBSTRUCTIVE PULMONARY DISEASE, UNSPECIFIED COPD TYPE (HCC): ICD-10-CM

## 2022-03-22 DIAGNOSIS — J32.4 CHRONIC PANSINUSITIS: ICD-10-CM

## 2022-03-22 DIAGNOSIS — E78.2 MIXED HYPERLIPIDEMIA: ICD-10-CM

## 2022-03-22 DIAGNOSIS — M25.512 ACUTE PAIN OF LEFT SHOULDER: Primary | ICD-10-CM

## 2022-03-22 LAB
ALBUMIN SERPL-MCNC: 4.2 G/DL (ref 3.5–5.2)
ALP BLD-CCNC: 99 U/L (ref 40–129)
ALT SERPL-CCNC: 20 U/L (ref 0–40)
ANION GAP SERPL CALCULATED.3IONS-SCNC: 15 MMOL/L (ref 7–16)
AST SERPL-CCNC: 18 U/L (ref 0–39)
BILIRUB SERPL-MCNC: 0.5 MG/DL (ref 0–1.2)
BUN BLDV-MCNC: 13 MG/DL (ref 6–20)
CALCIUM SERPL-MCNC: 9.6 MG/DL (ref 8.6–10.2)
CHLORIDE BLD-SCNC: 102 MMOL/L (ref 98–107)
CHOLESTEROL, TOTAL: 167 MG/DL (ref 0–199)
CO2: 24 MMOL/L (ref 22–29)
CREAT SERPL-MCNC: 0.6 MG/DL (ref 0.7–1.2)
GFR AFRICAN AMERICAN: >60
GFR NON-AFRICAN AMERICAN: >60 ML/MIN/1.73
GLUCOSE BLD-MCNC: 100 MG/DL (ref 74–99)
HBA1C MFR BLD: 7.9 %
HCT VFR BLD CALC: 50.2 % (ref 37–54)
HDLC SERPL-MCNC: 53 MG/DL
HEMOGLOBIN: 16.7 G/DL (ref 12.5–16.5)
LDL CHOLESTEROL CALCULATED: 99 MG/DL (ref 0–99)
MCH RBC QN AUTO: 30.8 PG (ref 26–35)
MCHC RBC AUTO-ENTMCNC: 33.3 % (ref 32–34.5)
MCV RBC AUTO: 92.6 FL (ref 80–99.9)
PDW BLD-RTO: 12.8 FL (ref 11.5–15)
PLATELET # BLD: 307 E9/L (ref 130–450)
PMV BLD AUTO: 9.1 FL (ref 7–12)
POTASSIUM SERPL-SCNC: 4.1 MMOL/L (ref 3.5–5)
RBC # BLD: 5.42 E12/L (ref 3.8–5.8)
SODIUM BLD-SCNC: 141 MMOL/L (ref 132–146)
TOTAL PROTEIN: 7.8 G/DL (ref 6.4–8.3)
TRIGL SERPL-MCNC: 76 MG/DL (ref 0–149)
TSH SERPL DL<=0.05 MIU/L-ACNC: 1.22 UIU/ML (ref 0.27–4.2)
VITAMIN D 25-HYDROXY: 29 NG/ML (ref 30–100)
VLDLC SERPL CALC-MCNC: 15 MG/DL
WBC # BLD: 9.1 E9/L (ref 4.5–11.5)

## 2022-03-22 PROCEDURE — 3051F HG A1C>EQUAL 7.0%<8.0%: CPT | Performed by: NURSE PRACTITIONER

## 2022-03-22 PROCEDURE — 83036 HEMOGLOBIN GLYCOSYLATED A1C: CPT | Performed by: NURSE PRACTITIONER

## 2022-03-22 PROCEDURE — G8417 CALC BMI ABV UP PARAM F/U: HCPCS | Performed by: NURSE PRACTITIONER

## 2022-03-22 PROCEDURE — 4004F PT TOBACCO SCREEN RCVD TLK: CPT | Performed by: NURSE PRACTITIONER

## 2022-03-22 PROCEDURE — G8484 FLU IMMUNIZE NO ADMIN: HCPCS | Performed by: NURSE PRACTITIONER

## 2022-03-22 PROCEDURE — 3023F SPIROM DOC REV: CPT | Performed by: NURSE PRACTITIONER

## 2022-03-22 PROCEDURE — 2022F DILAT RTA XM EVC RTNOPTHY: CPT | Performed by: NURSE PRACTITIONER

## 2022-03-22 PROCEDURE — 96372 THER/PROPH/DIAG INJ SC/IM: CPT | Performed by: NURSE PRACTITIONER

## 2022-03-22 PROCEDURE — 99214 OFFICE O/P EST MOD 30 MIN: CPT | Performed by: NURSE PRACTITIONER

## 2022-03-22 PROCEDURE — G8427 DOCREV CUR MEDS BY ELIG CLIN: HCPCS | Performed by: NURSE PRACTITIONER

## 2022-03-22 RX ORDER — KETOROLAC TROMETHAMINE 30 MG/ML
30 INJECTION, SOLUTION INTRAMUSCULAR; INTRAVENOUS ONCE
Status: COMPLETED | OUTPATIENT
Start: 2022-03-22 | End: 2022-03-22

## 2022-03-22 RX ORDER — CYCLOBENZAPRINE HCL 10 MG
10 TABLET ORAL 3 TIMES DAILY PRN
Qty: 30 TABLET | Refills: 0 | Status: SHIPPED | OUTPATIENT
Start: 2022-03-22 | End: 2022-04-01

## 2022-03-22 RX ORDER — ASPIRIN 81 MG/1
81 TABLET ORAL DAILY
Qty: 90 TABLET | Refills: 3 | Status: SHIPPED
Start: 2022-03-22 | End: 2022-06-24 | Stop reason: SDUPTHER

## 2022-03-22 RX ORDER — ERGOCALCIFEROL 1.25 MG/1
CAPSULE ORAL
Qty: 5 CAPSULE | Refills: 3 | Status: SHIPPED
Start: 2022-03-22 | End: 2022-06-24 | Stop reason: SDUPTHER

## 2022-03-22 RX ORDER — METOPROLOL TARTRATE 75 MG/1
TABLET, FILM COATED ORAL
Qty: 240 TABLET | Refills: 2 | Status: SHIPPED
Start: 2022-03-22 | End: 2022-05-13 | Stop reason: SDUPTHER

## 2022-03-22 RX ORDER — ALBUTEROL SULFATE 90 UG/1
2 AEROSOL, METERED RESPIRATORY (INHALATION) EVERY 6 HOURS PRN
Qty: 1 EACH | Refills: 3 | Status: SHIPPED
Start: 2022-03-22 | End: 2022-06-24 | Stop reason: SDUPTHER

## 2022-03-22 RX ORDER — LIRAGLUTIDE 6 MG/ML
1.8 INJECTION SUBCUTANEOUS DAILY
Qty: 6 ML | Refills: 3 | Status: SHIPPED
Start: 2022-03-22 | End: 2022-06-24 | Stop reason: SDUPTHER

## 2022-03-22 RX ORDER — AZELASTINE HYDROCHLORIDE 137 UG/1
SPRAY, METERED NASAL
Qty: 30 ML | Refills: 2 | Status: SHIPPED
Start: 2022-03-22 | End: 2022-06-22

## 2022-03-22 RX ORDER — ATORVASTATIN CALCIUM 80 MG/1
80 TABLET, FILM COATED ORAL NIGHTLY
Qty: 30 TABLET | Refills: 3 | Status: SHIPPED
Start: 2022-03-22 | End: 2022-06-24 | Stop reason: SDUPTHER

## 2022-03-22 RX ORDER — INSULIN GLARGINE 100 [IU]/ML
68 INJECTION, SOLUTION SUBCUTANEOUS NIGHTLY
Qty: 7 PEN | Refills: 3 | Status: SHIPPED
Start: 2022-03-22 | End: 2022-06-24 | Stop reason: SDUPTHER

## 2022-03-22 RX ADMIN — KETOROLAC TROMETHAMINE 30 MG: 30 INJECTION, SOLUTION INTRAMUSCULAR; INTRAVENOUS at 14:03

## 2022-03-22 ASSESSMENT — ENCOUNTER SYMPTOMS
CHEST TIGHTNESS: 0
ABDOMINAL DISTENTION: 0
COUGH: 0
NAUSEA: 0
COLOR CHANGE: 0
SHORTNESS OF BREATH: 0
DIARRHEA: 0
RECTAL PAIN: 0
SORE THROAT: 0
APNEA: 0
WHEEZING: 0
BLOOD IN STOOL: 0
CONSTIPATION: 0
TROUBLE SWALLOWING: 0
EYE REDNESS: 0
RHINORRHEA: 0
ABDOMINAL PAIN: 0
VOMITING: 0
BACK PAIN: 0

## 2022-03-22 ASSESSMENT — PATIENT HEALTH QUESTIONNAIRE - PHQ9
1. LITTLE INTEREST OR PLEASURE IN DOING THINGS: 0
SUM OF ALL RESPONSES TO PHQ9 QUESTIONS 1 & 2: 0
SUM OF ALL RESPONSES TO PHQ QUESTIONS 1-9: 0
2. FEELING DOWN, DEPRESSED OR HOPELESS: 0

## 2022-03-22 NOTE — PROGRESS NOTES
2022     Ignacio Aviles 40 y.o. male    : 1975    Chief Complaint:   Diabetes, Hypertension, and Hyperlipidemia       History of Present Illness:   Dav Montelongo is a 40 y.o. male who presents to the office for follow up on his chronic problems. Overall he is doing well. Dm type 2- checks -200's. Taking all medications as prescribed. A1C has improved from 9.5 to 7.9. He does not follow with endocrinology. He has been trying to follow a carb control diet. Hypertension/hyperlipidemia/CAD status post CABG in 3/2020follows with cardiology on a yearly basis. He does not check his blood pressures or exercise at home. He does attempt to follow a heart healthy diet. Vitamin D def- taking supplementation. Chronic low back pain- followed with Dr. Gauri Espinoza and was inadvertently dismissed from the practice due to inability to attend a mandatory pill count. He is requesting a new referral for further evaluation at this time. Left shoulder pain that has been ongoing for quite sometime. There has been no known injury, but reports decreased ROM and pain. He has tried NSAIDs with minimal improvement of symptoms. The patient is not up-to-date with health maintenance screenings. Previous lab work was reviewed.     Past Medical History:     Past Medical History:   Diagnosis Date    CAD (coronary artery disease)     Dr Robert Sheffield COPD [facility]chronic obstructive [facility] disease) ([facility]     Diabetes mellitus ([facility]     Hyperlipidemia     Hypertension     Internal carotid artery occlusion, right 2020    Nasopharyngeal mass 2021    For OR 21    Neuropathy     Obesity        Past Surgical History:   Procedure Laterality Date    CARDIAC CATHETERIZATION  2020    Dr. Vianney Krueger- multi vessel disease    MITRAL VALVE REPAIR N/A 3/16/2020    CORONARY ARTERY BYPASS POSSIBLE LEFT RADIAL ARTERY HARVEST, PATRICE performed by Jaime Cortez MD at [place] [address] NOSE SURGERY N/A 2021 NASOPHARYNGEAL BIOPSY performed by Crhistophe Xie DO at [address]  2017    resection of mediastinal mass    TONSILLECTOMY      TRANSESOPHAGEAL ECHOCARDIOGRAM  2020    TYMPANOSTOMY TUBE PLACEMENT Right 2021    POSSIBLE RIGHT EAR TUBE POSSIBLE EUSTACHIAN TUBE DILATION performed by Christophe Xie DO at [facility] [place]       Family History   Problem Relation Age of Onset    Heart Disease Mother         MI age 39     High Blood Pressure Mother     Diabetes Father     Heart Disease Father         MI    High Blood Pressure Father     High Cholesterol Father     Cancer Father     Stroke Father     Kidney Disease Father         dialysis    High Blood Pressure Sister     Other Brother     COPD Brother     High Blood Pressure Brother        Social History     Tobacco Use    Smoking status: Former Smoker     Packs/day: 1.00     Years: 25.00     Pack years: 25.00     Types: Cigarettes     Start date: 1994     Quit date: 3/14/2020     Years since quittin.0    Smokeless tobacco: Current User     Types: Chew, Snuff   Vaping Use    Vaping Use: Never used   Substance Use Topics    Alcohol use: No    Drug use: No       Medications:     Current Outpatient Medications:     vitamin D (ERGOCALCIFEROL) 1.25 MG (57582 UT) CAPS capsule, take 1 capsule by mouth every week, Disp: 5 capsule, Rfl: 3    Metoprolol Tartrate 75 MG TABS, take 1 tablet by mouth twice a day, Disp: 240 tablet, Rfl: 2    metFORMIN (GLUCOPHAGE) 850 MG tablet, take 1 tablet by mouth three times a day, Disp: 90 tablet, Rfl: 3    Liraglutide (VICTOZA) 18 MG/3ML SOPN SC injection, Inject 1.8 mg into the skin daily, Disp: 6 mL, Rfl: 3    insulin glargine (LANTUS SOLOSTAR) 100 UNIT/ML injection pen, Inject 68 Units into the skin nightly, Disp: 7 pen, Rfl: 3    Azelastine HCl 137 MCG/SPRAY SOLN, instill 2 sprays into each nostril twice a day, Disp: 30 mL, Rfl: 2    atorvastatin (LIPITOR) 80 MG tablet, Take 1 tablet by mouth nightly, Disp: 30 tablet, Rfl: 3    aspirin EC 81 MG EC tablet, Take 1 tablet by mouth daily, Disp: 90 tablet, Rfl: 3    albuterol sulfate  (90 Base) MCG/ACT inhaler, Inhale 2 puffs into the lungs every 6 hours as needed for Wheezing, Disp: 1 each, Rfl: 3    insulin aspart (NOVOLOG) 100 UNIT/ML injection vial, Inject 15 units three times a day plus sliding scale, Disp: 6 each, Rfl: 3    mometasone-formoterol (DULERA) 200-5 MCG/ACT inhaler, Inhale 2 puffs into the lungs 2 times daily Rinse mouth after using., Disp: 13 g, Rfl: 2    cyclobenzaprine (FLEXERIL) 10 MG tablet, Take 1 tablet by mouth 3 times daily as needed for Muscle spasms, Disp: 30 tablet, Rfl: 0    dapagliflozin (FARXIGA) 10 MG tablet, Take 1 tablet by mouth every morning, Disp: 30 tablet, Rfl: 2    naproxen (NAPROSYN) 500 MG tablet, Take 1 tablet by mouth 2 times daily (with meals), Disp: 60 tablet, Rfl: 0    B-D INS SYR ULTRAFINE 1CC/30G 30G X 1/2\" 1 ML MISC, use as directed three times a day, Disp: , Rfl:     FreeStyle Lancets MISC, use 1 LANCET to TEST BLOOD SUGAR three to four times a day, Disp: , Rfl:     blood glucose test strips (ASCENSIA AUTODISC VI;ONE TOUCH ULTRA TEST VI) strip, 1 each by In Vitro route daily As needed. , Disp: 100 each, Rfl: 3    blood glucose test strips (FREESTYLE INSULINX TEST) strip, 1 each by In Vitro route 3 times daily As needed. , Disp: 1 each, Rfl: 5    Insulin Pen Needle (PEN NEEDLES 29GX1/2\") 29G X 12MM MISC, USE AS DIRECTED, Disp: 100 each, Rfl: 5    BD PEN NEEDLE PRAMOD U/F 32G X 4 MM MISC, Inject 1 each as directed 2 times daily, Disp: 100 each, Rfl: 5    Lancets MISC, 1 each by Does not apply route 3 times daily, Disp: 100 each, Rfl: 3    Allergies   Allergen Reactions    Sulfa Antibiotics Other (See Comments)     Unknown reaction       Review of Systems:   Review of Systems   Constitutional: Negative for activity change, appetite change, chills, diaphoresis, fatigue, fever and unexpected weight change. HENT: Negative for congestion, ear pain, hearing loss, postnasal drip, rhinorrhea, sore throat, tinnitus and trouble swallowing. Eyes: Negative for redness and visual disturbance. Respiratory: Negative for apnea, cough, chest tightness, shortness of breath and wheezing. Cardiovascular: Negative for chest pain, palpitations and leg swelling. Gastrointestinal: Negative for abdominal distention, abdominal pain, blood in stool, constipation, diarrhea, nausea, rectal pain and vomiting. Endocrine: Negative for cold intolerance and heat intolerance. Genitourinary: Negative for decreased urine volume, difficulty urinating, dysuria, flank pain, frequency, hematuria and urgency. Musculoskeletal: Positive for arthralgias. Negative for back pain, gait problem, joint swelling, myalgias, neck pain and neck stiffness. Left shoulder   Skin: Negative for color change, pallor, rash and wound. Allergic/Immunologic: Negative for environmental allergies, food allergies and immunocompromised state. Neurological: Negative for dizziness, tremors, seizures, syncope, facial asymmetry, speech difficulty, weakness, light-headedness, numbness and headaches. Hematological: Negative for adenopathy. Does not bruise/bleed easily. Psychiatric/Behavioral: Negative for agitation, behavioral problems, confusion, decreased concentration, sleep disturbance and suicidal ideas. The patient is not nervous/anxious. Physical Exam:   Vital Signs:  /80   Pulse 64   Temp 98.6 °F (37 °C)   Resp 16   Ht 6' 4\" (1.93 m)   Wt (!) 360 lb (163.3 kg)   SpO2 98%   BMI 43.82 kg/m²    Oxygen Saturation Interpretation: Normal.  Physical Exam  Vitals and nursing note reviewed. Constitutional:       Appearance: Normal appearance. He is normal weight. HENT:      Head: Normocephalic and atraumatic.       Right Ear: Tympanic membrane, ear canal and external ear normal.      Left Ear: Tympanic membrane, ear canal and external ear normal.      Nose: Nose normal.      Mouth/Throat:      Mouth: Mucous membranes are moist.      Pharynx: Oropharynx is clear. Eyes:      Extraocular Movements: Extraocular movements intact. Conjunctiva/sclera: Conjunctivae normal.      Pupils: Pupils are equal, round, and reactive to light. Neck:      Thyroid: No thyroid mass, thyromegaly or thyroid tenderness. Trachea: Trachea normal.   Cardiovascular:      Rate and Rhythm: Normal rate and regular rhythm. Pulses: Normal pulses. Heart sounds: Normal heart sounds. Pulmonary:      Effort: Pulmonary effort is normal.      Breath sounds: Normal breath sounds. Abdominal:      General: Bowel sounds are normal.      Palpations: Abdomen is soft. Musculoskeletal:      Left shoulder: Tenderness present. No swelling, deformity, effusion or laceration. Decreased range of motion. Normal strength. Cervical back: Normal range of motion and neck supple. Comments: Tender to palpate to the posterior aspect of the shoulder. Positive liftoff test.  Pain noted with pronation. Lymphadenopathy:      Cervical: No cervical adenopathy. Skin:     General: Skin is warm and dry. Capillary Refill: Capillary refill takes less than 2 seconds. Neurological:      General: No focal deficit present. Mental Status: He is alert and oriented to person, place, and time. Sensory: Sensation is intact. Motor: Motor function is intact. Coordination: Coordination is intact. Gait: Gait is intact. Deep Tendon Reflexes: Reflexes normal.   Psychiatric:         Attention and Perception: Attention and perception normal.         Mood and Affect: Mood normal.         Speech: Speech normal.         Behavior: Behavior normal.         Thought Content:  Thought content normal.         Cognition and Memory: Cognition and memory normal.         Judgment: Judgment normal. Health Maintenance:     Health Maintenance   Topic Date Due    COVID-19 Vaccine (1) Never done    Depression Screen  Never done    Hepatitis B vaccine (1 of 3 - Risk 3-dose series) Never done    Diabetic retinal exam  10/09/2019    Diabetic foot exam  01/10/2020    Flu vaccine (1) 09/01/2021    Colorectal Cancer Screen  Never done    A1C test (Diabetic or Prediabetic)  01/13/2022    Lipid screen  06/22/2022    Diabetic microalbuminuria test  10/13/2022    Potassium monitoring  11/22/2022    Creatinine monitoring  11/22/2022    DTaP/Tdap/Td vaccine (2 - Td or Tdap) 07/21/2030    Pneumococcal 0-64 years Vaccine (2 of 2 - PPSV23) 09/07/2041    Hepatitis C screen  Completed    HIV screen  Completed    Hepatitis A vaccine  Aged Out    Hib vaccine  Aged Out    Meningococcal (ACWY) vaccine  Aged [employer] History   Administered Date(s) Administered    Influenza, MDCK Quadv, IM, PF (Flucelvax 2 yrs and older) 12/08/2018    Influenza, Quadv, IM, PF (6 mo and older Fluzone, Flulaval, Fluarix, and 3 yrs and older Afluria) 12/18/2020    Pneumococcal Polysaccharide ([id number]) 12/08/2018        Testing: All laboratory and radiology results have been personally reviewed by myself. Labs:  No results found for this visit on 03/22/22. Imaging: All Radiology results interpreted by Radiologist unless otherwise noted. No results found. Assessment/Plan:   I personally reviewed the patient's allergies, past medical history, medications, and vitals sign. Dolly Tatum was seen today for diabetes, hypertension and hyperlipidemia. Diagnoses and all orders for this visit:    Acute pain of left shoulder  -     XR SHOULDER LEFT (MIN 2 VIEWS); Future  -     cyclobenzaprine (FLEXERIL) 10 MG tablet;  Take 1 tablet by mouth 3 times daily as needed for Muscle spasms  -     ketorolac (TORADOL) injection 30 mg    Vitamin D deficiency  -     vitamin D (ERGOCALCIFEROL) 1.25 MG (28013 UT) CAPS capsule; take 1 capsule by mouth every week  -     Vitamin D 25 Hydroxy; Future    Mixed hyperlipidemia  -     Metoprolol Tartrate 75 MG TABS; take 1 tablet by mouth twice a day  -     atorvastatin (LIPITOR) 80 MG tablet; Take 1 tablet by mouth nightly  -     CBC; Future  -     Comprehensive Metabolic Panel; Future  -     Lipid Panel; Future  -     Vitamin D 25 Hydroxy; Future  -     TSH; Future    CAD in native artery  -     Metoprolol Tartrate 75 MG TABS; take 1 tablet by mouth twice a day  -     aspirin EC 81 MG EC tablet; Take 1 tablet by mouth daily  -     CBC; Future  -     Comprehensive Metabolic Panel; Future  -     Lipid Panel; Future  -     Vitamin D 25 Hydroxy; Future  -     TSH; Future    Type 2 diabetes, uncontrolled, with neuropathy (HCC)  -     metFORMIN (GLUCOPHAGE) 850 MG tablet; take 1 tablet by mouth three times a day  -     insulin glargine (LANTUS SOLOSTAR) 100 UNIT/ML injection pen; Inject 68 Units into the skin nightly  -     CBC; Future  -     Comprehensive Metabolic Panel; Future  -     Lipid Panel; Future  -     Vitamin D 25 Hydroxy; Future  -     TSH; Future    Uncontrolled type 2 diabetes mellitus with hyperglycemia (HCC)  -     Liraglutide (VICTOZA) 18 MG/3ML SOPN SC injection; Inject 1.8 mg into the skin daily  -     Discontinue: dapagliflozin (FARXIGA) 5 MG tablet; Take 1 tablet by mouth every morning  -     insulin aspart (NOVOLOG) 100 UNIT/ML injection vial; Inject 15 units three times a day plus sliding scale  -     dapagliflozin (FARXIGA) 10 MG tablet; Take 1 tablet by mouth every morning    Chronic obstructive pulmonary disease, unspecified COPD type ([facility]  -     albuterol sulfate  (90 Base) MCG/ACT inhaler; Inhale 2 puffs into the lungs every 6 hours as needed for Wheezing  -     mometasone-formoterol (DULERA) 200-5 MCG/ACT inhaler; Inhale 2 puffs into the lungs 2 times daily Rinse mouth after using.     Chronic pansinusitis  -     Azelastine HCl 137 MCG/SPRAY SOLN; instill 2 sprays into each nostril twice a day    Lumbar pain  -     [facility], MD, Pain Medicine, [facility] from 5 mg to 10 mg daily. Increase NovoLog from 14 units premeal bolus with sliding scale to 15 units premeal bolus with sliding scale. Increase activity and follow a Mediterranean diet. Will obtain lab work today, will call with results. Increase Dulera for better COPD control. Will refer to pain management for chronic low back pain. Toradol injection given today in office. Will obtain x-ray of left shoulder, will call with results. Patient will likely need an MRI for evaluation of rotator cuff tear. Call or go to ED immediately if symptoms worsen or persist.  Return in about 3 months (around 6/22/2022) for 3 mth. Sooner if necessary. Counseled regarding above diagnosis, including possible risks and complications,especially if left uncontrolled. Counseled regarding the possible side effects, risks, benefits and alternatives to treatment; patient and/or guardian verbalizes understanding. Advised patient to call with any new medication issues. All questions answered. Reviewed age and gender appropriate health screening exams and vaccinations. Advised patient regarding importance of keeping up with recommended health maintenance and to schedule as soon as possible if overdue, as this is important in assessing for undiagnosed pathology, especially cancer. Patient verbalizes understanding and agrees. ZIA Montaño NP     **This report was transcribed using voice recognition software. Every effort was made to ensure accuracy; however, inadvertent computerized transcription errors may be present.

## 2022-03-23 DIAGNOSIS — G89.29 CHRONIC LEFT SHOULDER PAIN: Primary | ICD-10-CM

## 2022-03-23 DIAGNOSIS — M25.512 CHRONIC LEFT SHOULDER PAIN: Primary | ICD-10-CM

## 2022-04-06 ENCOUNTER — TELEPHONE (OUTPATIENT)
Dept: VASCULAR SURGERY | Age: 47
End: 2022-04-06

## 2022-04-07 ENCOUNTER — OFFICE VISIT (OUTPATIENT)
Dept: VASCULAR SURGERY | Age: 47
End: 2022-04-07
Payer: COMMERCIAL

## 2022-04-07 ENCOUNTER — TELEPHONE (OUTPATIENT)
Dept: VASCULAR SURGERY | Age: 47
End: 2022-04-07

## 2022-04-07 DIAGNOSIS — I65.21 CAROTID STENOSIS, RIGHT: Primary | ICD-10-CM

## 2022-04-07 PROBLEM — G89.18 POST-OP PAIN: Status: RESOLVED | Noted: 2021-11-22 | Resolved: 2022-04-07

## 2022-04-07 PROCEDURE — G8417 CALC BMI ABV UP PARAM F/U: HCPCS | Performed by: SURGERY

## 2022-04-07 PROCEDURE — 4004F PT TOBACCO SCREEN RCVD TLK: CPT | Performed by: SURGERY

## 2022-04-07 PROCEDURE — 99213 OFFICE O/P EST LOW 20 MIN: CPT | Performed by: SURGERY

## 2022-04-07 PROCEDURE — G8427 DOCREV CUR MEDS BY ELIG CLIN: HCPCS | Performed by: SURGERY

## 2022-04-07 RX ORDER — ASCORBIC ACID 500 MG
500 TABLET ORAL DAILY
COMMUNITY

## 2022-04-07 RX ORDER — THIAMINE MONONITRATE (VIT B1) 100 MG
100 TABLET ORAL DAILY
COMMUNITY

## 2022-04-07 NOTE — TELEPHONE ENCOUNTER
Notified patient of carotid ultrasound at [facility] on 4-27-22 at 4:00 pm.  [place] at 3:30 pm.

## 2022-04-07 NOTE — PROGRESS NOTES
Chief Complaint:   Chief Complaint   Patient presents with    Circulatory Problem     Follow up carotid stenosis.          HPI: Patient came to the office, for the evaluation of carotid artery disease, patient is known to have complete occlusion of right internal carotid artery, based upon the testing done at the time of cardiac bypass surgery done in the past, overall doing well    Patient trying to lose some weight      Patient denies any focal lateralizing neurological symptoms like loss of speech, vision or loss of function of extremity    Patient can walk a few blocks slowly, and denies any symptoms of rest pain    Allergies   Allergen Reactions    Sulfa Antibiotics Other (See Comments)     Unknown reaction       Current Outpatient Medications   Medication Sig Dispense Refill    vitamin B-1 (THIAMINE) 100 MG tablet Take 100 mg by mouth daily      vitamin C (ASCORBIC ACID) 500 MG tablet Take 500 mg by mouth daily      Zinc Sulfate (ZINC 15 PO) Take by mouth      APPLE CIDER VINEGAR PO Take by mouth      Inulin (FIBER CHOICE PO) Take by mouth      vitamin D (ERGOCALCIFEROL) 1.25 MG (16007 UT) CAPS capsule take 1 capsule by mouth every week 5 capsule 3    Metoprolol Tartrate 75 MG TABS take 1 tablet by mouth twice a day 240 tablet 2    metFORMIN (GLUCOPHAGE) 850 MG tablet take 1 tablet by mouth three times a day 90 tablet 3    Liraglutide (VICTOZA) 18 MG/3ML SOPN SC injection Inject 1.8 mg into the skin daily 6 mL 3    insulin glargine (LANTUS SOLOSTAR) 100 UNIT/ML injection pen Inject 68 Units into the skin nightly 7 pen 3    Azelastine HCl 137 MCG/SPRAY SOLN instill 2 sprays into each nostril twice a day 30 mL 2    atorvastatin (LIPITOR) 80 MG tablet Take 1 tablet by mouth nightly 30 tablet 3    aspirin EC 81 MG EC tablet Take 1 tablet by mouth daily 90 tablet 3    albuterol sulfate  (90 Base) MCG/ACT inhaler Inhale 2 puffs into the lungs every 6 hours as needed for Wheezing 1 each 3    insulin aspart (NOVOLOG) 100 UNIT/ML injection vial Inject 15 units three times a day plus sliding scale 6 each 3    mometasone-formoterol (DULERA) 200-5 MCG/ACT inhaler Inhale 2 puffs into the lungs 2 times daily Rinse mouth after using. 13 g 2    dapagliflozin (FARXIGA) 10 MG tablet Take 1 tablet by mouth every morning 30 tablet 2    naproxen (NAPROSYN) 500 MG tablet Take 1 tablet by mouth 2 times daily (with meals) 60 tablet 0    B-D INS SYR ULTRAFINE 1CC/30G 30G X 1/2\" 1 ML MISC use as directed three times a day      FreeStyle Lancets MISC use 1 LANCET to TEST BLOOD SUGAR three to four times a day      blood glucose test strips (ASCENSIA AUTODISC VI;ONE TOUCH ULTRA TEST VI) strip 1 each by In Vitro route daily As needed. 100 each 3    blood glucose test strips (FREESTYLE INSULINX TEST) strip 1 each by In Vitro route 3 times daily As needed. 1 each 5    Insulin Pen Needle (PEN NEEDLES 29GX1/2\") 29G X 12MM MISC USE AS DIRECTED 100 each 5    BD PEN NEEDLE PRAMOD U/F 32G X 4 MM MISC Inject 1 each as directed 2 times daily 100 each 5    Lancets MISC 1 each by Does not apply route 3 times daily 100 each 3     No current facility-administered medications for this visit.        Past Medical History:   Diagnosis Date    CAD (coronary artery disease)     Dr Neel Gonzales [facility]) [facility]     Diabetes mellitus ([facility]     Hyperlipidemia     Hypertension     Internal carotid artery occlusion, right 03/16/2020    Nasopharyngeal mass 07/2021    For OR 11-22-21    Neuropathy     Obesity     Shoulder problem        Past Surgical History:   Procedure Laterality Date    CARDIAC CATHETERIZATION  02/11/2020    Dr. Derek Wu- multi vessel disease    MITRAL VALVE REPAIR N/A 3/16/2020    CORONARY ARTERY BYPASS POSSIBLE LEFT RADIAL ARTERY HARVEST, PATRICE performed by Yari Oh MD at [place] [address] NOSE SURGERY N/A 11/22/2021    NASOPHARYNGEAL BIOPSY performed by Gregor Nixon DO at [facility] OR    THORACOSCOPY  2017    resection of mediastinal mass    TONSILLECTOMY      TRANSESOPHAGEAL ECHOCARDIOGRAM  2020    TYMPANOSTOMY TUBE PLACEMENT Right 2021    POSSIBLE RIGHT EAR TUBE POSSIBLE EUSTACHIAN TUBE DILATION performed by Vangie Davison DO at [facility] OR       Family History   Problem Relation Age of Onset    Heart Disease Mother         MI age 39     High Blood Pressure Mother     Diabetes Father     Heart Disease Father         MI    High Blood Pressure Father     High Cholesterol Father     Cancer Father     Stroke Father     Kidney Disease Father         dialysis    High Blood Pressure Sister     Other Brother     COPD Brother     High Blood Pressure Brother        Social History     Socioeconomic History    Marital status: Single     Spouse name: Not on file    Number of children: Not on file    Years of education: Not on file    Highest education level: Not on file   Occupational History    Occupation: [employer] with [address] [place] [address]     Employer: NOT EMPLOYED   Tobacco Use    Smoking status: Former Smoker     Packs/day: 1.00     Years: 25.00     Pack years: 25.00     Types: Cigarettes     Start date: 1994     Quit date: 3/14/2020     Years since quittin.0    Smokeless tobacco: Current User     Types: Chew, Snuff   Vaping Use    Vaping Use: Never used   Substance and Sexual Activity    Alcohol use: No    Drug use: No    Sexual activity: Not on file   Other Topics Concern    Not on file   Social History Narrative    Not on file     Social Determinants of Health     Financial Resource Strain: High Risk    Difficulty of Paying Living Expenses: Very hard   Food Insecurity: No Food Insecurity    Worried About Running Out of Food in the Last Year: Never true    [place] of Food in the Last Year: Never true   Transportation Needs:     Lack of Transportation (Medical):  Not on file    Lack of Transportation (Non-Medical): Not on file   Physical Activity:     Days of Exercise per Week: Not on file    Minutes of Exercise per Session: Not on file   Stress:     Feeling of Stress : Not on file   Social Connections:     Frequency of Communication with Friends and Family: Not on file    Frequency of Social Gatherings with Friends and Family: Not on file    Attends Orthodoxy Services: Not on file    Active Member of [address] [place] [address] or Organizations: Not on file    Attends Club or Organization Meetings: Not on file    Marital Status: Not on file   Intimate Partner Violence:     Fear of Current or Ex-Partner: Not on file    Emotionally Abused: Not on file    Physically Abused: Not on file    Sexually Abused: Not on file   Housing Stability:     Unable to Pay for Housing in the Last Year: Not on file    Number of [place] in the Last Year: Not on file    Unstable Housing in the Last Year: Not on file       Review of Systems:    Eyes:  No blurring, diplopia or vision loss. Respiratory:  No cough, pleuritic chest pain, dyspnea, or wheezing. Chronic obstructive lung disease  Cardiovascular: No angina, palpitations . Coronary artery disease, post cardiac bypass surgery, hypertension, hyperlipidemia  Musculoskeletal:  No arthritis or weakness. Neurologic:  No paralysis, paresis, paresthesia, seizures or headache. Endocrinology: Diabetes mellitus          Physical Exam:  General appearance:  Alert, awake, oriented x 3. No distress. Eyes:  Conjunctivae appear normal; PERRL  Neck:  No jugular venous distention, lymphadenopathy or thyromegaly. No evidence of carotid bruit  Lungs:  Clear to ausculation bilaterally. No rhonchi, crackles, wheezes  Heart:  Regular rate and rhythm. No rub or murmur  Abdomen:  Soft, non-tender. No masses, organomegaly. Musculoskeletal : No joint effusions, tenderness swelling    Neuro: Speech is intact. Moving all extremities.  No focal motor or sensory deficits      Extremities:  Both feet are warm to touch. The color of both feet is normal.        Pulses Right  Left    Brachial 3 3    Radial    3=normal   Femoral 2 2  2=diminished   Popliteal    1=barely palpable   Dorsalis pedis    0=absent   Posterior tibial 2 2  4=aneurysmal             Other pertinent information:1. The past medical records were reviewed. Assessment:    1. Carotid stenosis, right              Plan:       Discussed the patient, patient recommend follow-up carotid ultrasound but call me anytime if he has any strokelike symptoms, explained, cancer weight loss              Patient was instructed to continue walking program and to call if any worsening of symptoms and to call if any focal lateralizing neurological symptoms like loss of speech, vision or loss of function of extremity. All the questions were answered. Orders Placed This Encounter   Procedures    US CAROTID ARTERY BILATERAL             Indicated follow-up: Return in about 1 year (around 4/7/2023), or if symptoms worsen or fail to improve.

## 2022-04-12 ENCOUNTER — TELEPHONE (OUTPATIENT)
Dept: PRIMARY CARE CLINIC | Age: 47
End: 2022-04-12

## 2022-04-12 DIAGNOSIS — G89.29 CHRONIC LEFT SHOULDER PAIN: Primary | ICD-10-CM

## 2022-04-12 DIAGNOSIS — M25.512 CHRONIC LEFT SHOULDER PAIN: Primary | ICD-10-CM

## 2022-04-12 NOTE — TELEPHONE ENCOUNTER
Can we please advise the patient that the MRI cannot be approved by insurance until he completes physical therapy. Is the patient agreeable to trialing physical therapy at this time?

## 2022-04-12 NOTE — TELEPHONE ENCOUNTER
Devora stovall called and said that Ann Leoclint was scheduled for an MRI and she was trying to get a PA done for [facility]. They require that the patient has to have done physical therapy before they will pay for MRI.  She cancelled the MRI and advises that you set him up for PT.

## 2022-04-13 ENCOUNTER — TELEPHONE (OUTPATIENT)
Dept: PRIMARY CARE CLINIC | Age: 47
End: 2022-04-13

## 2022-04-13 NOTE — TELEPHONE ENCOUNTER
Patient has already called [facility] in [facility] to be put on the schedule. They have already called us to request we send the referral in as soon as possible since he is on the schedule to start physical therapy next week.          Please advise

## 2022-04-20 ENCOUNTER — OFFICE VISIT (OUTPATIENT)
Dept: PODIATRY | Age: 47
End: 2022-04-20
Payer: COMMERCIAL

## 2022-04-20 VITALS — BODY MASS INDEX: 38.36 KG/M2 | WEIGHT: 315 LBS | HEIGHT: 76 IN

## 2022-04-20 DIAGNOSIS — L85.3 XEROSIS CUTIS: ICD-10-CM

## 2022-04-20 DIAGNOSIS — M20.42 HAMMER TOES OF BOTH FEET: ICD-10-CM

## 2022-04-20 DIAGNOSIS — E11.9 TYPE 2 DIABETES MELLITUS WITHOUT COMPLICATION, UNSPECIFIED WHETHER LONG TERM INSULIN USE (HCC): Primary | ICD-10-CM

## 2022-04-20 DIAGNOSIS — M20.41 HAMMER TOES OF BOTH FEET: ICD-10-CM

## 2022-04-20 DIAGNOSIS — G60.8 HEREDITARY SENSORY NEUROPATHY: ICD-10-CM

## 2022-04-20 PROCEDURE — 3051F HG A1C>EQUAL 7.0%<8.0%: CPT | Performed by: PODIATRIST

## 2022-04-20 PROCEDURE — G8417 CALC BMI ABV UP PARAM F/U: HCPCS | Performed by: PODIATRIST

## 2022-04-20 PROCEDURE — G8427 DOCREV CUR MEDS BY ELIG CLIN: HCPCS | Performed by: PODIATRIST

## 2022-04-20 PROCEDURE — 2022F DILAT RTA XM EVC RTNOPTHY: CPT | Performed by: PODIATRIST

## 2022-04-20 PROCEDURE — 4004F PT TOBACCO SCREEN RCVD TLK: CPT | Performed by: PODIATRIST

## 2022-04-20 PROCEDURE — 99213 OFFICE O/P EST LOW 20 MIN: CPT | Performed by: PODIATRIST

## 2022-04-20 NOTE — PROGRESS NOTES
Patient in office for diabetic foot exam. Tami Hernandez, APRN - NP last see 03/22/2022. CC:    Follow-up diabetic exam    HPI:   Follow-up diabetic exam  No new wounds or skin lesions. Wound previously at the second toe is well-healed. Does have elongated toenails he does not want to cut the toenail where he previously had a wound. Denies any open skin lesions once again left the right foot. Denies nausea vomiting fever chills shortness of breath. No additional pedal complaints.         ROS:  Const: Denies constitutional symptoms  Musculo: Denies symptoms other than stated above  Skin: Denies symptoms other than stated above       Current Outpatient Medications:     vitamin B-1 (THIAMINE) 100 MG tablet, Take 100 mg by mouth daily, Disp: , Rfl:     vitamin C (ASCORBIC ACID) 500 MG tablet, Take 500 mg by mouth daily, Disp: , Rfl:     Zinc Sulfate (ZINC 15 PO), Take by mouth, Disp: , Rfl:     APPLE CIDER VINEGAR PO, Take by mouth, Disp: , Rfl:     Inulin (FIBER CHOICE PO), Take by mouth, Disp: , Rfl:     vitamin D (ERGOCALCIFEROL) 1.25 MG (13060 UT) CAPS capsule, take 1 capsule by mouth every week, Disp: 5 capsule, Rfl: 3    Metoprolol Tartrate 75 MG TABS, take 1 tablet by mouth twice a day, Disp: 240 tablet, Rfl: 2    metFORMIN (GLUCOPHAGE) 850 MG tablet, take 1 tablet by mouth three times a day, Disp: 90 tablet, Rfl: 3    Liraglutide (VICTOZA) 18 MG/3ML SOPN SC injection, Inject 1.8 mg into the skin daily, Disp: 6 mL, Rfl: 3    insulin glargine (LANTUS SOLOSTAR) 100 UNIT/ML injection pen, Inject 68 Units into the skin nightly, Disp: 7 pen, Rfl: 3    Azelastine HCl 137 MCG/SPRAY SOLN, instill 2 sprays into each nostril twice a day, Disp: 30 mL, Rfl: 2    atorvastatin (LIPITOR) 80 MG tablet, Take 1 tablet by mouth nightly, Disp: 30 tablet, Rfl: 3    aspirin EC 81 MG EC tablet, Take 1 tablet by mouth daily, Disp: 90 tablet, Rfl: 3    albuterol sulfate  (90 Base) MCG/ACT inhaler, Inhale 2 puffs into the lungs every 6 hours as needed for Wheezing, Disp: 1 each, Rfl: 3    insulin aspart (NOVOLOG) 100 UNIT/ML injection vial, Inject 15 units three times a day plus sliding scale, Disp: 6 each, Rfl: 3    mometasone-formoterol (DULERA) 200-5 MCG/ACT inhaler, Inhale 2 puffs into the lungs 2 times daily Rinse mouth after using., Disp: 13 g, Rfl: 2    dapagliflozin (FARXIGA) 10 MG tablet, Take 1 tablet by mouth every morning, Disp: 30 tablet, Rfl: 2    naproxen (NAPROSYN) 500 MG tablet, Take 1 tablet by mouth 2 times daily (with meals), Disp: 60 tablet, Rfl: 0    Lancets MISC, 1 each by Does not apply route 3 times daily, Disp: 100 each, Rfl: 3    B-D INS SYR ULTRAFINE 1CC/30G 30G X 1/2\" 1 ML MISC, use as directed three times a day, Disp: , Rfl:     FreeStyle Lancets MISC, use 1 LANCET to TEST BLOOD SUGAR three to four times a day, Disp: , Rfl:     blood glucose test strips (ASCENSIA AUTODISC VI;ONE TOUCH ULTRA TEST VI) strip, 1 each by In Vitro route daily As needed. , Disp: 100 each, Rfl: 3    blood glucose test strips (FREESTYLE INSULINX TEST) strip, 1 each by In Vitro route 3 times daily As needed. , Disp: 1 each, Rfl: 5    Insulin Pen Needle (PEN NEEDLES 29GX1/2\") 29G X 12MM MISC, USE AS DIRECTED, Disp: 100 each, Rfl: 5    BD PEN NEEDLE PRAMOD U/F 32G X 4 MM MISC, Inject 1 each as directed 2 times daily, Disp: 100 each, Rfl: 5  Allergies   Allergen Reactions    Sulfa Antibiotics Other (See Comments)     Unknown reaction       Past Medical History:   Diagnosis Date    CAD (coronary artery disease)     Dr Philip Christina COPD [facility]ch[facility] obstructive [facility]) [facility] [address] [facility]     Diabetes mellitus ([facility] [facility]     Hyperlipidemia     Hypertension     Internal carotid artery occlusion, right 03/16/2020    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  Posterior tibialis weakly palpable bilateral.    Capillary Refill Time:  Immediate return  Hair growth:  Symmetrical and bilateral   Skin:  Not atrophic  Edema: No edema foot or ankle neurologic:  Light touch diminished bilateral.      Musculoskeletal/ Orthopedic examination:    Equinis: Absent bilateral  Dorsiflexion, plantarflexion, inversion, eversion bilateral 5 out of 5 muscle strength  Wiggling toes    Flexible hammertoes digits 2 through 4 bilateral.  No pain left to right foot      Dermatology examination:    No open wound right second toe. Elongated toenail right second digit with mycotic nail. After debridement of toenail in length and thickness there is no open wound. Assessment and Plan:  Omar Parra was seen today for follow-up and diabetes. Diagnoses and all orders for this visit:    Type 2 diabetes mellitus without complication, unspecified whether long term insulin use (HCC)    Hammer toes of both feet    Xerosis cutis    Hereditary sensory neuropathy        1.  No osteomyelitis or acute disease.       2.  2nd and 3rd mallet toe deformities suggested and please correlate   clinically.       3.  Atherosclerotic calcifications.                   Hemoglobin A1c from 3/22/2022.  7.9. Down from 6/22/2021.  12.3. Follow-up diabetic exam.  No open wound right second toe. Has progressed very well. I did pare elongated toenail right second toenail today. No open wound or redness. Stressed importance of lotion daily and avoiding barefoot. Any new wounds or skin lesions come in immediately. Follow-up in 4 months for regular diabetic exam.      Return in about 4 months (around 8/20/2022). Seen By:  Neva Estrada DPM      Document was created using voice recognition software. Note was reviewed, however may contain grammatical errors.

## 2022-04-25 DIAGNOSIS — M75.102 NONTRAUMATIC TEAR OF SUPRASPINATUS TENDON, LEFT: Primary | ICD-10-CM

## 2022-04-25 NOTE — RESULT ENCOUNTER NOTE
MRI revealed a partial tear to the supraspinatus tendon. Continue physical therapy. I did place a referral to orthopedics, Dr. Josh Guzman. Unless the patient has a preference with somebody else?

## 2022-04-26 ENCOUNTER — TELEPHONE (OUTPATIENT)
Dept: PAIN MANAGEMENT | Age: 47
End: 2022-04-26

## 2022-04-26 ENCOUNTER — OFFICE VISIT (OUTPATIENT)
Dept: PAIN MANAGEMENT | Age: 47
End: 2022-04-26
Payer: COMMERCIAL

## 2022-04-26 VITALS
TEMPERATURE: 99 F | OXYGEN SATURATION: 94 % | HEIGHT: 76 IN | BODY MASS INDEX: 38.36 KG/M2 | SYSTOLIC BLOOD PRESSURE: 128 MMHG | HEART RATE: 76 BPM | RESPIRATION RATE: 16 BRPM | WEIGHT: 315 LBS | DIASTOLIC BLOOD PRESSURE: 91 MMHG

## 2022-04-26 DIAGNOSIS — G89.4 CHRONIC PAIN SYNDROME: ICD-10-CM

## 2022-04-26 DIAGNOSIS — M54.41 CHRONIC BILATERAL LOW BACK PAIN WITH BILATERAL SCIATICA: ICD-10-CM

## 2022-04-26 DIAGNOSIS — M54.42 CHRONIC BILATERAL LOW BACK PAIN WITH BILATERAL SCIATICA: ICD-10-CM

## 2022-04-26 DIAGNOSIS — M54.16 LUMBAR RADICULOPATHY: ICD-10-CM

## 2022-04-26 DIAGNOSIS — G89.4 CHRONIC PAIN SYNDROME: Primary | ICD-10-CM

## 2022-04-26 DIAGNOSIS — G89.29 CHRONIC BILATERAL LOW BACK PAIN WITH BILATERAL SCIATICA: ICD-10-CM

## 2022-04-26 DIAGNOSIS — E11.42 DIABETIC POLYNEUROPATHY ASSOCIATED WITH TYPE 2 DIABETES MELLITUS (HCC): ICD-10-CM

## 2022-04-26 PROCEDURE — 4004F PT TOBACCO SCREEN RCVD TLK: CPT | Performed by: PAIN MEDICINE

## 2022-04-26 PROCEDURE — G8417 CALC BMI ABV UP PARAM F/U: HCPCS | Performed by: PAIN MEDICINE

## 2022-04-26 PROCEDURE — 99204 OFFICE O/P NEW MOD 45 MIN: CPT | Performed by: PAIN MEDICINE

## 2022-04-26 PROCEDURE — 3051F HG A1C>EQUAL 7.0%<8.0%: CPT | Performed by: PAIN MEDICINE

## 2022-04-26 PROCEDURE — G8427 DOCREV CUR MEDS BY ELIG CLIN: HCPCS | Performed by: PAIN MEDICINE

## 2022-04-26 PROCEDURE — 2022F DILAT RTA XM EVC RTNOPTHY: CPT | Performed by: PAIN MEDICINE

## 2022-04-26 PROCEDURE — 99204 OFFICE O/P NEW MOD 45 MIN: CPT

## 2022-04-26 RX ORDER — PREGABALIN 75 MG/1
75 CAPSULE ORAL 2 TIMES DAILY
Qty: 14 CAPSULE | Refills: 0 | Status: SHIPPED
Start: 2022-04-26 | End: 2022-05-24

## 2022-04-26 RX ORDER — PREGABALIN 150 MG/1
150 CAPSULE ORAL 2 TIMES DAILY
Qty: 60 CAPSULE | Refills: 0 | Status: SHIPPED
Start: 2022-05-03 | End: 2022-05-24

## 2022-04-26 NOTE — PROGRESS NOTES
Do you currently have any of the following:    Fever: No  Headache:  No  Cough: No  Shortness of breath: No  Exposed to anyone with these symptoms: No         Janice Cabrales presents to the [facility] on 4/26/[facility]22. Ivana Pittman is complaining of pain lower back, left shoulder, bilateral hip and legs. The pain is constant. The pain is described as aching and sharp. Pain is rated on his best day at a 4, on his worst day at a 8, and on average at a 5 on the VAS scale. He took his last dose of Tylenol and naproxen yesterday. Any procedures since your last visit: No, with  % relief. Pacemaker or defibrillator: No managed by . He is  on NSAIDS and is  on anticoagulation medications to include ASA and is managed by Gregor Nixon DO.     Medication Contract and Consent for Opioid Use Documents Filed     Patient Documents     Type of Document Status Date Received Received By Description    Medication Contract Received 6/7/2019 12:40 PM ESVIN TOLENTINO 2019 MEDICATION CONTRTACT    Medication Contract Received 8/6/2019 11:19 AM RADHA ROMO 08/02/2019 medication contract -NORCO                BP (!) 128/91   Pulse 76   Temp 99 °F (37.2 °C) (Oral)   Resp 16   Ht 6' 4\" (1.93 m)   Wt (!) 350 lb (158.8 kg)   SpO2 94%   BMI 42.60 kg/m²      No LMP for male patient.

## 2022-04-26 NOTE — PROGRESS NOTES
[id number] [facility]        [address]  [facility] - BE[facility]IOR[facility], [address] [place] [address]  Dept: [phone]        Patient:  BOOKER Rodríguez 1975    Date of Service:  22     Requesting Physician:  ZIA Gallagher NP    Reason for Consult:      Patient presents with complaints of bilateral, lower back pain that started 4 years ago    HISTORY OF PRESENT ILLNESS:      Pain is constant and is described as aching and sharp. Pain does radiate to both lower extremities. He  has numbness, tingling, weakness of the both lower extremities and does not have bladder or bowel dysfunction. Alleviating factors include: reclining. Aggravating factors include:  walking, standing. He has been on anticoagulation medications to include ASA and NSAIDS and has not been on herbal supplements. He is diabetic. Imaging:   3/2022 MRI L shoulder -       FINDINGS:             There is mild increased signal along the articular surface of the supraspinatus tendon most likely      partial tear without retraction.  No atrophy of the supraspinatus muscle.             The subscapularis muscle and tendon appear intact.             The infraspinatous and teres minor muscles and tendons appear intact.             The biceps tendon is intact.             The glenoid labrum is intact.             No joint effusion is identified.             No bone bruise or bony fracture.             Mild hypertrophic changes are seen at the acromioclavicular joint with a type 1 acromion.             IMPRESSION:             3T MRI Left Shoulder:             1.  Mild increased signal along the articular surface of the supraspinatus tendon most likely      partial tear without retraction.  No atrophy. Previous treatments: Physical Therapy and medications. .      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 Brandon Vargas- multi vessel disease    MITRAL VALVE REPAIR N/A 3/16/2020    CORONARY ARTERY BYPASS POSSIBLE LEFT RADIAL ARTERY HARVEST, PATRICE performed by Gail Foster MD at [place] [address] NOSE SURGERY N/A 11/22/2021    NASOPHARYNGEAL BIOPSY performed by Андрей Levine DO at [place] [address]  04/28/2017    resection of mediastinal mass    TONSILLECTOMY      TRANSESOPHAGEAL ECHOCARDIOGRAM  03/03/2020    TYMPANOSTOMY TUBE PLACEMENT Right 11/22/2021    POSSIBLE RIGHT EAR TUBE POSSIBLE EUSTACHIAN TUBE DILATION performed by Tamara Anthony DO at [facility] OR       Prior to Admission medications    Medication Sig Start Date End Date Taking? Authorizing Provider   pregabalin (LYRICA) 75 MG capsule Take 1 capsule by mouth 2 times daily for 7 days. 4/26/22 5/3/22 Yes Violeta Gip, DO   pregabalin (LYRICA) 150 MG capsule Take 1 capsule by mouth 2 times daily for 30 days.  5/3/22 6/2/22 Yes Violeta Gip, DO   vitamin B-1 (THIAMINE) 100 MG tablet Take 100 mg by mouth daily   Yes Historical Provider, MD   vitamin C (ASCORBIC ACID) 500 MG tablet Take 500 mg by mouth daily   Yes Historical Provider, MD   Zinc Sulfate (ZINC 15 PO) Take by mouth   Yes Historical Provider, MD   APPLE CIDER VINEGAR PO Take by mouth   Yes Historical Provider, MD   Inulin (FIBER CHOICE PO) Take by mouth   Yes Historical Provider, MD   vitamin D (ERGOCALCIFEROL) 1.25 MG (91561 UT) CAPS capsule take 1 capsule by mouth every week 3/22/22  Yes ZIA Peck - KIMBERLEE   Metoprolol Tartrate 75 MG TABS take 1 tablet by mouth twice a day 3/22/22  Yes ZIA Peck NP   metFORMIN (GLUCOPHAGE) 850 MG tablet take 1 tablet by mouth three times a day 3/22/22  Yes ZIA Rodriguez NP   Liraglutide (VICTOZA) 18 MG/3ML SOPN SC injection Inject 1.8 mg into the skin daily 3/22/22  Yes ZIA Rodriguez NP   insulin glargine (LANTUS SOLOSTAR) 100 UNIT/ML injection pen Inject 68 Units into the skin nightly 3/22/22  Yes ZIA Rodriguez NP   Azelastine HCl 137 MCG/SPRAY SOLN instill 2 sprays into each nostril twice a day 3/22/22  Yes ZIA Rodriguez NP   atorvastatin (LIPITOR) 80 MG tablet Take 1 tablet by mouth nightly 3/22/22  Yes ZIA Rodriguez NP   aspirin EC 81 MG EC tablet Take 1 tablet by mouth daily 3/22/22  Yes ZIA Rodriguez NP   albuterol sulfate  (90 Base) MCG/ACT inhaler Inhale 2 puffs into the lungs every 6 hours as needed for Wheezing 3/22/22  Yes ZIA Rodriguez NP   insulin aspart (NOVOLOG) 100 UNIT/ML injection vial Inject 15 units three times a day plus sliding scale 3/22/22  Yes ZIA Rodriguez NP   mometasone-formoterol [facility]) 200-5 MCG/ACT inhaler Inhale 2 puffs into the lungs 2 times daily Rinse mouth after using. 3/22/22  Yes ZIA Rodriguez NP   dapagliflozin (FARXIGA) 10 MG tablet Take 1 tablet by mouth every morning 3/22/22  Yes ZIA Rodriguez NP   naproxen (NAPROSYN) 500 MG tablet Take 1 tablet by mouth 2 times daily (with meals) 2/23/22  Yes ZIA Slade CNP   B-D INS SYR ULTRAFINE 1CC/30G 30G X 1/2\" 1 ML MISC use as directed three times a day 12/8/21  Yes Historical Provider, MD   FreeStyle Lancets MISC use 1 LANCET to TEST BLOOD SUGAR three to four times a day 1/6/22  Yes Historical Provider, MD   blood glucose test strips (ASCENSIA AUTODISC VI;ONE TOUCH ULTRA TEST VI) strip 1 each by In Vitro route daily As needed. 1/26/22  Yes Pauline Foster MD   blood glucose test strips (FREESTYLE INSULINX TEST) strip 1 each by In Vitro route 3 times daily As needed.  12/6/21  Yes ZIA Rodriguez NP   Insulin Pen Needle (PEN NEEDLES 29GX1/2\") 29G X 12MM MISC USE AS DIRECTED 6/22/21  Yes ZIA Stratton NP   BD PEN NEEDLE PRAMOD U/F 32G X 4 MM MISC Inject 1 each as directed 2 times daily 3/17/21  Yes Arlene Babinski, MD   Lancets MISC 1 each by Does not apply route 3 times daily 2/10/22 3/12/22  ZIA Stratton NP       Allergies   Allergen Reactions    Sulfa Antibiotics Other (See Comments)     Unknown reaction       Social History     Socioeconomic History    Marital status: Single     Spouse name: Not on file    Number of children: Not on file    Years of education: Not on file    Highest education level: Not on file   Occupational History    Occupation: [employer] [address] [place] [address]     Employer: NOT EMPLOYED   Tobacco Use    Smoking status: Former Smoker     Packs/day: 1.00     Years: 25.00     Pack years: 25.00     Types: Cigarettes     Start date: 1994     Quit date: 3/14/2020     Years since quittin.1    Smokeless tobacco: Current User     Types: Chew, Snuff   Vaping Use    Vaping Use: Never used   Substance and Sexual Activity    Alcohol use: No    Drug use: No    Sexual activity: Not on file   Other Topics Concern    Not on file   Social History Narrative    Not on file     Social Determinants of Health     Financial Resource Strain: High Risk    Difficulty of Paying Living Expenses: Very hard   Food Insecurity: No Food Insecurity    Worried About Running Out of Food in the Last Year: Never true    [place] of Food in the Last Year: Never true   Transportation Needs:     Lack of Transportation (Medical): Not on file    Lack of Transportation (Non-Medical):  Not on file   Physical Activity:     Days of Exercise per Week: Not on file    Minutes of Exercise per Session: Not on file   Stress:     Feeling of Stress : Not on file   Social Connections:     Frequency of Communication with Friends and Family: Not on file    Frequency of Social Gatherings with Friends and Family: Not on file    Attends Sikh Services: Not on file   [place]udelo Active Member of Clubs or Organizations: Not on file    Attends Club or Organization Meetings: Not on file    Marital Status: Not on file   Intimate Partner Violence:     Fear of Current or Ex-Partner: Not on file    Emotionally Abused: Not on file    Physically Abused: Not on file    Sexually Abused: Not on file   Housing Stability:     Unable to Pay for Housing in the Last Year: Not on file    Number of [place] in the Last Year: Not on file    Unstable Housing in the Last Year: Not on file       Family History   Problem Relation Age of Onset    Heart Disease Mother         MI age 39     High Blood Pressure Mother     Diabetes Father     Heart Disease Father         MI    High Blood Pressure Father     High Cholesterol Father     Cancer Father     Stroke Father     Kidney Disease Father         dialysis    High Blood Pressure Sister     Other Brother     COPD Brother     High Blood Pressure Brother        REVIEW OF SYSTEMS:     Patient specifically denies fever/chills, chest pain, shortness of breath, new bowel or bladder complaints. All other review of systems was negative. PHYSICAL EXAMINATION:      BP (!) 128/91   Pulse 76   Temp 99 °F (37.2 °C) (Oral)   Resp 16   Ht 6' 4\" (1.93 m)   Wt (!) 350 lb (158.8 kg)   SpO2 94%   BMI 42.60 kg/m²     General:      General appearance:pleasant and well-hydrated, in no distress and A & O x3  Build:Overweight  Function:Rises from a seated position with difficulty    HEENT:    Head:normocephalic, atraumatic  Pupils:regular, round, equal  Sclera: icterus absent    Lungs:    Breathing:normal breathing pattern    Abdomen:    Shape:non-distended  Tenderness:none  Guarding:none    Lumbar spine:    Spine inspection:normal   CVA tenderness:No   Palpation:tenderness paravertebral muscles, left, right positive. Range of motion:abnormal mildly in lateral bending, flexion, extension rotation bilateral and is painful.     Musculoskeletal:    Trigger points in Paraveteral:absent bilaterally  SI joint tenderness:positive right, positive left              NIKA test:not done right, not done left  Piriformis tenderness:negative right, negative left  Trochanteric bursa tenderness:negative right, negative left  SLR:negative right, negative left, sitting     Extremities:    Tremors:None bilaterally upper and lower  Range of motion:pain with internal rotation of hips negative.   Intact:Yes  Varicose veins:absent   Pulses:present Lt radial  Cyanosis:none  Edema:none x all 4 extremities    Neurological:    Sensory:decreased to light touch BLE in stocking distribution  Motor:                Right Quadriceps5/5          Left Quadriceps5/5           Right Gastrocnemius5/5    Left Gastrocnemius5/5  Right Ant Tibialis5/5  Left Ant Tibialis5/5    Reflexes:    Right Quadriceps reflex0-1+  Left Quadriceps reflex0-1+  Right Achilles reflex0-1+  Left Achilles reflex0-1+  Gait:normal station    Dermatology:    Skin:no rashes or lesions noted    Impression:    LBP BLE pain in L4 and L5 distribution  + LE symptoms consistent with DM neuropathy  PMHx: DM2, CAD, HTN, DLD, COPD, obesity  Prior pain mgmt with Dr. Boyd Lazaro at [facility], discharged for no show to pill count  On disability  Being worked up for L shoulder pain, MRI imaging as above, currently in PT, pending scheduling with ortho    Plan:    Obtain lumbar imaging from [facility] prior pain mgmt records from [facility] including UDS and reason for discharge  Reviewed referral documents and imaging  Urine screen today  [facility] report reviewed  Pt has failed gabapentin and pregabalin years ago without relief, prescribed by PCP  Pregabalin titration  Consider Butrans based upon results of UDS  Pt declines injections  Has failed PT in the past  TENS ordered  Patient encouraged to stay active and to lose weight  Treatment plan discussed with the patient including medication and procedure side effects     CC:  Referring physician    DAVY Back.

## 2022-04-27 ENCOUNTER — HOSPITAL ENCOUNTER (OUTPATIENT)
Dept: ULTRASOUND IMAGING | Age: 47
Discharge: HOME | End: 2022-04-29
Payer: COMMERCIAL

## 2022-04-27 DIAGNOSIS — I65.21 CAROTID STENOSIS, RIGHT: ICD-10-CM

## 2022-04-27 LAB
6-MONOACETYLMORPHINE, URINE: NOT DETECTED
ALCOHOL URINE: NOT DETECTED
AMPHETAMINE SCREEN, URINE: NOT DETECTED
BARBITURATE SCREEN URINE: NOT DETECTED
BENZODIAZEPINE SCREEN, URINE: NOT DETECTED
BUPRENORPHINE URINE: NOT DETECTED
CANNABINOID SCREEN URINE: NOT DETECTED
COCAINE METABOLITE SCREEN URINE: NOT DETECTED
FENTANYL SCREEN, URINE: NOT DETECTED
INTEGRITY CHECK, CREATININE, URINE: 38.3
INTEGRITY CHECK, OXIDANT, URINE: 52
INTEGRITY CHECK, PH, URINE: 4.4 (ref 4.5–9)
INTEGRITY CHECK, SPECIFIC GRAVITY, URINE: 1.01 (ref 1–1.03)
INTEGRITY CHECK, SPECIMEN INTEGRITY, URINE: ABNORMAL
Lab: ABNORMAL
METHADONE SCREEN, URINE: NOT DETECTED
OPIATE SCREEN URINE: NOT DETECTED
OXYCODONE URINE: NOT DETECTED
PHENCYCLIDINE SCREEN URINE: NOT DETECTED
TRAMADOL SCREEN URINE: NOT DETECTED

## 2022-04-27 PROCEDURE — 93880 EXTRACRANIAL BILAT STUDY: CPT

## 2022-04-29 DIAGNOSIS — G89.29 CHRONIC BILATERAL LOW BACK PAIN WITH BILATERAL SCIATICA: ICD-10-CM

## 2022-04-29 DIAGNOSIS — E11.42 DIABETIC POLYNEUROPATHY ASSOCIATED WITH TYPE 2 DIABETES MELLITUS (HCC): ICD-10-CM

## 2022-04-29 DIAGNOSIS — M54.16 LUMBAR RADICULOPATHY: ICD-10-CM

## 2022-04-29 DIAGNOSIS — G89.4 CHRONIC PAIN SYNDROME: ICD-10-CM

## 2022-04-29 DIAGNOSIS — M54.42 CHRONIC BILATERAL LOW BACK PAIN WITH BILATERAL SCIATICA: ICD-10-CM

## 2022-04-29 DIAGNOSIS — M54.41 CHRONIC BILATERAL LOW BACK PAIN WITH BILATERAL SCIATICA: ICD-10-CM

## 2022-04-29 LAB
6-MAM, QUANTITATIVE, URINE: <10
7-AMINOCLONAZEPAM, QUANTITATIVE, URINE: <50
ALPHA-HYDROXYALPRAZOLAM, QUANTITATIVE, URINE: <50
ALPHA-HYDROXYMIDAZOLAM, QUANTITATIVE, URINE: <50
ALPHA-HYDROXYTRIAZOLAM, QUANTITATIVE, URINE: <50
ALPRAZOLAM URINE QUANT: <50
CHLORDIAZEPOXIDE, QUANTITATIVE, URINE: <50
CLONAZEPAM, QUANTITATIVE, URINE: <50
CODEINE, QUANTITATIVE, URINE: <50
COMMENT: NORMAL
DIAZEPAM URINE QUANT: <50
FLUNITRAZEPAM, QUANTITATIVE, URINE: <50
FLURAZEPAM, QUANTITATIVE, URINE: <50
HYDROCODONE, QUANTITATIVE, URINE: <50
HYDROMORPHONE, QUANTITATIVE, URINE: <50
LORAZEPAM URINE QUANT: <50
MIDAZOLAM URINE QUANT: <50
MORPHINE, QUANTITATIVE, URINE: <50
NORDIAZEPAM URINE QUANT: <50
NORHYDROCODONE, QUANTITATIVE, URINE: <50
NOROXYCODONE, QUANTITATIVE, URINE: <50
OXAZEPAM URINE QUANT: <50
OXYCODONE URINE, QUANTITATIVE: <50
OXYMORPHONE, QUANTITATIVE, URINE: <50
TEMAZEPAM, QUANTITATIVE, URINE: <50

## 2022-05-02 DIAGNOSIS — M75.102 NONTRAUMATIC TEAR OF SUPRASPINATUS TENDON, LEFT: Primary | ICD-10-CM

## 2022-05-03 RX ORDER — PREGABALIN 75 MG/1
CAPSULE ORAL
Qty: 14 CAPSULE | OUTPATIENT
Start: 2022-05-03

## 2022-05-08 ENCOUNTER — CLINICAL DOCUMENTATION (OUTPATIENT)
Dept: VASCULAR SURGERY | Age: 47
End: 2022-05-08

## 2022-05-08 DIAGNOSIS — I65.21 OCCLUSION OF RIGHT INTERNAL CAROTID ARTERY: ICD-10-CM

## 2022-05-09 NOTE — PROGRESS NOTES
The carotid ultrasound was personally reviewed, chronic complete occlusion on the right, minimal disease, less than 30% on the left

## 2022-05-12 ENCOUNTER — TELEPHONE (OUTPATIENT)
Dept: VASCULAR SURGERY | Age: 47
End: 2022-05-12

## 2022-05-12 NOTE — TELEPHONE ENCOUNTER
Called and informed patient no change in carotid ultrasound, complete occlusion on right and left less than 50%,  keep next appointment.

## 2022-05-13 DIAGNOSIS — E78.2 MIXED HYPERLIPIDEMIA: ICD-10-CM

## 2022-05-13 DIAGNOSIS — I25.10 CAD IN NATIVE ARTERY: ICD-10-CM

## 2022-05-13 RX ORDER — METOPROLOL TARTRATE 75 MG/1
75 TABLET, FILM COATED ORAL 2 TIMES DAILY
Qty: 60 TABLET | Refills: 2 | Status: SHIPPED
Start: 2022-05-13 | End: 2022-05-24

## 2022-05-13 NOTE — TELEPHONE ENCOUNTER
Last Appointment:  3/22/2022  Future Appointments   Date Time Provider Larisa Salazar   5/24/2022 11:00 AM DO CHELE Rhodes PAIN [facility]   5/31/2022  7:15 AM Jennifer Hanson DO [place] [facility]   6/24/2022  8:00 AM ZIA Meza - NP [facility]   6/27/2022  2:30 PM Rahul Hugo MD [facility]   8/24/2022 10:30 AM MARTINEZ Ocampo Pod [facility]   4/20/2023  9:00 AM Gunnar Hernandez MD VAS/MED [facility]

## 2022-05-21 DIAGNOSIS — M54.16 LUMBAR RADICULOPATHY: ICD-10-CM

## 2022-05-21 DIAGNOSIS — E11.42 DIABETIC POLYNEUROPATHY ASSOCIATED WITH TYPE 2 DIABETES MELLITUS (HCC): ICD-10-CM

## 2022-05-21 DIAGNOSIS — M54.42 CHRONIC BILATERAL LOW BACK PAIN WITH BILATERAL SCIATICA: ICD-10-CM

## 2022-05-21 DIAGNOSIS — G89.4 CHRONIC PAIN SYNDROME: ICD-10-CM

## 2022-05-21 DIAGNOSIS — G89.29 CHRONIC BILATERAL LOW BACK PAIN WITH BILATERAL SCIATICA: ICD-10-CM

## 2022-05-21 DIAGNOSIS — M54.41 CHRONIC BILATERAL LOW BACK PAIN WITH BILATERAL SCIATICA: ICD-10-CM

## 2022-05-23 RX ORDER — PREGABALIN 150 MG/1
CAPSULE ORAL
Qty: 60 CAPSULE | OUTPATIENT
Start: 2022-05-23

## 2022-05-24 ENCOUNTER — OFFICE VISIT (OUTPATIENT)
Dept: PAIN MANAGEMENT | Age: 47
End: 2022-05-24
Payer: COMMERCIAL

## 2022-05-24 VITALS
DIASTOLIC BLOOD PRESSURE: 98 MMHG | SYSTOLIC BLOOD PRESSURE: 154 MMHG | TEMPERATURE: 98 F | HEART RATE: 100 BPM | HEIGHT: 76 IN | OXYGEN SATURATION: 96 % | BODY MASS INDEX: 38.36 KG/M2 | WEIGHT: 315 LBS

## 2022-05-24 DIAGNOSIS — E11.42 DIABETIC POLYNEUROPATHY ASSOCIATED WITH TYPE 2 DIABETES MELLITUS (HCC): ICD-10-CM

## 2022-05-24 DIAGNOSIS — G89.29 CHRONIC BILATERAL LOW BACK PAIN WITH BILATERAL SCIATICA: ICD-10-CM

## 2022-05-24 DIAGNOSIS — M54.16 LUMBAR RADICULOPATHY: ICD-10-CM

## 2022-05-24 DIAGNOSIS — M54.42 CHRONIC BILATERAL LOW BACK PAIN WITH BILATERAL SCIATICA: ICD-10-CM

## 2022-05-24 DIAGNOSIS — M54.41 CHRONIC BILATERAL LOW BACK PAIN WITH BILATERAL SCIATICA: ICD-10-CM

## 2022-05-24 DIAGNOSIS — G89.4 CHRONIC PAIN SYNDROME: ICD-10-CM

## 2022-05-24 PROCEDURE — G8427 DOCREV CUR MEDS BY ELIG CLIN: HCPCS | Performed by: PHYSICIAN ASSISTANT

## 2022-05-24 PROCEDURE — 99213 OFFICE O/P EST LOW 20 MIN: CPT | Performed by: PHYSICIAN ASSISTANT

## 2022-05-24 PROCEDURE — 3051F HG A1C>EQUAL 7.0%<8.0%: CPT | Performed by: PHYSICIAN ASSISTANT

## 2022-05-24 PROCEDURE — 2022F DILAT RTA XM EVC RTNOPTHY: CPT | Performed by: PHYSICIAN ASSISTANT

## 2022-05-24 PROCEDURE — G8417 CALC BMI ABV UP PARAM F/U: HCPCS | Performed by: PHYSICIAN ASSISTANT

## 2022-05-24 PROCEDURE — 99213 OFFICE O/P EST LOW 20 MIN: CPT

## 2022-05-24 PROCEDURE — 4004F PT TOBACCO SCREEN RCVD TLK: CPT | Performed by: PHYSICIAN ASSISTANT

## 2022-05-24 RX ORDER — BUPRENORPHINE 5 UG/H
1 PATCH TRANSDERMAL WEEKLY
Qty: 4 PATCH | Refills: 0 | Status: SHIPPED
Start: 2022-05-24 | End: 2022-06-22 | Stop reason: SINTOL

## 2022-05-24 NOTE — PROGRESS NOTES
[facility]  [address]  JAZIEL VALENTIN [facility] - BEHAVIORAL HEALTH SERVICES, [facility]    Follow up Note      Gabino Pardo     Date of Visit:  2022    CC:  Patient presents for follow up   Chief Complaint   Patient presents with    Back Pain       HPI:    Pain is slightly better with the pregabalin 150 mg BID, however, he states that he is having balance issues with it and fell into his brother. Appropriate analgesia with current medications regimen: yes - somewhat. .    Change in quality of symptoms:no. Medication side effects:balance issues with pregabalin 150 mg BID. Recent diagnostic testing:none. Recent interventional procedures:none. He has been on anticoagulation medications to include ASA and NSAIDS and has not been on herbal supplements. He is diabetic.     Imagin/2020 MRI lumbar spine  (in media)  L3-L4, L5-S1 moderate facet arthrosis without significant central canal stenosis    3/2022 MRI L shoulder -       FINDINGS:             There is mild increased signal along the articular surface of the supraspinatus tendon most likely      partial tear without retraction.  No atrophy of the supraspinatus muscle.             The subscapularis muscle and tendon appear intact.             The infraspinatous and teres minor muscles and tendons appear intact.             The biceps tendon is intact.             The glenoid labrum is intact.             No joint effusion is identified.             No bone bruise or bony fracture.             Mild hypertrophic changes are seen at the acromioclavicular joint with a type 1 acromion.             IMPRESSION:             3T MRI Left Shoulder:             1.  Mild increased signal along the articular surface of the supraspinatus tendon most likely      partial tear without retraction.  No atrophy.      Previous treatments: Physical Therapy and medications. .            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 Marvin Au- multi vessel disease    MITRAL VALVE REPAIR N/A 3/16/2020    CORONARY ARTERY BYPASS POSSIBLE LEFT RADIAL ARTERY HARVEST, PATRICE performed by Arin Chatman MD at [place] [address] NOSE SURGERY N/A 11/22/2021    NASOPHARYNGEAL BIOPSY performed by Kathia Howard DO at [place] [address]  04/28/2017    resection of mediastinal mass    TONSILLECTOMY      TRANSESOPHAGEAL ECHOCARDIOGRAM  03/03/2020    TYMPANOSTOMY TUBE PLACEMENT Right 11/22/2021    POSSIBLE RIGHT EAR TUBE POSSIBLE EUSTACHIAN TUBE DILATION performed by Kathia Howard DO at [facility] OR       Prior to Admission medications    Medication Sig Start Date End Date Taking? Authorizing Provider   buprenorphine (BUTRANS) 5 MCG/HR PTWK Place 1 patch onto the skin once a week for 28 days.  5/24/22 6/21/22 Yes RICKY Cannon   vitamin B-1 (THIAMINE) 100 MG tablet Take 100 mg by mouth daily   Yes Historical Provider, MD   vitamin C (ASCORBIC ACID) 500 MG tablet Take 500 mg by mouth daily   Yes Historical Provider, MD   Zinc Sulfate (ZINC 15 PO) Take by mouth   Yes Historical Provider, MD   APPLE CIDER VINEGAR PO Take by mouth   Yes Historical Provider, MD   Inulin (FIBER CHOICE PO) Take by mouth   Yes Historical Provider, MD   vitamin D (ERGOCALCIFEROL) 1.25 MG (16352 UT) CAPS capsule take 1 capsule by mouth every week 3/22/22  Yes ZIA Long - NP metFORMIN (GLUCOPHAGE) 850 MG tablet take 1 tablet by mouth three times a day 3/22/22  Yes ZIA Ramirez NP   Liraglutide (VICTOZA) 18 MG/3ML SOPN SC injection Inject 1.8 mg into the skin daily 3/22/22  Yes ZIA Ramirez NP   insulin glargine (LANTUS SOLOSTAR) 100 UNIT/ML injection pen Inject 68 Units into the skin nightly 3/22/22  Yes ZIA Ramirez NP   Azelastine HCl 137 MCG/SPRAY SOLN instill 2 sprays into each nostril twice a day 3/22/22  Yes ZIA Ramirez NP   atorvastatin (LIPITOR) 80 MG tablet Take 1 tablet by mouth nightly 3/22/22  Yes ZIA Ramirez NP   aspirin EC 81 MG EC tablet Take 1 tablet by mouth daily 3/22/22  Yes ZIA Ramirez NP   albuterol sulfate  (90 Base) MCG/ACT inhaler Inhale 2 puffs into the lungs every 6 hours as needed for Wheezing 3/22/22  Yes ZIA Ramirez NP   insulin aspart (NOVOLOG) 100 UNIT/ML injection vial Inject 15 units three times a day plus sliding scale 3/22/22  Yes ZIA Ramirez NP   mometasone-formoterol [facility]) 200-5 MCG/ACT inhaler Inhale 2 puffs into the lungs 2 times daily Rinse mouth after using. 3/22/22  Yes ZIA Ramirez NP   dapagliflozin (FARXIGA) 10 MG tablet Take 1 tablet by mouth every morning 3/22/22  Yes ZIA Ramirez NP   naproxen (NAPROSYN) 500 MG tablet Take 1 tablet by mouth 2 times daily (with meals) 2/23/22  Yes ZIA Quesada CNP   B-D INS SYR ULTRAFINE 1CC/30G 30G X 1/2\" 1 ML MISC use as directed three times a day 12/8/21  Yes Historical Provider, MD Rudolphyle Lancets MISC use 1 LANCET to TEST BLOOD SUGAR three to four times a day 1/6/22  Yes Historical Provider, MD   blood glucose test strips (ASCENSIA AUTODISC VI;ONE TOUCH ULTRA TEST VI) strip 1 each by In Vitro route daily As needed. 1/26/22  Yes Ruben Wilson MD   blood glucose test strips (FREESTYLE INSULINX TEST) strip 1 each by In Vitro route 3 times daily As needed.  12/6/21  Yes ZIA Ramirez - KIMBERELE   Insulin Pen Needle (PEN NEEDLES 29GX1/2\") 29G X 12MM MISC USE AS DIRECTED 21  Yes ZIA Rothman NP   BD PEN NEEDLE PRAMOD U/F 32G X 4 MM MISC Inject 1 each as directed 2 times daily 3/17/21  Yes Dandre Lopez MD   pregabalin (LYRICA) 75 MG capsule Take 1 capsule by mouth 2 times daily for 7 days. Patient not taking: Reported on 2022 4/26/22 5/3/22  Chu Taco, DO   Lancets MISC 1 each by Does not apply route 3 times daily 2/10/22 3/12/22  ZIA Rothman NP       Allergies   Allergen Reactions    Sulfa Antibiotics Other (See Comments)     Unknown reaction       Social History     Socioeconomic History    Marital status: Single     Spouse name: Not on file    Number of children: Not on file    Years of education: Not on file    Highest education level: Not on file   Occupational History    Occupation: [employer] [address] [place] [address]     Employer: NOT EMPLOYED   Tobacco Use    Smoking status: Former Smoker     Packs/day: 1.00     Years: 25.00     Pack years: 25.00     Types: Cigarettes     Start date: 1994     Quit date: 3/14/2020     Years since quittin.1    Smokeless tobacco: Current User     Types: Chew, Snuff   Vaping Use    Vaping Use: Never used   Substance and Sexual Activity    Alcohol use: No    Drug use: No    Sexual activity: Not on file   Other Topics Concern    Not on file   Social History Narrative    Not on file     Social Determinants of Health     Financial Resource Strain: High Risk    Difficulty of Paying Living Expenses: Very hard   Food Insecurity: No Food Insecurity    Worried About Running Out of Food in the Last Year: Never true    [place] of Food in the Last Year: Never true   Transportation Needs:     Lack of Transportation (Medical): Not on file    Lack of Transportation (Non-Medical):  Not on file   Physical Activity:     Days of Exercise per Week: Not on file    Minutes of Exercise per Session: Not on file   Stress:     Feeling of Stress : Not on file   Social Connections:     Frequency of Communication with Friends and Family: Not on file    Frequency of Social Gatherings with Friends and Family: Not on file    Attends Confucianism Services: Not on file    Active Member of [address] [place] [address] [employer] or Organizations: Not on file    Attends Club or Organization Meetings: Not on file    Marital Status: Not on file   Intimate Partner Violence:     Fear of Current or Ex-Partner: Not on file    Emotionally Abused: Not on file    Physically Abused: Not on file    Sexually Abused: Not on file   Housing Stability:     Unable to Pay for Housing in the Last Year: Not on file    Number of [place] in the Last Year: Not on file    Unstable Housing in the Last Year: Not on file       Family History   Problem Relation Age of Onset    Heart Disease Mother         MI age 39     High Blood Pressure Mother     Diabetes Father     Heart Disease Father         MI    High Blood Pressure Father     High Cholesterol Father     Cancer Father     Stroke Father     Kidney Disease Father         dialysis    High Blood Pressure Sister     Other Brother     COPD Brother     High Blood Pressure Brother        REVIEW OF SYSTEMS:     Juan Nicole denies fever/chills, chest pain, shortness of breath, new bowel or bladder complaints. All other review of systems was negative. PHYSICAL EXAMINATION:      BP (!) 154/98   Pulse 100   Temp 98 °F (36.7 °C) (Infrared)   Ht 6' 4\" (1.93 m)   Wt (!) 350 lb (158.8 kg)   SpO2 96%   BMI 42.60 kg/m²     General:      General appearance:   pleasant and well-hydrated. , in mild discomfort and A & O x3  Build:Obese    HEENT:    Head:normocephalic and atraumatic  Sclera: icterus absent,    Lungs:    Breathing:Normal expansion. No wheezing.     Abdomen:    Shape:non-distended and normal    Lumbar spine:    Range of motion:abnormal moderately Lateral bending, flexion, extension rotation bilateral and is painful. Extremities:    Tremors:None bilaterally upper and lower  Range of motion:Generally limited extension shoulder - left, pain with internal rotation of hips not done. Intact:Yes  Edema:Normal    Neurological:    [address]    Dermatology:    Skin:no unusual rashes, no skin lesions, no palpable subcutaneous nodules and good skin turgor    Impression:    LBP BLE pain in L4 and L5 distribution  + LE symptoms consistent with DM neuropathy  PMHx: DM2, CAD, HTN, DLD, COPD, obesity  Prior pain mgmt with Dr. Faith Cunningham at [facility], discharged for no show to pill count  On disability  Being worked up for L shoulder pain, MRI imaging as above, currently in PT, pending scheduling with ortho     Plan:    Reviewed lumbar imaging from [address] [place] [address] prior pain mgmt records from [facility] including UDS and reason for discharge  Urine screen reviewed and is consistent  OARRS report reviewed  Pt has failed gabapentin and pregabalin years ago without relief, prescribed by PCP  Pregabalin titration - 150 mg BID causing balance issues leading him to fall. No relief on lower dosing. Weaning instructions provided. Butrans 5 mcg weekly ordered. Discussed at length. Pt declines injections  Has failed PT in the past  TENS unit is helpful but hard to put on due to shoulder. Patient encouraged to stay active and to lose weight  Treatment plan discussed with the patient including medication and side effects                    Controlled Substance Monitoring:    Acute and Chronic Pain Monitoring:   RX Monitoring 5/24/2022   Periodic Controlled Substance Monitoring Possible medication side effects, risk of tolerance/dependence & alternative treatments discussed. ;No signs of potential drug abuse or diversion identified. ;Assessed functional status.          We discussed with the patient that combining opioids, benzodiazepines, alcohol, illicit drugs or sleep aids increases the risk of respiratory depression including death. We discussed that these medications may cause drowsiness, sedation or dizziness and have counseled the patient not to drive or operate machinery. We have discussed that these medications will be prescribed only by one provider. We have discussed with the patient about age related risk factors and have thoroughly discussed the importance of taking these medications as prescribed. The patient verbalizes understanding.     ccreferring physic

## 2022-05-24 NOTE — PROGRESS NOTES
Do you currently have any of the following:    Fever: No  Headache:  No  Cough: No  Shortness of breath: No  Exposed to anyone with these symptoms: Jacque Hopkins presents to the [facility] on 5/24/2022. Wing Parkinson is complaining of pain in back, L shoulder, b/l hips. The pain is constant. The pain is described as aching, throbbing, shooting, stabbing, sharp, burning and numb. Pain is rated on his best day at a 4, on his worst day at a 8, and on average at a 5 on the VAS scale. He took his last dose of Lyrica today. Any procedures since your last visit: No.    Pacemaker or defibrillator: No.    He is  on NSAIDS and is  on anticoagulation medications to include ASA and is managed by ZIA Noel NP  . Medication Contract and Consent for Opioid Use Documents Filed     Patient Documents     Type of Document Status Date Received Received By Description    Medication Contract Received 6/7/2019 12:40 PM ESVIN TOLENTINO 2019 MEDICATION CONTRTACT    Medication Contract Received 8/6/2019 11:19 AM RADHA ROMO 08/02/2019 medication contract -NORCO                Temp 98 °F (36.7 °C) (Infrared)   Ht 6' 4\" (1.93 m)   Wt (!) 350 lb (158.8 kg)   BMI 42.60 kg/m²      No LMP for male patient.

## 2022-05-24 NOTE — TELEPHONE ENCOUNTER
Name of Medication(s) Requested:  Test strips    Pharmacy Requested:   [facility]    Medication(s) pended? [x] Yes  [] No    Last Appointment:  3/22/2022    Future appts:  Future Appointments   Date Time Provider Larisa Romani   5/31/2022  7:15 AM Vangie Davison,  [place] [facility] [place] [facility]   6/22/2022  1:30 PM Manuela Dance, DO BD PAIN MAR [facility]   6/24/2022  8:00 AM ZIA Combs - NP [facility]   6/27/2022  2:30 PM Dorenda Bumpers, MD BDM ORTHO [facility]   8/24/2022 10:30 AM MARTINEZ Johnson Pod [facility]   4/20/2023  9:00 AM Kaitlyn Conklin MD [facility]/MED [facility]          Does patient need call back?   [] Yes  [x] No

## 2022-05-31 ENCOUNTER — OFFICE VISIT (OUTPATIENT)
Dept: ENT CLINIC | Age: 47
End: 2022-05-31
Payer: COMMERCIAL

## 2022-05-31 VITALS
OXYGEN SATURATION: 100 % | WEIGHT: 315 LBS | HEIGHT: 76 IN | SYSTOLIC BLOOD PRESSURE: 128 MMHG | DIASTOLIC BLOOD PRESSURE: 85 MMHG | TEMPERATURE: 97.5 F | HEART RATE: 98 BPM | BODY MASS INDEX: 38.36 KG/M2

## 2022-05-31 DIAGNOSIS — J32.4 CHRONIC PANSINUSITIS: Primary | ICD-10-CM

## 2022-05-31 DIAGNOSIS — H69.81 ETD (EUSTACHIAN TUBE DYSFUNCTION), RIGHT: ICD-10-CM

## 2022-05-31 DIAGNOSIS — J39.2 NASOPHARYNGEAL MASS: ICD-10-CM

## 2022-05-31 PROCEDURE — 4004F PT TOBACCO SCREEN RCVD TLK: CPT | Performed by: OTOLARYNGOLOGY

## 2022-05-31 PROCEDURE — 99213 OFFICE O/P EST LOW 20 MIN: CPT | Performed by: OTOLARYNGOLOGY

## 2022-05-31 PROCEDURE — G8427 DOCREV CUR MEDS BY ELIG CLIN: HCPCS | Performed by: OTOLARYNGOLOGY

## 2022-05-31 PROCEDURE — G8417 CALC BMI ABV UP PARAM F/U: HCPCS | Performed by: OTOLARYNGOLOGY

## 2022-05-31 ASSESSMENT — ENCOUNTER SYMPTOMS
ALLERGIC/IMMUNOLOGIC NEGATIVE: 1
EYE DISCHARGE: 0
NAUSEA: 0
SHORTNESS OF BREATH: 0
COLOR CHANGE: 0
VOMITING: 0
STRIDOR: 0
COUGH: 0
EYE REDNESS: 0

## 2022-05-31 NOTE — PROGRESS NOTES
Subjective:     Patient ID:  Yonny De Luna is a 40 y.o. male. HPI:  41year old male presenting for evaluation of his ears. Pt has a history of hearing loss, worse on the right, popping, and nasal congestion. Former smoker, 20 pack year history quit 1 year ago. Notes \"always having issues\" with his ears since a child but has never had HENT surgery. 11/30/21: patient returns for results of nasopharyngeal biopsy. No issues. 5/31/22: here for follow up, no lumps or bumps, no issues breathing, no ear infections     Patient's medications,allergies, past medical, surgical, social and family histories were reviewed andupdated as appropriate. Review of Systems   Constitutional: Negative for chills, fatigue and fever. HENT: Positive for congestion and hearing loss. Eyes: Negative for discharge and redness. Respiratory: Negative for cough, shortness of breath and stridor. Gastrointestinal: Negative for nausea and vomiting. Endocrine: Negative. Genitourinary: Negative. Musculoskeletal: Negative. Skin: Negative for color change and rash. Allergic/Immunologic: Negative. Neurological: Negative for dizziness, speech difficulty and headaches. Hematological: Negative. Psychiatric/Behavioral: Negative for agitation and confusion. All other systems reviewed and are negative. Objective:   Physical Exam  Constitutional:       Appearance: Normal appearance. HENT:      Head: Normocephalic and atraumatic. Right Ear: External ear normal.      Left Ear: External ear normal.      Ears:      Comments: Right T tube in place and patent no drainage     Nose: Congestion present. Mouth/Throat:      Mouth: Mucous membranes are moist.   Eyes:      Pupils: Pupils are equal, round, and reactive to light. Cardiovascular:      Rate and Rhythm: Normal rate. Pulmonary:      Effort: Pulmonary effort is normal.   Musculoskeletal:      Cervical back: Normal range of motion. Skin:     General: Skin is warm and dry. Neurological:      Mental Status: He is alert. Assessment:       Diagnosis Orders   1. Chronic pansinusitis     2. Nasopharyngeal mass     3. ETD (Eustachian tube dysfunction), right                Plan:     COM and ETD sp T tube on the Right   Adenoid biopsy with benign lymphoid tissue     Follow up in 6 months for recheck    Electronically signed by Zuleima Hope DO on 6/2/2022 at 2:32 PM                       [address] [facility]  1975    I have discussed the case, including pertinent history and exam findings with the resident. I have seen and examined the patient and the key elements of the encounter have been performed by me. I agree with the assessment, plan and orders as documented by the  resident              Remainder of medical problems as per  resident note. Patient seen and examined. Agree with above exam, assessment and plan.       Electronically signed by Zuleima Hope DO on 12/6/21 at 10:04 AM EST

## 2022-06-21 NOTE — PROGRESS NOTES
[facility]  [address]  JAZIEL VALENTIN [facility], [facility]    Follow up Note      Mildred Cast     Date of Visit:  2022    CC:  Patient presents for follow up   Chief Complaint   Patient presents with    Follow-up    Lower Back Pain    Shoulder Pain     left       HPI:    Pain is unchanged  Change in quality of symptoms:no. Medication side effects:balance issues with pregabalin 150 mg BID, headaches with buprenorphine. Recent diagnostic testing:none. Recent interventional procedures:none. He has been on anticoagulation medications to include ASA and NSAIDS and has not been on herbal supplements. He is diabetic.     Imagin/2020 MRI lumbar spine  (in media)  L3-L4, L5-S1 moderate facet arthrosis without significant central canal stenosis    3/2022 MRI L shoulder -       FINDINGS:             There is mild increased signal along the articular surface of the supraspinatus tendon most likely      partial tear without retraction.  No atrophy of the supraspinatus muscle.             The subscapularis muscle and tendon appear intact.             The infraspinatous and teres minor muscles and tendons appear intact.             The biceps tendon is intact.             The glenoid labrum is intact.             No joint effusion is identified.             No bone bruise or bony fracture.             Mild hypertrophic changes are seen at the acromioclavicular joint with a type 1 acromion.             IMPRESSION:             3T MRI Left Shoulder:             1.  Mild increased signal along the articular surface of the supraspinatus tendon most likely      partial tear without retraction.  No atrophy.      Previous treatments: Physical Therapy and medications.          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 Praveena Chandra- multi vessel disease    MITRAL VALVE REPAIR N/A 3/16/2020    CORONARY ARTERY BYPASS POSSIBLE LEFT RADIAL ARTERY HARVEST, PATRICE performed by Court Castellano MD at [place] [address] NOSE SURGERY N/A 11/22/2021    NASOPHARYNGEAL BIOPSY performed by Hamzah Canales DO at [address] [place] [address]  04/28/2017    resection of mediastinal mass    TONSILLECTOMY      TRANSESOPHAGEAL ECHOCARDIOGRAM  03/03/2020    TYMPANOSTOMY TUBE PLACEMENT Right 11/22/2021    POSSIBLE RIGHT EAR TUBE POSSIBLE EUSTACHIAN TUBE DILATION performed by Hamzah Canales DO at [facility] OR       Prior to Admission medications    Medication Sig Start Date End Date Taking? Authorizing Provider   Metoprolol Tartrate 75 MG TABS take 1 tablet by mouth twice a day 5/30/22  Yes Historical Provider, MD   blood glucose test strips (ASCENSIA AUTODISC VI;ONE TOUCH ULTRA TEST VI) strip 1 each by In Vitro route daily As needed.  5/24/22  Yes ZIA Stratton NP   vitamin B-1 (THIAMINE) 100 MG tablet Take 100 mg by mouth daily   Yes Historical Provider, MD   vitamin C (ASCORBIC ACID) 500 MG tablet Take 500 mg by mouth daily   Yes Historical Provider, MD   Zinc Sulfate (ZINC 15 PO) Take by mouth   Yes Historical Provider, MD   APPLE CIDER VINEGAR PO Take by mouth   Yes Historical Provider, MD   Inulin (FIBER CHOICE PO) Take by mouth   Yes Historical Provider, MD   vitamin D (ERGOCALCIFEROL) 1.25 MG (38002 UT) CAPS capsule take 1 capsule by mouth every week 3/22/22  Yes ZIA Stratton NP metFORMIN (GLUCOPHAGE) 850 MG tablet take 1 tablet by mouth three times a day 3/22/22  Yes ZIA Rodriguez NP   Liraglutide (VICTOZA) 18 MG/3ML SOPN SC injection Inject 1.8 mg into the skin daily 3/22/22  Yes ZIA Rodriguez NP   insulin glargine (LANTUS SOLOSTAR) 100 UNIT/ML injection pen Inject 68 Units into the skin nightly 3/22/22  Yes ZIA Rodriguez NP   atorvastatin (LIPITOR) 80 MG tablet Take 1 tablet by mouth nightly 3/22/22  Yes ZIA Rodriguez NP   aspirin EC 81 MG EC tablet Take 1 tablet by mouth daily 3/22/22  Yes ZIA Rodriguez NP   albuterol sulfate  (90 Base) MCG/ACT inhaler Inhale 2 puffs into the lungs every 6 hours as needed for Wheezing 3/22/22  Yes ZIA Rodriguez NP   insulin aspart (NOVOLOG) 100 UNIT/ML injection vial Inject 15 units three times a day plus sliding scale 3/22/22  Yes ZIA Rodriguez NP   mometasone-formoterol [facility]) 200-5 MCG/ACT inhaler Inhale 2 puffs into the lungs 2 times daily Rinse mouth after using. 3/22/22  Yes ZIA Rodriguez NP   dapagliflozin (FARXIGA) 10 MG tablet Take 1 tablet by mouth every morning 3/22/22  Yes ZIA Rodriguez NP   B-D INS SYR ULTRAFINE 1CC/30G 30G X 1/2\" 1 ML MISC use as directed three times a day 12/8/21  Yes Historical Provider, MD   FreeStyle Lancets MISC use 1 LANCET to TEST BLOOD SUGAR three to four times a day 1/6/22  Yes Historical Provider, MD   Insulin Pen Needle (PEN NEEDLES 29GX1/2\") 29G X 12MM MISC USE AS DIRECTED 6/22/21  Yes IZA Rodriguez NP   BD PEN NEEDLE PRAMOD U/F 32G X 4 MM MISC Inject 1 each as directed 2 times daily 3/17/21  Yes Mark Grullon MD   Lancets MISC 1 each by Does not apply route 3 times daily 2/10/22 3/12/22  ZIA Rodriguez - NP       Allergies   Allergen Reactions    Sulfa Antibiotics Other (See Comments)     Unknown reaction       Social History     Socioeconomic History    Marital status: Single     Spouse name: Not on file    Number of children: Not on the Last Year: Not on file       Family History   Problem Relation Age of Onset    Heart Disease Mother         MI age 39     High Blood Pressure Mother     Diabetes Father     Heart Disease Father         MI    High Blood Pressure Father     High Cholesterol Father     Cancer Father     Stroke Father     Kidney Disease Father         dialysis    High Blood Pressure Sister     Other Brother     COPD Brother     High Blood Pressure Brother        REVIEW OF SYSTEMS:     Pamela Enciso denies fever/chills, chest pain, shortness of breath, new bowel or bladder complaints. All other review of systems was negative. PHYSICAL EXAMINATION:      /82   Pulse 83   Temp 97.1 °F (36.2 °C) (Infrared)   Resp 16   SpO2 97%     General:      General appearance: pleasant and well-hydrated, in mild discomfort and A & O x3  Build:Obese    HEENT:    Head:normocephalic and atraumatic  Sclera: icterus absent    Lungs:    Breathing:Normal expansion. No wheezing. Abdomen:    Shape:non-distended and normal    Lumbar spine:    Range of motion:abnormal moderately in lateral bending, flexion, extension rotation bilateral and is painful. Extremities:    Tremors:None bilaterally upper and lower  Range of motion:Generally limited extension shoulder - left, pain with internal rotation of hips not done.   Intact:Yes  Edema:Normal    Neurological:    [address]    Dermatology:    Skin:no unusual rashes, no skin lesions    Impression:    LBP BLE pain in L4 and L5 distribution  + LE symptoms consistent with DM neuropathy  PMHx: DM2, CAD, HTN, DLD, COPD, obesity  Prior pain mgmt with Dr. Neisha Vanegas at [facility], discharged for no show to pill count  On disability  Being worked up for L shoulder pain, MRI imaging as above, currently in PT, pending scheduling with ortho     Plan:    Urine drug screen deferred  OARRS report reviewed  Pt has failed gabapentin (no relief) and pregabalin (SE's)   D/c butrans 5 mcg/hr not helpful and caused headaches  Duloxetine titration as tolerated up to 60 mg  Pt declines injections  Has failed PT in the past  TENS unit is helpful - needs more supplies   Patient encouraged to stay active and to lose weight  Treatment plan discussed with the patient including medication and side effects      We discussed with the patient that combining opioids, benzodiazepines, alcohol, illicit drugs or sleep aids increases the risk of respiratory depression including death. We discussed that these medications may cause drowsiness, sedation or dizziness and have counseled the patient not to drive or operate machinery. We have discussed that these medications will be prescribed only by one provider. We have discussed with the patient about age related risk factors and have thoroughly discussed the importance of taking these medications as prescribed. The patient verbalizes understanding.

## 2022-06-22 ENCOUNTER — OFFICE VISIT (OUTPATIENT)
Dept: PAIN MANAGEMENT | Age: 47
End: 2022-06-22
Payer: COMMERCIAL

## 2022-06-22 VITALS
SYSTOLIC BLOOD PRESSURE: 136 MMHG | DIASTOLIC BLOOD PRESSURE: 82 MMHG | TEMPERATURE: 97.1 F | OXYGEN SATURATION: 97 % | RESPIRATION RATE: 16 BRPM | HEART RATE: 83 BPM

## 2022-06-22 DIAGNOSIS — M54.42 CHRONIC BILATERAL LOW BACK PAIN WITH BILATERAL SCIATICA: ICD-10-CM

## 2022-06-22 DIAGNOSIS — M54.41 CHRONIC BILATERAL LOW BACK PAIN WITH BILATERAL SCIATICA: ICD-10-CM

## 2022-06-22 DIAGNOSIS — G89.29 CHRONIC BILATERAL LOW BACK PAIN WITH BILATERAL SCIATICA: ICD-10-CM

## 2022-06-22 DIAGNOSIS — E11.42 DIABETIC POLYNEUROPATHY ASSOCIATED WITH TYPE 2 DIABETES MELLITUS (HCC): ICD-10-CM

## 2022-06-22 DIAGNOSIS — M54.16 LUMBAR RADICULOPATHY: ICD-10-CM

## 2022-06-22 DIAGNOSIS — G89.4 CHRONIC PAIN SYNDROME: Primary | ICD-10-CM

## 2022-06-22 PROCEDURE — G8427 DOCREV CUR MEDS BY ELIG CLIN: HCPCS | Performed by: PAIN MEDICINE

## 2022-06-22 PROCEDURE — G8417 CALC BMI ABV UP PARAM F/U: HCPCS | Performed by: PAIN MEDICINE

## 2022-06-22 PROCEDURE — 99213 OFFICE O/P EST LOW 20 MIN: CPT | Performed by: PAIN MEDICINE

## 2022-06-22 PROCEDURE — 99213 OFFICE O/P EST LOW 20 MIN: CPT

## 2022-06-22 PROCEDURE — 2022F DILAT RTA XM EVC RTNOPTHY: CPT | Performed by: PAIN MEDICINE

## 2022-06-22 PROCEDURE — 4004F PT TOBACCO SCREEN RCVD TLK: CPT | Performed by: PAIN MEDICINE

## 2022-06-22 PROCEDURE — 3051F HG A1C>EQUAL 7.0%<8.0%: CPT | Performed by: PAIN MEDICINE

## 2022-06-22 RX ORDER — DULOXETIN HYDROCHLORIDE 30 MG/1
30 CAPSULE, DELAYED RELEASE ORAL DAILY
Qty: 7 CAPSULE | Refills: 0 | Status: SHIPPED
Start: 2022-06-22 | End: 2022-07-20 | Stop reason: SDUPTHER

## 2022-06-22 RX ORDER — METOPROLOL TARTRATE 75 MG/1
TABLET, FILM COATED ORAL
COMMUNITY
Start: 2022-05-30

## 2022-06-22 RX ORDER — DULOXETIN HYDROCHLORIDE 60 MG/1
60 CAPSULE, DELAYED RELEASE ORAL DAILY
Qty: 30 CAPSULE | Refills: 0 | Status: SHIPPED
Start: 2022-06-29 | End: 2022-07-20

## 2022-06-22 NOTE — PROGRESS NOTES
Do you currently have any of the following:    Fever: No  Headache:  No  Cough: No  Shortness of breath: No  Exposed to anyone with these symptoms: No         Fauzia Flores presents to the [address] [place] [address] on 6/22/2022. Antolin Cancino is complaining of pain in his lower and left shoulder. The pain is constant. The pain is described as aching, stabbing and sharp. Pain is rated on his best day at a 2, on his worst day at a 8, and on average at a 5 on the VAS scale. He took his last dose of Butrans Patch on right shoulder. Any procedures since your last visit: No.    Pacemaker or defibrillator: No managed by n/a. He is not on NSAIDS and is  on anticoagulation medications to include ASA and is managed by Edgar Bernal MD.     Medication Contract and Consent for Opioid Use Documents Filed     Patient Documents     Type of Document Status Date Received Received By Description    Medication Contract Received 6/7/2019 12:40 PM ESVIN TOLENTINO 2019 MEDICATION CONTRTACT    Medication Contract Received 8/6/2019 11:19 AM RADHA ROMO 08/02/2019 medication contract -[id number]    Medication Contract Received 5/24/2022 11:31 AM LOLITA, 11 [facility]                /82   Pulse 83   Temp 97.1 °F (36.2 °C) (Infrared)   Resp 16   SpO2 97%      No LMP for male patient.

## 2022-06-23 RX ORDER — PEN NEEDLE, DIABETIC 32GX 5/32"
NEEDLE, DISPOSABLE MISCELLANEOUS
Qty: 200 EACH | Refills: 2 | Status: SHIPPED
Start: 2022-06-23 | End: 2022-06-24 | Stop reason: SDUPTHER

## 2022-06-24 ENCOUNTER — OFFICE VISIT (OUTPATIENT)
Dept: PRIMARY CARE CLINIC | Age: 47
End: 2022-06-24
Payer: COMMERCIAL

## 2022-06-24 VITALS
HEIGHT: 76 IN | DIASTOLIC BLOOD PRESSURE: 80 MMHG | WEIGHT: 315 LBS | HEART RATE: 74 BPM | BODY MASS INDEX: 38.36 KG/M2 | TEMPERATURE: 97.2 F | RESPIRATION RATE: 16 BRPM | SYSTOLIC BLOOD PRESSURE: 136 MMHG | OXYGEN SATURATION: 96 %

## 2022-06-24 DIAGNOSIS — M54.42 CHRONIC BILATERAL LOW BACK PAIN WITH BILATERAL SCIATICA: ICD-10-CM

## 2022-06-24 DIAGNOSIS — E11.65 UNCONTROLLED TYPE 2 DIABETES MELLITUS WITH HYPERGLYCEMIA (HCC): ICD-10-CM

## 2022-06-24 DIAGNOSIS — G89.29 CHRONIC BILATERAL LOW BACK PAIN WITH BILATERAL SCIATICA: ICD-10-CM

## 2022-06-24 DIAGNOSIS — M54.16 LUMBAR RADICULOPATHY: ICD-10-CM

## 2022-06-24 DIAGNOSIS — E55.9 VITAMIN D DEFICIENCY: ICD-10-CM

## 2022-06-24 DIAGNOSIS — J44.9 CHRONIC OBSTRUCTIVE PULMONARY DISEASE, UNSPECIFIED COPD TYPE (HCC): ICD-10-CM

## 2022-06-24 DIAGNOSIS — E78.2 MIXED HYPERLIPIDEMIA: ICD-10-CM

## 2022-06-24 DIAGNOSIS — E11.42 DIABETIC POLYNEUROPATHY ASSOCIATED WITH TYPE 2 DIABETES MELLITUS (HCC): ICD-10-CM

## 2022-06-24 DIAGNOSIS — G89.4 CHRONIC PAIN SYNDROME: ICD-10-CM

## 2022-06-24 DIAGNOSIS — I25.10 CAD IN NATIVE ARTERY: ICD-10-CM

## 2022-06-24 DIAGNOSIS — M54.41 CHRONIC BILATERAL LOW BACK PAIN WITH BILATERAL SCIATICA: ICD-10-CM

## 2022-06-24 LAB — HBA1C MFR BLD: 7.2 %

## 2022-06-24 PROCEDURE — 2022F DILAT RTA XM EVC RTNOPTHY: CPT | Performed by: NURSE PRACTITIONER

## 2022-06-24 PROCEDURE — 83036 HEMOGLOBIN GLYCOSYLATED A1C: CPT | Performed by: NURSE PRACTITIONER

## 2022-06-24 PROCEDURE — 99214 OFFICE O/P EST MOD 30 MIN: CPT | Performed by: NURSE PRACTITIONER

## 2022-06-24 PROCEDURE — 3051F HG A1C>EQUAL 7.0%<8.0%: CPT | Performed by: NURSE PRACTITIONER

## 2022-06-24 PROCEDURE — 3023F SPIROM DOC REV: CPT | Performed by: NURSE PRACTITIONER

## 2022-06-24 PROCEDURE — G8427 DOCREV CUR MEDS BY ELIG CLIN: HCPCS | Performed by: NURSE PRACTITIONER

## 2022-06-24 PROCEDURE — G8417 CALC BMI ABV UP PARAM F/U: HCPCS | Performed by: NURSE PRACTITIONER

## 2022-06-24 PROCEDURE — 4004F PT TOBACCO SCREEN RCVD TLK: CPT | Performed by: NURSE PRACTITIONER

## 2022-06-24 RX ORDER — ASPIRIN 81 MG/1
81 TABLET ORAL DAILY
Qty: 90 TABLET | Refills: 3 | Status: SHIPPED
Start: 2022-06-24 | End: 2022-09-27 | Stop reason: SDUPTHER

## 2022-06-24 RX ORDER — DULOXETIN HYDROCHLORIDE 60 MG/1
60 CAPSULE, DELAYED RELEASE ORAL DAILY
Qty: 30 CAPSULE | Refills: 0 | Status: CANCELLED | OUTPATIENT
Start: 2022-06-29 | End: 2022-07-29

## 2022-06-24 RX ORDER — INSULIN GLARGINE 100 [IU]/ML
68 INJECTION, SOLUTION SUBCUTANEOUS NIGHTLY
Qty: 7 PEN | Refills: 3 | Status: SHIPPED
Start: 2022-06-24 | End: 2022-09-27 | Stop reason: SDUPTHER

## 2022-06-24 RX ORDER — ATORVASTATIN CALCIUM 80 MG/1
80 TABLET, FILM COATED ORAL NIGHTLY
Qty: 30 TABLET | Refills: 3 | Status: SHIPPED
Start: 2022-06-24 | End: 2022-09-27 | Stop reason: SDUPTHER

## 2022-06-24 RX ORDER — DULOXETIN HYDROCHLORIDE 30 MG/1
30 CAPSULE, DELAYED RELEASE ORAL DAILY
Qty: 7 CAPSULE | Refills: 0 | Status: CANCELLED | OUTPATIENT
Start: 2022-06-24 | End: 2022-07-01

## 2022-06-24 RX ORDER — PEN NEEDLE, DIABETIC 32GX 5/32"
NEEDLE, DISPOSABLE MISCELLANEOUS
Qty: 200 EACH | Refills: 2 | Status: SHIPPED
Start: 2022-06-24 | End: 2022-09-27 | Stop reason: SDUPTHER

## 2022-06-24 RX ORDER — ALBUTEROL SULFATE 90 UG/1
2 AEROSOL, METERED RESPIRATORY (INHALATION) EVERY 6 HOURS PRN
Qty: 1 EACH | Refills: 3 | Status: SHIPPED
Start: 2022-06-24 | End: 2022-09-27 | Stop reason: SDUPTHER

## 2022-06-24 RX ORDER — MOMETASONE FUROATE AND FORMOTEROL FUMARATE DIHYDRATE 200; 5 UG/1; UG/1
AEROSOL RESPIRATORY (INHALATION)
Qty: 13 G | Refills: 2 | Status: SHIPPED
Start: 2022-06-24 | End: 2022-09-27 | Stop reason: SDUPTHER

## 2022-06-24 RX ORDER — LIRAGLUTIDE 6 MG/ML
1.8 INJECTION SUBCUTANEOUS DAILY
Qty: 6 ML | Refills: 3 | Status: SHIPPED
Start: 2022-06-24 | End: 2022-09-27 | Stop reason: SDUPTHER

## 2022-06-24 RX ORDER — ERGOCALCIFEROL 1.25 MG/1
CAPSULE ORAL
Qty: 5 CAPSULE | Refills: 3 | Status: SHIPPED
Start: 2022-06-24 | End: 2022-09-27 | Stop reason: SDUPTHER

## 2022-06-24 SDOH — ECONOMIC STABILITY: FOOD INSECURITY: WITHIN THE PAST 12 MONTHS, THE FOOD YOU BOUGHT JUST DIDN'T LAST AND YOU DIDN'T HAVE MONEY TO GET MORE.: NEVER TRUE

## 2022-06-24 SDOH — ECONOMIC STABILITY: FOOD INSECURITY: WITHIN THE PAST 12 MONTHS, YOU WORRIED THAT YOUR FOOD WOULD RUN OUT BEFORE YOU GOT MONEY TO BUY MORE.: NEVER TRUE

## 2022-06-24 ASSESSMENT — ENCOUNTER SYMPTOMS
COUGH: 0
RECTAL PAIN: 0
EYE REDNESS: 0
COLOR CHANGE: 0
VOMITING: 0
SORE THROAT: 0
SHORTNESS OF BREATH: 0
RHINORRHEA: 0
WHEEZING: 0
ABDOMINAL DISTENTION: 0
CONSTIPATION: 0
CHEST TIGHTNESS: 0
BLOOD IN STOOL: 0
TROUBLE SWALLOWING: 0
ABDOMINAL PAIN: 0
NAUSEA: 0
DIARRHEA: 0
BACK PAIN: 0
APNEA: 0

## 2022-06-24 ASSESSMENT — SOCIAL DETERMINANTS OF HEALTH (SDOH): HOW HARD IS IT FOR YOU TO PAY FOR THE VERY BASICS LIKE FOOD, HOUSING, MEDICAL CARE, AND HEATING?: NOT HARD AT ALL

## 2022-06-24 NOTE — PROGRESS NOTES
2022     Rohan Aviles 40 y.o. male    : 1975    Chief Complaint:   Diabetes       History of Present Illness:   Tresa Owusu is a 40 y.o. male who presents to the office for follow up on his chronic problems. Overall he is doing well. Dm type 2- checks -200's. Taking all medications as prescribed. A1C has improved to 7.2. He does not follow with endocrinology. He has been trying to follow a carb control diet. Patient reports that in the afternoon he occasionally does get hypoglycemic episodes. He currently is taking Lantus 68 units nightly and Humalog 15 units Premeal bolus plus sliding scale. He averages about 20 units of insulin. Hypertension/hyperlipidemia/CAD status post CABG in 3/2020-follows with cardiology on a yearly basis. He does not check his blood pressures or exercise at home. He does attempt to follow a heart healthy diet. Vitamin D def- taking supplementation. Chronic low back pain- followed with Dr. Andrew Bingham and was inadvertently dismissed from the practice due to inability to attend a mandatory pill count. He recently established with Dr. Lili Saldivar, who titrated her his Cymbalta. She did offer injections, patient declines. He failed physical therapy, gabapentin, Lyrica and Butrans patches. Left shoulder pain that has been ongoing for quite sometime. There has been no known injury, but reports decreased ROM and pain. He has tried NSAIDs with minimal improvement of symptoms. MRI revealed mild increased signal along the articular surface of the supraspinatus tendon most likely a partial tear. He has a follow-up appointment with orthopedics on 2022. The patient is not up-to-date with health maintenance screenings. Previous lab work was reviewed.     Past Medical History:     Past Medical History:   Diagnosis Date    CAD (coronary artery disease)     Dr Jose Weems COPD (chronic obstructive pulmonary disease) ([facility] [address]     Diabetes mellitus ([facility] [address]  Hyperlipidemia     Hypertension     Internal carotid artery occlusion, right 2020    Nasopharyngeal mass 2021    For OR 21    Neuropathy     Obesity     Shoulder problem        Past Surgical History:   Procedure Laterality Date    CARDIAC CATHETERIZATION  2020    Dr. Tsering Winkler- multi vessel disease    MITRAL VALVE REPAIR N/A 3/16/2020    CORONARY ARTERY BYPASS POSSIBLE LEFT RADIAL ARTERY HARVEST, PATRICE performed by Siomara Mar MD at [facility] N/A 2021    NASOPHARYNGEAL BIOPSY performed by Rosa Becerra DO at [place] [address]  2017    resection of mediastinal mass    TONSILLECTOMY      TRANSESOPHAGEAL ECHOCARDIOGRAM  2020    TYMPANOSTOMY TUBE PLACEMENT Right 2021    POSSIBLE RIGHT EAR TUBE POSSIBLE EUSTACHIAN TUBE DILATION performed by Rosa Becerra DO at [facility] OR       Family History   Problem Relation Age of Onset    Heart Disease Mother         MI age 39     High Blood Pressure Mother     Diabetes Father     Heart Disease Father         MI    High Blood Pressure Father     High Cholesterol Father     Cancer Father     Stroke Father     Kidney Disease Father         dialysis    High Blood Pressure Sister     Other Brother     COPD Brother     High Blood Pressure Brother        Social History     Tobacco Use    Smoking status: Former Smoker     Packs/day: 1.00     Years: 25.00     Pack years: 25.00     Types: Cigarettes     Start date: 1994     Quit date: 3/14/2020     Years since quittin.2    Smokeless tobacco: Current User     Types: Chew, Snuff   Vaping Use    Vaping Use: Never used   Substance Use Topics    Alcohol use: No    Drug use: No       Medications:     Current Outpatient Medications:     vitamin D (ERGOCALCIFEROL) 1.25 MG (43440 UT) CAPS capsule, take 1 capsule by mouth every week, Disp: 5 capsule, Rfl: 3    mometasone-formoterol (DULERA) 200-5 MCG/ACT inhaler, Inhale 2 puffs into the lungs 2 times daily Rinse mouth after using., Disp: 13 g, Rfl: 2    metFORMIN (GLUCOPHAGE) 850 MG tablet, take 1 tablet by mouth three times a day, Disp: 90 tablet, Rfl: 3    Liraglutide (VICTOZA) 18 MG/3ML SOPN SC injection, Inject 1.8 mg into the skin daily, Disp: 6 mL, Rfl: 3    insulin glargine (LANTUS SOLOSTAR) 100 UNIT/ML injection pen, Inject 68 Units into the skin nightly, Disp: 7 pen, Rfl: 3    Insulin Pen Needle (DROPLET PEN NEEDLES) 32G X 4 MM MISC, use 1 PEN NEEDLE to inject MEDICATION subcutaneously three to four times a day, Disp: 200 each, Rfl: 2    dapagliflozin (FARXIGA) 10 MG tablet, Take 1 tablet by mouth every morning, Disp: 30 tablet, Rfl: 2    blood glucose test strips (ASCENSIA AUTODISC VI;ONE TOUCH ULTRA TEST VI) strip, 1 each by In Vitro route daily As needed. , Disp: 100 each, Rfl: 3    atorvastatin (LIPITOR) 80 MG tablet, Take 1 tablet by mouth nightly, Disp: 30 tablet, Rfl: 3    aspirin EC 81 MG EC tablet, Take 1 tablet by mouth daily, Disp: 90 tablet, Rfl: 3    albuterol sulfate HFA (PROVENTIL;VENTOLIN;PROAIR) 108 (90 Base) MCG/ACT inhaler, Inhale 2 puffs into the lungs every 6 hours as needed for Wheezing, Disp: 1 each, Rfl: 3    Metoprolol Tartrate 75 MG TABS, take 1 tablet by mouth twice a day, Disp: , Rfl:     DULoxetine (CYMBALTA) 30 MG extended release capsule, Take 1 capsule by mouth daily for 7 days, Disp: 7 capsule, Rfl: 0    [START ON 6/29/2022] DULoxetine (CYMBALTA) 60 MG extended release capsule, Take 1 capsule by mouth daily, Disp: 30 capsule, Rfl: 0    vitamin B-1 (THIAMINE) 100 MG tablet, Take 100 mg by mouth daily, Disp: , Rfl:     vitamin C (ASCORBIC ACID) 500 MG tablet, Take 500 mg by mouth daily, Disp: , Rfl:     Zinc Sulfate (ZINC 15 PO), Take by mouth, Disp: , Rfl:     APPLE CIDER VINEGAR PO, Take by mouth, Disp: , Rfl:     Inulin (FIBER CHOICE PO), Take by mouth, Disp: , Rfl:     insulin aspart (NOVOLOG) 100 UNIT/ML injection behavioral problems, confusion, decreased concentration, sleep disturbance and suicidal ideas. The patient is not nervous/anxious. Physical Exam:   Vital Signs:  /80   Pulse 74   Temp 97.2 °F (36.2 °C)   Resp 16   Ht 6' 4\" (1.93 m)   Wt (!) 361 lb (163.7 kg)   SpO2 96%   BMI 43.94 kg/m²    Oxygen Saturation Interpretation: Normal.  Physical Exam  Vitals and nursing note reviewed. Constitutional:       Appearance: Normal appearance. He is normal weight. HENT:      Head: Normocephalic and atraumatic. Right Ear: Tympanic membrane, ear canal and external ear normal.      Left Ear: Tympanic membrane, ear canal and external ear normal.      Nose: Nose normal.      Mouth/Throat:      Mouth: Mucous membranes are moist.      Pharynx: Oropharynx is clear. Eyes:      Extraocular Movements: Extraocular movements intact. Conjunctiva/sclera: Conjunctivae normal.      Pupils: Pupils are equal, round, and reactive to light. Neck:      Thyroid: No thyroid mass, thyromegaly or thyroid tenderness. Trachea: Trachea normal.   Cardiovascular:      Rate and Rhythm: Normal rate and regular rhythm. Pulses: Normal pulses. Heart sounds: Normal heart sounds. Pulmonary:      Effort: Pulmonary effort is normal.      Breath sounds: Normal breath sounds. Abdominal:      General: Bowel sounds are normal.      Palpations: Abdomen is soft. Musculoskeletal:      Left shoulder: Tenderness present. No swelling, deformity, effusion or laceration. Decreased range of motion. Normal strength. Cervical back: Normal range of motion and neck supple. Comments: Tender to palpate to the posterior aspect of the shoulder. Positive liftoff test.  Pain noted with pronation. Lymphadenopathy:      Cervical: No cervical adenopathy. Skin:     General: Skin is warm and dry. Capillary Refill: Capillary refill takes less than 2 seconds. Neurological:      General: No focal deficit present. Mental Status: He is alert and oriented to person, place, and time. Sensory: Sensation is intact. Motor: Motor function is intact. Coordination: Coordination is intact. Gait: Gait is intact. Deep Tendon Reflexes: Reflexes normal.   Psychiatric:         Attention and Perception: Attention and perception normal.         Mood and Affect: Mood normal.         Speech: Speech normal.         Behavior: Behavior normal.         Thought Content: Thought content normal.         Cognition and Memory: Cognition and memory normal.         Judgment: Judgment normal.         Health Maintenance:     Health Maintenance   Topic Date Due    COVID-19 Vaccine (1) Never done    Hepatitis B vaccine (1 of 3 - Risk 3-dose series) Never done    Diabetic retinal exam  10/09/2019    Pneumococcal 0-64 years Vaccine (2 - PCV) 12/08/2019    Diabetic foot exam  01/10/2020    Colorectal Cancer Screen  Never done    Flu vaccine (Season Ended) 09/01/2022    Diabetic microalbuminuria test  10/13/2022    Lipids  03/22/2023    Depression Screen  03/22/2023    A1C test (Diabetic or Prediabetic)  06/24/2023    DTaP/Tdap/Td vaccine (2 - Td or Tdap) 07/21/2030    Hepatitis C screen  Completed    HIV screen  Completed    Hepatitis A vaccine  Aged Out    Hib vaccine  Aged Out    Meningococcal (ACWY) vaccine  Aged [employer] History   Administered Date(s) Administered    Influenza, MDCK Quadv, IM, PF (Flucelvax 2 yrs and older) 12/08/2018    Influenza, Quadv, IM, PF (6 mo and older Fluzone, Flulaval, Fluarix, and 3 yrs and older Afluria) 12/18/2020    Pneumococcal Polysaccharide ([id number]) 12/08/2018        Testing: All laboratory and radiology results have been personally reviewed by myself. Labs:  Results for orders placed or performed in visit on 06/24/22   POCT glycosylated hemoglobin (Hb A1C)   Result Value Ref Range    Hemoglobin A1C 7.2 %        Imaging:   All Radiology results interpreted by Radiologist unless otherwise noted. No results found. Assessment/Plan:   I personally reviewed the patient's allergies, past medical history, medications, and vitals sign. Pamela Enciso was seen today for diabetes. Diagnoses and all orders for this visit:    Vitamin D deficiency  -     vitamin D (ERGOCALCIFEROL) 1.25 MG (67182 UT) CAPS capsule; take 1 capsule by mouth every week    Chronic obstructive pulmonary disease, unspecified COPD type ([facility] [address]  -     mometasone-formoterol (DULERA) 200-5 MCG/ACT inhaler; Inhale 2 puffs into the lungs 2 times daily Rinse mouth after using.  -     albuterol sulfate HFA (PROVENTIL;VENTOLIN;PROAIR) 108 (90 Base) MCG/ACT inhaler; Inhale 2 puffs into the lungs every 6 hours as needed for Wheezing    Type 2 diabetes, uncontrolled, with neuropathy (HCC)  -     metFORMIN (GLUCOPHAGE) 850 MG tablet; take 1 tablet by mouth three times a day  -     insulin glargine (LANTUS SOLOSTAR) 100 UNIT/ML injection pen; Inject 68 Units into the skin nightly  -     POCT glycosylated hemoglobin (Hb A1C)    Uncontrolled type 2 diabetes mellitus with hyperglycemia (HCC)  -     Liraglutide (VICTOZA) 18 MG/3ML SOPN SC injection; Inject 1.8 mg into the skin daily  -     dapagliflozin (FARXIGA) 10 MG tablet; Take 1 tablet by mouth every morning    Chronic pain syndrome    Chronic bilateral low back pain with bilateral sciatica    Diabetic polyneuropathy associated with type 2 diabetes mellitus (HCC)    Lumbar radiculopathy    Mixed hyperlipidemia  -     atorvastatin (LIPITOR) 80 MG tablet; Take 1 tablet by mouth nightly    CAD in native artery  -     aspirin EC 81 MG EC tablet;  Take 1 tablet by mouth daily    Other orders  -     Insulin Pen Needle (DROPLET PEN NEEDLES) 32G X 4 MM MISC; use 1 PEN NEEDLE to inject MEDICATION subcutaneously three to four times a day  -     blood glucose test strips (ASCENSIA AUTODISC VI;ONE TOUCH ULTRA TEST VI) strip; 1 each by In Vitro route daily As needed. Increase NovoLog to 18 units before breakfast and dinner and 12 units at lunch to reduce hypoglycemic episodes. Increase activity and follow a Mediterranean diet. Continue all other medications as prescribed. Keep follow-up appointments with all specialist.     Call or go to ED immediately if symptoms worsen or persist.  Return in about 3 months (around 9/24/2022) for 3 month f/u. Sooner if necessary. Counseled regarding above diagnosis, including possible risks and complications,especially if left uncontrolled. Counseled regarding the possible side effects, risks, benefits and alternatives to treatment; patient and/or guardian verbalizes understanding. Advised patient to call with any new medication issues. All questions answered. Reviewed age and gender appropriate health screening exams and vaccinations. Advised patient regarding importance of keeping up with recommended health maintenance and to schedule as soon as possible if overdue, as this is important in assessing for undiagnosed pathology, especially cancer. Patient verbalizes understanding and agrees. ZIA Freeman - NP     **This report was transcribed using voice recognition software. Every effort was made to ensure accuracy; however, inadvertent computerized transcription errors may be present.

## 2022-06-25 DIAGNOSIS — E11.42 DIABETIC POLYNEUROPATHY ASSOCIATED WITH TYPE 2 DIABETES MELLITUS (HCC): ICD-10-CM

## 2022-06-25 DIAGNOSIS — M54.41 CHRONIC BILATERAL LOW BACK PAIN WITH BILATERAL SCIATICA: ICD-10-CM

## 2022-06-25 DIAGNOSIS — M54.16 LUMBAR RADICULOPATHY: ICD-10-CM

## 2022-06-25 DIAGNOSIS — M54.42 CHRONIC BILATERAL LOW BACK PAIN WITH BILATERAL SCIATICA: ICD-10-CM

## 2022-06-25 DIAGNOSIS — G89.29 CHRONIC BILATERAL LOW BACK PAIN WITH BILATERAL SCIATICA: ICD-10-CM

## 2022-06-25 DIAGNOSIS — G89.4 CHRONIC PAIN SYNDROME: ICD-10-CM

## 2022-06-27 ENCOUNTER — OFFICE VISIT (OUTPATIENT)
Dept: ORTHOPEDIC SURGERY | Age: 47
End: 2022-06-27
Payer: COMMERCIAL

## 2022-06-27 VITALS — RESPIRATION RATE: 22 BRPM | BODY MASS INDEX: 38.36 KG/M2 | HEIGHT: 76 IN | WEIGHT: 315 LBS

## 2022-06-27 DIAGNOSIS — M77.8 LEFT SHOULDER TENDONITIS: Primary | ICD-10-CM

## 2022-06-27 PROCEDURE — 99203 OFFICE O/P NEW LOW 30 MIN: CPT | Performed by: ORTHOPAEDIC SURGERY

## 2022-06-27 PROCEDURE — G8417 CALC BMI ABV UP PARAM F/U: HCPCS | Performed by: ORTHOPAEDIC SURGERY

## 2022-06-27 PROCEDURE — 4004F PT TOBACCO SCREEN RCVD TLK: CPT | Performed by: ORTHOPAEDIC SURGERY

## 2022-06-27 PROCEDURE — G8427 DOCREV CUR MEDS BY ELIG CLIN: HCPCS | Performed by: ORTHOPAEDIC SURGERY

## 2022-06-27 PROCEDURE — 20610 DRAIN/INJ JOINT/BURSA W/O US: CPT | Performed by: ORTHOPAEDIC SURGERY

## 2022-06-27 RX ORDER — TRIAMCINOLONE ACETONIDE 40 MG/ML
80 INJECTION, SUSPENSION INTRA-ARTICULAR; INTRAMUSCULAR ONCE
Status: COMPLETED | OUTPATIENT
Start: 2022-06-27 | End: 2022-06-27

## 2022-06-27 RX ORDER — DULOXETIN HYDROCHLORIDE 30 MG/1
CAPSULE, DELAYED RELEASE ORAL
Qty: 7 CAPSULE | Refills: 0 | OUTPATIENT
Start: 2022-06-27

## 2022-06-27 RX ADMIN — TRIAMCINOLONE ACETONIDE 80 MG: 40 INJECTION, SUSPENSION INTRA-ARTICULAR; INTRAMUSCULAR at 18:33

## 2022-06-27 NOTE — PATIENT INSTRUCTIONS
Patient Education        Shoulder Bursitis: Care Instructions  Your Care Instructions     Bursitis is inflammation of the bursa. A bursa is a small sac of fluid that cushions a joint and helps it move easily. A bursa sits under the highest point of your shoulder. You can get bursitis by overusing your shoulder, which can happen with activities such as lifting, pitching a ball, or painting. Symptoms of bursitis include pain when you move your arm. Your arm may hurt when you try to lift it, and the pain can reach down the side of your arm. You may havetrouble sleeping because of the pain. Bursitis usually gets better if you avoid the activity that caused it. If pain lasts or gets worse despite home treatment, your doctor may draw fluid from the bursa through a needle. This may relieve your pain and help your doctor know if you have an infection. If so, your doctor will prescribe antibiotics. If you have inflammation only, you may get a corticosteroid shot to reduce swellingand pain. Sometimes surgery is needed to drain or remove the bursa. Follow-up care is a key part of your treatment and safety. Be sure to make and go to all appointments, and call your doctor if you are having problems. It's also a good idea to know your test results and keep alist of the medicines you take. How can you care for yourself at home?  Put ice or a cold pack on your shoulder for 10 to 20 minutes at a time. Put a thin cloth between the ice and your skin.  After 3 days of using ice, use heat on your shoulder. You can use a hot water bottle, a heating pad set on low, or a warm, moist towel. Some doctors suggest alternating between hot and cold.  Rest your shoulder. Stop any activities that cause pain. Switch to activities that do not stress your shoulder.  Take your medicines exactly as prescribed. Call your doctor if you think you are having a problem with your medicine.    If your doctor recommends it, take anti-inflammatory medicines to reduce pain. These include ibuprofen (Advil, Motrin) and naproxen (Aleve). Read and follow all instructions on the label.  To prevent stiffness, gently move the shoulder joint through its full range of motion. As the pain gets better, keep doing range-of-motion exercises. Ask your doctor for exercises that will make the muscles around the shoulder joint stronger. Do these as directed.  You can slowly return to the activity that caused the pain, but do it with less effort until you can do it without pain or swelling. Be sure to warm up before and stretch after you do the activity. When should you call for help? Call your doctor now or seek immediate medical care if:     You have a fever.      You have increased swelling or redness in your shoulder.      You cannot use your shoulder, or the pain in your shoulder gets worse. Watch closely for changes in your health, and be sure to contact your doctor if:     You have pain for 2 weeks or longer despite home treatment. Where can you learn more? Go to https://[employer]". org and sign in to your [employer] account. Enter M955 in the [employer] box to learn more about \"Shoulder Bursitis: Care Instructions. \"     If you do not have an account, please click on the \"Sign Up Now\" link. Current as of: March 9, 2022               Content Version: 13.3  © 0050-3299 Healthwise, Incorporated. Care instructions adapted under license by [facility] ([facility]). If you have questions about a medical condition or this instruction, always ask your healthcare professional. [place] [address] any warranty or liability for your use of this information. Patient Education        Shoulder Bursitis: Exercises  Introduction  Here are some examples of exercises for you to try. The exercises may be suggested for a condition or for rehabilitation. Start each exercise slowly. Ease off the exercises if you start to have pain.   You will be told when to start these exercises and which ones will work bestfor you. How to do the exercises  Posterior stretching exercise    1. Hold the elbow of your injured arm with your other hand. 2. Use your hand to pull your injured arm gently up and across your body. You will feel a gentle stretch across the back of your injured shoulder. 3. Hold for at least 15 to 30 seconds. Then slowly lower your arm. 4. Repeat 2 to 4 times. Up-the-back stretch    Your doctor or physical therapist may want you to wait to do this stretch until you have regained most of your range of motion and strength. You can do this stretch in different ways. Hold any of these stretches for at least 15 to 30seconds. Repeat them 2 to 4 times. 1. Light stretch: Put your hand in your back pocket. Let it rest there to stretch your shoulder. 2. Moderate stretch: With your other hand, hold your injured arm (palm outward) behind your back by the wrist. Pull your arm up gently to stretch your shoulder. 3. Advanced stretch: Put a towel over your other shoulder. Put the hand of your injured arm behind your back. Now hold the back end of the towel. With the other hand, hold the front end of the towel in front of your body. Pull gently on the front end of the towel. This will bring your hand farther up your back to stretch your shoulder. Overhead stretch    1. Standing about an arm's length away, grasp onto a solid surface. You could use a countertop, a doorknob, or the back of a sturdy chair. 2. With your knees slightly bent, bend forward with your arms straight. Lower your upper body, and let your shoulders stretch. 3. As your shoulders are able to stretch farther, you may need to take a step or two backward. 4. Hold for at least 15 to 30 seconds. Then stand up and relax. If you had stepped back during your stretch, step forward so you can keep your hands on the solid surface. 5. Repeat 2 to 4 times.   Shoulder flexion (lying down)    To make a wand for this exercise, use a piece of PVC pipe or a broom handlewith the broom removed. Make the wand about a foot wider than your shoulders. 1. Lie on your back, holding a wand with both hands. Your palms should face down as you hold the wand. 2. Keeping your elbows straight, slowly raise your arms over your head. Raise them until you feel a stretch in your shoulders, upper back, and chest.  3. Hold for 15 to 30 seconds. 4. Repeat 2 to 4 times. Shoulder rotation (lying down)    To make a wand for this exercise, use a piece of PVC pipe or a broom handlewith the broom removed. Make the wand about a foot wider than your shoulders. 1. Lie on your back. Hold a wand with both hands with your elbows bent and palms up. 2. Keep your elbows close to your body, and move the wand across your body toward the sore arm. 3. Hold for 8 to 12 seconds. 4. Repeat 2 to 4 times. Shoulder blade squeeze    1. Stand with your arms at your sides, and squeeze your shoulder blades together. Do not raise your shoulders up as you squeeze. 2. Hold 6 seconds. 3. Repeat 8 to 12 times. Shoulder flexor and extensor exercise    These are isometric exercises. That means you contract your muscles withoutactually moving. 1. Push forward (flex): Stand facing a wall or doorjamb, about 6 inches or less back. Hold your injured arm against your body. Make a closed fist with your thumb on top. Then gently push your hand forward into the wall with about 25% to 50% of your strength. Don't let your body move backward as you push. Hold for about 6 seconds. Relax for a few seconds. Repeat 8 to 12 times. 2. Push backward (extend): Stand with your back flat against a wall. Your upper arm should be against the wall, with your elbow bent 90 degrees (your hand straight ahead). Push your elbow gently back against the wall with about 25% to 50% of your strength. Don't let your body move forward as you push. Hold for about 6 seconds.  Relax for a few seconds. Repeat 8 to 12 times. Scapular exercise: Wall push-ups    This exercise is best done with your fingers somewhat turned out, rather thanstraight up and down. 1. Stand facing a wall, about 12 inches to 18 inches away. 2. Place your hands on the wall at shoulder height. 3. Slowly bend your elbows and bring your face to the wall. Keep your back and hips straight. 4. Push back to where you started. 5. Repeat 8 to 12 times. 6. When you can do this exercise against a wall comfortably, you can try it against a counter. You can then slowly progress to the end of a couch, then to a sturdy chair, and finally to the floor. Scapular exercise: Retraction    For this exercise, you will need elastic exercise material, such as surgicaltubing or Thera-Band. 1. Put the band around a solid object at about waist level. (A bedpost will work well.) Each hand should hold an end of the band. 2. With your elbows at your sides and bent to 90 degrees, pull the band back. Your shoulder blades should move toward each other. Then move your arms back where you started. 3. Repeat 8 to 12 times. 4. If you have good range of motion in your shoulders, try this exercise with your arms lifted out to the sides. Keep your elbows at a 90-degree angle. Raise the elastic band up to about shoulder level. Pull the band back to move your shoulder blades toward each other. Then move your arms back where you started. Internal rotator strengthening exercise    1. Start by tying a piece of elastic exercise material to a doorknob. You can use surgical tubing or Thera-Band. 2. Stand or sit with your shoulder relaxed and your elbow bent 90 degrees. Your upper arm should rest comfortably against your side. Squeeze a rolled towel between your elbow and your body for comfort. This will help keep your arm at your side. 3. Hold one end of the elastic band in the hand of the painful arm.   4. Slowly rotate your forearm toward your body until it touches your belly. Slowly move it back to where you started. 5. Keep your elbow and upper arm firmly tucked against the towel roll or at your side. 6. Repeat 8 to 12 times. External rotator strengthening exercise    1. Start by tying a piece of elastic exercise material to a doorknob. You can use surgical tubing or Thera-Band. (You may also hold one end of the band in each hand.)  2. Stand or sit with your shoulder relaxed and your elbow bent 90 degrees. Your upper arm should rest comfortably against your side. Squeeze a rolled towel between your elbow and your body for comfort. This will help keep your arm at your side. 3. Hold one end of the elastic band with the hand of the painful arm. 4. Start with your forearm across your belly. Slowly rotate the forearm out away from your body. Keep your elbow and upper arm tucked against the towel roll or the side of your body until you begin to feel tightness in your shoulder. Slowly move your arm back to where you started. 5. Repeat 8 to 12 times. Follow-up care is a key part of your treatment and safety. Be sure to make and go to all appointments, and call your doctor if you are having problems. It's also a good idea to know your test results and keep alist of the medicines you take. Where can you learn more? Go to https://[employer]. org and sign in to your [employer] account. Enter U941 in the [facility] box to learn more about \"Shoulder Bursitis: Exercises. \"     If you do not have an account, please click on the \"Sign Up Now\" link. Current as of: March 9, 2022               Content Version: 13.3  © 2057-2891 Healthwise, Incorporated. Care instructions adapted under license by [facility] ([facility]). If you have questions about a medical condition or this instruction, always ask your healthcare professional. [address] any warranty or liability for your use of this information.

## 2022-07-15 DIAGNOSIS — M54.41 CHRONIC BILATERAL LOW BACK PAIN WITH BILATERAL SCIATICA: ICD-10-CM

## 2022-07-15 DIAGNOSIS — E11.42 DIABETIC POLYNEUROPATHY ASSOCIATED WITH TYPE 2 DIABETES MELLITUS (HCC): ICD-10-CM

## 2022-07-15 DIAGNOSIS — M54.42 CHRONIC BILATERAL LOW BACK PAIN WITH BILATERAL SCIATICA: ICD-10-CM

## 2022-07-15 DIAGNOSIS — M54.16 LUMBAR RADICULOPATHY: ICD-10-CM

## 2022-07-15 DIAGNOSIS — G89.29 CHRONIC BILATERAL LOW BACK PAIN WITH BILATERAL SCIATICA: ICD-10-CM

## 2022-07-15 DIAGNOSIS — G89.4 CHRONIC PAIN SYNDROME: ICD-10-CM

## 2022-07-18 RX ORDER — LANCETS 30 GAUGE
EACH MISCELLANEOUS
Qty: 100 EACH | Refills: 2 | Status: SHIPPED
Start: 2022-07-18 | End: 2022-09-27 | Stop reason: SDUPTHER

## 2022-07-19 RX ORDER — DULOXETIN HYDROCHLORIDE 60 MG/1
CAPSULE, DELAYED RELEASE ORAL
Qty: 30 CAPSULE | Refills: 0 | OUTPATIENT
Start: 2022-07-19

## 2022-07-20 ENCOUNTER — OFFICE VISIT (OUTPATIENT)
Dept: PAIN MANAGEMENT | Age: 47
End: 2022-07-20
Payer: COMMERCIAL

## 2022-07-20 VITALS
HEART RATE: 88 BPM | RESPIRATION RATE: 16 BRPM | DIASTOLIC BLOOD PRESSURE: 84 MMHG | BODY MASS INDEX: 38.36 KG/M2 | WEIGHT: 315 LBS | HEIGHT: 76 IN | TEMPERATURE: 98.1 F | OXYGEN SATURATION: 98 % | SYSTOLIC BLOOD PRESSURE: 136 MMHG

## 2022-07-20 DIAGNOSIS — G89.4 CHRONIC PAIN SYNDROME: Primary | ICD-10-CM

## 2022-07-20 DIAGNOSIS — M54.41 CHRONIC BILATERAL LOW BACK PAIN WITH BILATERAL SCIATICA: ICD-10-CM

## 2022-07-20 DIAGNOSIS — E11.42 DIABETIC POLYNEUROPATHY ASSOCIATED WITH TYPE 2 DIABETES MELLITUS (HCC): ICD-10-CM

## 2022-07-20 DIAGNOSIS — M54.16 LUMBAR RADICULOPATHY: ICD-10-CM

## 2022-07-20 DIAGNOSIS — M54.42 CHRONIC BILATERAL LOW BACK PAIN WITH BILATERAL SCIATICA: ICD-10-CM

## 2022-07-20 DIAGNOSIS — G89.29 CHRONIC BILATERAL LOW BACK PAIN WITH BILATERAL SCIATICA: ICD-10-CM

## 2022-07-20 PROCEDURE — 4004F PT TOBACCO SCREEN RCVD TLK: CPT | Performed by: PHYSICIAN ASSISTANT

## 2022-07-20 PROCEDURE — 3051F HG A1C>EQUAL 7.0%<8.0%: CPT | Performed by: PHYSICIAN ASSISTANT

## 2022-07-20 PROCEDURE — G8427 DOCREV CUR MEDS BY ELIG CLIN: HCPCS | Performed by: PHYSICIAN ASSISTANT

## 2022-07-20 PROCEDURE — 2022F DILAT RTA XM EVC RTNOPTHY: CPT | Performed by: PHYSICIAN ASSISTANT

## 2022-07-20 PROCEDURE — 99213 OFFICE O/P EST LOW 20 MIN: CPT | Performed by: PHYSICIAN ASSISTANT

## 2022-07-20 PROCEDURE — G8417 CALC BMI ABV UP PARAM F/U: HCPCS | Performed by: PHYSICIAN ASSISTANT

## 2022-07-20 RX ORDER — BUPRENORPHINE HYDROCHLORIDE 75 UG/1
75 FILM, SOLUBLE BUCCAL EVERY 12 HOURS
Qty: 60 FILM | Refills: 0 | Status: SHIPPED
Start: 2022-07-20 | End: 2022-08-17 | Stop reason: SDUPTHER

## 2022-07-20 RX ORDER — DULOXETIN HYDROCHLORIDE 30 MG/1
30 CAPSULE, DELAYED RELEASE ORAL DAILY
Qty: 7 CAPSULE | Refills: 0 | Status: SHIPPED | OUTPATIENT
Start: 2022-07-20 | End: 2022-07-27

## 2022-07-20 NOTE — PROGRESS NOTES
[facility]  [address]  JAZIEL HOMERO [facility] - [facility], [facility]    Follow up Note      Larchinancronan Maida     Date of Visit:  2022    CC:  Patient presents for follow up   Chief Complaint   Patient presents with    1 Month Follow-Up     Low back pain          HPI:    Pain is unchanged. Cymbalta ineffective. States that he did remember that he was on it in the past for depression and states was ineffective. Appropriate analgesia with current medications regimen: yes - somewhat. .    Change in quality of symptoms:no. Medication side effects: None. Recent diagnostic testing:none. Recent interventional procedures:none. He has been on anticoagulation medications to include ASA and NSAIDS and has not been on herbal supplements. He is diabetic. Imagin/2020 MRI lumbar spine  (in media)  L3-L4, L5-S1 moderate facet arthrosis without significant central canal stenosis    3/2022 MRI L shoulder -       FINDINGS:             There is mild increased signal along the articular surface of the supraspinatus tendon most likely      partial tear without retraction. No atrophy of the supraspinatus muscle. The subscapularis muscle and tendon appear intact. The infraspinatous and teres minor muscles and tendons appear intact. The biceps tendon is intact. The glenoid labrum is intact. No joint effusion is identified. No bone bruise or bony fracture. Mild hypertrophic changes are seen at the acromioclavicular joint with a type 1 acromion. IMPRESSION:             3T MRI Left Shoulder:             1.  Mild increased signal along the articular surface of the supraspinatus tendon most likely      partial tear without retraction. No atrophy. Previous treatments: Physical Therapy and medications. .            Potential Aberrant Drug-Related Behavior:      Urine Drug Screenin2022:  Consistent - negative    OARRS report:  05/2022 Consistent to 07/2022 Consistent    Opioid Agreement:  Date enacted: 05/24/2022  Renewal date:    Past Medical History:   Diagnosis Date    CAD (coronary artery disease)     Dr Stephanie Knapp    COPD (chronic obstructive pulmonary disease) ([facility] [address]     Diabetes mellitus ([facility] [address]     Hyperlipidemia     Hypertension     Internal carotid artery occlusion, right 03/16/2020    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 Goodson Fore- multi vessel disease    MITRAL VALVE REPAIR N/A 3/16/2020    CORONARY ARTERY BYPASS POSSIBLE LEFT RADIAL ARTERY HARVEST, PATRICE [facility] N/A 11/22/2021    NASOPHARYNGEAL BIOPSY performed by Johanny Klein DO at [facility]  04/28/2017    resection of mediastinal mass    TONSILLECTOMY      TRANSESOPHAGEAL ECHOCARDIOGRAM  03/03/2020    TYMPANOSTOMY TUBE PLACEMENT Right 11/22/2021    POSSIBLE RIGHT EAR TUBE POSSIBLE EUSTACHIAN TUBE DILATION performed by Johanny Klein DO at [facility] OR       Prior to Admission medications    Medication Sig Start Date End Date Taking?  Authorizing Provider   Lancets (Kailee Whipple PLUS [id number]) MISC use 1 LANCET to TEST BLOOD SUGAR three to four times a day 7/18/22  Yes ZIA Bautista NP   vitamin D (ERGOCALCIFEROL) 1.25 MG (78609 UT) CAPS capsule take 1 capsule by mouth every week 6/24/22  Yes ZIA Bautista NP   metFORMIN (GLUCOPHAGE) 850 MG tablet take 1 tablet by mouth three times a day 6/24/22  Yes ZIA Bautista NP   Liraglutide (VICTOZA) 18 MG/3ML SOPN SC injection Inject 1.8 mg into the skin daily 6/24/22  Yes ZIA Bautista NP   insulin glargine (LANTUS SOLOSTAR) 100 UNIT/ML injection pen Inject 68 Units into the skin nightly 6/24/22  Yes ZIA Bautista NP   Insulin Pen Needle (DROPLET PEN NEEDLES) 32G X 4 MM MISC use 1 PEN NEEDLE to inject MEDICATION subcutaneously three to four times a day 6/24/22  Yes ZIA Bautista NP   dapagliflozin (FARXIGA) 10 MG tablet Take 1 tablet by mouth every morning 6/24/22  Yes ZIA Bautista NP   blood glucose test strips (ASCENSIA AUTODISC VI;ONE TOUCH ULTRA TEST VI) strip 1 each by In Vitro route daily As needed.  6/24/22  Yes ZIA Bautista NP   atorvastatin (LIPITOR) 80 MG tablet Take 1 tablet by mouth nightly 6/24/22  Yes ZIA Bautista NP   aspirin EC 81 MG EC tablet Take 1 tablet by mouth daily 6/24/22  Yes ZIA Bautista NP   albuterol sulfate HFA (PROVENTIL;VENTOLIN;PROAIR) 108 (90 Base) MCG/ACT inhaler Inhale 2 puffs into the lungs every 6 hours as needed for Wheezing 6/24/22  Yes ZIA Bautista NP   Kathrene Eagles 200-5 MCG/ACT inhaler inhale 2 puffs by mouth and INTO THE LUNGS twice a day 6/24/22  Yes ZIA Bautista NP   Metoprolol Tartrate 75 MG TABS take 1 tablet by mouth twice a day 5/30/22  Yes Historical Provider, MD   DULoxetine (CYMBALTA) 60 MG extended release capsule Take 1 capsule by mouth daily 6/29/22 7/29/22 Yes Esau Díaz,    vitamin B-1 (THIAMINE) 100 MG tablet Take 100 mg by mouth daily   Yes Historical Provider, MD   vitamin C (ASCORBIC ACID) 500 MG tablet Take 500 mg by mouth daily   Yes Historical Provider, MD   Zinc Sulfate (ZINC 15 PO) Take by mouth   Yes Historical Provider, MD   APPLE CIDER VINEGAR PO Take by mouth   Yes Historical Provider, MD   Inulin (FIBER CHOICE PO) Take by mouth   Yes Historical Provider, MD   insulin aspart (NOVOLOG) 100 UNIT/ML injection vial Inject 15 units three times a day plus sliding scale 3/22/22  Yes ZIA Bautista NP   B-D INS SYR ULTRAFINE 1CC/30G 30G X 1/2\" 1 ML MISC use as directed three times a day 12/8/21  Yes Historical Provider, MD   FreeStyle Lancets MISC use 1 LANCET to TEST BLOOD SUGAR three to four times a day 1/6/22  Yes Historical Provider, MD Disease Father         dialysis    High Blood Pressure Sister     Other Brother     COPD Brother     High Blood Pressure Brother        REVIEW OF SYSTEMS:     Bárbara Georges denies fever/chills, chest pain, shortness of breath, new bowel or bladder complaints. All other review of systems was negative. PHYSICAL EXAMINATION:      /84   Pulse 88   Temp 98.1 °F (36.7 °C) (Infrared)   Resp 16   Ht 6' 4\" (1.93 m)   Wt (!) 360 lb (163.3 kg)   SpO2 98%   BMI 43.82 kg/m²     General:      General appearance:   pleasant and well-hydrated. , in mild discomfort and A & O x3  Build:Obese    HEENT:    Head:normocephalic and atraumatic  Sclera: icterus absent,    Lungs:    Breathing:Normal expansion. No wheezing. Abdomen:    Shape:non-distended and normal    Lumbar spine:    Range of motion:abnormal moderately Lateral bending, flexion, extension rotation bilateral and is painful. Extremities:    Tremors:None bilaterally upper and lower  Range of motion:Generally limited extension shoulder - left, pain with internal rotation of hips not done. Intact:Yes  Edema:Normal    Neurological:    [address]    Dermatology:    Skin:no unusual rashes, no skin lesions, no palpable subcutaneous nodules and good skin turgor    Impression:    LBP BLE pain in L4 and L5 distribution  + LE symptoms consistent with DM neuropathy  PMHx: DM2, CAD, HTN, DLD, COPD, obesity  Prior pain mgmt with Dr. Osmany Soto at [facility], discharged for no show to pill count  On disability  Being worked up for L shoulder pain, MRI imaging as above, currently in PT, pending scheduling with ortho     Plan:    OARRS report reviewed  Pt has failed gabapentin (no relief) and pregabalin (SE's)   D/c butrans 5 mcg/hr not helpful and caused headaches  Duloxetine titration as tolerated up to 60 mg. Ineffective. Weaning script provided. Start belbuca 75 mcg BID. He will call if getting headaches and we will discontinue.    Pt declines injections  Has failed PT in the past  TENS unit is helpful - received supplies. Patient encouraged to stay active and to lose weight  Treatment plan discussed with the patient including medication and side effects     Discussed case with Dr. Agusto Nuñez. We discussed with the patient that combining opioids, benzodiazepines, alcohol, illicit drugs or sleep aids increases the risk of respiratory depression including death. We discussed that these medications may cause drowsiness, sedation or dizziness and have counseled the patient not to drive or operate machinery. We have discussed that these medications will be prescribed only by one provider. We have discussed with the patient about age related risk factors and have thoroughly discussed the importance of taking these medications as prescribed. The patient verbalizes understanding.     ccreferring physic

## 2022-07-20 NOTE — PROGRESS NOTES
Do you currently have any of the following:    Fever: No  Headache:  No  Cough: No  Shortness of breath: No  Exposed to anyone with these symptoms: No         Cassandra Up presents to the [facility] on 7/20/2022. Mario Mayen is complaining of pain in his low back. The pain is constant. The pain is described as aching, dull, sharp, and miserable. Pain is rated on his best day at a 3, on his worst day at a 7, and on average at a 6 on the VAS scale. He took his last dose of  cymbalta  yesterday. Any procedures since your last visit: No    Pacemaker or defibrillator: No     He is not on NSAIDS and is  on anticoagulation medications to include ASA and is managed by ZIA Cee NP  . Medication Contract and Consent for Opioid Use Documents Filed       Patient Documents       Type of Document Status Date Received Received By Description    Medication Contract Received 6/7/2019 12:40 PM ESVIN Izaguirre 2019 MEDICATION CONTRTACT    Medication Contract Received 8/6/2019 11:19 AM RADHA ROMO 08/02/2019 medication contract -Marla Ramsey    Medication Contract Received 5/24/2022 11:31 AM [facility]                    /84   Pulse 88   Temp 98.1 °F (36.7 °C) (Infrared)   Resp 16   Ht 6' 4\" (1.93 m)   Wt (!) 360 lb (163.3 kg)   SpO2 98%   BMI 43.82 kg/m²      No LMP for male patient.

## 2022-07-25 DIAGNOSIS — M54.42 CHRONIC BILATERAL LOW BACK PAIN WITH BILATERAL SCIATICA: ICD-10-CM

## 2022-07-25 DIAGNOSIS — G89.4 CHRONIC PAIN SYNDROME: ICD-10-CM

## 2022-07-25 DIAGNOSIS — M54.16 LUMBAR RADICULOPATHY: ICD-10-CM

## 2022-07-25 DIAGNOSIS — G89.29 CHRONIC BILATERAL LOW BACK PAIN WITH BILATERAL SCIATICA: ICD-10-CM

## 2022-07-25 DIAGNOSIS — E11.42 DIABETIC POLYNEUROPATHY ASSOCIATED WITH TYPE 2 DIABETES MELLITUS (HCC): ICD-10-CM

## 2022-07-25 DIAGNOSIS — M54.41 CHRONIC BILATERAL LOW BACK PAIN WITH BILATERAL SCIATICA: ICD-10-CM

## 2022-07-27 RX ORDER — DULOXETIN HYDROCHLORIDE 30 MG/1
CAPSULE, DELAYED RELEASE ORAL
Qty: 7 CAPSULE | Refills: 0 | OUTPATIENT
Start: 2022-07-27

## 2022-08-08 ENCOUNTER — OFFICE VISIT (OUTPATIENT)
Dept: ORTHOPEDIC SURGERY | Age: 47
End: 2022-08-08
Payer: COMMERCIAL

## 2022-08-08 VITALS — RESPIRATION RATE: 22 BRPM | WEIGHT: 315 LBS | HEIGHT: 76 IN | BODY MASS INDEX: 38.36 KG/M2

## 2022-08-08 DIAGNOSIS — M77.8 LEFT SHOULDER TENDONITIS: Primary | ICD-10-CM

## 2022-08-08 PROCEDURE — G8417 CALC BMI ABV UP PARAM F/U: HCPCS | Performed by: ORTHOPAEDIC SURGERY

## 2022-08-08 PROCEDURE — 99213 OFFICE O/P EST LOW 20 MIN: CPT | Performed by: ORTHOPAEDIC SURGERY

## 2022-08-08 PROCEDURE — G8427 DOCREV CUR MEDS BY ELIG CLIN: HCPCS | Performed by: ORTHOPAEDIC SURGERY

## 2022-08-08 PROCEDURE — 4004F PT TOBACCO SCREEN RCVD TLK: CPT | Performed by: ORTHOPAEDIC SURGERY

## 2022-08-08 NOTE — PROGRESS NOTES
Department of Orthopedic Surgery  History and Physical      CHIEF COMPLAINT:  Left shoulder pain    HISTORY OF PRESENT ILLNESS:                The patient is a RHD 40 y.o. male who presents with left shoulder pain. Patient reports 6 months ago he started having left shoulder pain. He states this is progressively worsened. He denies any injury. He currently is not working. He denies numbness or tingling. He does report burning down his arm. He states he has 0 out of 10 pain at rest.  With activity his pain can occasionally go up to a 7 or 8 out of 10. He reports the pain is shooting and burning. He has been in therapy for the last 3 months. He reports no improvement of his symptoms. He has also taken naproxen with no improvement of his symptoms. 6 weeks ago the patient had a cortisone injection to the left shoulder. Patient states this provided him relief for about 5 weeks. Patient brought his MRI in today for reviewed. No new injury or complaints.      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 Vikas Jean- multi vessel disease    MITRAL VALVE REPAIR N/A 3/16/2020    CORONARY ARTERY BYPASS POSSIBLE LEFT RADIAL ARTERY HARVEST, PATRICE performed by Odessa Mesa MD at [facility] [address]. N/A 11/22/2021    NASOPHARYNGEAL BIOPSY performed by Brian Espinoza DO at [facility]  04/28/2017    resection of mediastinal mass    TONSILLECTOMY      TRANSESOPHAGEAL ECHOCARDIOGRAM  03/03/2020    TYMPANOSTOMY TUBE PLACEMENT Right 11/22/2021    POSSIBLE RIGHT EAR TUBE POSSIBLE and good dentition. NECK:  Supple, symmetrical, trachea midline, no adenopathy, thyroid symmetric, not enlarged and no tenderness, skin normal  NEUROLOGIC:  Awake, alert, oriented to name, place and time. Cranial nerves II-XII are grossly intact. Motor is 5 out of 5 bilaterally. Sensory is intact.  gait is normal.  MUSCULOSKELETAL:    Left upper extremity: - tenderness over the long head of the biceps tendon. + impingement. - drop arm test. + tenderness over the periscapular musculature. 4/5 strength of the supraspinatus, 5/5 strength of the infraspinatus, 5/5 strength of the subscapularis, 5/5 strength of the teres minor. , abduction 90, IR lateral buttocks, ER 20. Median, ulnar, radial n intact to light touch. Brisk capillary refill. Otherwise NVI. DATA:    CBC:   Lab Results   Component Value Date/Time    WBC 9.1 03/22/2022 02:18 PM    RBC 5.42 03/22/2022 02:18 PM    HGB 16.7 03/22/2022 02:18 PM    HCT 50.2 03/22/2022 02:18 PM    MCV 92.6 03/22/2022 02:18 PM    MCH 30.8 03/22/2022 02:18 PM    MCHC 33.3 03/22/2022 02:18 PM    RDW 12.8 03/22/2022 02:18 PM     03/22/2022 02:18 PM    MPV 9.1 03/22/2022 02:18 PM     PT/INR:    Lab Results   Component Value Date/Time    PROTIME 13.0 03/16/2020 11:10 AM    INR 1.2 03/16/2020 11:10 AM       Radiology Review:  Xray: x-rays of the left shoulder were reviewed from 3/22/22. 3 views: Ap/scapular Y view/axial demonstrate no acute fractures or dislocations   Impression: no acute fractures or dislocations    MRI of the left shoulder was reviewed in coronal, sagittal, axial planes  Report from 4/23/22 reviewed   FINDINGS:             There is mild increased signal along the articular surface of the supraspinatus tendon most likely      partial tear without retraction. No atrophy of the supraspinatus muscle. The subscapularis muscle and tendon appear intact. The infraspinatous and teres minor muscles and tendons appear intact. The biceps tendon is intact. The glenoid labrum is intact. No joint effusion is identified. No bone bruise or bony fracture. Mild hypertrophic changes are seen at the acromioclavicular joint with a type 1 acromion. IMPRESSION:             3T MRI Left Shoulder:             1.  Mild increased signal along the articular surface of the supraspinatus tendon most likely      partial tear without retraction. No atrophy. IMPRESSION:  Left shoulder partial rotator cuff tear with impingement  COPD  CAD  HTN  Diabetes  Hyperlipidemia    PLAN:  Discussed findings with the patient at today's visit. Discussed conservative and surgical management with the patient. Surgery was explained to the patient in detail. He would like to hold off on surgery at this time. He will follow up in another 6 weeks for repeat injection to the left shoulder. All questions answered. I have seen and evaluated the patient and agree with the above assessment and plan on today's visit. I have performed the key components of the history and physical examination with significant findings of patient reports good relief with the injection to the shoulder. MRI results were reviewed with the patient. He does have a partial tear to the rotator cuff. No significant retraction. He does some mild atrophy of the supraspinatus musculature on the sagittal images. These findings were reviewed. He reports shoulder is improved. May follow-up every 3 months as needed for possible injections. Discussed surgical intervention if needed. . I concur with the findings and plan as documented.     Alesia Nur MD  8/8/2022

## 2022-08-17 ENCOUNTER — OFFICE VISIT (OUTPATIENT)
Dept: PAIN MANAGEMENT | Age: 47
End: 2022-08-17
Payer: COMMERCIAL

## 2022-08-17 VITALS
WEIGHT: 315 LBS | HEART RATE: 101 BPM | TEMPERATURE: 98.4 F | SYSTOLIC BLOOD PRESSURE: 140 MMHG | HEIGHT: 76 IN | BODY MASS INDEX: 38.36 KG/M2 | OXYGEN SATURATION: 100 % | RESPIRATION RATE: 16 BRPM | DIASTOLIC BLOOD PRESSURE: 94 MMHG

## 2022-08-17 DIAGNOSIS — E11.42 DIABETIC POLYNEUROPATHY ASSOCIATED WITH TYPE 2 DIABETES MELLITUS (HCC): ICD-10-CM

## 2022-08-17 DIAGNOSIS — G89.4 CHRONIC PAIN SYNDROME: ICD-10-CM

## 2022-08-17 DIAGNOSIS — G89.29 CHRONIC BILATERAL LOW BACK PAIN WITH BILATERAL SCIATICA: Primary | ICD-10-CM

## 2022-08-17 DIAGNOSIS — M54.41 CHRONIC BILATERAL LOW BACK PAIN WITH BILATERAL SCIATICA: Primary | ICD-10-CM

## 2022-08-17 DIAGNOSIS — M54.16 LUMBAR RADICULOPATHY: ICD-10-CM

## 2022-08-17 DIAGNOSIS — M54.42 CHRONIC BILATERAL LOW BACK PAIN WITH BILATERAL SCIATICA: Primary | ICD-10-CM

## 2022-08-17 PROCEDURE — G8417 CALC BMI ABV UP PARAM F/U: HCPCS | Performed by: PHYSICIAN ASSISTANT

## 2022-08-17 PROCEDURE — 4004F PT TOBACCO SCREEN RCVD TLK: CPT | Performed by: PHYSICIAN ASSISTANT

## 2022-08-17 PROCEDURE — 99213 OFFICE O/P EST LOW 20 MIN: CPT | Performed by: PHYSICIAN ASSISTANT

## 2022-08-17 PROCEDURE — 2022F DILAT RTA XM EVC RTNOPTHY: CPT | Performed by: PHYSICIAN ASSISTANT

## 2022-08-17 PROCEDURE — 3051F HG A1C>EQUAL 7.0%<8.0%: CPT | Performed by: PHYSICIAN ASSISTANT

## 2022-08-17 PROCEDURE — G8427 DOCREV CUR MEDS BY ELIG CLIN: HCPCS | Performed by: PHYSICIAN ASSISTANT

## 2022-08-17 RX ORDER — BUPRENORPHINE HYDROCHLORIDE 75 UG/1
75 FILM, SOLUBLE BUCCAL EVERY 12 HOURS
Qty: 60 FILM | Refills: 0 | Status: SHIPPED | OUTPATIENT
Start: 2022-08-17 | End: 2022-09-16

## 2022-08-17 RX ORDER — AZELASTINE HYDROCHLORIDE 137 UG/1
SPRAY, METERED NASAL
COMMUNITY
Start: 2022-08-04 | End: 2022-09-01

## 2022-08-17 NOTE — PROGRESS NOTES
Spike's blood pressure was elevated today. He was instructed to contact his primary care provider as soon as possible.

## 2022-08-17 NOTE — PROGRESS NOTES
[facility]  [address]  [facility]    Follow up Note      Ermelinda Cruz     Date of Visit:  2022    CC:  Patient presents for follow up   Chief Complaint   Patient presents with    Follow-up     Low back pain            HPI:    Pain is unchanged. Appropriate analgesia with current medications regimen: yes - somewhat. .    Change in quality of symptoms:no. Medication side effects: None. Recent diagnostic testing:none. Recent interventional procedures:none. He has been on anticoagulation medications to include ASA and NSAIDS and has not been on herbal supplements. He is diabetic. Imagin/2020 MRI lumbar spine  (in media)  L3-L4, L5-S1 moderate facet arthrosis without significant central canal stenosis    3/2022 MRI L shoulder -       FINDINGS:             There is mild increased signal along the articular surface of the supraspinatus tendon most likely      partial tear without retraction. No atrophy of the supraspinatus muscle. The subscapularis muscle and tendon appear intact. The infraspinatous and teres minor muscles and tendons appear intact. The biceps tendon is intact. The glenoid labrum is intact. No joint effusion is identified. No bone bruise or bony fracture. Mild hypertrophic changes are seen at the acromioclavicular joint with a type 1 acromion. IMPRESSION:             3T MRI Left Shoulder:             1.  Mild increased signal along the articular surface of the supraspinatus tendon most likely      partial tear without retraction. No atrophy. Previous treatments: Physical Therapy and medications. .            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 Vikas Jean- multi vessel disease    MITRAL VALVE REPAIR N/A 3/16/2020    CORONARY ARTERY BYPASS POSSIBLE LEFT RADIAL ARTERY HARVEST, PATRICE [facility] N/A 11/22/2021    NASOPHARYNGEAL BIOPSY performed by Brian Espinoza DO at [facility]  04/28/2017    resection of mediastinal mass    TONSILLECTOMY      TRANSESOPHAGEAL ECHOCARDIOGRAM  03/03/2020    TYMPANOSTOMY TUBE PLACEMENT Right 11/22/2021    POSSIBLE RIGHT EAR TUBE POSSIBLE EUSTACHIAN TUBE DILATION performed by Brian Espinoza DO at [facility] OR       Prior to Admission medications    Medication Sig Start Date End Date Taking? Authorizing Provider   Azelastine HCl 137 MCG/SPRAY SOLN instill 2 sprays into each nostril twice a day 8/4/22  Yes Historical Provider, MD   buprenorphine (BELBUCA) 75 MCG FILM buccal film Place 1 Film inside cheek in the morning and 1 Film in the evening. Do all this for 30 days.  7/20/22 8/19/22 Yes RICKY Romero   Lancets [facility] PLUS [id number]) MISC use 1 LANCET to TEST BLOOD SUGAR three to four times a day 7/18/22  Yes ZIA Hathaway NP   vitamin D (ERGOCALCIFEROL) 1.25 MG (78291 UT) CAPS capsule take 1 capsule by mouth every week 6/24/22  Yes ZIA Hathaway NP   metFORMIN (GLUCOPHAGE) 850 MG tablet take 1 tablet by mouth three times a day 6/24/22  Yes ZIA Hathaway NP   Liraglutide (VICTOZA) 18 MG/3ML SOPN SC injection Inject 1.8 mg into the skin daily 6/24/22  Yes ZIA Hathaway NP insulin glargine (LANTUS SOLOSTAR) 100 UNIT/ML injection pen Inject 68 Units into the skin nightly 6/24/22  Yes ZIA Hicks NP   Insulin Pen Needle (DROPLET PEN NEEDLES) 32G X 4 MM MISC use 1 PEN NEEDLE to inject MEDICATION subcutaneously three to four times a day 6/24/22  Yes ZIA Hicks NP   dapagliflozin (FARXIGA) 10 MG tablet Take 1 tablet by mouth every morning 6/24/22  Yes ZIA Hicks NP   blood glucose test strips (ASCENSIA AUTODISC VI;ONE TOUCH ULTRA TEST VI) strip 1 each by In Vitro route daily As needed.  6/24/22  Yes ZIA Hicks NP   atorvastatin (LIPITOR) 80 MG tablet Take 1 tablet by mouth nightly 6/24/22  Yes ZIA Hicks NP   aspirin EC 81 MG EC tablet Take 1 tablet by mouth daily 6/24/22  Yes ZIA Hicks NP   albuterol sulfate HFA (PROVENTIL;VENTOLIN;PROAIR) 108 (90 Base) MCG/ACT inhaler Inhale 2 puffs into the lungs every 6 hours as needed for Wheezing 6/24/22  Yes ZIA Hicks NP   [place] 200-5 MCG/ACT inhaler inhale 2 puffs by mouth and INTO THE LUNGS twice a day 6/24/22  Yes ZIA Hicks NP   Metoprolol Tartrate 75 MG TABS take 1 tablet by mouth twice a day 5/30/22  Yes Historical Provider, MD   vitamin B-1 (THIAMINE) 100 MG tablet Take 100 mg by mouth daily   Yes Historical Provider, MD   vitamin C (ASCORBIC ACID) 500 MG tablet Take 500 mg by mouth daily   Yes Historical Provider, MD   Zinc Sulfate (ZINC 15 PO) Take by mouth   Yes Historical Provider, MD   APPLE CIDER VINEGAR PO Take by mouth   Yes Historical Provider, MD   Inulin (FIBER CHOICE PO) Take by mouth   Yes Historical Provider, MD   insulin aspart (NOVOLOG) 100 UNIT/ML injection vial Inject 15 units three times a day plus sliding scale 3/22/22  Yes ZIA Hicks NP   B-D INS SYR ULTRAFINE 1CC/30G 30G X 1/2\" 1 ML MISC use as directed three times a day 12/8/21  Yes Historical Provider, MD   FreeStyle Lancets MISC use 1 LANCET to TEST BLOOD SUGAR three to four times a day 22  Yes Historical Provider, MD   DULoxetine (CYMBALTA) 30 MG extended release capsule Take 1 capsule by mouth in the morning for 7 days.  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[place] [address]     Employer: NOT EMPLOYED   Tobacco Use    Smoking status: Former     Packs/day: 1.00     Years: 25.00     Pack years: 25.00     Types: Cigarettes     Start date: 1994     Quit date: 3/14/2020     Years since quittin.4    Smokeless tobacco: Current     Types: Chew, Snuff   Vaping Use    Vaping Use: Never used   Substance and Sexual Activity    Alcohol use: No    Drug use: No    Sexual activity: Not on file   Other Topics Concern    Not on file   Social History Narrative    Not on file     Social Determinants of Health     Financial Resource Strain: Low Risk     Difficulty of Paying Living Expenses: Not hard at all   Food Insecurity: No Food Insecurity    Worried About Running Out of Food in the Last Year: Never true    Ran Out of Food in the Last Year: Never true   Transportation Needs: Not on file   Physical Activity: Not on file   Stress: Not on file   Social Connections: Not on file   Intimate Partner Violence: Not on file   Housing Stability: Not on file       Family History   Problem Relation Age of Onset    Heart Disease Mother         MI age 39     High Blood Pressure Mother     Diabetes Father     Heart Disease Father         MI    High Blood Pressure Father     High Cholesterol Father     Cancer Father     Stroke Father Kidney Disease Father         dialysis    High Blood Pressure Sister     Other Brother     COPD Brother     High Blood Pressure Brother        REVIEW OF SYSTEMS:     Juanjose Slaughter denies fever/chills, chest pain, shortness of breath, new bowel or bladder complaints. All other review of systems was negative. PHYSICAL EXAMINATION:      BP (!) 154/105   Pulse (!) 101   Temp 98.4 °F (36.9 °C) (Infrared)   Resp 16   Ht 6' 4\" (1.93 m)   Wt (!) 350 lb (158.8 kg)   SpO2 100%   BMI 42.60 kg/m²     General:      General appearance:   pleasant and well-hydrated. , in mild discomfort and A & O x3  Build:Obese    HEENT:    Head:normocephalic and atraumatic  Sclera: icterus absent,    Lungs:    Breathing:Normal expansion. No wheezing. Abdomen:    Shape:non-distended and normal    Lumbar spine:    Range of motion:abnormal moderately Lateral bending, flexion, extension rotation bilateral and is painful. Extremities:    Tremors:None bilaterally upper and lower  Range of motion:Generally limited extension shoulder - left, pain with internal rotation of hips not done. Intact:Yes  Edema:Normal    Neurological:    [address]    Dermatology:    Skin:no unusual rashes, no skin lesions, no palpable subcutaneous nodules and good skin turgor    Impression:    LBP BLE pain in L4 and L5 distribution  + LE symptoms consistent with DM neuropathy  PMHx: DM2, CAD, HTN, DLD, COPD, obesity  Prior pain mgmt with Dr. Deangelo Figueroa at [facility], discharged for no show to pill count  On disability  Being worked up for L shoulder pain, MRI imaging as above, currently in PT, pending scheduling with ortho     Plan:    OARRS report reviewed  Pt has failed gabapentin (no relief) and pregabalin (SE's)   D/c butrans 5 mcg/hr not helpful and caused headaches  He is not off cymbalta  Belbuca 75 mcg BID ordered last visit but apparently there was an issue with his prior auth. I called the pharmacy who sent over prior auth. RN will complete.   He will call if getting headaches and we will discontinue. Discussed MBB as a non-steroid option. Literature provided. Pt declines injections  Has failed PT in the past  TENS unit is helpful - received supplies. Patient encouraged to stay active and to lose weight  Treatment plan discussed with the patient including medication and side effects       Controlled Substance Monitoring:    Acute and Chronic Pain Monitoring:   RX Monitoring 8/17/2022   Periodic Controlled Substance Monitoring Possible medication side effects, risk of tolerance/dependence & alternative treatments discussed. ;No signs of potential drug abuse or diversion identified. ;Assessed functional status. We discussed with the patient that combining opioids, benzodiazepines, alcohol, illicit drugs or sleep aids increases the risk of respiratory depression including death. We discussed that these medications may cause drowsiness, sedation or dizziness and have counseled the patient not to drive or operate machinery. We have discussed that these medications will be prescribed only by one provider. We have discussed with the patient about age related risk factors and have thoroughly discussed the importance of taking these medications as prescribed. The patient verbalizes understanding.     ccreferring physic

## 2022-08-17 NOTE — PROGRESS NOTES
Do you currently have any of the following:    Fever: No  Headache:  No  Cough: No  Shortness of breath: No  Exposed to anyone with these symptoms: No         Katie Aponte presents to the [facility] on 8/17/[facility]22. Malva Lombard is complaining of pain in his low back. The pain is constant. The pain is described as aching, dull, sharp, and miserable. Pain is rated on his best day at a 3, on his worst day at a 8, and on average at a 6 on the VAS scale. Any procedures since your last visit: No    Pacemaker or defibrillator: No     He is not on NSAIDS and is  on anticoagulation medications to include ASA and is managed by ZIA Pérez NP. Medication Contract and Consent for Opioid Use Documents Filed       Patient Documents       Type of Document Status Date Received Received By Description    Medication Contract Received 6/7/2019 12:40 PM ESVIN TOLENTINO 2019 MEDICATION CONTRTACT    Medication Contract Received 8/6/2019 11:19 AM RADHA ROMO 08/02/2019 medication contract -Seamus Lawton    Medication Contract Received 5/24/2022 11:31 AM LOLITA, [address] [place] [address] Pain Management                    BP (!) 154/105   Pulse (!) 101   Temp 98.4 °F (36.9 °C) (Infrared)   Resp 16   Ht 6' 4\" (1.93 m)   Wt (!) 350 lb (158.8 kg)   SpO2 100%   BMI 42.60 kg/m²      No LMP for male patient.

## 2022-08-18 ENCOUNTER — TELEPHONE (OUTPATIENT)
Dept: PAIN MANAGEMENT | Age: 47
End: 2022-08-18

## 2022-08-18 NOTE — TELEPHONE ENCOUNTER
PA approved for [facility]. Case #[id number], 6/27/22-11/9/22. Patient notified and scheduled for follow up appointment.

## 2022-08-22 ENCOUNTER — OFFICE VISIT (OUTPATIENT)
Dept: FAMILY MEDICINE CLINIC | Age: 47
End: 2022-08-22
Payer: COMMERCIAL

## 2022-08-22 VITALS
RESPIRATION RATE: 18 BRPM | TEMPERATURE: 97.1 F | HEIGHT: 76 IN | OXYGEN SATURATION: 96 % | WEIGHT: 315 LBS | BODY MASS INDEX: 38.36 KG/M2 | DIASTOLIC BLOOD PRESSURE: 88 MMHG | SYSTOLIC BLOOD PRESSURE: 142 MMHG | HEART RATE: 91 BPM

## 2022-08-22 DIAGNOSIS — I10 ESSENTIAL HYPERTENSION: ICD-10-CM

## 2022-08-22 DIAGNOSIS — H10.33 ACUTE BACTERIAL CONJUNCTIVITIS OF BOTH EYES: Primary | ICD-10-CM

## 2022-08-22 PROCEDURE — 99214 OFFICE O/P EST MOD 30 MIN: CPT | Performed by: NURSE PRACTITIONER

## 2022-08-22 PROCEDURE — 4004F PT TOBACCO SCREEN RCVD TLK: CPT | Performed by: NURSE PRACTITIONER

## 2022-08-22 PROCEDURE — G8427 DOCREV CUR MEDS BY ELIG CLIN: HCPCS | Performed by: NURSE PRACTITIONER

## 2022-08-22 PROCEDURE — G8417 CALC BMI ABV UP PARAM F/U: HCPCS | Performed by: NURSE PRACTITIONER

## 2022-08-22 RX ORDER — FEXOFENADINE HCL 180 MG/1
180 TABLET ORAL DAILY
Qty: 30 TABLET | Refills: 0 | Status: SHIPPED | OUTPATIENT
Start: 2022-08-22 | End: 2022-09-21

## 2022-08-22 RX ORDER — LISINOPRIL 40 MG/1
40 TABLET ORAL DAILY
Qty: 90 TABLET | Refills: 1 | Status: SHIPPED
Start: 2022-08-22 | End: 2022-09-27 | Stop reason: SDUPTHER

## 2022-08-22 RX ORDER — POLYMYXIN B SULFATE AND TRIMETHOPRIM 1; 10000 MG/ML; [USP'U]/ML
1 SOLUTION OPHTHALMIC EVERY 4 HOURS
Qty: 10 ML | Refills: 0 | Status: SHIPPED | OUTPATIENT
Start: 2022-08-22 | End: 2022-08-29

## 2022-08-22 NOTE — PROGRESS NOTES
Chief Complaint:   Eye Problem      History of Present Illness   Source of history provided by:  patient. Kathi Lindsey is a 40 y.o. old male presenting to walk-in care with redness, itching, mild irritation, and abnormal drainage to the Bilateral eye for the past 14 days. States there was no obvious FB exposure. Denies had a recent URI within the past week. Patient has not tried any over-the-counter home remedies. Denies any visual changes, visual loss, HA, ear pain, fever, chills, N/V, or lethargy. Circumstances:    []  Contact Lens Use     []  Recent URI Sx's     [x]  Spontaneous Onset     []  Close Contact w/similar Sx's     []  Work Related     History of:     []   Glaucoma     []   Recent Eye Surgery     Review of Systems    Unless otherwise stated in this report or unable to obtain because of the patient's clinical or mental status as evidenced by the medical record, this patients's positive and negative responses for Review of Systems, constitutional, psych, eyes, ENT, cardiovascular, respiratory, gastrointestinal, neurological, genitourinary, musculoskeletal, integument systems and systems related to the presenting problem are either stated in the preceding or were not pertinent or were negative for the symptoms and/or complaints related to the medical problem. Past Medical History:  has a past medical history of CAD (coronary artery disease), COPD (chronic obstructive pulmonary disease) ([address] Diabetes mellitus ([address] Hyperlipidemia, Hypertension, Internal carotid artery occlusion, right, Nasopharyngeal mass, Neuropathy, Obesity, and Shoulder problem. Past Surgical History:  has a past surgical history that includes Thoracoscopy (04/28/2017); Cardiac catheterization (02/11/2020); transesophageal echocardiogram (03/03/2020); Mitral valve repair (N/A, 3/16/2020); Tonsillectomy; Nose surgery (N/A, 11/22/2021); and Tympanostomy tube placement (Right, 11/22/2021).   Social History: all orders for this visit:    Acute bacterial conjunctivitis of both eyes  -     trimethoprim-polymyxin b (POLYTRIM) 04975-1.1 UNIT/ML-% ophthalmic solution; Place 1 drop into both eyes every 4 hours for 7 days  -     fexofenadine (ALLEGRA) 180 MG tablet; Take 1 tablet by mouth daily    Essential hypertension  -     lisinopril (PRINIVIL;ZESTRIL) 40 MG tablet; Take 1 tablet by mouth daily    Script written for Polytrim ophthalmic drops, side effects and application directions discussed. Start allegra. Advise good handwashing, avoiding rubbing eyes, and washing towels and pillowcase in hot water to prevent spread. F/u with ophthalmology in 48 hrs if not improved. BP is poorly controlled. Will initiate lisinopril, side effects and administration instructions. ED sooner if symptoms worsen or change. ED immediately with the development of fever, visual changes, ocular pain/swelling, body aches, or shaking chills. Patient verbalized understanding and is in agreement with this care plan. All questions answered. No follow-ups on file.     ZIA Burns - KIMBERLEE

## 2022-08-24 ENCOUNTER — PROCEDURE VISIT (OUTPATIENT)
Dept: PODIATRY | Age: 47
End: 2022-08-24
Payer: COMMERCIAL

## 2022-08-24 DIAGNOSIS — E11.9 TYPE 2 DIABETES MELLITUS WITHOUT COMPLICATION, UNSPECIFIED WHETHER LONG TERM INSULIN USE (HCC): Primary | ICD-10-CM

## 2022-08-24 DIAGNOSIS — M20.42 HAMMER TOES OF BOTH FEET: ICD-10-CM

## 2022-08-24 DIAGNOSIS — L84 CORNS AND CALLOSITIES: ICD-10-CM

## 2022-08-24 DIAGNOSIS — M20.41 HAMMER TOES OF BOTH FEET: ICD-10-CM

## 2022-08-24 DIAGNOSIS — G60.8 HEREDITARY SENSORY NEUROPATHY: ICD-10-CM

## 2022-08-24 DIAGNOSIS — M79.674 PAIN OF TOE OF RIGHT FOOT: ICD-10-CM

## 2022-08-24 PROCEDURE — G8428 CUR MEDS NOT DOCUMENT: HCPCS | Performed by: PODIATRIST

## 2022-08-24 PROCEDURE — 3051F HG A1C>EQUAL 7.0%<8.0%: CPT | Performed by: PODIATRIST

## 2022-08-24 PROCEDURE — G8417 CALC BMI ABV UP PARAM F/U: HCPCS | Performed by: PODIATRIST

## 2022-08-24 PROCEDURE — 99214 OFFICE O/P EST MOD 30 MIN: CPT | Performed by: PODIATRIST

## 2022-08-24 PROCEDURE — 4004F PT TOBACCO SCREEN RCVD TLK: CPT | Performed by: PODIATRIST

## 2022-08-24 PROCEDURE — 11056 PARNG/CUTG B9 HYPRKR LES 2-4: CPT | Performed by: PODIATRIST

## 2022-08-24 PROCEDURE — 2022F DILAT RTA XM EVC RTNOPTHY: CPT | Performed by: PODIATRIST

## 2022-08-24 PROCEDURE — 28230 INCISION OF FOOT TENDON(S): CPT | Performed by: PODIATRIST

## 2022-08-24 NOTE — PROGRESS NOTES
Units into the skin nightly, Disp: 7 pen, Rfl: 3    Insulin Pen Needle (DROPLET PEN NEEDLES) 32G X 4 MM MISC, use 1 PEN NEEDLE to inject MEDICATION subcutaneously three to four times a day, Disp: 200 each, Rfl: 2    dapagliflozin (FARXIGA) 10 MG tablet, Take 1 tablet by mouth every morning, Disp: 30 tablet, Rfl: 2    blood glucose test strips (ASCENSIA AUTODISC VI;ONE TOUCH ULTRA TEST VI) strip, 1 each by In Vitro route daily As needed. , Disp: 100 each, Rfl: 3    atorvastatin (LIPITOR) 80 MG tablet, Take 1 tablet by mouth nightly, Disp: 30 tablet, Rfl: 3    aspirin EC 81 MG EC tablet, Take 1 tablet by mouth daily, Disp: 90 tablet, Rfl: 3    albuterol sulfate HFA (PROVENTIL;VENTOLIN;PROAIR) 108 (90 Base) MCG/ACT inhaler, Inhale 2 puffs into the lungs every 6 hours as needed for Wheezing, Disp: 1 each, Rfl: 3    DULERA 200-5 MCG/ACT inhaler, inhale 2 puffs by mouth and INTO THE LUNGS twice a day, Disp: 13 g, Rfl: 2    Metoprolol Tartrate 75 MG TABS, take 1 tablet by mouth twice a day, Disp: , Rfl:     vitamin B-1 (THIAMINE) 100 MG tablet, Take 100 mg by mouth daily, Disp: , Rfl:     vitamin C (ASCORBIC ACID) 500 MG tablet, Take 500 mg by mouth daily, Disp: , Rfl:     Zinc Sulfate (ZINC 15 PO), Take by mouth, Disp: , Rfl:     APPLE CIDER VINEGAR PO, Take by mouth, Disp: , Rfl:     Inulin (FIBER CHOICE PO), Take by mouth, Disp: , Rfl:     insulin aspart (NOVOLOG) 100 UNIT/ML injection vial, Inject 15 units three times a day plus sliding scale, Disp: 6 each, Rfl: 3    Lancets MISC, 1 each by Does not apply route 3 times daily, Disp: 100 each, Rfl: 3    B-D INS SYR ULTRAFINE 1CC/30G 30G X 1/2\" 1 ML MISC, use as directed three times a day, Disp: , Rfl:     FreeStyle Lancets MISC, use 1 LANCET to TEST BLOOD SUGAR three to four times a day, Disp: , Rfl:   Allergies   Allergen Reactions    Sulfa Antibiotics Other (See Comments)     Unknown reaction       Past Medical History:   Diagnosis Date    CAD (coronary artery disease) Dr Svitlana Lakhani    COPD (chronic obstructive pulmonary disease) ([facility] [address]     Diabetes mellitus ([facility] [address]     Hyperlipidemia     Hypertension     Internal carotid artery occlusion, right 03/16/2020    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 Work History/Social History: Foot and ankle history:     Focused Lower Extremity Physical Exam:    Neurovascular examination:    Dorsalis Pedis palpable bilateral.  Posterior tibialis weakly palpable bilateral.    Capillary Refill Time:  Immediate return  Hair growth:  Symmetrical and bilateral   Skin:  Not atrophic  Edema: No edema foot or ankle neurologic:  Light touch diminished bilateral.      Musculoskeletal/ Orthopedic examination:    Equinis: Absent bilateral  Dorsiflexion, plantarflexion, inversion, eversion bilateral 5 out of 5 muscle strength  Wiggling toes    Flexible hammertoes digits 2 through 4 bilateral.  Mild tenderness second toe where there is hyperkeratotic tissue. Dermatology examination:    Hyperkeratotic tissue distal second digit. Dried scab noted. Assessment and Plan:  Kamilah Sewell was seen today for callouses, diabetes and nail problem. Diagnoses and all orders for this visit:    Type 2 diabetes mellitus without complication, unspecified whether long term insulin use (HCC)    Hammer toes of both feet    Hereditary sensory neuropathy    Corns and callosities    Pain of toe of right foot      1. No osteomyelitis or acute disease. 2.  2nd and 3rd mallet toe deformities suggested and please correlate   clinically. 3.  Atherosclerotic calcifications. Follow-up diabetic exam  Does still have a callus tissue and states he still gets a wound occasionally on the second toe. We did discuss in office flexor tenotomy. Risk and benefits of amputation second toe discussed. No clinical signs of infection today.   After Betadine prep I did use ethyl chloride I did use an 18-gauge needle at the IP J plantarly at the right second toe where I did perform a flexor tenotomy. Immediately release of the second toe was noted with rectus alignment. Importance of Band-Aid once daily. I will follow-up in office 2 weeks. Any increase in redness or drainage I would be happy to see him sooner. I will follow-up 2 weeks. Importance of avoiding barefoot continue wide well supported walking shoe. We did discuss surgical shoe. We did place an addendum to notify this was the right second toe that a flexor tenotomy was performed on. Return in about 2 weeks (around 9/7/2022). Seen By:  Dez Whitlock DPM      Document was created using voice recognition software. Note was reviewed, however may contain grammatical errors.

## 2022-08-31 NOTE — TELEPHONE ENCOUNTER
Last Appointment:  8/22/2022  Future Appointments   Date Time Provider Larisa Salazar   9/7/2022 11:30 AM Miriam Heimlich, DPM [place] [facility]   9/16/2022  9:30 AM RICKY Sheehan [facility]   9/26/2022 11:10 AM Trina Dunn MD [facility]   9/27/2022  2:00 PM ZIA Ordonez - NP [facility]   12/2/2022  7:45 AM Maren Rodas DO [place] [facility]   4/20/2023  9:00 AM Ninfa [facility]

## 2022-09-01 RX ORDER — AZELASTINE HYDROCHLORIDE 137 UG/1
SPRAY, METERED NASAL
Qty: 30 ML | Refills: 2 | Status: SHIPPED
Start: 2022-09-01 | End: 2022-09-27

## 2022-09-07 ENCOUNTER — OFFICE VISIT (OUTPATIENT)
Dept: PODIATRY | Age: 47
End: 2022-09-07
Payer: COMMERCIAL

## 2022-09-07 DIAGNOSIS — E11.9 TYPE 2 DIABETES MELLITUS WITHOUT COMPLICATION, UNSPECIFIED WHETHER LONG TERM INSULIN USE (HCC): Primary | ICD-10-CM

## 2022-09-07 DIAGNOSIS — G60.8 HEREDITARY SENSORY NEUROPATHY: ICD-10-CM

## 2022-09-07 DIAGNOSIS — L84 CORNS AND CALLOSITIES: ICD-10-CM

## 2022-09-07 DIAGNOSIS — M20.42 HAMMER TOES OF BOTH FEET: ICD-10-CM

## 2022-09-07 DIAGNOSIS — M20.41 HAMMER TOES OF BOTH FEET: ICD-10-CM

## 2022-09-07 PROCEDURE — G8417 CALC BMI ABV UP PARAM F/U: HCPCS | Performed by: PODIATRIST

## 2022-09-07 PROCEDURE — 4004F PT TOBACCO SCREEN RCVD TLK: CPT | Performed by: PODIATRIST

## 2022-09-07 PROCEDURE — 3051F HG A1C>EQUAL 7.0%<8.0%: CPT | Performed by: PODIATRIST

## 2022-09-07 PROCEDURE — G8427 DOCREV CUR MEDS BY ELIG CLIN: HCPCS | Performed by: PODIATRIST

## 2022-09-07 PROCEDURE — 99213 OFFICE O/P EST LOW 20 MIN: CPT | Performed by: PODIATRIST

## 2022-09-07 PROCEDURE — 2022F DILAT RTA XM EVC RTNOPTHY: CPT | Performed by: PODIATRIST

## 2022-09-07 NOTE — PROGRESS NOTES
Patient in office for wound check second toe right foot. CC:    Follow-up flexor tenotomy second digit right foot    HPI:   Presents today follow-up flexor tenotomy right second digit. No callus tissue or any open wound right foot or right second toe today. He is pain-free and wearing regular shoes. Very pleased with overall progression. No calf pain. ROS:  Const: Denies constitutional symptoms  Musculo: Denies symptoms other than stated above  Skin: Denies symptoms other than stated above       Current Outpatient Medications:     Azelastine HCl 137 MCG/SPRAY SOLN, instill 2 sprays into each nostril twice a day, Disp: 30 mL, Rfl: 2    fexofenadine (ALLEGRA) 180 MG tablet, Take 1 tablet by mouth daily, Disp: 30 tablet, Rfl: 0    lisinopril (PRINIVIL;ZESTRIL) 40 MG tablet, Take 1 tablet by mouth daily, Disp: 90 tablet, Rfl: 1    buprenorphine (BELBUCA) 75 MCG FILM buccal film, Place 1 Film inside cheek in the morning and 1 Film in the evening. Do all this for 30 days. , Disp: 60 Film, Rfl: 0    DULoxetine (CYMBALTA) 30 MG extended release capsule, Take 1 capsule by mouth in the morning for 7 days. , Disp: 7 capsule, Rfl: 0    Lancets ([place]) MISC, use 1 LANCET to TEST BLOOD SUGAR three to four times a day, Disp: 100 each, Rfl: 2    vitamin D (ERGOCALCIFEROL) 1.25 MG (37733 UT) CAPS capsule, take 1 capsule by mouth every week, Disp: 5 capsule, Rfl: 3    metFORMIN (GLUCOPHAGE) 850 MG tablet, take 1 tablet by mouth three times a day, Disp: 90 tablet, Rfl: 3    Liraglutide (VICTOZA) 18 MG/3ML SOPN SC injection, Inject 1.8 mg into the skin daily, Disp: 6 mL, Rfl: 3    insulin glargine (LANTUS SOLOSTAR) 100 UNIT/ML injection pen, Inject 68 Units into the skin nightly, Disp: 7 pen, Rfl: 3    Insulin Pen Needle (DROPLET PEN NEEDLES) 32G X 4 MM MISC, use 1 PEN NEEDLE to inject MEDICATION subcutaneously three to four times a day, Disp: 200 each, Rfl: 2    dapagliflozin (FARXIGA) 10 MG tablet, Take 1 tablet by mouth every morning, Disp: 30 tablet, Rfl: 2    blood glucose test strips (ASCENSIA AUTODISC VI;ONE TOUCH ULTRA TEST VI) strip, 1 each by In Vitro route daily As needed. , Disp: 100 each, Rfl: 3    atorvastatin (LIPITOR) 80 MG tablet, Take 1 tablet by mouth nightly, Disp: 30 tablet, Rfl: 3    aspirin EC 81 MG EC tablet, Take 1 tablet by mouth daily, Disp: 90 tablet, Rfl: 3    albuterol sulfate HFA (PROVENTIL;VENTOLIN;PROAIR) 108 (90 Base) MCG/ACT inhaler, Inhale 2 puffs into the lungs every 6 hours as needed for Wheezing, Disp: 1 each, Rfl: 3    DULERA 200-5 MCG/ACT inhaler, inhale 2 puffs by mouth and INTO THE LUNGS twice a day, Disp: 13 g, Rfl: 2    Metoprolol Tartrate 75 MG TABS, take 1 tablet by mouth twice a day, Disp: , Rfl:     vitamin B-1 (THIAMINE) 100 MG tablet, Take 100 mg by mouth daily, Disp: , Rfl:     vitamin C (ASCORBIC ACID) 500 MG tablet, Take 500 mg by mouth daily, Disp: , Rfl:     Zinc Sulfate (ZINC 15 PO), Take by mouth, Disp: , Rfl:     APPLE CIDER VINEGAR PO, Take by mouth, Disp: , Rfl:     Inulin (FIBER CHOICE PO), Take by mouth, Disp: , Rfl:     insulin aspart (NOVOLOG) 100 UNIT/ML injection vial, Inject 15 units three times a day plus sliding scale, Disp: 6 each, Rfl: 3    Lancets MISC, 1 each by Does not apply route 3 times daily, Disp: 100 each, Rfl: 3    B-D INS SYR ULTRAFINE 1CC/30G 30G X 1/2\" 1 ML MISC, use as directed three times a day, Disp: , Rfl:     FreeStyle Lancets MISC, use 1 LANCET to TEST BLOOD SUGAR three to four times a day, Disp: , Rfl:   Allergies   Allergen Reactions    Sulfa Antibiotics Other (See Comments)     Unknown reaction       Past Medical History:   Diagnosis Date    CAD (coronary artery disease)     Dr Vijay Rivera    COPD (chronic obstructive pulmonary disease) ([facility] [address]     Diabetes mellitus ([facility] [address]     Hyperlipidemia     Hypertension     Internal carotid artery occlusion, right 03/16/2020    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 Work History/Social History: Foot and ankle history:     Focused Lower Extremity Physical Exam:    Neurovascular examination:    Dorsalis Pedis palpable bilateral.  Posterior tibialis weakly palpable bilateral.    Capillary Refill Time:  Immediate return  Hair growth:  Symmetrical and bilateral   Skin:  Not atrophic  Edema: No edema foot or ankle neurologic:  Light touch diminished bilateral.      Musculoskeletal/ Orthopedic examination:    Equinis: Absent bilateral  Dorsiflexion, plantarflexion, inversion, eversion bilateral 5 out of 5 muscle strength  Wiggling toes  No pain right second toe. Dermatology examination:    Immediate capillary refill time all digits. No hyperkeratotic tissue second digit right toe. No open wound. Assessment and Plan:  Go Denny was seen today for follow-up, wound check and diabetes. Diagnoses and all orders for this visit:    Type 2 diabetes mellitus without complication, unspecified whether long term insulin use (HCC)    Hammer toes of both feet    Hereditary sensory neuropathy    Corns and callosities      1. No osteomyelitis or acute disease. 2.  2nd and 3rd mallet toe deformities suggested and please correlate   clinically. 3.  Atherosclerotic calcifications. Follow-up flexor tenotomy second digit right toe. Pain-free right foot. No open wounds. No redness. Importance of continue wide well supported walking shoe. I will follow-up 2 months regular diabetic foot ankle exam.              No follow-ups on file. Seen By:  Jorge Rodriguez DPM      Document was created using voice recognition software. Note was reviewed, however may contain grammatical errors.

## 2022-09-19 NOTE — PROGRESS NOTES
[facility]  [address]  [address]    Follow up Note      Louisa Cancel     Date of Visit:  2022    CC:  Patient presents for follow up   Chief Complaint   Patient presents with    Follow-up    Lower Back Pain    Leg Pain     Bilateral legs    Shoulder Pain     Left shoulder    Elbow Pain     right             HPI:    Pain is unchanged. Belbuca causes headaches. Appropriate analgesia with current medications regimen: No.    Change in quality of symptoms:no. Medication side effects: Headaches - belbuca. Recent diagnostic testing:none. Recent interventional procedures:none. He has been on anticoagulation medications to include ASA and NSAIDS and has not been on herbal supplements. He is diabetic. Imagin/2020 MRI lumbar spine  (in media)  L3-L4, L5-S1 moderate facet arthrosis without significant central canal stenosis    3/2022 MRI L shoulder -       FINDINGS:             There is mild increased signal along the articular surface of the supraspinatus tendon most likely      partial tear without retraction. No atrophy of the supraspinatus muscle. The subscapularis muscle and tendon appear intact. The infraspinatous and teres minor muscles and tendons appear intact. The biceps tendon is intact. The glenoid labrum is intact. No joint effusion is identified. No bone bruise or bony fracture. Mild hypertrophic changes are seen at the acromioclavicular joint with a type 1 acromion. IMPRESSION:             3T MRI Left Shoulder:             1.  Mild increased signal along the articular surface of the supraspinatus tendon most likely      partial tear without retraction. No atrophy. Previous treatments: Physical Therapy and medications. .            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 Jf Proctor- multi vessel disease    MITRAL VALVE REPAIR N/A 3/16/2020    CORONARY ARTERY BYPASS POSSIBLE LEFT RADIAL ARTERY HARVEST, PATRICE performed by Oscar Rodgers MD at [facility] [address] N/A 11/22/2021    NASOPHARYNGEAL BIOPSY performed by Humphrey Brown DO at [facility]  04/28/2017    resection of mediastinal mass    TONSILLECTOMY      TRANSESOPHAGEAL ECHOCARDIOGRAM  03/03/2020    TYMPANOSTOMY TUBE PLACEMENT Right 11/22/2021    POSSIBLE RIGHT EAR TUBE POSSIBLE EUSTACHIAN TUBE DILATION performed by Humphrey Brown DO at [facility] OR       Prior to Admission medications    Medication Sig Start Date End Date Taking?  Authorizing Provider   fexofenadine (ALLEGRA) 180 MG tablet Take 1 tablet by mouth daily 8/22/22 9/21/22 Yes ZIA Alvarado NP   lisinopril (PRINIVIL;ZESTRIL) 40 MG tablet Take 1 tablet by mouth daily 8/22/22  Yes Micheline Wilson APRN - NP   Lancets ([address]) [address] use 1 LANCET to TEST BLOOD SUGAR three to four times a day 7/18/22  Yes Micheline Wilson APRN - NP   vitamin D (ERGOCALCIFEROL) 1.25 MG (37938 UT) CAPS capsule take 1 capsule by mouth every week 6/24/22  Yes Micheline Wilson APRN - NP   metFORMIN (GLUCOPHAGE) 850 MG tablet take 1 tablet by mouth three times a day 6/24/22  Yes Micheline Wilson APRN - NP   Liraglutide (VICTOZA) 18 MG/3ML SOPN SC injection Inject 1.8 mg into the skin daily 6/24/22  Yes ZIA Joe NP   insulin glargine (LANTUS SOLOSTAR) 100 UNIT/ML injection pen Inject 68 Units into the skin nightly 6/24/22  Yes ZIA Joe NP   Insulin Pen Needle (DROPLET PEN NEEDLES) 32G X 4 MM MISC use 1 PEN NEEDLE to inject MEDICATION subcutaneously three to four times a day 6/24/22  Yes ZIA Joe NP   dapagliflozin (FARXIGA) 10 MG tablet Take 1 tablet by mouth every morning  Patient taking differently: Take 5 mg by mouth every morning 6/24/22  Yes ZIA Joe NP   blood glucose test strips (ASCENSIA AUTODISC VI;ONE TOUCH ULTRA TEST VI) strip 1 each by In Vitro route daily As needed.  6/24/22  Yes ZIA Joe NP   atorvastatin (LIPITOR) 80 MG tablet Take 1 tablet by mouth nightly 6/24/22  Yes ZIA Joe NP   aspirin EC 81 MG EC tablet Take 1 tablet by mouth daily 6/24/22  Yes ZIA Joe NP   albuterol sulfate HFA (PROVENTIL;VENTOLIN;PROAIR) 108 (90 Base) MCG/ACT inhaler Inhale 2 puffs into the lungs every 6 hours as needed for Wheezing 6/24/22  Yes ZIA Joe NP   [place] 200-5 MCG/ACT inhaler inhale 2 puffs by mouth and INTO THE LUNGS twice a day 6/24/22  Yes ZIA Joe NP   Metoprolol Tartrate 75 MG TABS take 1 tablet by mouth twice a day 5/30/22  Yes Historical Provider, MD   vitamin B-1 (THIAMINE) 100 MG tablet Take 100 mg by mouth daily   Yes Historical Provider, MD   vitamin C (ASCORBIC ACID) 500 MG tablet Take 500 mg by mouth daily   Yes Historical Provider, MD   Zinc Sulfate (ZINC 15 PO) Take by mouth   Yes Historical Provider, MD   APPLE CIDER VINEGAR PO Take by mouth   Yes Historical Provider, MD   Inulin (FIBER CHOICE PO) Take by mouth   Yes Historical Provider, MD   insulin aspart (NOVOLOG) 100 UNIT/ML injection vial Inject 15 units three times a day plus sliding scale 3/22/22  Yes ZIA Joe NP   B-D INS SYR ULTRAFINE 1CC/30G 30G X 1/2\" 1 ML MISC use as directed three times a day 12/8/21  Yes Historical Provider, MD   FreeStyle Lancets MISC use 1 LANCET to TEST BLOOD SUGAR three to four times a day 22  Yes Historical Provider, MD   Azelastine HCl 137 MCG/SPRAY SOLN instill 2 sprays into each nostril twice a day  Patient not taking: Reported on 2022   ZIA Joe NP   DULoxetine (CYMBALTA) 30 MG extended release capsule Take 1 capsule by mouth in the morning for 7 days.  22  RICKY De León   Lancets MISC 1 each by Does not apply route 3 times daily 2/10/22 3/12/22  ZIA Joe NP       Allergies   Allergen Reactions    Sulfa Antibiotics Other (See Comments)     Unknown reaction       Social History     Socioeconomic History    Marital status: Single     Spouse name: Not on file    Number of children: Not on file    Years of education: Not on file    Highest education level: Not on file   Occupational History    Occupation: [employer] [address] [place] [address]     Employer: NOT EMPLOYED   Tobacco Use    Smoking status: Former     Packs/day: 1.00     Years: 25.00     Pack years: 25.00     Types: Cigarettes     Start date: 1994     Quit date: 3/14/2020     Years since quittin.5    Smokeless tobacco: Current     Types: Chew, Snuff   Vaping Use    Vaping Use: Never used   Substance and Sexual Activity    Alcohol use: No    Drug use: No    Sexual activity: Not on file   Other Topics Concern    Not on file   Social History Narrative    Not on file     Social Determinants of Health     Financial Resource Strain: Low Risk     Difficulty of Paying Living Expenses: Not hard at all   Food Insecurity: No Food Insecurity    Worried About Running Out of Food in the Last Year: Never true    Ran Out of Food in the Last Year: Never true   Transportation Needs: Not on file   Physical Activity: Not on file   Stress: Not on file   Social Connections: Not on file   Intimate Partner Violence: Not on file   Housing Stability: Not on file       Family History Problem Relation Age of Onset    Heart Disease Mother         MI age 39     High Blood Pressure Mother     Diabetes Father     Heart Disease Father         MI    High Blood Pressure Father     High Cholesterol Father     Cancer Father     Stroke Father     Kidney Disease Father         dialysis    High Blood Pressure Sister     Other Brother     COPD Brother     High Blood Pressure Brother        REVIEW OF SYSTEMS:     Early Emely denies fever/chills, chest pain, shortness of breath, new bowel or bladder complaints. All other review of systems was negative. PHYSICAL EXAMINATION:      /68   Pulse 98   Temp 98.6 °F (37 °C) (Infrared)   Resp 16   Ht 6' 4\" (1.93 m)   Wt (!) 340 lb (154.2 kg)   SpO2 97%   BMI 41.39 kg/m²     General:      General appearance:   pleasant and well-hydrated. , in mild discomfort and A & O x3  Build:Obese    HEENT:    Head:normocephalic and atraumatic  Sclera: icterus absent,    Lungs:    Breathing:Normal expansion. No wheezing. Abdomen:    Shape:non-distended and normal    Lumbar spine:    Range of motion:abnormal moderately Lateral bending, flexion, extension rotation bilateral and is painful. Extremities:    Tremors:None bilaterally upper and lower  Range of motion:Generally limited extension shoulder - left, pain with internal rotation of hips not done. Intact:Yes  Edema:Normal    Neurological:    [address]    Dermatology:    Skin:no unusual rashes, no skin lesions, no palpable subcutaneous nodules and good skin turgor    Impression:    LBP BLE pain in L4 and L5 distribution  + LE symptoms consistent with DM neuropathy  PMHx: DM2, CAD, HTN, DLD, COPD, obesity  Prior pain mgmt with Dr. Thierno Maldonado at [facility], discharged for no show to pill count  On disability  Left shoulder pain - through Dr. Mady Laureano - left shoulder CSIs help.      Plan:    OARRS report reviewed  Pt has failed gabapentin (no relief) and pregabalin (SE's)   D/c butrans 5 mcg/hr not helpful

## 2022-09-20 ENCOUNTER — OFFICE VISIT (OUTPATIENT)
Dept: PAIN MANAGEMENT | Age: 47
End: 2022-09-20
Payer: COMMERCIAL

## 2022-09-20 VITALS
WEIGHT: 315 LBS | HEIGHT: 76 IN | OXYGEN SATURATION: 97 % | TEMPERATURE: 98.6 F | DIASTOLIC BLOOD PRESSURE: 68 MMHG | SYSTOLIC BLOOD PRESSURE: 106 MMHG | RESPIRATION RATE: 16 BRPM | BODY MASS INDEX: 38.36 KG/M2 | HEART RATE: 98 BPM

## 2022-09-20 DIAGNOSIS — G89.4 CHRONIC PAIN SYNDROME: ICD-10-CM

## 2022-09-20 DIAGNOSIS — M54.16 LUMBAR RADICULOPATHY: ICD-10-CM

## 2022-09-20 DIAGNOSIS — M54.41 CHRONIC BILATERAL LOW BACK PAIN WITH BILATERAL SCIATICA: ICD-10-CM

## 2022-09-20 DIAGNOSIS — M54.42 CHRONIC BILATERAL LOW BACK PAIN WITH BILATERAL SCIATICA: ICD-10-CM

## 2022-09-20 DIAGNOSIS — E11.42 DIABETIC POLYNEUROPATHY ASSOCIATED WITH TYPE 2 DIABETES MELLITUS (HCC): Primary | ICD-10-CM

## 2022-09-20 DIAGNOSIS — G89.29 CHRONIC BILATERAL LOW BACK PAIN WITH BILATERAL SCIATICA: ICD-10-CM

## 2022-09-20 PROCEDURE — 2022F DILAT RTA XM EVC RTNOPTHY: CPT | Performed by: PHYSICIAN ASSISTANT

## 2022-09-20 PROCEDURE — G8427 DOCREV CUR MEDS BY ELIG CLIN: HCPCS | Performed by: PHYSICIAN ASSISTANT

## 2022-09-20 PROCEDURE — 3051F HG A1C>EQUAL 7.0%<8.0%: CPT | Performed by: PHYSICIAN ASSISTANT

## 2022-09-20 PROCEDURE — 99213 OFFICE O/P EST LOW 20 MIN: CPT | Performed by: PHYSICIAN ASSISTANT

## 2022-09-20 PROCEDURE — G8417 CALC BMI ABV UP PARAM F/U: HCPCS | Performed by: PHYSICIAN ASSISTANT

## 2022-09-20 PROCEDURE — 99213 OFFICE O/P EST LOW 20 MIN: CPT

## 2022-09-20 PROCEDURE — 4004F PT TOBACCO SCREEN RCVD TLK: CPT | Performed by: PHYSICIAN ASSISTANT

## 2022-09-20 NOTE — PROGRESS NOTES
Do you currently have any of the following:    Fever: No  Headache:  No  Cough: No  Shortness of breath: No  Exposed to anyone with these symptoms: No         Wanda Becerra presents to the [address] [place] [address] on 9/20/2022. Enrike Salgado is complaining of pain lower back left shoulder bilateral legs and right elbow. The pain is constant. The pain is described as shooting and sharp. Pain is rated on his best day at a 4, on his worst day at a 8, and on average at a 5 on the VAS scale. He took his last dose of Tylenol last night. Any procedures since your last visit: No.    Pacemaker or defibrillator: No .    He is not on NSAIDS and is  on anticoagulation medications to include ASA and is managed by Yany White MD.     Medication Contract and Consent for Opioid Use Documents Filed       Patient Documents       Type of Document Status Date Received Received By Description    Medication Contract Received 6/7/2019 12:40 PM ESVIN TOLENTINO 2019 MEDICATION CONTRTACT    Medication Contract Received 8/6/2019 11:19 AM RADHA ROMO 08/02/2019 medication contract -[id number] [facility]    Medication Contract Received 5/24/2022 11:31 AM KAREL YOHAN [facility]                    Temp 98.6 °F (37 °C) (Infrared)   Resp 16   Ht 6' 4\" (1.93 m)   Wt (!) 340 lb (154.2 kg)   BMI 41.39 kg/m²      No LMP for male patient.

## 2022-09-26 ENCOUNTER — OFFICE VISIT (OUTPATIENT)
Dept: ORTHOPEDIC SURGERY | Age: 47
End: 2022-09-26
Payer: COMMERCIAL

## 2022-09-26 VITALS — HEIGHT: 76 IN | WEIGHT: 315 LBS | RESPIRATION RATE: 20 BRPM | BODY MASS INDEX: 38.36 KG/M2

## 2022-09-26 DIAGNOSIS — M77.8 LEFT SHOULDER TENDONITIS: Primary | ICD-10-CM

## 2022-09-26 PROCEDURE — G8427 DOCREV CUR MEDS BY ELIG CLIN: HCPCS | Performed by: ORTHOPAEDIC SURGERY

## 2022-09-26 PROCEDURE — 99213 OFFICE O/P EST LOW 20 MIN: CPT | Performed by: ORTHOPAEDIC SURGERY

## 2022-09-26 PROCEDURE — G8417 CALC BMI ABV UP PARAM F/U: HCPCS | Performed by: ORTHOPAEDIC SURGERY

## 2022-09-26 PROCEDURE — 20610 DRAIN/INJ JOINT/BURSA W/O US: CPT | Performed by: ORTHOPAEDIC SURGERY

## 2022-09-26 PROCEDURE — 4004F PT TOBACCO SCREEN RCVD TLK: CPT | Performed by: ORTHOPAEDIC SURGERY

## 2022-09-26 RX ORDER — TRIAMCINOLONE ACETONIDE 40 MG/ML
80 INJECTION, SUSPENSION INTRA-ARTICULAR; INTRAMUSCULAR ONCE
Status: COMPLETED | OUTPATIENT
Start: 2022-09-26 | End: 2022-09-26

## 2022-09-26 RX ADMIN — TRIAMCINOLONE ACETONIDE 80 MG: 40 INJECTION, SUSPENSION INTRA-ARTICULAR; INTRAMUSCULAR at 12:15

## 2022-09-26 NOTE — PROGRESS NOTES
Department of Orthopedic Surgery  History and Physical      CHIEF COMPLAINT:  Left shoulder pain    HISTORY OF PRESENT ILLNESS:                The patient is a RHD 56 y.o. male who presents with left shoulder pain. Patient reports 6 months ago he started having left shoulder pain. He states this is progressively worsened. He denies any injury. He currently is not working. He denies numbness or tingling. He does report burning down his arm. He states he has 0 out of 10 pain at rest.  With activity his pain can occasionally go up to a 7 or 8 out of 10. He reports the pain is shooting and burning. He has been in therapy for the last 3 months. He reports no improvement of his symptoms. He has also taken naproxen with no improvement of his symptoms. 12 weeks ago the patient had a cortisone injection to the left shoulder. Patient states this provided him relief for about 5 weeks.            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 Donna Love- multi vessel disease    MITRAL VALVE REPAIR N/A 3/16/2020    CORONARY ARTERY BYPASS POSSIBLE LEFT RADIAL ARTERY HARVEST, PATRICE [facility] N/A 11/22/2021    NASOPHARYNGEAL BIOPSY performed by Calista Lane DO at [facility]  04/28/2017    resection of mediastinal mass    TONSILLECTOMY      TRANSESOPHAGEAL ECHOCARDIOGRAM  03/03/2020    TYMPANOSTOMY TUBE PLACEMENT Right 11/22/2021    POSSIBLE RIGHT EAR TUBE POSSIBLE EUSTACHIAN TUBE DILATION performed by Calista Lane DO at [facility] midline, no adenopathy, thyroid symmetric, not enlarged and no tenderness, skin normal  NEUROLOGIC:  Awake, alert, oriented to name, place and time. Cranial nerves II-XII are grossly intact. Motor is 5 out of 5 bilaterally. Sensory is intact.  gait is normal.  MUSCULOSKELETAL:    Left upper extremity: - tenderness over the long head of the biceps tendon. + impingement. - drop arm test. + tenderness over the periscapular musculature. 4/5 strength of the supraspinatus, 5/5 strength of the infraspinatus, 5/5 strength of the subscapularis, 5/5 strength of the teres minor. , abduction 90, IR lateral buttocks, ER 20. Median, ulnar, radial n intact to light touch. Brisk capillary refill. Otherwise NVI. DATA:    CBC:   Lab Results   Component Value Date/Time    WBC 9.1 03/22/2022 02:18 PM    RBC 5.42 03/22/2022 02:18 PM    HGB 16.7 03/22/2022 02:18 PM    HCT 50.2 03/22/2022 02:18 PM    MCV 92.6 03/22/2022 02:18 PM    MCH 30.8 03/22/2022 02:18 PM    MCHC 33.3 03/22/2022 02:18 PM    RDW 12.8 03/22/2022 02:18 PM     03/22/2022 02:18 PM    MPV 9.1 03/22/2022 02:18 PM     PT/INR:    Lab Results   Component Value Date/Time    PROTIME 13.0 03/16/2020 11:10 AM    INR 1.2 03/16/2020 11:10 AM       Radiology Review:  Xray: x-rays of the left shoulder were reviewed from 3/22/22. 3 views: Ap/scapular Y view/axial demonstrate no acute fractures or dislocations   Impression: no acute fractures or dislocations    MRI of the left shoulder was reviewed in coronal, sagittal, axial planes  Report from 4/23/22 reviewed   FINDINGS:             There is mild increased signal along the articular surface of the supraspinatus tendon most likely      partial tear without retraction. No atrophy of the supraspinatus muscle. The subscapularis muscle and tendon appear intact. The infraspinatous and teres minor muscles and tendons appear intact. The biceps tendon is intact.              The glenoid labrum is intact. No joint effusion is identified. No bone bruise or bony fracture. Mild hypertrophic changes are seen at the acromioclavicular joint with a type 1 acromion. IMPRESSION:             3T MRI Left Shoulder:             1.  Mild increased signal along the articular surface of the supraspinatus tendon most likely      partial tear without retraction. No atrophy. IMPRESSION:  Left shoulder partial rotator cuff tear with impingement  COPD  CAD  HTN  Diabetes  Hyperlipidemia    PLAN:  Discussed findings with the patient at today's visit. Discussed conservative and surgical management with the patient. Surgery was explained to the patient in detail. He would like to hold off on surgery at this time. All questions answered. Procedure Note Cortisone Injection to Shoulder    The left shoulder was identified as the injection site. The risk and benefits of a cortisone injection were explained and the patient consented to the injection. Under sterile conditions, the shoulder SAS was injected with a mixture of 80mg of Kenelog and 4 mL of 5% Ropivacaine and 4 mL of 1% Lidocaine without complication. A sterile bandage was applied.

## 2022-09-27 ENCOUNTER — OFFICE VISIT (OUTPATIENT)
Dept: PRIMARY CARE CLINIC | Age: 47
End: 2022-09-27
Payer: COMMERCIAL

## 2022-09-27 VITALS
DIASTOLIC BLOOD PRESSURE: 70 MMHG | BODY MASS INDEX: 38.36 KG/M2 | WEIGHT: 315 LBS | HEART RATE: 86 BPM | HEIGHT: 76 IN | SYSTOLIC BLOOD PRESSURE: 118 MMHG | OXYGEN SATURATION: 98 % | RESPIRATION RATE: 16 BRPM

## 2022-09-27 DIAGNOSIS — I25.10 CAD IN NATIVE ARTERY: ICD-10-CM

## 2022-09-27 DIAGNOSIS — H69.82 ETD (EUSTACHIAN TUBE DYSFUNCTION), LEFT: ICD-10-CM

## 2022-09-27 DIAGNOSIS — E78.2 MIXED HYPERLIPIDEMIA: ICD-10-CM

## 2022-09-27 DIAGNOSIS — E11.65 UNCONTROLLED TYPE 2 DIABETES MELLITUS WITH HYPERGLYCEMIA (HCC): ICD-10-CM

## 2022-09-27 DIAGNOSIS — J44.9 CHRONIC OBSTRUCTIVE PULMONARY DISEASE, UNSPECIFIED COPD TYPE (HCC): ICD-10-CM

## 2022-09-27 DIAGNOSIS — E55.9 VITAMIN D DEFICIENCY: ICD-10-CM

## 2022-09-27 DIAGNOSIS — I10 ESSENTIAL HYPERTENSION: ICD-10-CM

## 2022-09-27 DIAGNOSIS — G89.4 CHRONIC PAIN SYNDROME: ICD-10-CM

## 2022-09-27 LAB — HBA1C MFR BLD: 6.7 %

## 2022-09-27 PROCEDURE — 3044F HG A1C LEVEL LT 7.0%: CPT | Performed by: NURSE PRACTITIONER

## 2022-09-27 PROCEDURE — 4004F PT TOBACCO SCREEN RCVD TLK: CPT | Performed by: NURSE PRACTITIONER

## 2022-09-27 PROCEDURE — G8417 CALC BMI ABV UP PARAM F/U: HCPCS | Performed by: NURSE PRACTITIONER

## 2022-09-27 PROCEDURE — 3023F SPIROM DOC REV: CPT | Performed by: NURSE PRACTITIONER

## 2022-09-27 PROCEDURE — 83036 HEMOGLOBIN GLYCOSYLATED A1C: CPT | Performed by: NURSE PRACTITIONER

## 2022-09-27 PROCEDURE — 99214 OFFICE O/P EST MOD 30 MIN: CPT | Performed by: NURSE PRACTITIONER

## 2022-09-27 PROCEDURE — 2022F DILAT RTA XM EVC RTNOPTHY: CPT | Performed by: NURSE PRACTITIONER

## 2022-09-27 PROCEDURE — G8427 DOCREV CUR MEDS BY ELIG CLIN: HCPCS | Performed by: NURSE PRACTITIONER

## 2022-09-27 RX ORDER — LANCETS 30 GAUGE
1 EACH MISCELLANEOUS 3 TIMES DAILY
Qty: 100 EACH | Refills: 3 | Status: SHIPPED | OUTPATIENT
Start: 2022-09-27 | End: 2022-10-27

## 2022-09-27 RX ORDER — ASPIRIN 81 MG/1
81 TABLET ORAL DAILY
Qty: 90 TABLET | Refills: 3 | Status: SHIPPED | OUTPATIENT
Start: 2022-09-27

## 2022-09-27 RX ORDER — ERGOCALCIFEROL 1.25 MG/1
CAPSULE ORAL
Qty: 5 CAPSULE | Refills: 3 | Status: SHIPPED | OUTPATIENT
Start: 2022-09-27

## 2022-09-27 RX ORDER — LIRAGLUTIDE 6 MG/ML
1.8 INJECTION SUBCUTANEOUS DAILY
Qty: 6 ML | Refills: 3 | Status: SHIPPED | OUTPATIENT
Start: 2022-09-27

## 2022-09-27 RX ORDER — AZELASTINE HYDROCHLORIDE 137 UG/1
SPRAY, METERED NASAL
Qty: 30 ML | Refills: 2 | Status: CANCELLED | OUTPATIENT
Start: 2022-09-27

## 2022-09-27 RX ORDER — LANCETS 30 GAUGE
EACH MISCELLANEOUS
Qty: 100 EACH | Refills: 2 | Status: SHIPPED
Start: 2022-09-27 | End: 2022-09-29

## 2022-09-27 RX ORDER — INSULIN GLARGINE 100 [IU]/ML
70 INJECTION, SOLUTION SUBCUTANEOUS NIGHTLY
Qty: 3 ML | Refills: 2 | Status: SHIPPED | OUTPATIENT
Start: 2022-09-27

## 2022-09-27 RX ORDER — PEN NEEDLE, DIABETIC 32GX 5/32"
NEEDLE, DISPOSABLE MISCELLANEOUS
Qty: 200 EACH | Refills: 2 | Status: SHIPPED | OUTPATIENT
Start: 2022-09-27

## 2022-09-27 RX ORDER — PREDNISONE 10 MG/1
TABLET ORAL
Qty: 18 TABLET | Refills: 0 | Status: SHIPPED | OUTPATIENT
Start: 2022-09-27 | End: 2022-10-06

## 2022-09-27 RX ORDER — AZITHROMYCIN 250 MG/1
250 TABLET, FILM COATED ORAL SEE ADMIN INSTRUCTIONS
Qty: 6 TABLET | Refills: 0 | Status: SHIPPED | OUTPATIENT
Start: 2022-09-27 | End: 2022-10-02

## 2022-09-27 RX ORDER — ATORVASTATIN CALCIUM 80 MG/1
80 TABLET, FILM COATED ORAL NIGHTLY
Qty: 30 TABLET | Refills: 3 | Status: SHIPPED | OUTPATIENT
Start: 2022-09-27

## 2022-09-27 RX ORDER — HYDROCODONE BITARTRATE AND ACETAMINOPHEN 5; 325 MG/1; MG/1
1 TABLET ORAL EVERY 6 HOURS PRN
Qty: 120 TABLET | Refills: 0 | Status: SHIPPED
Start: 2022-09-27 | End: 2022-09-30 | Stop reason: SDUPTHER

## 2022-09-27 RX ORDER — ALBUTEROL SULFATE 90 UG/1
2 AEROSOL, METERED RESPIRATORY (INHALATION) EVERY 6 HOURS PRN
Qty: 1 EACH | Refills: 3 | Status: SHIPPED | OUTPATIENT
Start: 2022-09-27

## 2022-09-27 RX ORDER — LISINOPRIL 20 MG/1
20 TABLET ORAL DAILY
Qty: 90 TABLET | Refills: 2 | Status: SHIPPED | OUTPATIENT
Start: 2022-09-27

## 2022-09-28 NOTE — TELEPHONE ENCOUNTER
Last Appointment:  9/27/2022  Future Appointments   Date Time Provider Larisa Salazar   11/9/2022  9:45 AM Ginger Cohen DPM [facility]   12/2/2022  7:45 AM DO JAZIEL Watts [facility]   12/27/2022 10:30 AM ZIA Alvarado - NP [facility]   12/27/2022  1:40 PM Waleska Wong MD [facility]   4/20/2023  9:00 AM Osmin Delatorre MD [facility]/MED [facility]

## 2022-09-29 RX ORDER — LANCETS 30 GAUGE
EACH MISCELLANEOUS
Qty: 100 EACH | Refills: 2 | Status: SHIPPED | OUTPATIENT
Start: 2022-09-29

## 2022-09-29 ASSESSMENT — ENCOUNTER SYMPTOMS
SHORTNESS OF BREATH: 0
BACK PAIN: 1
CONSTIPATION: 0
RHINORRHEA: 0
RECTAL PAIN: 0
NAUSEA: 0
WHEEZING: 0
COUGH: 0
VOMITING: 0
TROUBLE SWALLOWING: 0
BLOOD IN STOOL: 0
CHEST TIGHTNESS: 0
ABDOMINAL PAIN: 0
COLOR CHANGE: 0
DIARRHEA: 0
ABDOMINAL DISTENTION: 0
SORE THROAT: 0
APNEA: 0
EYE REDNESS: 0

## 2022-09-29 NOTE — PROGRESS NOTES
2022     Isaura Aviles 56 y.o. male    : 1975    Chief Complaint:   Diabetes       History of Present Illness:   Rob Elkins is a 56 y.o. male who presents to the office for follow up on his chronic problems. Overall he is doing well. Dm type 2- checks -200's. Taking all medications as prescribed. A1C has improved to 6.7. He does not follow with endocrinology. He has been trying to follow a carb control diet. Hypertension/hyperlipidemia/CAD status post CABG in 3/2020-follows with cardiology on a yearly basis. He does not check his blood pressures or exercise at home. He does attempt to follow a heart healthy diet. Vitamin D def- taking supplementation. Chronic low back pain- followed with Dr. Lamont Louise and was inadvertently dismissed from the practice due to inability to attend a mandatory pill count. He recently established with Dr. Luis Armendariz, who titrated her his Cymbalta. She did offer injections, patient declines. He failed physical therapy, gabapentin, Lyrica and Butrans patches. He is requesting a referral to a new pain management doctor for a third opinion. Left shoulder pain that has been ongoing for quite sometime. There has been no known injury, but reports decreased ROM and pain. He has tried NSAIDs with minimal improvement of symptoms. MRI revealed mild increased signal along the articular surface of the supraspinatus tendon most likely a partial tear. Orthopedics recommended rotator cuff repair surgery. Patient is deferring at this time.   Past Medical History:     Past Medical History:   Diagnosis Date    CAD (coronary artery disease)     Dr Krysten Delgado    COPD (chronic obstructive pulmonary disease) ([facility] [address]     Diabetes mellitus ([facility] [address]     Hyperlipidemia     Hypertension     Internal carotid artery occlusion, right 2020    Nasopharyngeal mass 2021    For OR 21    Neuropathy     Obesity     Shoulder problem        Past Surgical History: Procedure Laterality Date    CARDIAC CATHETERIZATION  2020    Dr. Anum Armando- multi vessel disease    MITRAL VALVE REPAIR N/A 3/16/2020    CORONARY ARTERY BYPASS POSSIBLE LEFT RADIAL ARTERY HARVEST, PATRICE performed by Komal Landin MD at [facility] N/A 2021    NASOPHARYNGEAL BIOPSY performed by Nallely Ramsey DO at [facility]  2017    resection of mediastinal mass    TONSILLECTOMY      TRANSESOPHAGEAL ECHOCARDIOGRAM  2020    TYMPANOSTOMY TUBE PLACEMENT Right 2021    POSSIBLE RIGHT EAR TUBE POSSIBLE EUSTACHIAN TUBE DILATION performed by Nallely Ramsey DO at [facility] OR       Family History   Problem Relation Age of Onset    Heart Disease Mother         MI age 39     High Blood Pressure Mother     Diabetes Father     Heart Disease Father         MI    High Blood Pressure Father     High Cholesterol Father     Cancer Father     Stroke Father     Kidney Disease Father         dialysis    High Blood Pressure Sister     Other Brother     COPD Brother     High Blood Pressure Brother        Social History     Tobacco Use    Smoking status: Former     Packs/day: 1.00     Years: 25.00     Pack years: 25.00     Types: Cigarettes     Start date: 1994     Quit date: 3/14/2020     Years since quittin.5    Smokeless tobacco: Current     Types: Chew, Snuff   Vaping Use    Vaping Use: Never used   Substance Use Topics    Alcohol use: No    Drug use: No       Medications:     Current Outpatient Medications:     Lancets ([address]) [facility] [place], use 1 LANCET to TEST BLOOD SUGAR three to four times a day, Disp: 100 each, Rfl: 2    vitamin D (ERGOCALCIFEROL) 1.25 MG (17108 UT) CAPS capsule, take 1 capsule by mouth every week, Disp: 5 capsule, Rfl: 3    metFORMIN (GLUCOPHAGE) 850 MG tablet, take 1 tablet by mouth three times a day, Disp: 90 tablet, Rfl: 3    lisinopril (PRINIVIL;ZESTRIL) 20 MG tablet, Take 1 tablet by mouth daily, Disp: 90 tablet, Rfl: 2    Liraglutide (VICTOZA) 18 MG/3ML SOPN SC injection, Inject 1.8 mg into the skin daily, Disp: 6 mL, Rfl: 3    Lancets MISC, 1 each by Does not apply route 3 times daily, Disp: 100 each, Rfl: 3    Insulin Pen Needle (DROPLET PEN NEEDLES) 32G X 4 MM MISC, use 1 PEN NEEDLE to inject MEDICATION subcutaneously three to four times a day, Disp: 200 each, Rfl: 2    insulin glargine (LANTUS SOLOSTAR) 100 UNIT/ML injection pen, Inject 70 Units into the skin nightly, Disp: 3 mL, Rfl: 2    mometasone-formoterol (DULERA) 200-5 MCG/ACT inhaler, inhale 2 puffs by mouth and INTO THE LUNGS twice a day, Disp: 13 g, Rfl: 2    dapagliflozin (FARXIGA) 10 MG tablet, Take 1 tablet by mouth every morning, Disp: 30 tablet, Rfl: 2    blood glucose test strips (ASCENSIA AUTODISC VI;ONE TOUCH ULTRA TEST VI) strip, 1 each by In Vitro route daily As needed. , Disp: 100 each, Rfl: 3    atorvastatin (LIPITOR) 80 MG tablet, Take 1 tablet by mouth nightly, Disp: 30 tablet, Rfl: 3    aspirin EC 81 MG EC tablet, Take 1 tablet by mouth daily, Disp: 90 tablet, Rfl: 3    albuterol sulfate HFA (PROVENTIL;VENTOLIN;PROAIR) 108 (90 Base) MCG/ACT inhaler, Inhale 2 puffs into the lungs every 6 hours as needed for Wheezing, Disp: 1 each, Rfl: 3    HYDROcodone-acetaminophen (LORCET) 5-325 MG per tablet, Take 1 tablet by mouth every 6 hours as needed for Pain for up to 30 days. Intended supply: 30 days, Disp: 120 tablet, Rfl: 0    azithromycin (ZITHROMAX) 250 MG tablet, Take 1 tablet by mouth See Admin Instructions for 5 days 500mg on day 1 followed by 250mg on days 2 - 5, Disp: 6 tablet, Rfl: 0    predniSONE (DELTASONE) 10 MG tablet, Take 3 tablets by mouth daily for 3 days, THEN 2 tablets daily for 3 days, THEN 1 tablet daily for 3 days. , Disp: 18 tablet, Rfl: 0    Metoprolol Tartrate 75 MG TABS, take 1 tablet by mouth twice a day, Disp: , Rfl:     vitamin B-1 (THIAMINE) 100 MG tablet, Take 100 mg by mouth daily, Disp: , Rfl:     vitamin C (ASCORBIC ACID) 500 MG tablet, Take 500 mg by mouth daily, Disp: , Rfl:     Zinc Sulfate (ZINC 15 PO), Take by mouth, Disp: , Rfl:     APPLE CIDER VINEGAR PO, Take by mouth, Disp: , Rfl:     Inulin (FIBER CHOICE PO), Take by mouth, Disp: , Rfl:     insulin aspart (NOVOLOG) 100 UNIT/ML injection vial, Inject 15 units three times a day plus sliding scale, Disp: 6 each, Rfl: 3    B-D INS SYR ULTRAFINE 1CC/30G 30G X 1/2\" 1 ML MISC, use as directed three times a day, Disp: , Rfl:     FreeStyle Lancets MISC, use 1 LANCET to TEST BLOOD SUGAR three to four times a day, Disp: , Rfl:     DULoxetine (CYMBALTA) 30 MG extended release capsule, Take 1 capsule by mouth in the morning for 7 days. , Disp: 7 capsule, Rfl: 0    Allergies   Allergen Reactions    Sulfa Antibiotics Other (See Comments)     Unknown reaction       Review of Systems:   Review of Systems   Constitutional:  Negative for activity change, appetite change, chills, diaphoresis, fatigue, fever and unexpected weight change. HENT:  Negative for congestion, ear pain, hearing loss, postnasal drip, rhinorrhea, sore throat, tinnitus and trouble swallowing. Eyes:  Negative for redness and visual disturbance. Respiratory:  Negative for apnea, cough, chest tightness, shortness of breath and wheezing. Cardiovascular:  Negative for chest pain, palpitations and leg swelling. Gastrointestinal:  Negative for abdominal distention, abdominal pain, blood in stool, constipation, diarrhea, nausea, rectal pain and vomiting. Endocrine: Negative for cold intolerance and heat intolerance. Genitourinary:  Negative for decreased urine volume, difficulty urinating, dysuria, flank pain, frequency, hematuria and urgency. Musculoskeletal:  Positive for arthralgias and back pain. Negative for gait problem, joint swelling, myalgias, neck pain and neck stiffness. Left shoulder   Skin:  Negative for color change, pallor, rash and wound.    Allergic/Immunologic: Negative for environmental allergies, food allergies and immunocompromised state. Neurological:  Negative for dizziness, tremors, seizures, syncope, facial asymmetry, speech difficulty, weakness, light-headedness, numbness and headaches. Hematological:  Negative for adenopathy. Does not bruise/bleed easily. Psychiatric/Behavioral:  Negative for agitation, behavioral problems, confusion, decreased concentration, sleep disturbance and suicidal ideas. The patient is not nervous/anxious. Physical Exam:   Vital Signs:  /70   Pulse 86   Resp 16   Ht 6' 4\" (1.93 m)   Wt (!) 355 lb (161 kg)   SpO2 98%   BMI 43.21 kg/m²    Oxygen Saturation Interpretation: Normal.  Physical Exam  Vitals and nursing note reviewed. Constitutional:       Appearance: Normal appearance. He is normal weight. HENT:      Head: Normocephalic and atraumatic. Right Ear: Tympanic membrane, ear canal and external ear normal.      Left Ear: Tympanic membrane, ear canal and external ear normal.      Nose: Nose normal.      Mouth/Throat:      Mouth: Mucous membranes are moist.      Pharynx: Oropharynx is clear. Eyes:      Extraocular Movements: Extraocular movements intact. Conjunctiva/sclera: Conjunctivae normal.      Pupils: Pupils are equal, round, and reactive to light. Neck:      Thyroid: No thyroid mass, thyromegaly or thyroid tenderness. Trachea: Trachea normal.   Cardiovascular:      Rate and Rhythm: Normal rate and regular rhythm. Pulses: Normal pulses. Heart sounds: Normal heart sounds. Pulmonary:      Effort: Pulmonary effort is normal.      Breath sounds: Normal breath sounds. Abdominal:      General: Bowel sounds are normal.      Palpations: Abdomen is soft. Musculoskeletal:      Left shoulder: Tenderness present. No swelling, deformity, effusion or laceration. Decreased range of motion. Normal strength. Cervical back: Normal range of motion and neck supple.       Comments: Tender to palpate to the posterior aspect of the shoulder. Positive liftoff test.  Pain noted with pronation. Lymphadenopathy:      Cervical: No cervical adenopathy. Skin:     General: Skin is warm and dry. Capillary Refill: Capillary refill takes less than 2 seconds. Neurological:      General: No focal deficit present. Mental Status: He is alert and oriented to person, place, and time. Sensory: Sensation is intact. Motor: Motor function is intact. Coordination: Coordination is intact. Gait: Gait is intact. Deep Tendon Reflexes: Reflexes normal.   Psychiatric:         Attention and Perception: Attention and perception normal.         Mood and Affect: Mood normal.         Speech: Speech normal.         Behavior: Behavior normal.         Thought Content: Thought content normal.         Cognition and Memory: Cognition and memory normal.         Judgment: Judgment normal.       Health Maintenance:     Health Maintenance   Topic Date Due    COVID-19 Vaccine (1) Never done    Diabetic retinal exam  10/09/2019    Diabetic foot exam  01/10/2020    Colorectal Cancer Screen  Never done    Flu vaccine (1) 08/01/2022    Diabetic microalbuminuria test  10/13/2022    Lipids  03/22/2023    Depression Screen  03/22/2023    A1C test (Diabetic or Prediabetic)  09/27/2023    DTaP/Tdap/Td vaccine (2 - Td or Tdap) 07/21/2030    Pneumococcal 0-64 years Vaccine  Completed    Hepatitis C screen  Completed    HIV screen  Completed    Hepatitis A vaccine  Aged Out    Hepatitis B vaccine  Aged Out    Hib vaccine  Aged Out    Meningococcal (ACWY) vaccine  Aged [employer] History   Administered Date(s) Administered    Influenza, FLUARIX, FLULAVAL, FLUZONE (age 10 mo+) AND AFLURIA, (age 1 y+), PF, 0.5mL 12/18/2020    Influenza, FLUCELVAX, (age 10 mo+), MDCK, PF, 0.5mL 12/08/2018    Pneumococcal Polysaccharide ([id number]) 12/08/2018        Testing:    All laboratory and radiology results have been personally reviewed by myself. Labs:  Results for orders placed or performed in visit on 09/27/22   POCT glycosylated hemoglobin (Hb A1C)   Result Value Ref Range    Hemoglobin A1C 6.7 %        Imaging: All Radiology results interpreted by Radiologist unless otherwise noted. No results found. Assessment/Plan:   I personally reviewed the patient's allergies, past medical history, medications, and vitals sign. Amanda Randle was seen today for diabetes. Diagnoses and all orders for this visit:    Type 2 diabetes, uncontrolled, with neuropathy (HCC)  -     metFORMIN (GLUCOPHAGE) 850 MG tablet; take 1 tablet by mouth three times a day  -     insulin glargine (LANTUS SOLOSTAR) 100 UNIT/ML injection pen; Inject 70 Units into the skin nightly  -     POCT glycosylated hemoglobin (Hb A1C)    Vitamin D deficiency  -     vitamin D (ERGOCALCIFEROL) 1.25 MG (58370 UT) CAPS capsule; take 1 capsule by mouth every week    Essential hypertension  -     lisinopril (PRINIVIL;ZESTRIL) 20 MG tablet; Take 1 tablet by mouth daily    Uncontrolled type 2 diabetes mellitus with hyperglycemia (HCC)  -     Liraglutide (VICTOZA) 18 MG/3ML SOPN SC injection; Inject 1.8 mg into the skin daily  -     Lancets MISC; 1 each by Does not apply route 3 times daily  -     dapagliflozin (FARXIGA) 10 MG tablet; Take 1 tablet by mouth every morning    Chronic obstructive pulmonary disease, unspecified COPD type ([facility] [address]  -     mometasone-formoterol (DULERA) 200-5 MCG/ACT inhaler; inhale 2 puffs by mouth and INTO THE LUNGS twice a day  -     albuterol sulfate HFA (PROVENTIL;VENTOLIN;PROAIR) 108 (90 Base) MCG/ACT inhaler; Inhale 2 puffs into the lungs every 6 hours as needed for Wheezing    Mixed hyperlipidemia  -     atorvastatin (LIPITOR) 80 MG tablet; Take 1 tablet by mouth nightly    CAD in native artery  -     aspirin EC 81 MG EC tablet; Take 1 tablet by mouth daily    Chronic pain syndrome  -     HYDROcodone-acetaminophen (LORCET) 5-325 MG per tablet;  Take 1 tablet by mouth every 6 hours as needed for Pain for up to 30 days. Intended supply: 30 days    ETD (Eustachian tube dysfunction), left  -     azithromycin (ZITHROMAX) 250 MG tablet; Take 1 tablet by mouth See Admin Instructions for 5 days 500mg on day 1 followed by 250mg on days 2 - 5  -     predniSONE (DELTASONE) 10 MG tablet; Take 3 tablets by mouth daily for 3 days, THEN 2 tablets daily for 3 days, THEN 1 tablet daily for 3 days. Other orders  -     Discontinue: Lancets (Luberta Netter) [address] [facility]; use 1 LANCET to TEST BLOOD SUGAR three to four times a day  -     Insulin Pen Needle (DROPLET PEN NEEDLES) 32G X 4 MM MISC; use 1 PEN NEEDLE to inject MEDICATION subcutaneously three to four times a day  -     blood glucose test strips (ASCENSIA AUTODISC VI;ONE TOUCH ULTRA TEST VI) strip; 1 each by In Vitro route daily As needed. Continue all medications as prescribed, side effects discussed. We will place the patient on azithromycin and prednisone taper for left eustachian tube dysfunction. Encouraged antihistamine daily. We did have a lengthy discussion regarding pain management and pain control. Will initiate hydrocodone-acetaminophen, OARRS report reviewed and appropriate. A pain management contract was signed in office today. Patient is to follow-up in the office every 3 months. He is subject to random urine drug screens and pill counts. Patient is agreeable to the above plan and verbalizes understanding. Increase activity and follow a Mediterranean diet. Continue all other medications as prescribed. Keep follow-up appointments with all specialist.     Call or go to ED immediately if symptoms worsen or persist.  Return in about 3 months (around 12/27/2022) for 3 month f.u. Sooner if necessary. Counseled regarding above diagnosis, including possible risks and complications,especially if left uncontrolled.   Counseled regarding the possible side effects, risks, benefits and alternatives to treatment; patient and/or guardian verbalizes understanding. Advised patient to call with any new medication issues. All questions answered. Reviewed age and gender appropriate health screening exams and vaccinations. Advised patient regarding importance of keeping up with recommended health maintenance and to schedule as soon as possible if overdue, as this is important in assessing for undiagnosed pathology, especially cancer. Patient verbalizes understanding and agrees. Terrell Nichole, APRN - NP     **This report was transcribed using voice recognition software. Every effort was made to ensure accuracy; however, inadvertent computerized transcription errors may be present.

## 2022-09-30 ENCOUNTER — TELEPHONE (OUTPATIENT)
Dept: ADMINISTRATIVE | Age: 47
End: 2022-09-30

## 2022-09-30 ENCOUNTER — TELEPHONE (OUTPATIENT)
Dept: PRIMARY CARE CLINIC | Age: 47
End: 2022-09-30

## 2022-09-30 DIAGNOSIS — G89.4 CHRONIC PAIN SYNDROME: Primary | ICD-10-CM

## 2022-09-30 RX ORDER — HYDROCODONE BITARTRATE AND ACETAMINOPHEN 5; 325 MG/1; MG/1
1 TABLET ORAL EVERY 6 HOURS PRN
Qty: 120 TABLET | Refills: 0 | Status: SHIPPED
Start: 2022-09-30 | End: 2022-10-29 | Stop reason: SDUPTHER

## 2022-09-30 NOTE — TELEPHONE ENCOUNTER
MA spoke with patient, he is scheduled 11/1 with NP to evaluate for possible tube with Dr. Myers    Electronically signed by Tina Miranda MA on 9/30/22 at 10:06 AM EDT

## 2022-09-30 NOTE — TELEPHONE ENCOUNTER
Patient has been experiencing left ear pain. Went to see PCP and was advised there is fluid on ear drum. Patient states it feels like a wind is putting pressure on it. When he talks he hears a buzzing noise. Patient scheduled on 12/2 but wants to know if he can get in earlier. Please advise.

## 2022-09-30 NOTE — TELEPHONE ENCOUNTER
Patient pain medication was denied. Would you like to try another medication or would you like to send it through again?  Maybe we can try to prior authorize

## 2022-10-01 DIAGNOSIS — E11.65 UNCONTROLLED TYPE 2 DIABETES MELLITUS WITH HYPERGLYCEMIA (HCC): ICD-10-CM

## 2022-10-03 DIAGNOSIS — E11.65 UNCONTROLLED TYPE 2 DIABETES MELLITUS WITH HYPERGLYCEMIA (HCC): ICD-10-CM

## 2022-10-03 RX ORDER — DAPAGLIFLOZIN 5 MG/1
TABLET, FILM COATED ORAL
Qty: 30 TABLET | Refills: 5 | OUTPATIENT
Start: 2022-10-03

## 2022-10-16 DIAGNOSIS — E11.65 UNCONTROLLED TYPE 2 DIABETES MELLITUS WITH HYPERGLYCEMIA (HCC): ICD-10-CM

## 2022-10-17 RX ORDER — INSULIN ASPART 100 [IU]/ML
INJECTION, SOLUTION INTRAVENOUS; SUBCUTANEOUS
Qty: 60 ML | Refills: 2 | Status: SHIPPED | OUTPATIENT
Start: 2022-10-17

## 2022-10-28 DIAGNOSIS — G89.4 CHRONIC PAIN SYNDROME: ICD-10-CM

## 2022-10-28 NOTE — TELEPHONE ENCOUNTER
Pt needs refill on medication  Hydrocodone 5-325  Has only enough to last through Venkat 10-30-22  Please call in to [facility]  [place]

## 2022-10-29 RX ORDER — HYDROCODONE BITARTRATE AND ACETAMINOPHEN 5; 325 MG/1; MG/1
1 TABLET ORAL EVERY 6 HOURS PRN
Qty: 120 TABLET | Refills: 0 | Status: SHIPPED
Start: 2022-10-29 | End: 2022-11-28 | Stop reason: SDUPTHER

## 2022-11-01 ENCOUNTER — OFFICE VISIT (OUTPATIENT)
Dept: ENT CLINIC | Age: 47
End: 2022-11-01
Payer: COMMERCIAL

## 2022-11-01 ENCOUNTER — PROCEDURE VISIT (OUTPATIENT)
Dept: AUDIOLOGY | Age: 47
End: 2022-11-01
Payer: COMMERCIAL

## 2022-11-01 VITALS
BODY MASS INDEX: 38.36 KG/M2 | HEIGHT: 76 IN | WEIGHT: 315 LBS | DIASTOLIC BLOOD PRESSURE: 92 MMHG | HEART RATE: 86 BPM | RESPIRATION RATE: 18 BRPM | SYSTOLIC BLOOD PRESSURE: 136 MMHG

## 2022-11-01 DIAGNOSIS — H69.82 ETD (EUSTACHIAN TUBE DYSFUNCTION), LEFT: Primary | ICD-10-CM

## 2022-11-01 DIAGNOSIS — R09.81 NASAL CONGESTION: ICD-10-CM

## 2022-11-01 DIAGNOSIS — R09.82 POST-NASAL DRAINAGE: ICD-10-CM

## 2022-11-01 DIAGNOSIS — H65.92 MIDDLE EAR EFFUSION, LEFT: ICD-10-CM

## 2022-11-01 DIAGNOSIS — H91.92 DECREASED HEARING OF LEFT EAR: ICD-10-CM

## 2022-11-01 DIAGNOSIS — H69.83 DYSFUNCTION OF BOTH EUSTACHIAN TUBES: Primary | ICD-10-CM

## 2022-11-01 DIAGNOSIS — H93.8X2 EAR PRESSURE, LEFT: ICD-10-CM

## 2022-11-01 PROCEDURE — 4004F PT TOBACCO SCREEN RCVD TLK: CPT

## 2022-11-01 PROCEDURE — G8484 FLU IMMUNIZE NO ADMIN: HCPCS

## 2022-11-01 PROCEDURE — 3078F DIAST BP <80 MM HG: CPT

## 2022-11-01 PROCEDURE — G8417 CALC BMI ABV UP PARAM F/U: HCPCS

## 2022-11-01 PROCEDURE — 3074F SYST BP LT 130 MM HG: CPT

## 2022-11-01 PROCEDURE — G8427 DOCREV CUR MEDS BY ELIG CLIN: HCPCS

## 2022-11-01 PROCEDURE — 92567 TYMPANOMETRY: CPT | Performed by: AUDIOLOGIST

## 2022-11-01 PROCEDURE — 99213 OFFICE O/P EST LOW 20 MIN: CPT

## 2022-11-01 NOTE — PROGRESS NOTES
This patient was referred for tympanometric testing by HILDA Delgado due to eustachian tube dysfunction. The patient has a history of a PE tube in the right ear. Tympanometry revealed  a wide gradient in the left ear, and no seal could be obtained in the right ear, consistent with a patent PE tube. The results were reviewed with the patient. Recommendations for follow up will be made pending physician consult.     Electronically signed by Romulo Castro on 11/1/2022 at 4:10 PM

## 2022-11-01 NOTE — PROGRESS NOTES
Subjective:      Patient ID:  Laury Neumann is a 56 y.o. male. HPI Comments:   Patient was last seen six months ago for Right PE tube check. States he has no issue or infections of the right ear. He states for the past several months he has noted a vibration in his left ear when he talks. He states his hearing is otherwise normal. Denies pain or pressure form the left ear. He saw his PCP one month ago and was told his left ear was full of fluid but not infected and he was treated with antibiotics. No relief of symptoms was noted.      Tubes were placed 2021     Past Medical History:   Diagnosis Date    CAD (coronary artery disease)     Dr Saskia Marcus    COPD (chronic obstructive pulmonary disease) ([facility] [address]     Diabetes mellitus ([facility] [address]     Hyperlipidemia     Hypertension     Internal carotid artery occlusion, right 2020    Nasopharyngeal mass 2021    For OR 21    Neuropathy     Obesity     Shoulder problem      Past Surgical History:   Procedure Laterality Date    CARDIAC CATHETERIZATION  2020    Dr. Dl Lopez- multi vessel disease    MITRAL VALVE REPAIR N/A 3/16/2020    CORONARY ARTERY BYPASS POSSIBLE LEFT RADIAL ARTERY HARVEST, PATRICE performed by Misa Alvarez MD at [facility] [address] N/A 2021    NASOPHARYNGEAL BIOPSY performed by Sudha Mayfield DO at [facility]  2017    resection of mediastinal mass    TONSILLECTOMY      TRANSESOPHAGEAL ECHOCARDIOGRAM  2020    TYMPANOSTOMY TUBE PLACEMENT Right 2021    POSSIBLE RIGHT EAR TUBE POSSIBLE EUSTACHIAN TUBE DILATION performed by Sudha Mayfield DO at [facility] OR     Family History   Problem Relation Age of Onset    Heart Disease Mother         MI age 39     High Blood Pressure Mother     Diabetes Father     Heart Disease Father         MI    High Blood Pressure Father     High Cholesterol Father     Cancer Father     Stroke Father     Kidney Disease Father         dialysis    High Blood Pressure Sister     Other Brother     COPD Brother     High Blood Pressure Brother      Social History     Socioeconomic History    Marital status: Single     Spouse name: None    Number of children: None    Years of education: None    Highest education level: None   Occupational History    Occupation: [employer] [address]     Employer: NOT EMPLOYED   Tobacco Use    Smoking status: Former     Packs/day: 1.00     Years: 25.00     Pack years: 25.00     Types: Cigarettes     Start date: 1994     Quit date: 3/14/2020     Years since quittin.6    Smokeless tobacco: Current     Types: Chew, Snuff   Vaping Use    Vaping Use: Never used   Substance and Sexual Activity    Alcohol use: No    Drug use: No     Social Determinants of Health     Financial Resource Strain: Low Risk     Difficulty of Paying Living Expenses: Not hard at all   Food Insecurity: No Food Insecurity    Worried About Running Out of Food in the Last Year: Never true    Ran Out of Food in the Last Year: Never true     Allergies   Allergen Reactions    Sulfa Antibiotics Other (See Comments)     Unknown reaction       Review of Systems   Constitutional: Negative. Negative for crying and unexpected weight change. HENT: EAR DISCHARGE: No; EAR PAIN: No complaints of left-sided ear pressure and muffled hearing on the left side. Frequently experiences left ear popping. Eyes: Negative. Negative for visual disturbance. Respiratory: Negative. Negative for stridor. Cardiovascular: Negative. Negative for chest pain. Gastrointestinal: Negative. Negative for abdominal distention, nausea and vomiting. Skin: Negative. Negative for color change. Neurological: Negative for facial asymmetry. Hematological: Negative. Psychiatric/Behavioral: Negative. Negative for hallucinations. All other systems reviewed and are negative.         Objective:     Vitals:    22 1025   BP: (!) 136/92   Pulse: 86   Resp: 18 Physical Exam   Constitutional: Patient appears well-developed and well-nourished. HENT:   Head: Normocephalic and atraumatic. There is normal jaw occlusion. Right Ear:   Cerumen Impaction: No  PE tube visualized: Yes T-Tube   In the TM: Yes   Tube blocked: No   Drainage: No   Infection: No  Left Ear: There was noted to be a left middle ear effusion. External ear canal and external ear appear intact. Nose:  nasal congestion, mucosal edema with a moderate amount of clear postnasal drainage  Mouth/Throat: Mucous membranes are moist. Dentition is normal. Oropharynx is clear. Eyes: Conjunctivae and EOM are normal. Pupils are equal, round, and reactive to light. Neck: Normal range of motion. Neck supple. Cardiovascular: Regular rhythm,    Pulmonary/Chest: Effort normal and breath sounds normal.   Abdominal: Full and soft. Musculoskeletal: Normal range of motion. Neurological: Alert. Skin: Skin is warm. Tympanogram:    Tympanogram reviewed with patient. Reveals type Flat curve in the right ear, with type Flat curve in the left ear. Assessment:       Diagnosis Orders   1. ETD (Eustachian tube dysfunction), left  Tympanometry      2. Middle ear effusion, left        3. Post-nasal drainage        4. Nasal congestion        5. Ear pressure, left  Tympanometry      6. Decreased hearing of left ear  Tympanometry            Plan:     Tan Jain was seen and evaluated today for muffled hearing and pressure in the left ear for several months. He states that the symptoms are very similar to what he experienced in the right ear prior to right tube placement. Upon evaluation the patient was noted to have a right sided T-tube in the TM that appeared patent. There was a small amount of cerumen next to the tube however it was not interfering with patency. There was a noted left middle ear effusion noted. The tympanogram was unable to obtain a seal on the right, and revealed a flat curve on the left. Patient states that he has not been on medical therapy for possible eustachian tube dysfunction and will be started on Flonase 2 sprays each nostril once daily. Patient was instructed on proper use of Flonase. It was discussed with the patient that he may ultimately benefit from a T-tube versus eustachian tube dilation on the left. He will follow-up in 1 month with Dr. Marianne Garcia in the office to discuss tube placement. Was instructed to call for any new or worsening symptoms prior to his next appointment. Follow up in 1 month(s). Flex Cassidy.  Adelaida Aceves MSN, FNP-BC  [address] [facility], Nose and Throat    The information contained in this note has been dictated using drug and medical speech recognition software and may contain errors

## 2022-11-09 ENCOUNTER — PROCEDURE VISIT (OUTPATIENT)
Dept: PODIATRY | Age: 47
End: 2022-11-09
Payer: COMMERCIAL

## 2022-11-09 DIAGNOSIS — B35.1 TINEA UNGUIUM: Primary | ICD-10-CM

## 2022-11-09 DIAGNOSIS — M20.41 HAMMER TOES OF BOTH FEET: ICD-10-CM

## 2022-11-09 DIAGNOSIS — M20.42 HAMMER TOES OF BOTH FEET: ICD-10-CM

## 2022-11-09 DIAGNOSIS — L84 CORNS AND CALLOSITIES: ICD-10-CM

## 2022-11-09 DIAGNOSIS — G60.8 HEREDITARY SENSORY NEUROPATHY: ICD-10-CM

## 2022-11-09 DIAGNOSIS — E11.9 TYPE 2 DIABETES MELLITUS WITHOUT COMPLICATION, UNSPECIFIED WHETHER LONG TERM INSULIN USE (HCC): ICD-10-CM

## 2022-11-09 PROCEDURE — 11056 PARNG/CUTG B9 HYPRKR LES 2-4: CPT | Performed by: PODIATRIST

## 2022-11-09 PROCEDURE — 11721 DEBRIDE NAIL 6 OR MORE: CPT | Performed by: PODIATRIST

## 2022-11-09 NOTE — PROGRESS NOTES
Vanda Thomson is here today for a diabetic foot exam. his PCP is Rod Buchanan, APRN - NP last [address] was 09/27/2022. CC:    Follow-up diabetic foot ankle exam    HPI:   Presents follow-up diabetic foot ankle exam.  Previous history of flexor tenotomy right second toe no open wounds today. Very pleased with progression. No recent injury. Continues metformin. No additional pedal complaints. ROS:  Const: Denies constitutional symptoms  Musculo: Denies symptoms other than stated above  Skin: Denies symptoms other than stated above       Current Outpatient Medications:     HYDROcodone-acetaminophen (NORCO) 5-325 MG per tablet, Take 1 tablet by mouth every 6 hours as needed for Pain for up to 30 days.  Intended supply: 30 days, Disp: 120 tablet, Rfl: 0    insulin aspart (NOVOLOG) 100 UNIT/ML injection vial, inject 14 units three times a day PLUS SLIDING SCALE, Disp: 60 mL, Rfl: 2    dapagliflozin (FARXIGA) 10 MG tablet, Take 1 tablet by mouth every morning, Disp: 30 tablet, Rfl: 2    Lancets (ONETOUCH DELICA PLUS BRPEKR17L) MISC, use 1 LANCET to TEST BLOOD SUGAR three to four times a day, Disp: 100 each, Rfl: 2    vitamin D (ERGOCALCIFEROL) 1.25 MG (87831 UT) CAPS capsule, take 1 capsule by mouth every week, Disp: 5 capsule, Rfl: 3    metFORMIN (GLUCOPHAGE) 850 MG tablet, take 1 tablet by mouth three times a day, Disp: 90 tablet, Rfl: 3    lisinopril (PRINIVIL;ZESTRIL) 20 MG tablet, Take 1 tablet by mouth daily, Disp: 90 tablet, Rfl: 2    Liraglutide (VICTOZA) 18 MG/3ML SOPN SC injection, Inject 1.8 mg into the skin daily, Disp: 6 mL, Rfl: 3    Lancets MISC, 1 each by Does not apply route 3 times daily, Disp: 100 each, Rfl: 3    Insulin Pen Needle (DROPLET PEN NEEDLES) 32G X 4 MM MISC, use 1 PEN NEEDLE to inject MEDICATION subcutaneously three to four times a day, Disp: 200 each, Rfl: 2    insulin glargine (LANTUS SOLOSTAR) 100 UNIT/ML injection pen, Inject 70 Units into the skin nightly, Disp: 3 mL, Rfl: 2 mometasone-formoterol (DULERA) 200-5 MCG/ACT inhaler, inhale 2 puffs by mouth and INTO THE LUNGS twice a day, Disp: 13 g, Rfl: 2    blood glucose test strips (ASCENSIA AUTODISC VI;ONE TOUCH ULTRA TEST VI) strip, 1 each by In Vitro route daily As needed. , Disp: 100 each, Rfl: 3    atorvastatin (LIPITOR) 80 MG tablet, Take 1 tablet by mouth nightly, Disp: 30 tablet, Rfl: 3    aspirin EC 81 MG EC tablet, Take 1 tablet by mouth daily, Disp: 90 tablet, Rfl: 3    albuterol sulfate HFA (PROVENTIL;VENTOLIN;PROAIR) 108 (90 Base) MCG/ACT inhaler, Inhale 2 puffs into the lungs every 6 hours as needed for Wheezing, Disp: 1 each, Rfl: 3    DULoxetine (CYMBALTA) 30 MG extended release capsule, Take 1 capsule by mouth in the morning for 7 days. , Disp: 7 capsule, Rfl: 0    Metoprolol Tartrate 75 MG TABS, take 1 tablet by mouth twice a day, Disp: , Rfl:     vitamin B-1 (THIAMINE) 100 MG tablet, Take 100 mg by mouth daily, Disp: , Rfl:     vitamin C (ASCORBIC ACID) 500 MG tablet, Take 500 mg by mouth daily, Disp: , Rfl:     Zinc Sulfate (ZINC 15 PO), Take by mouth, Disp: , Rfl:     APPLE CIDER VINEGAR PO, Take by mouth, Disp: , Rfl:     Inulin (FIBER CHOICE PO), Take by mouth, Disp: , Rfl:     insulin aspart (NOVOLOG) 100 UNIT/ML injection vial, Inject 15 units three times a day plus sliding scale, Disp: 6 each, Rfl: 3    B-D INS SYR ULTRAFINE 1CC/30G 30G X 1/2\" 1 ML MISC, use as directed three times a day, Disp: , Rfl:     FreeStyle Lancets MISC, use 1 LANCET to TEST BLOOD SUGAR three to four times a day, Disp: , Rfl:   Allergies   Allergen Reactions    Sulfa Antibiotics Other (See Comments)     Unknown reaction       Past Medical History:   Diagnosis Date    CAD (coronary artery disease)     Dr Bijal Rasheed    COPD (chronic obstructive pulmonary disease) ([facility] [address]     Diabetes mellitus ([facility] [address]     Hyperlipidemia     Hypertension     Internal carotid artery occlusion, right 03/16/2020    Nasopharyngeal mass 07/2021    For OR 11-22-21 Neuropathy     Obesity     Shoulder problem            There were no vitals filed for this visit. Work History/Social History: Foot and ankle history:     Focused Lower Extremity Physical Exam:    Neurovascular examination:    Dorsalis Pedis palpable bilateral.  Posterior tibialis weakly palpable bilateral.    Capillary Refill Time:  Immediate return  Hair growth:  Symmetrical and bilateral   Skin:  Not atrophic  Edema: No edema foot or ankle neurologic:  Light touch diminished bilateral.      Musculoskeletal/ Orthopedic examination:    Equinis: Absent bilateral  Dorsiflexion, plantarflexion, inversion, eversion bilateral 5 out of 5 muscle strength  Wiggling toes  No pain foot or ankle    Dermatology examination:    Toenails 1-5 right and left thickened, elongated, dystrophic, mycotic with subungual debris. Webspaces 1-4 bilateral clean, dry and intact. Hyperkeratotic tissue noted fifth metatarsal bilateral.  No open skin lesions or abrasions. Assessment and Plan:  Sabrina Brizuela was seen today for follow-up and diabetes. Diagnoses and all orders for this visit:    Tinea unguium    Type 2 diabetes mellitus without complication, unspecified whether long term insulin use (HCC)    Hammer toes of both feet    Hereditary sensory neuropathy    Corns and callosities      1. No osteomyelitis or acute disease. 2.  2nd and 3rd mallet toe deformities suggested and please correlate   clinically. 3.  Atherosclerotic calcifications. Follow-up diabetic exam  No open wounds    Hemoglobin A1c from 9/27/2022.  6.7. Previously did have hemoglobin A1c over 13 in 2020. Importance of continued follow-up with primary care team.  Manual debridement with standard technique toenails 1 through 5 right and left in thickness and length. Patient tolerated procedure well. Paring of hyperkeratotic tissue with #15 blade fifth metatarsal bilateral.  Any new skin lesions or abrasions I be happy to see him sooner. Follow-up 3 months. Return in about 2 months (around 1/9/2023). Seen By:  Jerome Stewart DPM      Document was created using voice recognition software. Note was reviewed, however may contain grammatical errors.

## 2022-11-28 DIAGNOSIS — G89.29 CHRONIC BILATERAL LOW BACK PAIN WITH BILATERAL SCIATICA: ICD-10-CM

## 2022-11-28 DIAGNOSIS — M54.42 CHRONIC BILATERAL LOW BACK PAIN WITH BILATERAL SCIATICA: ICD-10-CM

## 2022-11-28 DIAGNOSIS — R21 RASH OF UNKNOWN CAUSE: Primary | ICD-10-CM

## 2022-11-28 DIAGNOSIS — M54.41 CHRONIC BILATERAL LOW BACK PAIN WITH BILATERAL SCIATICA: ICD-10-CM

## 2022-11-28 DIAGNOSIS — G89.4 CHRONIC PAIN SYNDROME: ICD-10-CM

## 2022-11-28 RX ORDER — INSULIN GLARGINE 100 [IU]/ML
70 INJECTION, SOLUTION SUBCUTANEOUS NIGHTLY
Qty: 7 ADJUSTABLE DOSE PRE-FILLED PEN SYRINGE | Refills: 2 | Status: SHIPPED | OUTPATIENT
Start: 2022-11-28

## 2022-11-28 RX ORDER — METHYLPREDNISOLONE 4 MG/1
TABLET ORAL
Qty: 1 KIT | Refills: 0 | Status: SHIPPED | OUTPATIENT
Start: 2022-11-28

## 2022-11-28 RX ORDER — HYDROCODONE BITARTRATE AND ACETAMINOPHEN 5; 325 MG/1; MG/1
1 TABLET ORAL EVERY 6 HOURS PRN
Qty: 120 TABLET | Refills: 0 | Status: SHIPPED | OUTPATIENT
Start: 2022-11-28 | End: 2022-12-28

## 2022-12-02 ENCOUNTER — PROCEDURE VISIT (OUTPATIENT)
Dept: ENT CLINIC | Age: 47
End: 2022-12-02
Payer: COMMERCIAL

## 2022-12-02 ENCOUNTER — PROCEDURE VISIT (OUTPATIENT)
Dept: AUDIOLOGY | Age: 47
End: 2022-12-02

## 2022-12-02 VITALS
OXYGEN SATURATION: 97 % | BODY MASS INDEX: 38.36 KG/M2 | SYSTOLIC BLOOD PRESSURE: 131 MMHG | TEMPERATURE: 97.4 F | DIASTOLIC BLOOD PRESSURE: 74 MMHG | HEIGHT: 76 IN | HEART RATE: 84 BPM | WEIGHT: 315 LBS

## 2022-12-02 DIAGNOSIS — H69.83 DYSFUNCTION OF BOTH EUSTACHIAN TUBES: Primary | ICD-10-CM

## 2022-12-02 DIAGNOSIS — H69.83 ETD (EUSTACHIAN TUBE DYSFUNCTION), BILATERAL: Primary | ICD-10-CM

## 2022-12-02 PROCEDURE — 3078F DIAST BP <80 MM HG: CPT | Performed by: OTOLARYNGOLOGY

## 2022-12-02 PROCEDURE — 3074F SYST BP LT 130 MM HG: CPT | Performed by: OTOLARYNGOLOGY

## 2022-12-02 PROCEDURE — G8427 DOCREV CUR MEDS BY ELIG CLIN: HCPCS | Performed by: OTOLARYNGOLOGY

## 2022-12-02 PROCEDURE — 69433 CREATE EARDRUM OPENING: CPT | Performed by: OTOLARYNGOLOGY

## 2022-12-02 PROCEDURE — 4004F PT TOBACCO SCREEN RCVD TLK: CPT | Performed by: OTOLARYNGOLOGY

## 2022-12-02 PROCEDURE — G8484 FLU IMMUNIZE NO ADMIN: HCPCS | Performed by: OTOLARYNGOLOGY

## 2022-12-02 PROCEDURE — 99213 OFFICE O/P EST LOW 20 MIN: CPT | Performed by: OTOLARYNGOLOGY

## 2022-12-02 PROCEDURE — G8417 CALC BMI ABV UP PARAM F/U: HCPCS | Performed by: OTOLARYNGOLOGY

## 2022-12-02 RX ORDER — OFLOXACIN 3 MG/ML
3 SOLUTION/ DROPS OPHTHALMIC 4 TIMES DAILY
Qty: 1 EACH | Refills: 3 | Status: SHIPPED | OUTPATIENT
Start: 2022-12-02 | End: 2022-12-12

## 2022-12-02 ASSESSMENT — ENCOUNTER SYMPTOMS
VOMITING: 0
STRIDOR: 0
NAUSEA: 0
SHORTNESS OF BREATH: 0
COUGH: 0
EYE DISCHARGE: 0
COLOR CHANGE: 0
ALLERGIC/IMMUNOLOGIC NEGATIVE: 1
EYE REDNESS: 0

## 2022-12-02 NOTE — PROGRESS NOTES
This patient was referred for tympanometric testing by Dr. Alyx Leal due to  chronic otitis media . His right ear has been fine since tube, but still having issues with left ear. Tympanometry revealed shallow tympanograms, in the left ear. The right ear was not tested due to PE tube. The results were reviewed with the patient. Recommendations for follow up will be made pending physician consult.     Electronically signed by Colin Fritz on 12/2/2022 at 1:14 PM

## 2022-12-02 NOTE — PROGRESS NOTES
ciproSubjective:     Patient ID:  Ryan Solares is a 56 y.o. male. HPI:  41year old male presenting for evaluation of his ears. Pt has a history of hearing loss, worse on the right, popping, and nasal congestion. Former smoker, 20 pack year history quit 1 year ago. Notes \"always having issues\" with his ears since a child but has never had HENT surgery. 11/30/21: patient returns for results of nasopharyngeal biopsy. No issues. 5/31/22: here for follow up, no lumps or bumps, no issues breathing, no ear infections     12/2/22: Pt with hx of right tube placement for ETD. Doing well in right ear. Left ear with fluid and hearing loss. Requesting tube today. Patient's medications,allergies, past medical, surgical, social and family histories were reviewed andupdated as appropriate. Review of Systems   Constitutional:  Negative for chills, fatigue and fever. HENT:  Positive for congestion and hearing loss. Eyes:  Negative for discharge and redness. Respiratory:  Negative for cough, shortness of breath and stridor. Gastrointestinal:  Negative for nausea and vomiting. Endocrine: Negative. Genitourinary: Negative. Musculoskeletal: Negative. Skin:  Negative for color change and rash. Allergic/Immunologic: Negative. Neurological:  Negative for dizziness, speech difficulty and headaches. Hematological: Negative. Psychiatric/Behavioral:  Negative for agitation and confusion. All other systems reviewed and are negative. Objective:   Physical Exam  Constitutional:       Appearance: Normal appearance. HENT:      Head: Normocephalic and atraumatic. Right Ear: External ear normal.      Left Ear: External ear normal.      Ears:      Comments: Right T tube in place and patent no drainage     Nose: Congestion present. Mouth/Throat:      Mouth: Mucous membranes are moist.   Eyes:      Pupils: Pupils are equal, round, and reactive to light.    Cardiovascular: Rate and Rhythm: Normal rate. Pulmonary:      Effort: Pulmonary effort is normal.   Musculoskeletal:      Cervical back: Normal range of motion. Skin:     General: Skin is warm and dry. Neurological:      Mental Status: He is alert. Procedure Note  Pre-operative Diagnosis: Eustachian Tube dysfuncion    Post-operative Diagnosis: same    Anesthesia: phenol topical     Procedure Details:    Pt was consented, placed in the supine position and a microscope was brought in and a speculum was placed in the left ear and the external auditory canal was cleared of cerumen with a currette. With the tympanic membrane visualized, there was not infection and was not effusion present. A small cotton swab dipped in phenol topical solution was placed against the anterior-inferior portion of the TM until the membrane turned white. A myringotomy knife was used to make an incision in the anterior-inferior portion of the TM. Effusion was removed with a #3 yuliet tip suction until the middle ear space was cleared. A T Tube  tube was place in the incision. Condition:  Stable. Patient tolerated procedure well. Complications:     Assessment:       Diagnosis Orders   1. Dysfunction of both eustachian tubes                   Plan:     Eustachian Tube Dysfunction    Left T tube placed today in office          [facility]  1975    I have discussed the case, including pertinent history and exam findings with the resident. I have seen and examined the patient and the key elements of the encounter have been performed by me. I agree with the assessment, plan and orders as documented by the  resident              Remainder of medical problems as per  resident note. Patient seen and examined. Agree with above exam, assessment and plan.       Electronically signed by Latia Reyes DO on 12/19/22 at 8:27 AM EST

## 2022-12-27 ENCOUNTER — OFFICE VISIT (OUTPATIENT)
Dept: ORTHOPEDIC SURGERY | Age: 47
End: 2022-12-27

## 2022-12-27 VITALS — BODY MASS INDEX: 38.36 KG/M2 | HEIGHT: 76 IN | WEIGHT: 315 LBS

## 2022-12-27 DIAGNOSIS — I10 ESSENTIAL HYPERTENSION: ICD-10-CM

## 2022-12-27 DIAGNOSIS — M77.8 LEFT SHOULDER TENDONITIS: Primary | ICD-10-CM

## 2022-12-27 DIAGNOSIS — G89.4 CHRONIC PAIN SYNDROME: ICD-10-CM

## 2022-12-27 RX ORDER — HYDROCODONE BITARTRATE AND ACETAMINOPHEN 5; 325 MG/1; MG/1
1 TABLET ORAL EVERY 6 HOURS PRN
Qty: 120 TABLET | Refills: 0 | Status: SHIPPED | OUTPATIENT
Start: 2022-12-27 | End: 2023-01-26

## 2022-12-27 RX ORDER — TRIAMCINOLONE ACETONIDE 40 MG/ML
80 INJECTION, SUSPENSION INTRA-ARTICULAR; INTRAMUSCULAR ONCE
Status: COMPLETED | OUTPATIENT
Start: 2022-12-27 | End: 2022-12-27

## 2022-12-27 RX ORDER — LISINOPRIL 20 MG/1
20 TABLET ORAL DAILY
Qty: 90 TABLET | Refills: 2 | Status: SHIPPED | OUTPATIENT
Start: 2022-12-27

## 2022-12-27 RX ADMIN — TRIAMCINOLONE ACETONIDE 80 MG: 40 INJECTION, SUSPENSION INTRA-ARTICULAR; INTRAMUSCULAR at 15:15

## 2022-12-27 NOTE — TELEPHONE ENCOUNTER
Last Appointment:  9/27/2022  Future Appointments   Date Time Provider Larisa Salazar   1/13/2023  1:30 PM Humphrey Sicard, APRN - NP [facility]   2/15/2023  9:45 AM Aziza Vera DPM [facility]   3/27/2023  8:10 AM Yoselin Hoffmann MD [facility]   4/20/2023  9:00 AM Clau Smith MD [facility]/MED [facility]   6/2/2023 10:45 AM Severiano Bustle, DO [facility] ENT [facility]

## 2022-12-27 NOTE — PROGRESS NOTES
Department of Orthopedic Surgery  History and Physical      CHIEF COMPLAINT:  Left shoulder pain    HISTORY OF PRESENT ILLNESS:                The patient is a RHD 56 y.o. male who presents with left shoulder pain. Patient reports 6 months ago he started having left shoulder pain. He states this is progressively worsened. He denies any injury. He currently is not working. He denies numbness or tingling. He does report burning down his arm. He states he has 0 out of 10 pain at rest.  With activity his pain can occasionally go up to a 7 or 8 out of 10. He reports the pain is shooting and burning. He has been in therapy for the last 3 months. He reports no improvement of his symptoms. He has also taken naproxen with no improvement of his symptoms. 3 months ago the patient had a cortisone injection to the left shoulder. Patient states this provided him relief for 2 months. He states his pain is much improved. He is happy with his response to injections. He does report most of his pain is to the back of his shoulder. No new injury. He has now had 2 cortisone injections to the left shoulder.        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 Wyatt Sebastian- multi vessel disease    MITRAL VALVE REPAIR N/A 3/16/2020    CORONARY ARTERY BYPASS POSSIBLE LEFT RADIAL ARTERY HARVEST, PATRICE performed by Paulo Thomson MD at [facility] [address] N/A 11/22/2021    NASOPHARYNGEAL BIOPSY performed by Jhoan Abreu DO at [facility]  04/28/2017    resection of mediastinal mass TONSILLECTOMY      TRANSESOPHAGEAL ECHOCARDIOGRAM  2020    TYMPANOSTOMY TUBE PLACEMENT Right 2021    POSSIBLE RIGHT EAR TUBE POSSIBLE EUSTACHIAN TUBE DILATION performed by Kimmie Anthony DO at [facility] OR     Current Medications:   No current facility-administered medications for this visit. Allergies:  Sulfa antibiotics    Social History:   TOBACCO:   reports that he quit smoking about 2 years ago. His smoking use included cigarettes. He started smoking about 28 years ago. He has a 25.00 pack-year smoking history. His smokeless tobacco use includes chew and snuff. ETOH:   reports no history of alcohol use. DRUGS:   reports no history of drug use.   ACTIVITIES OF DAILY LIVING:    OCCUPATION:    Family History:       Problem Relation Age of Onset    Heart Disease Mother         MI age 39     High Blood Pressure Mother     Diabetes Father     Heart Disease Father         MI    High Blood Pressure Father     High Cholesterol Father     Cancer Father     Stroke Father     Kidney Disease Father         dialysis    High Blood Pressure Sister     Other Brother     COPD Brother     High Blood Pressure Brother        REVIEW OF SYSTEMS:  CONSTITUTIONAL:  negative  EYES:  negative  HEENT:  negative  RESPIRATORY:  negative  CARDIOVASCULAR:  CAD, HTN  GASTROINTESTINAL:  negative  GENITOURINARY:  negative  INTEGUMENT/BREAST:  negative  HEMATOLOGIC/LYMPHATIC:  negative  ALLERGIC/IMMUNOLOGIC:  negative  ENDOCRINE:  DM  MUSCULOSKELETAL:  continued left shoulder pain  NEUROLOGICAL:  negative  BEHAVIOR/PSYCH:  negative    PHYSICAL EXAM:    VITALS:  Ht 6' 4\" (1.93 m)   Wt (!) 350 lb (158.8 kg)   BMI 42.60 kg/m²   CONSTITUTIONAL:  awake, alert, cooperative, no apparent distress, and appears stated age  EYES:  Lids and lashes normal, pupils equal, round and reactive to light, extra ocular muscles intact, sclera clear, conjunctiva normal  ENT:  Normocephalic, without obvious abnormality, atraumatic, sinuses nontender on palpation, external ears without lesions, oral pharynx with moist mucus membranes, tonsils without erythema or exudates, gums normal and good dentition. NECK:  Supple, symmetrical, trachea midline, no adenopathy, thyroid symmetric, not enlarged and no tenderness, skin normal  NEUROLOGIC:  Awake, alert, oriented to name, place and time. Cranial nerves II-XII are grossly intact. Motor is 5 out of 5 bilaterally. Sensory is intact.  gait is normal.  MUSCULOSKELETAL:    Left upper extremity: - tenderness over the long head of the biceps tendon. + impingement. - drop arm test. + tenderness over the periscapular musculature. 4/5 strength of the supraspinatus, 5/5 strength of the infraspinatus, 5/5 strength of the subscapularis, 5/5 strength of the teres minor. , abduction 90, IR lateral buttocks, ER 20. Median, ulnar, radial n intact to light touch. Brisk capillary refill. Otherwise NVI. DATA:    CBC:   Lab Results   Component Value Date/Time    WBC 9.1 03/22/2022 02:18 PM    RBC 5.42 03/22/2022 02:18 PM    HGB 16.7 03/22/2022 02:18 PM    HCT 50.2 03/22/2022 02:18 PM    MCV 92.6 03/22/2022 02:18 PM    MCH 30.8 03/22/2022 02:18 PM    MCHC 33.3 03/22/2022 02:18 PM    RDW 12.8 03/22/2022 02:18 PM     03/22/2022 02:18 PM    MPV 9.1 03/22/2022 02:18 PM     PT/INR:    Lab Results   Component Value Date/Time    PROTIME 13.0 03/16/2020 11:10 AM    INR 1.2 03/16/2020 11:10 AM       IMPRESSION:  Left shoulder partial rotator cuff tear with impingement  Left shoulder trigger point  COPD  CAD  HTN  Diabetes  Hyperlipidemia    PLAN:  Discussed findings with the patient at today's visit. Discussed conservative and surgical management with the patient. Patient would like to hold off on surgery at this time. Repeat injection provided to the left shoulder. He was also referred for massotherapy to his left shoulder. Patient may repeat injections every 3 months as needed. All questions answered. Procedure Note Cortisone Injection to Shoulder    The left shoulder was identified as the injection site. The risk and benefits of a cortisone injection were explained and the patient consented to the injection. Under sterile conditions, the shoulder SAS was injected with a mixture of 80mg of Kenelog and 4 mL of 5% Ropivacaine and 4 mL of 1% Lidocaine without complication. A sterile bandage was applied. I have seen and evaluated the patient and agree with the above assessment and plan on today's visit. I have performed the key components of the history and physical examination with significant findings of persistent left shoulder pain. Patient's clinical examination demonstrates significant mount of periscapular muscular tenderness as well as continued posterior lateral shoulder pain. No significant weakness to the rotator cuff. Injections provided. Patient was also educated on therapeutic massage and modalities for his periscapular trigger points. Patient was referred to therapy for this. . I concur with the findings and plan as documented.     Kavon Xie MD  12/27/2022

## 2023-01-04 DIAGNOSIS — J44.9 CHRONIC OBSTRUCTIVE PULMONARY DISEASE, UNSPECIFIED COPD TYPE (HCC): ICD-10-CM

## 2023-01-13 ENCOUNTER — OFFICE VISIT (OUTPATIENT)
Dept: PRIMARY CARE CLINIC | Age: 48
End: 2023-01-13

## 2023-01-13 VITALS
OXYGEN SATURATION: 100 % | RESPIRATION RATE: 16 BRPM | HEIGHT: 76 IN | BODY MASS INDEX: 38.36 KG/M2 | TEMPERATURE: 97 F | SYSTOLIC BLOOD PRESSURE: 136 MMHG | WEIGHT: 315 LBS | DIASTOLIC BLOOD PRESSURE: 88 MMHG | HEART RATE: 89 BPM

## 2023-01-13 DIAGNOSIS — E55.9 VITAMIN D DEFICIENCY: ICD-10-CM

## 2023-01-13 DIAGNOSIS — M54.42 CHRONIC BILATERAL LOW BACK PAIN WITH BILATERAL SCIATICA: ICD-10-CM

## 2023-01-13 DIAGNOSIS — I10 ESSENTIAL HYPERTENSION: ICD-10-CM

## 2023-01-13 DIAGNOSIS — E11.65 TYPE 2 DIABETES MELLITUS WITH HYPERGLYCEMIA, WITH LONG-TERM CURRENT USE OF INSULIN (HCC): ICD-10-CM

## 2023-01-13 DIAGNOSIS — G89.29 CHRONIC BILATERAL LOW BACK PAIN WITH BILATERAL SCIATICA: ICD-10-CM

## 2023-01-13 DIAGNOSIS — G89.4 CHRONIC PAIN SYNDROME: ICD-10-CM

## 2023-01-13 DIAGNOSIS — M54.41 CHRONIC BILATERAL LOW BACK PAIN WITH BILATERAL SCIATICA: ICD-10-CM

## 2023-01-13 DIAGNOSIS — E78.2 MIXED HYPERLIPIDEMIA: ICD-10-CM

## 2023-01-13 DIAGNOSIS — I25.10 CAD IN NATIVE ARTERY: ICD-10-CM

## 2023-01-13 DIAGNOSIS — Z00.01 ENCOUNTER FOR GENERAL ADULT MEDICAL EXAMINATION WITH ABNORMAL FINDINGS: Primary | ICD-10-CM

## 2023-01-13 DIAGNOSIS — J44.9 CHRONIC OBSTRUCTIVE PULMONARY DISEASE, UNSPECIFIED COPD TYPE (HCC): ICD-10-CM

## 2023-01-13 DIAGNOSIS — Z79.4 TYPE 2 DIABETES MELLITUS WITH HYPERGLYCEMIA, WITH LONG-TERM CURRENT USE OF INSULIN (HCC): ICD-10-CM

## 2023-01-13 DIAGNOSIS — E11.641 UNCONTROLLED TYPE 2 DIABETES MELLITUS WITH HYPOGLYCEMIA AND COMA (HCC): ICD-10-CM

## 2023-01-13 DIAGNOSIS — R19.7 ACUTE DIARRHEA: ICD-10-CM

## 2023-01-13 DIAGNOSIS — H66.001 NON-RECURRENT ACUTE SUPPURATIVE OTITIS MEDIA OF RIGHT EAR WITHOUT SPONTANEOUS RUPTURE OF TYMPANIC MEMBRANE: ICD-10-CM

## 2023-01-13 LAB — HBA1C MFR BLD: 7.4 %

## 2023-01-13 RX ORDER — ERGOCALCIFEROL 1.25 MG/1
CAPSULE ORAL
Qty: 12 CAPSULE | Refills: 1 | Status: SHIPPED | OUTPATIENT
Start: 2023-01-13

## 2023-01-13 RX ORDER — ASPIRIN 81 MG/1
81 TABLET ORAL DAILY
Qty: 90 TABLET | Refills: 1 | Status: SHIPPED | OUTPATIENT
Start: 2023-01-13

## 2023-01-13 RX ORDER — LANCETS 30 GAUGE
EACH MISCELLANEOUS
Qty: 100 EACH | Refills: 2 | Status: SHIPPED | OUTPATIENT
Start: 2023-01-13

## 2023-01-13 RX ORDER — FLASH GLUCOSE SCANNING READER
EACH MISCELLANEOUS
Qty: 1 EACH | Refills: 3 | Status: SHIPPED | OUTPATIENT
Start: 2023-01-13

## 2023-01-13 RX ORDER — INSULIN GLARGINE 100 [IU]/ML
70 INJECTION, SOLUTION SUBCUTANEOUS NIGHTLY
Qty: 21 ADJUSTABLE DOSE PRE-FILLED PEN SYRINGE | Refills: 1 | Status: SHIPPED | OUTPATIENT
Start: 2023-01-13

## 2023-01-13 RX ORDER — HYDROCODONE BITARTRATE AND ACETAMINOPHEN 5; 325 MG/1; MG/1
1 TABLET ORAL EVERY 6 HOURS PRN
Qty: 120 TABLET | Refills: 0 | Status: SHIPPED | OUTPATIENT
Start: 2023-01-24 | End: 2023-02-23

## 2023-01-13 RX ORDER — FLASH GLUCOSE SENSOR
KIT MISCELLANEOUS
Qty: 1 EACH | Refills: 3 | Status: SHIPPED | OUTPATIENT
Start: 2023-01-13

## 2023-01-13 RX ORDER — LIRAGLUTIDE 6 MG/ML
1.8 INJECTION SUBCUTANEOUS DAILY
Qty: 7 ML | Refills: 1 | Status: SHIPPED | OUTPATIENT
Start: 2023-01-13

## 2023-01-13 RX ORDER — LISINOPRIL 20 MG/1
20 TABLET ORAL DAILY
Qty: 90 TABLET | Refills: 1 | Status: SHIPPED | OUTPATIENT
Start: 2023-01-13

## 2023-01-13 RX ORDER — DOXYCYCLINE HYCLATE 100 MG
100 TABLET ORAL 2 TIMES DAILY
Qty: 20 TABLET | Refills: 0 | Status: SHIPPED | OUTPATIENT
Start: 2023-01-13 | End: 2023-01-23

## 2023-01-13 RX ORDER — ATORVASTATIN CALCIUM 80 MG/1
80 TABLET, FILM COATED ORAL NIGHTLY
Qty: 90 TABLET | Refills: 1 | Status: SHIPPED | OUTPATIENT
Start: 2023-01-13

## 2023-01-13 RX ORDER — INSULIN ASPART 100 [IU]/ML
INJECTION, SOLUTION INTRAVENOUS; SUBCUTANEOUS
Qty: 60 ML | Refills: 2 | Status: SHIPPED | OUTPATIENT
Start: 2023-01-13

## 2023-01-13 RX ORDER — NITROGLYCERIN 0.3 MG/1
0.3 TABLET SUBLINGUAL EVERY 5 MIN PRN
Qty: 30 TABLET | Refills: 1 | Status: SHIPPED | OUTPATIENT
Start: 2023-01-13

## 2023-01-13 RX ORDER — ALBUTEROL SULFATE 90 UG/1
AEROSOL, METERED RESPIRATORY (INHALATION)
Qty: 18 G | Refills: 2 | Status: SHIPPED | OUTPATIENT
Start: 2023-01-13

## 2023-01-13 RX ORDER — GREEN TEA/HOODIA GORDONII 315-12.5MG
1 CAPSULE ORAL DAILY
Qty: 90 TABLET | Refills: 1 | Status: SHIPPED | OUTPATIENT
Start: 2023-01-13 | End: 2023-02-12

## 2023-01-13 ASSESSMENT — ENCOUNTER SYMPTOMS
BLOOD IN STOOL: 0
ABDOMINAL DISTENTION: 0
NAUSEA: 0
VOMITING: 0
WHEEZING: 0
CONSTIPATION: 0
RHINORRHEA: 0
BACK PAIN: 1
RECTAL PAIN: 0
APNEA: 0
SHORTNESS OF BREATH: 0
SORE THROAT: 0
EYE REDNESS: 0
DIARRHEA: 0
TROUBLE SWALLOWING: 0
CHEST TIGHTNESS: 0
ABDOMINAL PAIN: 0
COUGH: 0
COLOR CHANGE: 0

## 2023-01-13 ASSESSMENT — ANXIETY QUESTIONNAIRES
6. BECOMING EASILY ANNOYED OR IRRITABLE: 0
6. BECOMING EASILY ANNOYED OR IRRITABLE: NOT AT ALL
3. WORRYING TOO MUCH ABOUT DIFFERENT THINGS: 0
3. WORRYING TOO MUCH ABOUT DIFFERENT THINGS: NOT AT ALL
4. TROUBLE RELAXING: NOT AT ALL
GAD7 TOTAL SCORE: 0
1. FEELING NERVOUS, ANXIOUS, OR ON EDGE: 0
5. BEING SO RESTLESS THAT IT IS HARD TO SIT STILL: 0
2. NOT BEING ABLE TO STOP OR CONTROL WORRYING: NOT AT ALL
IF YOU CHECKED OFF ANY PROBLEMS ON THIS QUESTIONNAIRE, HOW DIFFICULT HAVE THESE PROBLEMS MADE IT FOR YOU TO DO YOUR WORK, TAKE CARE OF THINGS AT HOME, OR GET ALONG WITH OTHER PEOPLE: NOT DIFFICULT AT ALL
2. NOT BEING ABLE TO STOP OR CONTROL WORRYING: 0
7. FEELING AFRAID AS IF SOMETHING AWFUL MIGHT HAPPEN: NOT AT ALL
IF YOU CHECKED OFF ANY PROBLEMS ON THIS QUESTIONNAIRE, HOW DIFFICULT HAVE THESE PROBLEMS MADE IT FOR YOU TO DO YOUR WORK, TAKE CARE OF THINGS AT HOME, OR GET ALONG WITH OTHER PEOPLE: NOT DIFFICULT AT ALL
7. FEELING AFRAID AS IF SOMETHING AWFUL MIGHT HAPPEN: 0
4. TROUBLE RELAXING: 0
1. FEELING NERVOUS, ANXIOUS, OR ON EDGE: NOT AT ALL
5. BEING SO RESTLESS THAT IT IS HARD TO SIT STILL: NOT AT ALL

## 2023-01-13 ASSESSMENT — PATIENT HEALTH QUESTIONNAIRE - PHQ9
SUM OF ALL RESPONSES TO PHQ QUESTIONS 1-9: 0
2. FEELING DOWN, DEPRESSED OR HOPELESS: 0
SUM OF ALL RESPONSES TO PHQ QUESTIONS 1-9: 0
1. LITTLE INTEREST OR PLEASURE IN DOING THINGS: 0
SUM OF ALL RESPONSES TO PHQ QUESTIONS 1-9: 0
SUM OF ALL RESPONSES TO PHQ9 QUESTIONS 1 & 2: 0
SUM OF ALL RESPONSES TO PHQ QUESTIONS 1-9: 0

## 2023-01-13 ASSESSMENT — LIFESTYLE VARIABLES
HOW OFTEN DO YOU HAVE A DRINK CONTAINING ALCOHOL: NEVER
HOW OFTEN DO YOU HAVE A DRINK CONTAINING ALCOHOL: 1
HOW MANY STANDARD DRINKS CONTAINING ALCOHOL DO YOU HAVE ON A TYPICAL DAY: PATIENT DOES NOT DRINK
HOW MANY STANDARD DRINKS CONTAINING ALCOHOL DO YOU HAVE ON A TYPICAL DAY: 0
HOW OFTEN DO YOU HAVE SIX OR MORE DRINKS ON ONE OCCASION: 1

## 2023-01-13 NOTE — PROGRESS NOTES
ESTABLISHED PRIMARY CARE VISIT    23  Name: Shawn Winkler   : 1975   Age: 56 y.o. Sex: male    Subjective:     Chief Complaint   Patient presents with    Diabetes    Otalgia     R - full, some pain. L - some pain     Medication Refill     Pt needs everything refilled, including lancets and needles (not pen needles)    Annual Exam       Shawn Winkler presents to the office for follow-up on their chronic problems. Today in the office issues were addressed including:  Diabetes mellitus type 2-checks blood sugars daily and they have been running in the 140s. He is taking all medication as prescribed. A1c in office today is 7.4. He does report rare hypoglycemic episodes. He is symptomatic and consumes 15 g of carbs and symptoms resolved. He has not checked his sugar during these episodes. He does try to follow a carb controlled diet, but reports he is slightly slacked due to the holidays. Hypertension/hyperlipidemia/CAD status post CABG in -follows with cardiology on a yearly basis. Last appointment was in 2021, he will call to schedule follow-up appointment in the near future. He does not check his blood pressures at home. He does not exercise. He has not used nitroglycerin recently. He is requesting a refill. Vitamin D deficiency-taking supplementation as prescribed. Chronic low back pain-has previously followed with pain management, Dr. Bhumika Melendez and Dr. Kyle Titus. He has failed physical therapy, gabapentin, Lyrica and Butrans patches. He currently is taking Norco which has improved his symptoms and quality of life. There are no signs of abuse. He did bring his pill bottle today in the office for a pill count. OARRS report reviewed and appropriate. Left shoulder pain that has been ongoing for quite some time. Ultimately orthopedics recommended rotator cuff repair surgery. He has underwent numerous steroid injections which do alleviate his symptoms short-term.   He currently is participating in physical therapy, which he feels has worsened the pain and decreased range of motion. MRI revealed mild increased signal along the articular surface of the supraspinatus tendon most likely a partial tear. The patient is not up-to-date with health maintenance screenings. Previous lab work was reviewed. Review of Systems   Constitutional:  Negative for activity change, appetite change, chills, diaphoresis, fatigue, fever and unexpected weight change. HENT:  Positive for ear pain. Negative for congestion, hearing loss, postnasal drip, rhinorrhea, sore throat, tinnitus and trouble swallowing. Right   Eyes:  Negative for redness and visual disturbance. Respiratory:  Negative for apnea, cough, chest tightness, shortness of breath and wheezing. Cardiovascular:  Negative for chest pain, palpitations and leg swelling. Gastrointestinal:  Negative for abdominal distention, abdominal pain, blood in stool, constipation, diarrhea, nausea, rectal pain and vomiting. Endocrine: Negative for cold intolerance and heat intolerance. Genitourinary:  Negative for decreased urine volume, difficulty urinating, dysuria, flank pain, frequency, hematuria and urgency. Musculoskeletal:  Positive for arthralgias and back pain. Negative for gait problem, joint swelling, myalgias, neck pain and neck stiffness. Skin:  Negative for color change, pallor, rash and wound. Allergic/Immunologic: Negative for environmental allergies, food allergies and immunocompromised state. Neurological:  Negative for dizziness, tremors, seizures, syncope, facial asymmetry, speech difficulty, weakness, light-headedness, numbness and headaches. Hematological:  Negative for adenopathy. Does not bruise/bleed easily. Psychiatric/Behavioral:  Negative for agitation, behavioral problems, confusion, decreased concentration, sleep disturbance and suicidal ideas. The patient is not nervous/anxious. Medications:     Current Outpatient Medications:     vitamin D (ERGOCALCIFEROL) 1.25 MG (04939 UT) CAPS capsule, take 1 capsule by mouth every week, Disp: 12 capsule, Rfl: 1    albuterol sulfate HFA (VENTOLIN HFA) 108 (90 Base) MCG/ACT inhaler, inhale 2 puffs by mouth and INTO THE LUNGS every 6 hours if needed for wheezing, Disp: 18 g, Rfl: 2    mometasone-formoterol (DULERA) 200-5 MCG/ACT inhaler, inhale 2 puffs by mouth and INTO THE LUNGS twice a day, Disp: 49 g, Rfl: 2    lisinopril (PRINIVIL;ZESTRIL) 20 MG tablet, Take 1 tablet by mouth daily, Disp: 90 tablet, Rfl: 1    Liraglutide (VICTOZA) 18 MG/3ML SOPN SC injection, Inject 1.8 mg into the skin daily, Disp: 7 mL, Rfl: 1    Lancets (ONETOUCH DELICA PLUS [id number]) MISC, use 1 LANCET to TEST BLOOD SUGAR three to four times a day, Disp: 100 each, Rfl: 2    insulin glargine (LANTUS SOLOSTAR) 100 UNIT/ML injection pen, Inject 70 Units into the skin nightly, Disp: 21 Adjustable Dose Pre-filled Pen Syringe, Rfl: 1    insulin aspart (NOVOLOG) 100 UNIT/ML injection vial, inject 14 units three times a day PLUS SLIDING SCALE, Disp: 60 mL, Rfl: 2    dapagliflozin (FARXIGA) 10 MG tablet, Take 1 tablet by mouth every morning, Disp: 90 tablet, Rfl: 1    blood glucose test strips (ASCENSIA AUTODISC VI;ONE TOUCH ULTRA TEST VI) strip, 1 each by In Vitro route daily As needed. , Disp: 100 each, Rfl: 3    atorvastatin (LIPITOR) 80 MG tablet, Take 1 tablet by mouth nightly, Disp: 90 tablet, Rfl: 1    aspirin EC 81 MG EC tablet, Take 1 tablet by mouth daily, Disp: 90 tablet, Rfl: 1    Continuous Blood Gluc Sensor (FREESTYLE DIPTI 14 DAY SENSOR) [facility], Use as directed to check BG QID & PRN, Disp: 1 each, Rfl: 3    Continuous Blood Gluc  (FREESTYLE DIPTI 2 READER) [facility], Use as directed to check BG QID & PRN, Disp: 1 each, Rfl: 3    [START ON 1/24/2023] HYDROcodone-acetaminophen (NORCO) 5-325 MG per tablet, Take 1 tablet by mouth every 6 hours as needed for Pain for up to 30 days. Intended supply: 30 days, Disp: 120 tablet, Rfl: 0    Probiotic Acidophilus (FLORANEX) TABS, Take 1 tablet by mouth daily, Disp: 90 tablet, Rfl: 1    nitroGLYCERIN (NITROSTAT) 0.3 MG SL tablet, Place 1 tablet under the tongue every 5 minutes as needed for Chest pain up to max of 3 total doses. If no relief after 1 dose, call 911., Disp: 30 tablet, Rfl: 1    doxycycline hyclate (VIBRA-TABS) 100 MG tablet, Take 1 tablet by mouth 2 times daily for 10 days, Disp: 20 tablet, Rfl: 0    metFORMIN (GLUCOPHAGE) 850 MG tablet, take 1 tablet by mouth three times a day, Disp: 90 tablet, Rfl: 3    Lancets MISC, 1 each by Does not apply route 3 times daily, Disp: 100 each, Rfl: 3    Insulin Pen Needle (DROPLET PEN NEEDLES) 32G X 4 MM MISC, use 1 PEN NEEDLE to inject MEDICATION subcutaneously three to four times a day, Disp: 200 each, Rfl: 2    Metoprolol Tartrate 75 MG TABS, take 1 tablet by mouth twice a day, Disp: , Rfl:     vitamin B-1 (THIAMINE) 100 MG tablet, Take 100 mg by mouth daily, Disp: , Rfl:     vitamin C (ASCORBIC ACID) 500 MG tablet, Take 500 mg by mouth daily, Disp: , Rfl:     Zinc Sulfate (ZINC 15 PO), Take by mouth, Disp: , Rfl:     Inulin (FIBER CHOICE PO), Take by mouth, Disp: , Rfl:     B-D INS SYR ULTRAFINE 1CC/30G 30G X 1/2\" 1 ML MISC, use as directed three times a day, Disp: , Rfl:     FreeStyle Lancets MISC, use 1 LANCET to TEST BLOOD SUGAR three to four times a day, Disp: , Rfl:     Physical Exam:     Vitals:    01/13/23 1329 01/13/23 1343   BP: (!) 136/98 136/88   Pulse: 89    Resp: 16    Temp: 97 °F (36.1 °C)    TempSrc: Temporal    SpO2: 100%    Weight: (!) 355 lb (161 kg)    Height: 6' 4\" (1.93 m)      BP Readings from Last 3 Encounters:   01/13/23 136/88   12/02/22 131/74   11/01/22 (!) 136/92     Wt Readings from Last 3 Encounters:   01/13/23 (!) 355 lb (161 kg)   12/27/22 (!) 350 lb (158.8 kg)   12/02/22 (!) 349 lb (158.3 kg)     Physical Exam  Vitals and nursing note reviewed. Constitutional:       Appearance: Normal appearance. He is obese. HENT:      Head: Normocephalic and atraumatic. Right Ear: Ear canal and external ear normal. A middle ear effusion is present. A PE tube is present. Tympanic membrane is bulging. Left Ear: Tympanic membrane, ear canal and external ear normal. A PE tube is present. Ears:      Comments: Right PE tube has shifted and not draining. Nose: Nose normal.      Mouth/Throat:      Mouth: Mucous membranes are moist.      Pharynx: Oropharynx is clear. Eyes:      Extraocular Movements: Extraocular movements intact. Conjunctiva/sclera: Conjunctivae normal.      Pupils: Pupils are equal, round, and reactive to light. Neck:      Thyroid: No thyroid mass, thyromegaly or thyroid tenderness. Trachea: Trachea normal.   Cardiovascular:      Rate and Rhythm: Normal rate and regular rhythm. Pulses: Normal pulses. Heart sounds: Normal heart sounds. Pulmonary:      Effort: Pulmonary effort is normal.      Breath sounds: Normal breath sounds. Abdominal:      General: Bowel sounds are normal.      Palpations: Abdomen is soft. Musculoskeletal:         General: Normal range of motion. Cervical back: Normal range of motion and neck supple. Lymphadenopathy:      Cervical: No cervical adenopathy. Skin:     General: Skin is warm and dry. Capillary Refill: Capillary refill takes less than 2 seconds. Neurological:      General: No focal deficit present. Mental Status: He is alert and oriented to person, place, and time. Sensory: Sensation is intact. Motor: Motor function is intact. Coordination: Coordination is intact. Gait: Gait is intact.       Deep Tendon Reflexes: Reflexes normal.   Psychiatric:         Attention and Perception: Attention and perception normal.         Mood and Affect: Mood normal.         Speech: Speech normal.         Behavior: Behavior normal.         Thought Content: Thought content normal.         Cognition and Memory: Cognition and memory normal.         Judgment: Judgment normal.     Testing:     Orders Placed This Encounter   Procedures    CBC     Standing Status:   Future     Standing Expiration Date:   2/24/2023    Comprehensive Metabolic Panel     Standing Status:   Future     Standing Expiration Date:   2/24/2023    Lipid Panel     Standing Status:   Future     Standing Expiration Date:   2/24/2023    TSH     Standing Status:   Future     Standing Expiration Date:   2/24/2023    Vitamin D 25 Hydroxy     Standing Status:   Future     Standing Expiration Date:   2/24/2023    Microalbumin / Creatinine Urine Ratio     Standing Status:   Future     Standing Expiration Date:   2/24/2023    POCT glycosylated hemoglobin (Hb A1C)      Recent Results (from the past 24 hour(s))   POCT glycosylated hemoglobin (Hb A1C)    Collection Time: 01/13/23  2:31 PM   Result Value Ref Range    Hemoglobin A1C 7.4 %     Assessment & Plan:     Problem List Items Addressed This Visit       Chronic pain syndrome    Relevant Medications    aspirin EC 81 MG EC tablet    HYDROcodone-acetaminophen (NORCO) 5-325 MG per tablet (Start on 1/24/2023)    Chronic bilateral low back pain with bilateral sciatica    Relevant Medications    insulin glargine (LANTUS SOLOSTAR) 100 UNIT/ML injection pen    aspirin EC 81 MG EC tablet    HYDROcodone-acetaminophen (NORCO) 5-325 MG per tablet (Start on 1/24/2023)    Mixed hyperlipidemia    Relevant Medications    lisinopril (PRINIVIL;ZESTRIL) 20 MG tablet    atorvastatin (LIPITOR) 80 MG tablet    aspirin EC 81 MG EC tablet    nitroGLYCERIN (NITROSTAT) 0.3 MG SL tablet    Other Relevant Orders    CBC    Comprehensive Metabolic Panel    Lipid Panel    TSH    Vitamin D 25 Hydroxy    Microalbumin / Creatinine Urine Ratio    Essential hypertension    Relevant Medications    lisinopril (PRINIVIL;ZESTRIL) 20 MG tablet    CAD in native artery    Relevant Medications lisinopril (PRINIVIL;ZESTRIL) 20 MG tablet    atorvastatin (LIPITOR) 80 MG tablet    aspirin EC 81 MG EC tablet    nitroGLYCERIN (NITROSTAT) 0.3 MG SL tablet    Other Relevant Orders    CBC    Comprehensive Metabolic Panel    Lipid Panel    TSH    Vitamin D 25 Hydroxy    Microalbumin / Creatinine Urine Ratio    Uncontrolled type 2 diabetes mellitus with hypoglycemia and coma (HCC)    Relevant Medications    Liraglutide (VICTOZA) 18 MG/3ML SOPN SC injection    insulin glargine (LANTUS SOLOSTAR) 100 UNIT/ML injection pen    insulin aspart (NOVOLOG) 100 UNIT/ML injection vial    dapagliflozin (FARXIGA) 10 MG tablet     Other Visit Diagnoses       Encounter for general adult medical examination with abnormal findings    -  Primary    Vitamin D deficiency        Relevant Medications    vitamin D (ERGOCALCIFEROL) 1.25 MG (92091 UT) CAPS capsule    Chronic obstructive pulmonary disease, unspecified COPD type (HCC)        Relevant Medications    albuterol sulfate HFA (VENTOLIN HFA) 108 (90 Base) MCG/ACT inhaler    mometasone-formoterol (DULERA) 200-5 MCG/ACT inhaler    Other Relevant Orders    CBC    Comprehensive Metabolic Panel    Lipid Panel    TSH    Vitamin D 25 Hydroxy    Microalbumin / Creatinine Urine Ratio    Non-recurrent acute suppurative otitis media of right ear without spontaneous rupture of tympanic membrane        Relevant Medications    doxycycline hyclate (VIBRA-TABS) 100 MG tablet    Type 2 diabetes mellitus with hyperglycemia, with long-term current use of insulin (HCC)        Relevant Medications    Liraglutide (VICTOZA) 18 MG/3ML SOPN SC injection    Lancets (ONETOUCH DELICA PLUS [id number]) MISC    insulin glargine (LANTUS SOLOSTAR) 100 UNIT/ML injection pen    insulin aspart (NOVOLOG) 100 UNIT/ML injection vial    dapagliflozin (FARXIGA) 10 MG tablet    blood glucose test strips (ASCENSIA AUTODISC VI;ONE TOUCH ULTRA TEST VI) strip    Continuous Blood Gluc Sensor (FREESTYLE DIPTI 14 DAY SENSOR) [facility] Continuous Blood Gluc  (FREESTYLE DIPTI 2 READER) MANNY    Probiotic Acidophilus (FLORANEX) TABS    Other Relevant Orders    POCT glycosylated hemoglobin (Hb A1C) (Completed)    Acute diarrhea        Relevant Medications    Probiotic Acidophilus (FLORANEX) TABS          Will obtain fasting lab work at a later date, will call with results. A1c in office today is 7.4. Continue all medications as prescribed, side effects discussed. OARRS report reviewed and appropriate. Patient is checking blood sugars 4 times a day. He does report occasional hypoglycemic episodes. He would benefit from a continuous glucose monitor, orders placed today. He is up-to-date with a diabetic retinal eye exam today. Patient is to keep follow-up appointment with all specialist.  Right TM tympanostomy tube has shifted and superior to effusion present. Prescription written for doxycycline, side effects and administration instructions discussed. Probiotic on a daily basis. Follow a mediterranean diet to reduce risk of stroke/heart attack. This includes emphasizing intake of vegetables, fruits, legumes, nuts, whole grains, and fish. Replace saturated fats with monounsaturated and polyunsaturated fats. Avoid trans fat. Limit sodium and cholesterol in diet. Minimize processed foods, refined carbs, and sweetened drinks. Try to get 150 minutes per week of moderate intensity (brisk walking, biking 5 mph, recreational swimming and yoga) OR 75 minutes per week of vigorous intensity (jogging/running, swimming laps, biking >10mph) of aerobic activity. However, any amount of exercise is beneficial.     Counseled patient regarding above diagnosis, including possible risks and complications, especially if left uncontrolled.   Counseled patient as appropriate and relevant regarding any possible side effects, risks, and alternatives to treatment; the patient and/or guardian verbalizes understanding, and is in agreement with the plan as detailed above. All educational materials and instructions were discussed and included on the After Visit Summary. All questions answered to the patient's satisfaction. The patient was advised to call or send Foxtrot message for any concerns prior to next appointment. Return in about 3 months (around 4/13/2023) for 3 month f/u.   ZIA Shaver NP

## 2023-01-17 ENCOUNTER — TELEPHONE (OUTPATIENT)
Dept: PRIMARY CARE CLINIC | Age: 48
End: 2023-01-17

## 2023-01-17 ENCOUNTER — TELEPHONE (OUTPATIENT)
Dept: ENT CLINIC | Age: 48
End: 2023-01-17

## 2023-01-17 DIAGNOSIS — E78.2 MIXED HYPERLIPIDEMIA: ICD-10-CM

## 2023-01-17 DIAGNOSIS — J44.9 CHRONIC OBSTRUCTIVE PULMONARY DISEASE, UNSPECIFIED COPD TYPE (HCC): ICD-10-CM

## 2023-01-17 DIAGNOSIS — I25.10 CAD IN NATIVE ARTERY: ICD-10-CM

## 2023-01-17 LAB
ALBUMIN SERPL-MCNC: 4.1 G/DL (ref 3.5–5.2)
ALP BLD-CCNC: 113 U/L (ref 40–129)
ALT SERPL-CCNC: 18 U/L (ref 0–40)
ANION GAP SERPL CALCULATED.3IONS-SCNC: 17 MMOL/L (ref 7–16)
AST SERPL-CCNC: 14 U/L (ref 0–39)
BILIRUB SERPL-MCNC: 0.3 MG/DL (ref 0–1.2)
BUN BLDV-MCNC: 22 MG/DL (ref 6–20)
CALCIUM SERPL-MCNC: 10 MG/DL (ref 8.6–10.2)
CHLORIDE BLD-SCNC: 98 MMOL/L (ref 98–107)
CHOLESTEROL, TOTAL: 196 MG/DL (ref 0–199)
CO2: 24 MMOL/L (ref 22–29)
CREAT SERPL-MCNC: 0.7 MG/DL (ref 0.7–1.2)
CREATININE URINE: 80 MG/DL (ref 40–278)
GFR SERPL CREATININE-BSD FRML MDRD: >60 ML/MIN/1.73
GLUCOSE BLD-MCNC: 117 MG/DL (ref 74–99)
HCT VFR BLD CALC: 48.8 % (ref 37–54)
HDLC SERPL-MCNC: 62 MG/DL
HEMOGLOBIN: 16.6 G/DL (ref 12.5–16.5)
LDL CHOLESTEROL CALCULATED: 116 MG/DL (ref 0–99)
MCH RBC QN AUTO: 30.8 PG (ref 26–35)
MCHC RBC AUTO-ENTMCNC: 34 % (ref 32–34.5)
MCV RBC AUTO: 90.5 FL (ref 80–99.9)
MICROALBUMIN UR-MCNC: 20.2 MG/L
MICROALBUMIN/CREAT UR-RTO: 25.3 (ref 0–30)
PDW BLD-RTO: 13 FL (ref 11.5–15)
PLATELET # BLD: 301 E9/L (ref 130–450)
PMV BLD AUTO: 8.7 FL (ref 7–12)
POTASSIUM SERPL-SCNC: 4 MMOL/L (ref 3.5–5)
RBC # BLD: 5.39 E12/L (ref 3.8–5.8)
SODIUM BLD-SCNC: 139 MMOL/L (ref 132–146)
TOTAL PROTEIN: 7.5 G/DL (ref 6.4–8.3)
TRIGL SERPL-MCNC: 90 MG/DL (ref 0–149)
TSH SERPL DL<=0.05 MIU/L-ACNC: 0.83 UIU/ML (ref 0.27–4.2)
VITAMIN D 25-HYDROXY: 29 NG/ML (ref 30–100)
VLDLC SERPL CALC-MCNC: 18 MG/DL
WBC # BLD: 10.6 E9/L (ref 4.5–11.5)

## 2023-01-17 RX ORDER — FLASH GLUCOSE SENSOR
KIT MISCELLANEOUS
Qty: 1 EACH | Refills: 5 | Status: SHIPPED | OUTPATIENT
Start: 2023-01-17

## 2023-01-17 NOTE — TELEPHONE ENCOUNTER
Patient is requesting to have appointment with Dr Vadim Juarez- he saw his PCP for right side ear pain, she said the tube has started to come out but it is tilted upward so it is not allowing anything to drain and he has fluid build up in ears. She did give him an oral abx she did not give drops due to the tube being tilted up/not draining. He was advised to see Dr Vadim Juarez. Please contact patient to schedule as no soon appointments are open.

## 2023-01-17 NOTE — TELEPHONE ENCOUNTER
MA spoke with patient, scheduled 1/24 with Dr. Kevin Perez    Electronically signed by Carla Goel MA on 1/17/23 at 3:22 PM EST

## 2023-01-17 NOTE — TELEPHONE ENCOUNTER
Pt said that when you called in the freestyle monitoring system you called in the wrong sensors.   He needs freestyle faustino 2 sensors called in   Kamari aid [place]

## 2023-01-24 ENCOUNTER — OFFICE VISIT (OUTPATIENT)
Dept: ENT CLINIC | Age: 48
End: 2023-01-24
Payer: COMMERCIAL

## 2023-01-24 ENCOUNTER — PROCEDURE VISIT (OUTPATIENT)
Dept: AUDIOLOGY | Age: 48
End: 2023-01-24
Payer: COMMERCIAL

## 2023-01-24 VITALS — HEIGHT: 76 IN | BODY MASS INDEX: 38.36 KG/M2 | WEIGHT: 315 LBS

## 2023-01-24 DIAGNOSIS — H69.83 DYSFUNCTION OF BOTH EUSTACHIAN TUBES: Primary | ICD-10-CM

## 2023-01-24 DIAGNOSIS — H69.82 ETD (EUSTACHIAN TUBE DYSFUNCTION), LEFT: ICD-10-CM

## 2023-01-24 PROCEDURE — G8427 DOCREV CUR MEDS BY ELIG CLIN: HCPCS | Performed by: OTOLARYNGOLOGY

## 2023-01-24 PROCEDURE — 69433 CREATE EARDRUM OPENING: CPT | Performed by: OTOLARYNGOLOGY

## 2023-01-24 PROCEDURE — G8417 CALC BMI ABV UP PARAM F/U: HCPCS | Performed by: OTOLARYNGOLOGY

## 2023-01-24 PROCEDURE — 99211 OFF/OP EST MAY X REQ PHY/QHP: CPT | Performed by: OTOLARYNGOLOGY

## 2023-01-24 PROCEDURE — 92567 TYMPANOMETRY: CPT | Performed by: AUDIOLOGIST

## 2023-01-24 NOTE — PROGRESS NOTES
Subjective:      Patient ID:  Faisal Boston is a 56 y.o. male. HPI Comments: Pt returns for check of ear tubes, there have been infections since last visit. Pt had issue 10 days ago with pain and PCP used Abx oral pt is better. No drainage present.      Tubes were placed 2022     Past Medical History:   Diagnosis Date    CAD (coronary artery disease)     Dr Spencer Boyer    COPD (chronic obstructive pulmonary disease) ([facility] [address]     Diabetes mellitus ([facility] [address]     Hyperlipidemia     Hypertension     Internal carotid artery occlusion, right 2020    Nasopharyngeal mass 2021    For OR 21    Neuropathy     Obesity     Shoulder problem      Past Surgical History:   Procedure Laterality Date    CARDIAC CATHETERIZATION  2020    Dr. Chely Ozuna- multi vessel disease    MITRAL VALVE REPAIR N/A 3/16/2020    CORONARY ARTERY BYPASS POSSIBLE LEFT RADIAL ARTERY HARVEST, PATRICE performed by Lisa Xavier MD at [facility] [address] N/A 2021    NASOPHARYNGEAL BIOPSY performed by Kip Solares DO at [place]  2017    resection of mediastinal mass    TONSILLECTOMY      TRANSESOPHAGEAL ECHOCARDIOGRAM  2020    TYMPANOSTOMY TUBE PLACEMENT Right 2021    POSSIBLE RIGHT EAR TUBE POSSIBLE EUSTACHIAN TUBE DILATION performed by Kip Solares DO at [facility] OR     Family History   Problem Relation Age of Onset    Heart Disease Mother         MI age 39     High Blood Pressure Mother     Diabetes Father     Heart Disease Father         MI    High Blood Pressure Father     High Cholesterol Father     Cancer Father     Stroke Father     Kidney Disease Father         dialysis    High Blood Pressure Sister     Other Brother     COPD Brother     High Blood Pressure Brother      Social History     Socioeconomic History    Marital status: Single     Spouse name: None    Number of children: None    Years of education: None    Highest education level: None   Occupational History    Occupation: [employer] [address] [place] [address]     Employer: NOT EMPLOYED   Tobacco Use    Smoking status: Former     Packs/day: 1.00     Years: 25.00     Pack years: 25.00     Types: Cigarettes     Start date: 1994     Quit date: 3/14/2020     Years since quittin.8    Smokeless tobacco: Current     Types: Chew, Snuff   Vaping Use    Vaping Use: Never used   Substance and Sexual Activity    Alcohol use: No    Drug use: No     Social Determinants of Health     Financial Resource Strain: Low Risk     Difficulty of Paying Living Expenses: Not hard at all   Food Insecurity: No Food Insecurity    Worried About Running Out of Food in the Last Year: Never true    Ran Out of Food in the Last Year: Never true     Allergies   Allergen Reactions    Sulfa Antibiotics Other (See Comments)     Unknown reaction       Review of Systems   Constitutional: Negative. Negative for crying and unexpected weight change. HENT: EAR DISCHARGE: No; EAR PAIN: No  Eyes: Negative. Negative for visual disturbance. Respiratory: Negative. Negative for stridor. Cardiovascular: Negative. Negative for chest pain. Gastrointestinal: Negative. Negative for abdominal distention, nausea and vomiting. Skin: Negative. Negative for color change. Neurological: Negative for facial asymmetry. Hematological: Negative. Psychiatric/Behavioral: Negative. Negative for hallucinations. All other systems reviewed and are negative. Objective: There were no vitals filed for this visit. Physical Exam   Constitutional: Patient appears well-developed and well-nourished. HENT:   Head: Normocephalic and atraumatic. There is normal jaw occlusion.      Right Ear:   Cerumen Impaction: No  PE tube visualized: Yes   In the TM: No   Tube blocked: No   Drainage: No   Infection: No    Left Ear:   Cerumen Impaction: No  PE tube visualized: Yes   In the TM: Yes   Tube blocked: No   Drainage: No   Infection: No      Nose: Nose normal.   Mouth/Throat: Mucous membranes are moist. Dentition is normal. Oropharynx is clear. Tonsil:      Eyes: Conjunctivae and EOM are normal. Pupils are equal, round, and reactive to light. Neck: Normal range of motion. Neck supple. Cardiovascular: Regular rhythm,    Pulmonary/Chest: Effort normal and breath sounds normal.   Abdominal: Full and soft. Musculoskeletal: Normal range of motion. Neurological: Alert. Skin: Skin is warm. Procedure Note  Pre-operative Diagnosis: eustachian tube dysfunction    Post-operative Diagnosis: same    Anesthesia: phenol topical     Procedure Details: Pt was consented, placed in the supine position        Right   A microscope was brought in and a speculum was placed in the right ear and the external auditory canal was cleared of cerumen with a currette. With the tympanic membrane visualized, there was not infection and was not effusion present. A small cotton swab dipped in phenol topical solution was placed against the anterior-inferior portion of the TM until the membrane turned white. A myringotomy knife was used to make an incision in the anterior-inferior portion of the TM. Effusion was removed with a #3 yuliet tip suction until the middle ear space was cleared. T tube was placed in the ear incision    Condition:  Stable. Patient tolerated procedure well. Complications:           Assessment:       Diagnosis Orders   1. Dysfunction of both eustachian tubes        2. ETD (Eustachian tube dysfunction), left                   Plan:      Right tube replaced today. Recheck bilateral ear tube. Follow up in 1 month(s). Return to office earlier if there is an unresolved infection unresponsive to drops.

## 2023-01-24 NOTE — PROGRESS NOTES
This patient was referred for tympanometric testing by Dr. Cori Hinson due to  right ear pain . Patient has history of tubes in both ears. Tympanometry revealed  large physical volume, in the right ear and no seal obtainable, in the left ear. The results were reviewed with the patient. Recommendations for follow up will be made pending physician consult. CRUZITO Voss.   Audiology Intern (4th Year)     Electronically signed by Romulo Parks on 1/24/2023 at 3:09 PM

## 2023-01-30 ENCOUNTER — OFFICE VISIT (OUTPATIENT)
Dept: FAMILY MEDICINE CLINIC | Age: 48
End: 2023-01-30
Payer: COMMERCIAL

## 2023-01-30 VITALS
OXYGEN SATURATION: 100 % | HEART RATE: 80 BPM | BODY MASS INDEX: 38.36 KG/M2 | DIASTOLIC BLOOD PRESSURE: 86 MMHG | WEIGHT: 315 LBS | HEIGHT: 76 IN | TEMPERATURE: 97.4 F | RESPIRATION RATE: 18 BRPM | SYSTOLIC BLOOD PRESSURE: 132 MMHG

## 2023-01-30 DIAGNOSIS — J02.0 ACUTE STREPTOCOCCAL PHARYNGITIS: ICD-10-CM

## 2023-01-30 DIAGNOSIS — J02.9 SORE THROAT: Primary | ICD-10-CM

## 2023-01-30 LAB — S PYO AG THROAT QL: POSITIVE

## 2023-01-30 PROCEDURE — 99213 OFFICE O/P EST LOW 20 MIN: CPT | Performed by: NURSE PRACTITIONER

## 2023-01-30 PROCEDURE — G8427 DOCREV CUR MEDS BY ELIG CLIN: HCPCS | Performed by: NURSE PRACTITIONER

## 2023-01-30 PROCEDURE — G8484 FLU IMMUNIZE NO ADMIN: HCPCS | Performed by: NURSE PRACTITIONER

## 2023-01-30 PROCEDURE — 3079F DIAST BP 80-89 MM HG: CPT | Performed by: NURSE PRACTITIONER

## 2023-01-30 PROCEDURE — 3075F SYST BP GE 130 - 139MM HG: CPT | Performed by: NURSE PRACTITIONER

## 2023-01-30 PROCEDURE — 87880 STREP A ASSAY W/OPTIC: CPT | Performed by: NURSE PRACTITIONER

## 2023-01-30 PROCEDURE — 4004F PT TOBACCO SCREEN RCVD TLK: CPT | Performed by: NURSE PRACTITIONER

## 2023-01-30 PROCEDURE — G8417 CALC BMI ABV UP PARAM F/U: HCPCS | Performed by: NURSE PRACTITIONER

## 2023-01-30 RX ORDER — AMOXICILLIN 500 MG/1
500 CAPSULE ORAL 2 TIMES DAILY
Qty: 20 CAPSULE | Refills: 0 | Status: SHIPPED | OUTPATIENT
Start: 2023-01-30 | End: 2023-02-09

## 2023-01-30 NOTE — PROGRESS NOTES
Chief Complaint:   Pharyngitis      History of Present Illness   Source of history provided by:  patient. Darryn Smith is a 56 y.o. old male who presents to the [facility] care for sore throat. Pt states sore throat began 1 days ago. States they have headache, sore throat, nasal congestion, and rhinorrhea. Denies any fever, chills, chest congestion, chest pain, shortness of breath, nausea, vomiting, lethargy, body aches, otalgia, and malaise         Exposed To: Streptococcus: yes. Infectious Mononucleosis:  no.     Review of Systems   Unless otherwise stated in this report or unable to obtain because of the patient's clinical or mental status as evidenced by the medical record, this patients's positive and negative responses for Review of Systems, constitutional, psych, eyes, ENT, cardiovascular, respiratory, gastrointestinal, neurological, genitourinary, musculoskeletal, integument systems and systems related to the presenting problem are either stated in the preceding or were not pertinent or were negative for the symptoms and/or complaints related to the medical problem. Past Medical History:  has a past medical history of CAD (coronary artery disease), COPD (chronic obstructive pulmonary disease) ([facility] [address] Diabetes mellitus ([facility] [address] Hyperlipidemia, Hypertension, Internal carotid artery occlusion, right, Nasopharyngeal mass, Neuropathy, Obesity, and Shoulder problem. Past Surgical History:  has a past surgical history that includes Thoracoscopy (04/28/2017); Cardiac catheterization (02/11/2020); transesophageal echocardiogram (03/03/2020); Mitral valve repair (N/A, 3/16/2020); Tonsillectomy; Nose surgery (N/A, 11/22/2021); and Tympanostomy tube placement (Right, 11/22/2021). Social History:  reports that he quit smoking about 2 years ago. His smoking use included cigarettes. He started smoking about 28 years ago. He has a 25.00 pack-year smoking history.  His smokeless tobacco use includes chew and snuff. He reports that he does not drink alcohol and does not use drugs. Family History: family history includes COPD in his brother; Cancer in his father; Diabetes in his father; Heart Disease in his father and mother; High Blood Pressure in his brother, father, mother, and sister; High Cholesterol in his father; Kidney Disease in his father; Other in his brother; Stroke in his father. Allergies: Sulfa antibiotics    Physical Exam   Vital Signs:   Vitals:    01/30/23 1313   BP: 132/86   Pulse: 80   Resp: 18   Temp: 97.4 °F (36.3 °C)   TempSrc: Temporal   SpO2: 100%   Weight: (!) 350 lb (158.8 kg)   Height: 6' 4\" (1.93 m)     Oxygen Saturation Interpretation: Normal.    Constitutional:  Alert, development consistent with age. .  Ears:  TMs without perforation, injection, or bulging. External canals clear without exudate. Throat: Airway patent. Posterior pharynx with erythema and s/p tonsillectomy. There is not exudate noted. Neck:  Supple with good ROM. There is anterior bilateral adenopathy. Lungs:  Clear to auscultation and breath sounds equal.    CV: Regular rate and rhythm, normal heart sounds, without pathological murmurs, ectopy, gallops, or rubs. Abdomen:  Soft, nontender, good bowel sounds. No firm or pulsatile mass. No hepatosplenomegaly. Skin:  No rashes, erythema present. Lymphatics: No lymphangitis or adenopathy noted other then stated above. Neurological:  Alert and orientated. Motor functions intact. Responds to commands. Test Results Section   (All laboratory and radiology results have been personally reviewed by myself)  Labs:  Results for orders placed or performed in visit on 01/30/23   POCT rapid strep A   Result Value Ref Range    Strep A Ag Positive (A) None Detected       Imaging: All Radiology results interpreted by Radiologist unless otherwise noted. No results found.     Assessment / Plan     Impression(s):  Hilario Chan was seen today for pharyngitis. Diagnoses and all orders for this visit:    Sore throat  -     POCT rapid strep A    Acute streptococcal pharyngitis  -     amoxicillin (AMOXIL) 500 MG capsule; Take 1 capsule by mouth 2 times daily for 10 days      Rapid strep in office is positive. The patient will be started on amoxicillin, side effects and administration instructions discussed. Patient advised to dispose of toothbrush after 24 hours of antibiotic therapy. New toothbrush to be used during duration of antibiotics. Change toothbrush again once antibiotics are complete. No sharing drinks, cups, utensils. Patient aware that they are contagious for up to 24 hours after the start of antibiotics. Increase fluids and rest. NSAIDs prn pain/fever. F/u PCP in 5-7 days if symptoms persist. ED sooner if symptoms worsen or change. ED immediately with the development of refractory fever, shaking chills, dyspnea, dysphagia, neck stiffness, vomiting, etc. Pt is in agreement with this care plan. All questions answered. No follow-ups on file.     Shabana Wilkes, APRN - NP

## 2023-02-02 DIAGNOSIS — I10 ESSENTIAL HYPERTENSION: ICD-10-CM

## 2023-02-03 RX ORDER — LISINOPRIL 40 MG/1
TABLET ORAL
Qty: 90 TABLET | Refills: 1 | OUTPATIENT
Start: 2023-02-03

## 2023-02-03 NOTE — TELEPHONE ENCOUNTER
Patient does not take the 40mg, only 20. He said he has told the pharmacy that but they haven't taken it off his record. Pended the 20mg.

## 2023-02-05 DIAGNOSIS — I25.10 CAD IN NATIVE ARTERY: ICD-10-CM

## 2023-02-05 RX ORDER — LISINOPRIL 20 MG/1
20 TABLET ORAL DAILY
Qty: 90 TABLET | Refills: 1 | Status: SHIPPED | OUTPATIENT
Start: 2023-02-05

## 2023-02-06 RX ORDER — NITROGLYCERIN 0.3 MG/1
0.3 TABLET SUBLINGUAL EVERY 5 MIN PRN
Qty: 100 TABLET | Refills: 1 | Status: SHIPPED | OUTPATIENT
Start: 2023-02-06

## 2023-02-06 NOTE — TELEPHONE ENCOUNTER
Last Appointment:  1/30/2023  Future Appointments   Date Time Provider Larisa Salazar   2/15/2023  9:00 AM Neel Polanco MD [address] [facility]   2/15/2023  9:45 [facility]   3/27/2023  8:10 AM [facility]   4/14/2023  7:45 AM Leeanne Breen APRN - NP [facility]   4/20/2023  9:15 AM [facility] US RM [address] [facility]   4/20/2023  9:30 AM Harika Bennett MD [facility]/MED [facility]   6/2/2023 10:45 AM Khalida Estevez DO [place] [facility]

## 2023-02-15 ENCOUNTER — OFFICE VISIT (OUTPATIENT)
Dept: CARDIOLOGY CLINIC | Age: 48
End: 2023-02-15

## 2023-02-15 VITALS
BODY MASS INDEX: 38.36 KG/M2 | RESPIRATION RATE: 18 BRPM | DIASTOLIC BLOOD PRESSURE: 84 MMHG | SYSTOLIC BLOOD PRESSURE: 128 MMHG | WEIGHT: 315 LBS | HEART RATE: 78 BPM | HEIGHT: 76 IN

## 2023-02-15 DIAGNOSIS — E78.2 MIXED HYPERLIPIDEMIA: ICD-10-CM

## 2023-02-15 DIAGNOSIS — I10 HYPERTENSION, UNSPECIFIED TYPE: ICD-10-CM

## 2023-02-15 DIAGNOSIS — I25.10 CAD IN NATIVE ARTERY: Primary | ICD-10-CM

## 2023-02-15 DIAGNOSIS — G89.29 CHRONIC BILATERAL LOW BACK PAIN WITH BILATERAL SCIATICA: ICD-10-CM

## 2023-02-15 DIAGNOSIS — M54.41 CHRONIC BILATERAL LOW BACK PAIN WITH BILATERAL SCIATICA: ICD-10-CM

## 2023-02-15 DIAGNOSIS — E11.42 DIABETIC POLYNEUROPATHY ASSOCIATED WITH TYPE 2 DIABETES MELLITUS (HCC): ICD-10-CM

## 2023-02-15 DIAGNOSIS — I10 ESSENTIAL HYPERTENSION: ICD-10-CM

## 2023-02-15 DIAGNOSIS — M54.42 CHRONIC BILATERAL LOW BACK PAIN WITH BILATERAL SCIATICA: ICD-10-CM

## 2023-02-15 RX ORDER — EZETIMIBE 10 MG/1
10 TABLET ORAL DAILY
Qty: 90 TABLET | Refills: 3 | Status: SHIPPED | OUTPATIENT
Start: 2023-02-15

## 2023-02-15 NOTE — PROGRESS NOTES
Out Patient CARDIOLOGY FOLLOW UP    Name: Prabhjot Damon    Age: 56 y.o. Date of Service: 2/15/2023      Referring Physician: No admitting provider for patient encounter. Chief Complaint   Patient presents with    Follow-up     1 yr ov        History of Present Illness: 61-year-old male with history of COPD, diabetes, strong family history of obstructive coronary artery disease, hypertension, morbid obesity, peripheral neuropathy, prior history of tobacco abuse quit, and coronary artery disease status post CABG x4 vessel in March 2020 with LIMA to LAD, radial to OM, SVG to RCA, and SVG to PDA. He also has history of palpitations and report prior to CABG he was having significant palpitations but after CABG palpitation has significantly improved. He presented for follow-up visit today and reports overall doing well and denies any symptoms. His LDL is not at goal and subsequently Zetia was added. He will follow-up in 1 year but is advised to call for earlier visit if symptoms okay he verbalized understanding.    :  Medical History:  COPD. Diabetes mellitus. Hyperlipidemia. Total cholesterol 188, HDL 48,  (03/2019). Repeat lipid panel 02/05/2030: Total cholesterol 213, triglycerides 112, HDL 59,   Hypertension. No history of MI, CHF, CVA. Neuropathy. Obesity. BMI 40/2020. Resection of a left anterior mediastinal mass, 04/28/2017 (Dr. Barbara Stevenson). Cigarette abuse. Right carotid stenosis by ultrasound [facility], 2017. Lexiscan stress MPS, 12/20/2019. Fixed inferolateral defect. Mild inferior hypokinesis. EF 50%. No reversible defect. Echo, 12/20/2019. No LV dilatation. Mild good concentric LVH. Normal EF. No WMA. Mild to moderate central mitral regurgitation jet. Coronary CT angiogram 01/31/2020: Calcium score 367. Left main no stenosis. LAD extensive calcified and noncalcified plaque without stenosis. Left circumflex not well demonstrated.   Possibly occluded. Right coronary artery not well demonstrated. Possible severe stenosis mid RCA. EF 60%. Ambulatory monitor 01/10 through 01/16/2020: Sinus rhythm. No atrial fibrillation. No prolonged pauses. Occasional PVCs without complex repetitive forms. Cardiac catheterization 02/11/2020: LM 0% stenosis. LAD 70% stenosis. D2 90%. Circumflex 99%. OM 99%. Dominant RCA 85% stenosis. PDA 85% stenosis. CABG x 4 03/16/2020: LIMA-LAD, radial OM, GSV RCA sequential to PDA  Outpatient cardiac assessment 07/01/2020: Stable cardiac status.   Lisinopril restarted     Review of Systems:   Cardiac: As per HPI  General: Denies fever or chills  Pulmonary: As per HPI  HEENT: Denies runny nose  GI: No complaints  : No complaints  Endocrine: Denies night sweats  Musculoskeletal: No complaints  Skin: Dry skin  Neuro: No complaints  Psych: Denies depression    Past Medical History:  Past Medical History:   Diagnosis Date    CAD (coronary artery disease)     Dr Chalo Louis    COPD (chronic obstructive pulmonary disease) ([facility] [address]     Diabetes mellitus ([facility] [address]     Hyperlipidemia     Hypertension     Internal carotid artery occlusion, right 03/16/2020    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 Isabel Brothers- multi vessel disease    MITRAL VALVE REPAIR N/A 3/16/2020    CORONARY ARTERY BYPASS POSSIBLE LEFT RADIAL ARTERY HARVEST, PATRICE performed by Vincent Weber MD at [facility] [address]. N/A 11/22/2021    NASOPHARYNGEAL BIOPSY performed by Shaina Gunter DO at [facility]  04/28/2017    resection of mediastinal mass    TONSILLECTOMY      TRANSESOPHAGEAL ECHOCARDIOGRAM  03/03/2020    TYMPANOSTOMY TUBE PLACEMENT Right 11/22/2021    POSSIBLE RIGHT EAR TUBE POSSIBLE EUSTACHIAN TUBE DILATION performed by Shaina Gunter DO at [address] [address] Family History:  Family History   Problem Relation Age of Onset    Heart Disease Mother         MI age 39     High Blood Pressure Mother     Diabetes Father     Heart Disease Father         MI    High Blood Pressure Father     High Cholesterol Father     Cancer Father     Stroke Father     Kidney Disease Father         dialysis    High Blood Pressure Sister     Other Brother     COPD Brother     High Blood Pressure Brother        Social History:  Social History     Socioeconomic History    Marital status: Single     Spouse name: Not on file    Number of children: Not on file    Years of education: Not on file    Highest education level: Not on file   Occupational History    Occupation: [employer] [address] [place] [address]     Employer: NOT EMPLOYED   Tobacco Use    Smoking status: Former     Packs/day: 1.00     Years: 25.00     Pack years: 25.00     Types: Cigarettes     Start date: 1994     Quit date: 3/14/2020     Years since quittin.9    Smokeless tobacco: Current     Types: Chew, Snuff   Vaping Use    Vaping Use: Never used   Substance and Sexual Activity    Alcohol use: No    Drug use: No    Sexual activity: Not on file   Other Topics Concern    Not on file   Social History Narrative    Not on file     Social Determinants of Health     Financial Resource Strain: Low Risk     Difficulty of Paying Living Expenses: Not hard at all   Food Insecurity: No Food Insecurity    Worried About Running Out of Food in the Last Year: Never true    Ran Out of Food in the Last Year: Never true   Transportation Needs: Not on file   Physical Activity: Not on file   Stress: Not on file   Social Connections: Not on file   Intimate Partner Violence: Not on file   Housing Stability: Not on file       Allergies: Allergies   Allergen Reactions    Sulfa Antibiotics Other (See Comments)     Unknown reaction       Home Medications:  Prior to Admission medications    Medication Sig Start Date End Date Taking? Authorizing Provider   nitroGLYCERIN (NITROSTAT) 0.3 MG SL tablet PLACE 1 TABLET UNDER THE TONGUE EVERY 5 MINUTES AS NEEDED FOR CHEST PAIN UP TO MAX OF 3 TOTAL DOSES. IF NO RELIEF AFTER 1 DOSE, CALL 911. 2/6/23  Yes ZIA Singh NP   lisinopril (PRINIVIL;ZESTRIL) 20 MG tablet Take 1 tablet by mouth daily 2/5/23  Yes ZIA Singh NP   Continuous Blood Gluc Sensor (FREESTYLE DIPTI 2 SENSOR) [facility] Use as directed 1/17/23  Yes ZIA Singh NP   vitamin D (ERGOCALCIFEROL) 1.25 MG (87364 UT) CAPS capsule take 1 capsule by mouth every week 1/13/23  Yes ZIA Singh NP   albuterol sulfate HFA (VENTOLIN HFA) 108 (90 Base) MCG/ACT inhaler inhale 2 puffs by mouth and INTO THE LUNGS every 6 hours if needed for wheezing 1/13/23  Yes ZIA Singh NP   mometasone-formoterol [facility]) 200-5 MCG/ACT inhaler inhale 2 puffs by mouth and INTO THE LUNGS twice a day 1/13/23  Yes ZIA Singh NP   Liraglutide (VICTOZA) 18 MG/3ML SOPN SC injection Inject 1.8 mg into the skin daily 1/13/23  Yes ZIA Singh NP   Lancets (Yamel Gentile) [address] use 1 LANCET to TEST BLOOD SUGAR three to four times a day 1/13/23  Yes ZIA Singh NP   insulin glargine (LANTUS SOLOSTAR) 100 UNIT/ML injection pen Inject 70 Units into the skin nightly 1/13/23  Yes ZIA Singh NP   insulin aspart (NOVOLOG) 100 UNIT/ML injection vial inject 14 units three times a day PLUS SLIDING SCALE 1/13/23  Yes ZIA Singh NP   dapagliflozin (FARXIGA) 10 MG tablet Take 1 tablet by mouth every morning 1/13/23  Yes ZIA Singh NP   blood glucose test strips (ASCENSIA AUTODISC VI;ONE TOUCH ULTRA TEST VI) strip 1 each by In Vitro route daily As needed.  1/13/23  Yes ZIA Singh NP   atorvastatin (LIPITOR) 80 MG tablet Take 1 tablet by mouth nightly 1/13/23  Yes ZIA Singh NP   aspirin EC 81 MG EC tablet Take 1 tablet by mouth daily 1/13/23  Yes ZIA Singh NP Continuous Blood Gluc Sensor (FREESTYLE DIPTI 14 DAY SENSOR) MISC Use as directed to check BG QID & PRN 1/13/23  Yes ZIA Lucero NP   Continuous Blood Gluc  (FREESTYLE DIPTI 2 READER) MANNY Use as directed to check BG QID & PRN 1/13/23  Yes ZIA Lucero NP   HYDROcodone-acetaminophen (NORCO) 5-325 MG per tablet Take 1 tablet by mouth every 6 hours as needed for Pain for up to 30 days. Intended supply: 30 days 1/24/23 2/23/23 Yes ZIA Lucero NP   metFORMIN (GLUCOPHAGE) 850 MG tablet take 1 tablet by mouth three times a day 9/27/22  Yes ZIA Lucero NP   Lancets MISC 1 each by Does not apply route 3 times daily 9/27/22 2/15/23 Yes ZIA Lucero NP   Insulin Pen Needle (DROPLET PEN NEEDLES) 32G X 4 MM MISC use 1 PEN NEEDLE to inject MEDICATION subcutaneously three to four times a day 9/27/22  Yes ZIA Lucero NP   Metoprolol Tartrate 75 MG TABS take 1 tablet by mouth twice a day 5/30/22  Yes Historical Provider, MD   vitamin B-1 (THIAMINE) 100 MG tablet Take 100 mg by mouth daily   Yes Historical Provider, MD   vitamin C (ASCORBIC ACID) 500 MG tablet Take 500 mg by mouth daily   Yes Historical Provider, MD   Zinc Sulfate (ZINC 15 PO) Take by mouth   Yes Historical Provider, MD   Inulin (FIBER CHOICE PO) Take by mouth   Yes Historical Provider, MD TOM INS SYR ULTRAFINE 1CC/30G 30G X 1/2\" 1 ML MISC use as directed three times a day 12/8/21  Yes Historical Provider, MD   FreeStyle Lancets MISC use 1 LANCET to TEST BLOOD SUGAR three to four times a day 1/6/22  Yes Historical Provider, MD       Current Medications:  Current Outpatient Medications   Medication Sig Dispense Refill    nitroGLYCERIN (NITROSTAT) 0.3 MG SL tablet PLACE 1 TABLET UNDER THE TONGUE EVERY 5 MINUTES AS NEEDED FOR CHEST PAIN UP TO MAX OF 3 TOTAL DOSES.  IF NO RELIEF AFTER 1 DOSE, CALL 911. 100 tablet 1    lisinopril (PRINIVIL;ZESTRIL) 20 MG tablet Take 1 tablet by mouth daily 90 tablet 1    Continuous Blood Gluc Sensor (FREESTYLE DIPTI 2 SENSOR) MISC Use as directed 1 each 5    vitamin D (ERGOCALCIFEROL) 1.25 MG (43278 UT) CAPS capsule take 1 capsule by mouth every week 12 capsule 1    albuterol sulfate HFA (VENTOLIN HFA) 108 (90 Base) MCG/ACT inhaler inhale 2 puffs by mouth and INTO THE LUNGS every 6 hours if needed for wheezing 18 g 2    mometasone-formoterol (DULERA) 200-5 MCG/ACT inhaler inhale 2 puffs by mouth and INTO THE LUNGS twice a day 49 g 2    Liraglutide (VICTOZA) 18 MG/3ML SOPN SC injection Inject 1.8 mg into the skin daily 7 mL 1    Lancets (ONETOUCH DELICA PLUS [id number]) MISC use 1 LANCET to TEST BLOOD SUGAR three to four times a day 100 each 2    insulin glargine (LANTUS SOLOSTAR) 100 UNIT/ML injection pen Inject 70 Units into the skin nightly 21 Adjustable Dose Pre-filled Pen Syringe 1    insulin aspart (NOVOLOG) 100 UNIT/ML injection vial inject 14 units three times a day PLUS SLIDING SCALE 60 mL 2    dapagliflozin (FARXIGA) 10 MG tablet Take 1 tablet by mouth every morning 90 tablet 1    blood glucose test strips (ASCENSIA AUTODISC VI;ONE TOUCH ULTRA TEST VI) strip 1 each by In Vitro route daily As needed. 100 each 3    atorvastatin (LIPITOR) 80 MG tablet Take 1 tablet by mouth nightly 90 tablet 1    aspirin EC 81 MG EC tablet Take 1 tablet by mouth daily 90 tablet 1    Continuous Blood Gluc Sensor (FREESTYLE DIPTI 14 DAY SENSOR) MISC Use as directed to check BG QID & PRN 1 each 3    Continuous Blood Gluc  (FREESTYLE DIPTI 2 READER) MANNY Use as directed to check BG QID & PRN 1 each 3    HYDROcodone-acetaminophen (NORCO) 5-325 MG per tablet Take 1 tablet by mouth every 6 hours as needed for Pain for up to 30 days.  Intended supply: 30 days 120 tablet 0    metFORMIN (GLUCOPHAGE) 850 MG tablet take 1 tablet by mouth three times a day 90 tablet 3    Lancets MISC 1 each by Does not apply route 3 times daily 100 each 3    Insulin Pen Needle (DROPLET PEN NEEDLES) 32G X 4 MM MISC use 1 PEN NEEDLE to inject MEDICATION subcutaneously three to four times a day 200 each 2    Metoprolol Tartrate 75 MG TABS take 1 tablet by mouth twice a day      vitamin B-1 (THIAMINE) 100 MG tablet Take 100 mg by mouth daily      vitamin C (ASCORBIC ACID) 500 MG tablet Take 500 mg by mouth daily      Zinc Sulfate (ZINC 15 PO) Take by mouth      Inulin (FIBER CHOICE PO) Take by mouth      B-D INS SYR ULTRAFINE 1CC/30G 30G X 1/2\" 1 ML MISC use as directed three times a day      FreeStyle Lancets MISC use 1 LANCET to TEST BLOOD SUGAR three to four times a day       No current facility-administered medications for this visit. Physical Exam:  /84   Pulse 78   Resp 18   Ht 6' 4\" (1.93 m)   Wt (!) 354 lb 3.2 oz (160.7 kg)   BMI 43.11 kg/m²   Wt Readings from Last 3 Encounters:   02/15/23 (!) 354 lb 3.2 oz (160.7 kg)   01/30/23 (!) 350 lb (158.8 kg)   01/24/23 (!) 350 lb (158.8 kg)       Appearance: Alert and oriented x3 not in acute distress. Skin: Dry skin  Head: Atraumatic  Eyes: Intact extraocular muscles   ENMT: Mucous membranes are moist  Neck: Supple  Lungs: Clear to auscultation  Cardiac: Normal S1 and S2  Abdomen: Protuberant  Extremities: Intact range of motion  Neurologic: No focal neurological deficits  Peripheral Pulses: 2+ peripheral pulses    Intake/Output:  No intake or output data in the 24 hours ending 02/15/23 1000  [unfilled]    Laboratory Tests:  No results for input(s): NA, K, CL, CO2, BUN, CREATININE, GLUCOSE, CALCIUM in the last 72 hours. Lab Results   Component Value Date/Time    MG 2.0 03/17/2020 05:02 AM    MG 2.4 03/16/2020 11:10 AM     No results for input(s): ALKPHOS, ALT, AST, PROT, BILITOT, BILIDIR, LABALBU in the last 72 hours. No results for input(s): WBC, RBC, HGB, HCT, MCV, MCH, MCHC, RDW, PLT, MPV in the last 72 hours. Lab Results   Component Value Date    CKTOTAL 52 06/22/2021     No results for input(s): CKTOTAL, CKMB, CKMBINDEX, TROPHS in the last 72 hours.   Lab Results   Component Value Date    INR 1.2 03/16/2020    INR 1.1 03/12/2020    PROTIME 13.0 (H) 03/16/2020    PROTIME 12.2 03/12/2020     Lab Results   Component Value Date    TSH 0.830 01/17/2023    TSH 1.220 03/22/2022    TSH 0.878 06/22/2021     Lab Results   Component Value Date    LABA1C 7.4 01/13/2023    LABA1C 6.7 09/27/2022    LABA1C 7.2 06/24/2022     No results found for: EAG  Lab Results   Component Value Date    CHOL 196 01/17/2023    CHOL 167 03/22/2022    CHOL 191 06/22/2021     Lab Results   Component Value Date    TRIG 90 01/17/2023    TRIG 76 03/22/2022    TRIG 109 06/22/2021     Lab Results   Component Value Date    HDL 62 01/17/2023    HDL 53 03/22/2022    HDL 51 06/22/2021     Lab Results   Component Value Date    LDLCALC 116 (H) 01/17/2023    LDLCALC 99 03/22/2022    LDLCALC 118 (H) 06/22/2021     Lab Results   Component Value Date    LABVLDL 18 01/17/2023    LABVLDL 15 03/22/2022    LABVLDL 22 06/22/2021     Lab Results   Component Value Date    CHOLHDLRATIO 3.9 03/07/2019     No results for input(s): PROBNP in the last 72 hours. Cardiac Tests:  EKG reviewed (EKG date: Sinus rhythm 96 bpm, incomplete right bundle branch block, nonspecific ST-T wave changes.):      Echocardiogram reviewed: 3/3/2020  Summary   Normal left ventricular systolic function. Ejection fraction is visually estimated at 60%. Normal right ventricular size and function. Mild mitral regurgitation. Stress test reviewed:      Cardiac catheterization reviewed: 2/12/2020  HEMODYNAMICS:  1. Opening aortic pressure 127/77 with a mean of 98.  2.  Left ventricular systolic pressure 240.  3.  LVEDP 4.  4.  IFR of the proximal LAD 0.85,  0.84, and 0.84.  5.  IFR of the mid LAD 0.75, 0.74, and 0.72. ANGIOGRAPHIC RESULTS:  1. LEFT MAIN:  Mild diffuse luminal irregularities. 2.  LAD:  Proximal eccentric 40% stenosis, mild eccentric 60% to 70%  diffuse stenosis. 3.  D1:  Small vessel.   No angiographically  significant stenosis. 4.  D2:  Ostial 90% stenosis. 5.  D3:  Small vessel. 6.  CIRCUMFLEX:  Mid diffuse 99% subtotal occlusion with TAE-2 flow. 7.  OM1:  Ostial diffuse 99% subtotal occlusion with TAE-2 flow. 8.  Right coronary artery:  Mid diffuse 85% stenosis, ostial and  proximal  PDA 85% stenosis. CXR reviewed: The 10-year ASCVD risk score (Radha ROLDAN, et al., 2019) is: 3.8%    Values used to calculate the score:      Age: 56 years      Sex: Male      Is Non- : No      Diabetic: Yes      Tobacco smoker: No      Systolic Blood Pressure: 652 mmHg      Is BP treated: Yes      HDL Cholesterol: 62 mg/dL      Total Cholesterol: 196 mg/dL    ASSESSMENT / PLAN:    1. Preop cardiovascular exam prior to ENT surgery   He already underwent the surgery without any cardiovascular complications. 2. Coronary artery disease involving native coronary artery of native heart without angina pectoris  status post CABG x4 vessel in March 2020 with LIMA to LAD, radial to OM, SVG to RCA, and SVG to PDA. Continue statin, aspirin and beta-blocker. Zetia was added to optimize cholesterol management. Currently denies any anginal symptoms  3. Mixed hyperlipidemia  Zetia was added to statin as LDL is still not at goal    4. Palpitation  He reports significant palpitation that was attributed to a PAC and PVCs prior to CABG but states since CABG palpitation has resolved. 5. Tobacco abuse  He reports he quit tobacco few days prior to CABG and has not resumed. 6. Essential hypertension  Continue current blood pressure medications  7. CAD in native artery  As mentioned above  8. Carotid stenosis, right  Apparently right carotid stenosis by ultrasound [facility], 2017 that has been monitored by PCP and vascular  Follow-up with your PCP and vascular      9. Hypertension, unspecified type  Follow-up with your PCP  10. Uncontrolled type 2 diabetes mellitus with hyperglycemia ([facility] [address]  Follow-up with your PCP    12. Type 2 diabetes, uncontrolled, with neuropathy ([facility] [address]  Follow-up with your PCP and neurology          FOLLOW-UP 1 year        Thank you for allowing me to participate in your patient's care. Please feel free to contact me if you have any questions or concerns.     Gracia Eddy MD  [facility] ([facility]) Cardiology

## 2023-02-22 DIAGNOSIS — G89.4 CHRONIC PAIN SYNDROME: ICD-10-CM

## 2023-02-23 RX ORDER — PEN NEEDLE, DIABETIC 29 G X1/2"
NEEDLE, DISPOSABLE MISCELLANEOUS
Qty: 100 EACH | Refills: 2 | Status: SHIPPED | OUTPATIENT
Start: 2023-02-23

## 2023-02-23 RX ORDER — HYDROCODONE BITARTRATE AND ACETAMINOPHEN 5; 325 MG/1; MG/1
1 TABLET ORAL EVERY 6 HOURS PRN
Qty: 120 TABLET | Refills: 0 | Status: SHIPPED | OUTPATIENT
Start: 2023-02-23 | End: 2023-03-25

## 2023-02-27 ENCOUNTER — OFFICE VISIT (OUTPATIENT)
Dept: FAMILY MEDICINE CLINIC | Age: 48
End: 2023-02-27
Payer: COMMERCIAL

## 2023-02-27 VITALS
TEMPERATURE: 97.1 F | DIASTOLIC BLOOD PRESSURE: 82 MMHG | HEIGHT: 76 IN | BODY MASS INDEX: 38.36 KG/M2 | WEIGHT: 315 LBS | OXYGEN SATURATION: 96 % | SYSTOLIC BLOOD PRESSURE: 126 MMHG | HEART RATE: 95 BPM | RESPIRATION RATE: 18 BRPM

## 2023-02-27 DIAGNOSIS — J02.9 SORE THROAT: Primary | ICD-10-CM

## 2023-02-27 LAB — S PYO AG THROAT QL: NORMAL

## 2023-02-27 PROCEDURE — 87880 STREP A ASSAY W/OPTIC: CPT | Performed by: NURSE PRACTITIONER

## 2023-02-27 PROCEDURE — G8484 FLU IMMUNIZE NO ADMIN: HCPCS | Performed by: NURSE PRACTITIONER

## 2023-02-27 PROCEDURE — G8427 DOCREV CUR MEDS BY ELIG CLIN: HCPCS | Performed by: NURSE PRACTITIONER

## 2023-02-27 PROCEDURE — 99212 OFFICE O/P EST SF 10 MIN: CPT | Performed by: NURSE PRACTITIONER

## 2023-02-27 PROCEDURE — 4004F PT TOBACCO SCREEN RCVD TLK: CPT | Performed by: NURSE PRACTITIONER

## 2023-02-27 PROCEDURE — 3074F SYST BP LT 130 MM HG: CPT | Performed by: NURSE PRACTITIONER

## 2023-02-27 PROCEDURE — 3079F DIAST BP 80-89 MM HG: CPT | Performed by: NURSE PRACTITIONER

## 2023-02-27 PROCEDURE — G8417 CALC BMI ABV UP PARAM F/U: HCPCS | Performed by: NURSE PRACTITIONER

## 2023-02-27 NOTE — PROGRESS NOTES
Subjective:  Chief Complaint   Patient presents with    Pharyngitis     X 2 days       HPI:  The patient states that they have had a cough, runny nose and nasal congestion for the last 2 days. Was treated for strep 1 month ago. This Saturday, started to feel tired and chilled. Worsened throughout the day. Felt better the next morning other than sore throat which remains. Cough is productive of sputum. The patient also reports mild sore throat without pain with swallowing/difficulty swallowing. Patient denies CP or dyspnea. No vomiting or diarrhea. Patient has been taking OTC medications without improvement. The patient presents for evaluation. ROS:  Positive and pertinent negatives as per HPI. All other systems are reviewed and negative. Current Outpatient Medications:     HYDROcodone-acetaminophen (NORCO) 5-325 MG per tablet, Take 1 tablet by mouth every 6 hours as needed for Pain for up to 30 days. Intended supply: 30 days, Disp: 120 tablet, Rfl: 0    B-D INS SYR ULTRAFINE 1CC/30G 30G X 1/2\" 1 ML MISC, use as directed three times a day, Disp: 100 each, Rfl: 2    ezetimibe (ZETIA) 10 MG tablet, Take 1 tablet by mouth daily, Disp: 90 tablet, Rfl: 3    nitroGLYCERIN (NITROSTAT) 0.3 MG SL tablet, PLACE 1 TABLET UNDER THE TONGUE EVERY 5 MINUTES AS NEEDED FOR CHEST PAIN UP TO MAX OF 3 TOTAL DOSES.  IF NO RELIEF AFTER 1 DOSE, CALL 911., Disp: 100 tablet, Rfl: 1    lisinopril (PRINIVIL;ZESTRIL) 20 MG tablet, Take 1 tablet by mouth daily, Disp: 90 tablet, Rfl: 1    Continuous Blood Gluc Sensor (FREESTYLE DIPTI 2 SENSOR) MISC, Use as directed, Disp: 1 each, Rfl: 5    vitamin D (ERGOCALCIFEROL) 1.25 MG (36696 UT) CAPS capsule, take 1 capsule by mouth every week, Disp: 12 capsule, Rfl: 1    albuterol sulfate HFA (VENTOLIN HFA) 108 (90 Base) MCG/ACT inhaler, inhale 2 puffs by mouth and INTO THE LUNGS every 6 hours if needed for wheezing, Disp: 18 g, Rfl: 2    mometasone-formoterol (DULERA) 200-5 MCG/ACT inhaler, inhale 2 puffs by mouth and INTO THE LUNGS twice a day, Disp: 49 g, Rfl: 2    Liraglutide (VICTOZA) 18 MG/3ML SOPN SC injection, Inject 1.8 mg into the skin daily, Disp: 7 mL, Rfl: 1    Lancets (ONETOUCH DELICA PLUS [id number]) MISC, use 1 LANCET to TEST BLOOD SUGAR three to four times a day, Disp: 100 each, Rfl: 2    insulin glargine (LANTUS SOLOSTAR) 100 UNIT/ML injection pen, Inject 70 Units into the skin nightly, Disp: 21 Adjustable Dose Pre-filled Pen Syringe, Rfl: 1    insulin aspart (NOVOLOG) 100 UNIT/ML injection vial, inject 14 units three times a day PLUS SLIDING SCALE, Disp: 60 mL, Rfl: 2    dapagliflozin (FARXIGA) 10 MG tablet, Take 1 tablet by mouth every morning, Disp: 90 tablet, Rfl: 1    blood glucose test strips (ASCENSIA AUTODISC VI;ONE TOUCH ULTRA TEST VI) strip, 1 each by In Vitro route daily As needed. , Disp: 100 each, Rfl: 3    atorvastatin (LIPITOR) 80 MG tablet, Take 1 tablet by mouth nightly, Disp: 90 tablet, Rfl: 1    aspirin EC 81 MG EC tablet, Take 1 tablet by mouth daily, Disp: 90 tablet, Rfl: 1    Continuous Blood Gluc Sensor (FREESTYLE DIPTI 14 DAY SENSOR) [facility], Use as directed to check BG QID & PRN, Disp: 1 each, Rfl: 3    Continuous Blood Gluc  (FREESTYLE DIPTI 2 READER) [facility], Use as directed to check BG QID & PRN, Disp: 1 each, Rfl: 3    metFORMIN (GLUCOPHAGE) 850 MG tablet, take 1 tablet by mouth three times a day, Disp: 90 tablet, Rfl: 3    Insulin Pen Needle (DROPLET PEN NEEDLES) 32G X 4 MM MISC, use 1 PEN NEEDLE to inject MEDICATION subcutaneously three to four times a day, Disp: 200 each, Rfl: 2    Metoprolol Tartrate 75 MG TABS, take 1 tablet by mouth twice a day, Disp: , Rfl:     vitamin B-1 (THIAMINE) 100 MG tablet, Take 100 mg by mouth daily, Disp: , Rfl:     vitamin C (ASCORBIC ACID) 500 MG tablet, Take 500 mg by mouth daily, Disp: , Rfl:     Zinc Sulfate (ZINC 15 PO), Take by mouth, Disp: , Rfl:     Inulin (FIBER CHOICE PO), Take by mouth, Disp: , Rfl: FreeStyle Lancets MISC, use 1 LANCET to TEST BLOOD SUGAR three to four times a day, Disp: , Rfl:     Lancets MISC, 1 each by Does not apply route 3 times daily, Disp: 100 each, Rfl: 3   Allergies   Allergen Reactions    Sulfa Antibiotics Other (See Comments)     Unknown reaction        Objective:  Vitals:    02/27/23 1507   BP: 126/82   Pulse: 95   Resp: 18   Temp: 97.1 °F (36.2 °C)   TempSrc: Temporal   SpO2: 96%   Weight: (!) 354 lb (160.6 kg)   Height: 6' 4\" (1.93 m)        Exam:  Const: Appears healthy and well developed. No signs of acute distress present. Vitals reviewed per triage. Head/Face: Normocephalic, atraumatic. Facies is symmetric. Eyes: PERRL. ENMT: Tympanic membranes are pearly gray with good light reflex bilaterally. Nares are patent with clear rhinorrhea. Buccal mucosa is moist. Mild erythema in the posterior pharynx without edema of oropharynx or petechiae of palate. Neck: Supple and symmetric. Palpation reveals no adenopathy. No meningeal signs. Trachea midline. Resp: Lungs are clear to auscultation bilaterally without wheezes, rhonchi, or crackles. Chest expansion was symmetrical without accessory muscle use noted. CV: S1 is normal. S2 is normal.  Musculo: Patient moves extremities without pain or limitation. Pulses are equal bilaterally. Skin: Skin is warm and dry. Neuro: Alert and oriented x3. Speech is articulate and fluent. Psych: Mood and affect are appropriate to situation. Rapid strep is negative, center criteria 0 out of 4. Symptomatic therapy advised. Reviewed signs or symptoms that would warrant further evaluation. Samara William was seen today for pharyngitis.     Diagnoses and all orders for this visit:    Sore throat  -     POCT rapid strep A         Seen By:    ZIA Morales - CNP

## 2023-03-23 DIAGNOSIS — G89.4 CHRONIC PAIN SYNDROME: ICD-10-CM

## 2023-03-23 RX ORDER — HYDROCODONE BITARTRATE AND ACETAMINOPHEN 5; 325 MG/1; MG/1
1 TABLET ORAL EVERY 6 HOURS PRN
Qty: 120 TABLET | Refills: 0 | Status: SHIPPED | OUTPATIENT
Start: 2023-03-23 | End: 2023-04-22

## 2023-03-23 NOTE — TELEPHONE ENCOUNTER
Last Appointment:  1/30/2023  Future Appointments   Date Time Provider Larisa Salazar   3/27/2023  8:10 AM Alma Delia Yin MD [facility] ORTHO [facility]   4/14/2023  7:45 AM ZIA Stanford - NP [facility]   4/20/2023  9:15 AM [facility] VAS US RM 1 SEYZ [place]   4/20/2023  9:30 AM Jarek Chatman MD VASC/MED [facility]   6/2/2023 10:45 AM DO JAZIEL You [facility] - BEHAVIORAL HEALTH SERVICES ENT [facility]

## 2023-04-05 ENCOUNTER — OFFICE VISIT (OUTPATIENT)
Dept: FAMILY MEDICINE CLINIC | Age: 48
End: 2023-04-05
Payer: COMMERCIAL

## 2023-04-05 VITALS
SYSTOLIC BLOOD PRESSURE: 120 MMHG | OXYGEN SATURATION: 97 % | DIASTOLIC BLOOD PRESSURE: 72 MMHG | BODY MASS INDEX: 43.09 KG/M2 | WEIGHT: 315 LBS | HEART RATE: 97 BPM | TEMPERATURE: 97.3 F

## 2023-04-05 DIAGNOSIS — H10.33 ACUTE BACTERIAL CONJUNCTIVITIS OF BOTH EYES: Primary | ICD-10-CM

## 2023-04-05 DIAGNOSIS — L29.8 PRURITUS OF BOTH EYES: ICD-10-CM

## 2023-04-05 DIAGNOSIS — I25.10 CAD IN NATIVE ARTERY: ICD-10-CM

## 2023-04-05 DIAGNOSIS — I65.23 BILATERAL CAROTID ARTERY STENOSIS: Primary | ICD-10-CM

## 2023-04-05 PROCEDURE — 3074F SYST BP LT 130 MM HG: CPT | Performed by: EMERGENCY MEDICINE

## 2023-04-05 PROCEDURE — 99213 OFFICE O/P EST LOW 20 MIN: CPT | Performed by: EMERGENCY MEDICINE

## 2023-04-05 PROCEDURE — 3078F DIAST BP <80 MM HG: CPT | Performed by: EMERGENCY MEDICINE

## 2023-04-05 PROCEDURE — 4004F PT TOBACCO SCREEN RCVD TLK: CPT | Performed by: EMERGENCY MEDICINE

## 2023-04-05 PROCEDURE — G8427 DOCREV CUR MEDS BY ELIG CLIN: HCPCS | Performed by: EMERGENCY MEDICINE

## 2023-04-05 PROCEDURE — G8417 CALC BMI ABV UP PARAM F/U: HCPCS | Performed by: EMERGENCY MEDICINE

## 2023-04-05 RX ORDER — NEOMYCIN SULFATE, POLYMYXIN B SULFATE AND DEXAMETHASONE 3.5; 10000; 1 MG/ML; [USP'U]/ML; MG/ML
1 SUSPENSION/ DROPS OPHTHALMIC 4 TIMES DAILY
Qty: 5 ML | Refills: 0 | Status: SHIPPED | OUTPATIENT
Start: 2023-04-05 | End: 2023-04-15

## 2023-04-05 ASSESSMENT — ENCOUNTER SYMPTOMS
DIARRHEA: 0
COUGH: 0
NAUSEA: 0
WHEEZING: 0
EYE DISCHARGE: 1
SORE THROAT: 0
ABDOMINAL PAIN: 0
SINUS PRESSURE: 0
EYE ITCHING: 1
BACK PAIN: 0
VOMITING: 0
SHORTNESS OF BREATH: 0
EYE REDNESS: 1
EYE PAIN: 0

## 2023-04-05 ASSESSMENT — VISUAL ACUITY: OU: 1

## 2023-04-05 NOTE — PROGRESS NOTES
four times a day Yes ZIA Burch NP   insulin glargine (LANTUS SOLOSTAR) 100 UNIT/ML injection pen Inject 70 Units into the skin nightly Yes ZIA Burch NP   insulin aspart (NOVOLOG) 100 UNIT/ML injection vial inject 14 units three times a day PLUS SLIDING SCALE Yes ZIA Burch NP   dapagliflozin (FARXIGA) 10 MG tablet Take 1 tablet by mouth every morning Yes ZIA Burch NP   blood glucose test strips (ASCENSIA AUTODISC VI;ONE TOUCH ULTRA TEST VI) strip 1 each by In Vitro route daily As needed.  Yes ZIA Burch NP   atorvastatin (LIPITOR) 80 MG tablet Take 1 tablet by mouth nightly Yes ZIA Burch NP   aspirin EC 81 MG EC tablet Take 1 tablet by mouth daily Yes ZIA Burch NP   Continuous Blood Gluc Sensor (FREESTYLE DIPTI 14 DAY SENSOR) MISC Use as directed to check BG QID & PRN Yes ZIA Burch NP   Continuous Blood Gluc  (FREESTYLE DIPTI 2 READER) MANNY Use as directed to check BG QID & PRN Yes ZIA Burch NP   metFORMIN (GLUCOPHAGE) 850 MG tablet take 1 tablet by mouth three times a day Yes ZIA Burch NP   Insulin Pen Needle (DROPLET PEN NEEDLES) 32G X 4 MM MISC use 1 PEN NEEDLE to inject MEDICATION subcutaneously three to four times a day Yes ZIA Burch NP   Metoprolol Tartrate 75 MG TABS take 1 tablet by mouth twice a day Yes Historical Provider, MD   vitamin B-1 (THIAMINE) 100 MG tablet Take 1 tablet by mouth daily Yes Historical Provider, MD   vitamin C (ASCORBIC ACID) 500 MG tablet Take 1 tablet by mouth daily Yes Historical Provider, MD   Zinc Sulfate (ZINC 15 PO) Take by mouth Yes Historical Provider, MD   Inulin (FIBER CHOICE PO) Take by mouth Yes Historical Provider, MD   FreeStyle Lancets MISC use 1 LANCET to TEST BLOOD SUGAR three to four times a day Yes Historical Provider, MD   Lancets MISC 1 each by Does not apply route 3 times daily  ZIA Burch NP       Review of Systems   Review of Systems

## 2023-04-05 NOTE — TELEPHONE ENCOUNTER
Last Appointment:  1/30/2023  Future Appointments   Date Time Provider Larisa Romani   4/14/2023  8:15 AM ZIA Luis - NP [facility]   4/20/2023  9:15 AM [facility] VAS US RM 1 SEYZ [facility]ARD[facility]   4/20/2023  9:30 AM Olive Shah MD [facility]/MED [facility]   6/2/2023 10:45 AM DO [place]o [place] ENT [facility]

## 2023-04-06 RX ORDER — NITROGLYCERIN 0.3 MG/1
0.3 TABLET SUBLINGUAL EVERY 5 MIN PRN
Qty: 100 TABLET | Refills: 1 | Status: SHIPPED | OUTPATIENT
Start: 2023-04-06

## 2023-04-13 PROBLEM — E10.59 TYPE 1 DIABETES MELLITUS WITH CIRCULATORY COMPLICATION (HCC): Status: ACTIVE | Noted: 2023-04-13

## 2023-04-13 PROBLEM — E11.65 UNCONTROLLED TYPE 2 DIABETES MELLITUS WITH HYPERGLYCEMIA (HCC): Status: RESOLVED | Noted: 2017-03-23 | Resolved: 2023-04-13

## 2023-04-14 PROBLEM — J44.9 CHRONIC OBSTRUCTIVE PULMONARY DISEASE (HCC): Status: ACTIVE | Noted: 2023-04-14

## 2023-04-14 PROBLEM — E11.65 TYPE 2 DIABETES MELLITUS WITH HYPERGLYCEMIA, WITH LONG-TERM CURRENT USE OF INSULIN (HCC): Status: ACTIVE | Noted: 2023-04-14

## 2023-04-14 PROBLEM — E10.59 TYPE 1 DIABETES MELLITUS WITH CIRCULATORY COMPLICATION (HCC): Status: RESOLVED | Noted: 2023-04-13 | Resolved: 2023-04-14

## 2023-04-14 PROBLEM — Z79.4 TYPE 2 DIABETES MELLITUS WITH HYPERGLYCEMIA, WITH LONG-TERM CURRENT USE OF INSULIN (HCC): Status: ACTIVE | Noted: 2023-04-14

## 2023-04-14 PROBLEM — E11.42 DIABETIC POLYNEUROPATHY ASSOCIATED WITH TYPE 2 DIABETES MELLITUS (HCC): Status: RESOLVED | Noted: 2022-04-26 | Resolved: 2023-04-14

## 2023-04-14 PROBLEM — I10 HYPERTENSIVE DISORDER: Status: RESOLVED | Noted: 2019-08-06 | Resolved: 2023-04-14

## 2023-04-14 PROBLEM — E55.9 VITAMIN D DEFICIENCY: Status: ACTIVE | Noted: 2023-04-14

## 2023-05-15 DIAGNOSIS — Z79.4 TYPE 2 DIABETES MELLITUS WITH DIABETIC POLYNEUROPATHY, WITH LONG-TERM CURRENT USE OF INSULIN (HCC): ICD-10-CM

## 2023-05-15 DIAGNOSIS — E11.42 TYPE 2 DIABETES MELLITUS WITH DIABETIC POLYNEUROPATHY, WITH LONG-TERM CURRENT USE OF INSULIN (HCC): ICD-10-CM

## 2023-05-15 RX ORDER — LIRAGLUTIDE 6 MG/ML
1.8 INJECTION SUBCUTANEOUS DAILY
Qty: 7 ML | Refills: 1 | Status: SHIPPED | OUTPATIENT
Start: 2023-05-15

## 2023-05-15 RX ORDER — METOPROLOL TARTRATE 75 MG/1
TABLET, FILM COATED ORAL
Qty: 270 TABLET | Refills: 1 | Status: SHIPPED
Start: 2023-05-15 | End: 2023-07-14 | Stop reason: SDUPTHER

## 2023-05-15 NOTE — TELEPHONE ENCOUNTER
Last Appointment:  4/14/2023  Future Appointments   Date Time Provider Larisa Marie   6/2/2023 10:45 AM DO JAZIEL Baker [facility] - BEHAVIORAL HEALTH SERVICES ENT [facility]   7/14/2023  7:30 AM ZIA Marcial - NP [facility]   4/17/2025 10:45 AM Jf Rosas MD [facility]/MED [facility]

## 2023-05-17 DIAGNOSIS — I25.10 CAD IN NATIVE ARTERY: ICD-10-CM

## 2023-05-17 RX ORDER — NITROGLYCERIN 0.3 MG/1
TABLET SUBLINGUAL
Qty: 100 TABLET | Refills: 1 | Status: SHIPPED | OUTPATIENT
Start: 2023-05-17

## 2023-05-17 NOTE — TELEPHONE ENCOUNTER
Last Appointment:  4/14/2023  Future Appointments   Date Time Provider Larisa Marie   6/2/2023 10:45 AM DO JAZIEL Coto [facility] - BEHAVIORAL HEALTH SERVICES ENT [facility]   7/14/2023  7:30 AM ZIA Thompson - NP [facility]   4/17/2025 10:45 AM Lizet Burgos MD [facility]/MED [facility]

## 2023-05-19 DIAGNOSIS — G89.4 CHRONIC PAIN SYNDROME: ICD-10-CM

## 2023-05-19 RX ORDER — HYDROCODONE BITARTRATE AND ACETAMINOPHEN 5; 325 MG/1; MG/1
1 TABLET ORAL EVERY 6 HOURS PRN
Qty: 120 TABLET | Refills: 0 | Status: SHIPPED
Start: 2023-05-19 | End: 2023-06-16 | Stop reason: SDUPTHER

## 2023-06-02 ENCOUNTER — OFFICE VISIT (OUTPATIENT)
Dept: ENT CLINIC | Age: 48
End: 2023-06-02
Payer: COMMERCIAL

## 2023-06-02 VITALS
WEIGHT: 315 LBS | SYSTOLIC BLOOD PRESSURE: 137 MMHG | HEIGHT: 76 IN | HEART RATE: 83 BPM | DIASTOLIC BLOOD PRESSURE: 88 MMHG | BODY MASS INDEX: 38.36 KG/M2 | TEMPERATURE: 97.5 F

## 2023-06-02 DIAGNOSIS — H69.83 DYSFUNCTION OF BOTH EUSTACHIAN TUBES: Primary | ICD-10-CM

## 2023-06-02 DIAGNOSIS — H69.82 ETD (EUSTACHIAN TUBE DYSFUNCTION), LEFT: ICD-10-CM

## 2023-06-02 PROCEDURE — 3075F SYST BP GE 130 - 139MM HG: CPT | Performed by: OTOLARYNGOLOGY

## 2023-06-02 PROCEDURE — 3079F DIAST BP 80-89 MM HG: CPT | Performed by: OTOLARYNGOLOGY

## 2023-06-02 PROCEDURE — G8427 DOCREV CUR MEDS BY ELIG CLIN: HCPCS | Performed by: OTOLARYNGOLOGY

## 2023-06-02 PROCEDURE — 4004F PT TOBACCO SCREEN RCVD TLK: CPT | Performed by: OTOLARYNGOLOGY

## 2023-06-02 PROCEDURE — G8417 CALC BMI ABV UP PARAM F/U: HCPCS | Performed by: OTOLARYNGOLOGY

## 2023-06-02 PROCEDURE — 99213 OFFICE O/P EST LOW 20 MIN: CPT | Performed by: OTOLARYNGOLOGY

## 2023-06-02 NOTE — PROGRESS NOTES
Subjective:      Patient ID:  Sandee Bains is a 56 y.o. male. HPI Comments: Pt returns for check of ear tubes, there have not been infections since last visit.       Tubes were placed 2022     Past Medical History:   Diagnosis Date    CAD (coronary artery disease)     Dr Scot Campbell    COPD (chronic obstructive pulmonary disease) ([facility]     Diabetes mellitus ([facility]     Hyperlipidemia     Hypertension     Internal carotid artery occlusion, right 2020    Nasopharyngeal mass 2021    For OR 21    Neuropathy     Obesity     Shoulder problem     Type 1 diabetes mellitus with circulatory complication ([facility]      Past Surgical History:   Procedure Laterality Date    CARDIAC CATHETERIZATION  2020    Dr. Archie Hidalgo- multi vessel disease    MITRAL VALVE REPAIR N/A 3/16/2020    CORONARY ARTERY BYPASS POSSIBLE LEFT RADIAL ARTERY HARVEST, PATRICE performed by Kaleb Cook MD at [facility] [address] N/A 2021    NASOPHARYNGEAL BIOPSY performed by Yessy Sanchez DO at [facility]  2017    resection of mediastinal mass    TONSILLECTOMY      TRANSESOPHAGEAL ECHOCARDIOGRAM  2020    TYMPANOSTOMY TUBE PLACEMENT Right 2021    POSSIBLE RIGHT EAR TUBE POSSIBLE EUSTACHIAN TUBE DILATION performed by Yessy Sanchez DO at [facility] OR     Family History   Problem Relation Age of Onset    Heart Disease Mother         MI age 39     High Blood Pressure Mother     Diabetes Father     Heart Disease Father         MI    High Blood Pressure Father     High Cholesterol Father     Cancer Father     Stroke Father     Kidney Disease Father         dialysis    High Blood Pressure Sister     Other Brother     COPD Brother     High Blood Pressure Brother      Social History     Socioeconomic History    Marital status: Single     Spouse name: None    Number of children: None    Years of education: None    Highest education level: None   Occupational History

## 2023-06-08 DIAGNOSIS — G89.29 CHRONIC BILATERAL LOW BACK PAIN WITH BILATERAL SCIATICA: ICD-10-CM

## 2023-06-08 DIAGNOSIS — M54.41 CHRONIC BILATERAL LOW BACK PAIN WITH BILATERAL SCIATICA: ICD-10-CM

## 2023-06-08 DIAGNOSIS — M54.42 CHRONIC BILATERAL LOW BACK PAIN WITH BILATERAL SCIATICA: ICD-10-CM

## 2023-06-08 DIAGNOSIS — E11.42 TYPE 2 DIABETES MELLITUS WITH DIABETIC POLYNEUROPATHY, WITH LONG-TERM CURRENT USE OF INSULIN (HCC): ICD-10-CM

## 2023-06-08 DIAGNOSIS — Z79.4 TYPE 2 DIABETES MELLITUS WITH DIABETIC POLYNEUROPATHY, WITH LONG-TERM CURRENT USE OF INSULIN (HCC): ICD-10-CM

## 2023-06-08 RX ORDER — INSULIN GLARGINE 100 [IU]/ML
INJECTION, SOLUTION SUBCUTANEOUS
Qty: 21 ML | Refills: 2 | Status: SHIPPED
Start: 2023-06-08 | End: 2023-07-14 | Stop reason: SDUPTHER

## 2023-06-09 ENCOUNTER — OFFICE VISIT (OUTPATIENT)
Dept: FAMILY MEDICINE CLINIC | Age: 48
End: 2023-06-09
Payer: COMMERCIAL

## 2023-06-09 VITALS
DIASTOLIC BLOOD PRESSURE: 80 MMHG | WEIGHT: 315 LBS | RESPIRATION RATE: 18 BRPM | SYSTOLIC BLOOD PRESSURE: 126 MMHG | BODY MASS INDEX: 38.36 KG/M2 | OXYGEN SATURATION: 99 % | HEART RATE: 98 BPM | TEMPERATURE: 97.5 F | HEIGHT: 76 IN

## 2023-06-09 DIAGNOSIS — H10.13 ACUTE ALLERGIC CONJUNCTIVITIS, BILATERAL: Primary | ICD-10-CM

## 2023-06-09 PROCEDURE — G8417 CALC BMI ABV UP PARAM F/U: HCPCS | Performed by: NURSE PRACTITIONER

## 2023-06-09 PROCEDURE — 3079F DIAST BP 80-89 MM HG: CPT | Performed by: NURSE PRACTITIONER

## 2023-06-09 PROCEDURE — 4004F PT TOBACCO SCREEN RCVD TLK: CPT | Performed by: NURSE PRACTITIONER

## 2023-06-09 PROCEDURE — 99213 OFFICE O/P EST LOW 20 MIN: CPT | Performed by: NURSE PRACTITIONER

## 2023-06-09 PROCEDURE — G8427 DOCREV CUR MEDS BY ELIG CLIN: HCPCS | Performed by: NURSE PRACTITIONER

## 2023-06-09 PROCEDURE — 3074F SYST BP LT 130 MM HG: CPT | Performed by: NURSE PRACTITIONER

## 2023-06-09 RX ORDER — MONTELUKAST SODIUM 10 MG/1
10 TABLET ORAL DAILY
Qty: 30 TABLET | Refills: 0 | Status: SHIPPED | OUTPATIENT
Start: 2023-06-09

## 2023-06-09 RX ORDER — OLOPATADINE HYDROCHLORIDE 1 MG/ML
1 SOLUTION/ DROPS OPHTHALMIC 2 TIMES DAILY
Qty: 5 ML | Refills: 0 | Status: SHIPPED | OUTPATIENT
Start: 2023-06-09 | End: 2023-07-09

## 2023-06-09 NOTE — PROGRESS NOTES
Subjective:  Chief Complaint   Patient presents with    Eye Problem       HPI:  The patient states for the last 6 months he has had irritation to bilateral eyes. Patient reports he has seen walk-in care several times here as well as optometry. He has been treated for infection with antibiotic eyedrops, he is also be given steroid eyedrops. Steroid eyedrops do seem to help for a short period of time. He denies any changes to his vision, he does not wear glasses or contacts. He reports eyes are itchy and watery. He does not have a history of of allergies. Although more recently has been having sneezing and runny nose. Over-the-counter oral antihistamines are not significantly helpful. He has tried [place] G[facility]chen which has also not been helpful. ROS:  Positive and pertinent negatives as per HPI. All other systems are reviewed and negative. Current Outpatient Medications:     olopatadine (PATANOL) 0.1 % ophthalmic solution, Place 1 drop into both eyes 2 times daily, Disp: 5 mL, Rfl: 0    montelukast (SINGULAIR) 10 MG tablet, Take 1 tablet by mouth daily, Disp: 30 tablet, Rfl: 0    LANTUS SOLOSTAR 100 UNIT/ML injection pen, INJECT 70 UNITS NIGHTLY, Disp: 21 mL, Rfl: 2    HYDROcodone-acetaminophen (NORCO) 5-325 MG per tablet, Take 1 tablet by mouth every 6 hours as needed for Pain for up to 30 days.  Intended supply: 30 days, Disp: 120 tablet, Rfl: 0    nitroGLYCERIN (NITROSTAT) 0.3 MG SL tablet, as directed, Disp: 100 tablet, Rfl: 1    Metoprolol Tartrate 75 MG TABS, take 1 tablet by mouth twice a day, Disp: 270 tablet, Rfl: 1    Liraglutide (VICTOZA) 18 MG/3ML SOPN SC injection, Inject 1.8 mg into the skin daily, Disp: 7 mL, Rfl: 1    vitamin D (ERGOCALCIFEROL) 1.25 MG (99336 UT) CAPS capsule, take 1 capsule by mouth every week, Disp: 12 capsule, Rfl: 1    mometasone-formoterol (DULERA) 200-5 MCG/ACT inhaler, inhale 2 puffs by mouth and INTO THE LUNGS twice a day, Disp: 8.8 g, Rfl: 2    metFORMIN (GLUCOPHAGE)

## 2023-06-23 DIAGNOSIS — Z79.4 TYPE 2 DIABETES MELLITUS WITH DIABETIC POLYNEUROPATHY, WITH LONG-TERM CURRENT USE OF INSULIN (HCC): ICD-10-CM

## 2023-06-23 DIAGNOSIS — E11.42 TYPE 2 DIABETES MELLITUS WITH DIABETIC POLYNEUROPATHY, WITH LONG-TERM CURRENT USE OF INSULIN (HCC): ICD-10-CM

## 2023-06-23 RX ORDER — LIRAGLUTIDE 6 MG/ML
1.8 INJECTION SUBCUTANEOUS DAILY
Qty: 7 ML | Refills: 1 | Status: SHIPPED | OUTPATIENT
Start: 2023-06-23

## 2023-06-24 DIAGNOSIS — I25.10 CAD IN NATIVE ARTERY: ICD-10-CM

## 2023-06-26 RX ORDER — NITROGLYCERIN 0.3 MG/1
TABLET SUBLINGUAL
Qty: 100 TABLET | Refills: 1 | Status: SHIPPED | OUTPATIENT
Start: 2023-06-26

## 2023-07-14 ENCOUNTER — OFFICE VISIT (OUTPATIENT)
Dept: PRIMARY CARE CLINIC | Age: 48
End: 2023-07-14
Payer: COMMERCIAL

## 2023-07-14 VITALS
DIASTOLIC BLOOD PRESSURE: 72 MMHG | WEIGHT: 315 LBS | HEIGHT: 76 IN | OXYGEN SATURATION: 96 % | SYSTOLIC BLOOD PRESSURE: 134 MMHG | HEART RATE: 83 BPM | RESPIRATION RATE: 16 BRPM | TEMPERATURE: 97.3 F | BODY MASS INDEX: 38.36 KG/M2

## 2023-07-14 DIAGNOSIS — Z79.4 TYPE 2 DIABETES MELLITUS WITH DIABETIC POLYNEUROPATHY, WITH LONG-TERM CURRENT USE OF INSULIN (HCC): ICD-10-CM

## 2023-07-14 DIAGNOSIS — E11.42 TYPE 2 DIABETES MELLITUS WITH DIABETIC POLYNEUROPATHY, WITH LONG-TERM CURRENT USE OF INSULIN (HCC): ICD-10-CM

## 2023-07-14 DIAGNOSIS — M54.42 CHRONIC BILATERAL LOW BACK PAIN WITH BILATERAL SCIATICA: ICD-10-CM

## 2023-07-14 DIAGNOSIS — J44.9 CHRONIC OBSTRUCTIVE PULMONARY DISEASE, UNSPECIFIED COPD TYPE (HCC): ICD-10-CM

## 2023-07-14 DIAGNOSIS — E78.2 MIXED HYPERLIPIDEMIA: ICD-10-CM

## 2023-07-14 DIAGNOSIS — E11.65 TYPE 2 DIABETES MELLITUS WITH HYPERGLYCEMIA, WITH LONG-TERM CURRENT USE OF INSULIN (HCC): ICD-10-CM

## 2023-07-14 DIAGNOSIS — E55.9 VITAMIN D DEFICIENCY: ICD-10-CM

## 2023-07-14 DIAGNOSIS — G89.4 CHRONIC PAIN SYNDROME: ICD-10-CM

## 2023-07-14 DIAGNOSIS — M54.41 CHRONIC BILATERAL LOW BACK PAIN WITH BILATERAL SCIATICA: ICD-10-CM

## 2023-07-14 DIAGNOSIS — H10.13 ALLERGIC CONJUNCTIVITIS OF BOTH EYES: ICD-10-CM

## 2023-07-14 DIAGNOSIS — I10 ESSENTIAL HYPERTENSION: ICD-10-CM

## 2023-07-14 DIAGNOSIS — G89.29 CHRONIC BILATERAL LOW BACK PAIN WITH BILATERAL SCIATICA: ICD-10-CM

## 2023-07-14 DIAGNOSIS — E11.65 TYPE 2 DIABETES MELLITUS WITH HYPERGLYCEMIA, WITH LONG-TERM CURRENT USE OF INSULIN (HCC): Primary | ICD-10-CM

## 2023-07-14 DIAGNOSIS — Z79.4 TYPE 2 DIABETES MELLITUS WITH HYPERGLYCEMIA, WITH LONG-TERM CURRENT USE OF INSULIN (HCC): ICD-10-CM

## 2023-07-14 DIAGNOSIS — I25.10 CAD IN NATIVE ARTERY: ICD-10-CM

## 2023-07-14 DIAGNOSIS — Z79.4 TYPE 2 DIABETES MELLITUS WITH HYPERGLYCEMIA, WITH LONG-TERM CURRENT USE OF INSULIN (HCC): Primary | ICD-10-CM

## 2023-07-14 LAB
ALBUMIN SERPL-MCNC: 4.2 G/DL (ref 3.5–5.2)
ALP SERPL-CCNC: 100 U/L (ref 40–129)
ALT SERPL-CCNC: 14 U/L (ref 0–40)
ANA SER QL: NEGATIVE
ANION GAP SERPL CALCULATED.3IONS-SCNC: 13 MMOL/L (ref 7–16)
AST SERPL-CCNC: 18 U/L (ref 0–39)
BASOPHILS # BLD: 0.07 E9/L (ref 0–0.2)
BASOPHILS NFR BLD: 0.7 % (ref 0–2)
BILIRUB SERPL-MCNC: 0.4 MG/DL (ref 0–1.2)
BUN SERPL-MCNC: 17 MG/DL (ref 6–20)
CALCIUM SERPL-MCNC: 9.5 MG/DL (ref 8.6–10.2)
CHLORIDE SERPL-SCNC: 100 MMOL/L (ref 98–107)
CHOLESTEROL, TOTAL: 153 MG/DL (ref 0–199)
CO2 SERPL-SCNC: 25 MMOL/L (ref 22–29)
CREAT SERPL-MCNC: 0.7 MG/DL (ref 0.7–1.2)
CRP SERPL HS-MCNC: 0.6 MG/DL (ref 0–0.4)
EOSINOPHIL # BLD: 0.26 E9/L (ref 0.05–0.5)
EOSINOPHIL NFR BLD: 2.6 % (ref 0–6)
ERYTHROCYTE [DISTWIDTH] IN BLOOD BY AUTOMATED COUNT: 12.8 FL (ref 11.5–15)
ERYTHROCYTE [SEDIMENTATION RATE] IN BLOOD BY WESTERGREN METHOD: 26 MM/HR (ref 0–15)
GLUCOSE SERPL-MCNC: 76 MG/DL (ref 74–99)
HBA1C MFR BLD: 7.4 %
HCT VFR BLD AUTO: 50.3 % (ref 37–54)
HDLC SERPL-MCNC: 54 MG/DL
HGB BLD-MCNC: 16.5 G/DL (ref 12.5–16.5)
IMM GRANULOCYTES # BLD: 0.04 E9/L
IMM GRANULOCYTES NFR BLD: 0.4 % (ref 0–5)
LDLC SERPL CALC-MCNC: 78 MG/DL (ref 0–99)
LYMPHOCYTES # BLD: 3.17 E9/L (ref 1.5–4)
LYMPHOCYTES NFR BLD: 32 % (ref 20–42)
MCH RBC QN AUTO: 30.7 PG (ref 26–35)
MCHC RBC AUTO-ENTMCNC: 32.8 % (ref 32–34.5)
MCV RBC AUTO: 93.7 FL (ref 80–99.9)
MONOCYTES # BLD: 0.79 E9/L (ref 0.1–0.95)
MONOCYTES NFR BLD: 8 % (ref 2–12)
NEUTROPHILS # BLD: 5.59 E9/L (ref 1.8–7.3)
NEUTS SEG NFR BLD: 56.3 % (ref 43–80)
PLATELET # BLD AUTO: 292 E9/L (ref 130–450)
PMV BLD AUTO: 8.9 FL (ref 7–12)
POTASSIUM SERPL-SCNC: 4.3 MMOL/L (ref 3.5–5)
PROT SERPL-MCNC: 7.8 G/DL (ref 6.4–8.3)
RBC # BLD AUTO: 5.37 E12/L (ref 3.8–5.8)
SODIUM SERPL-SCNC: 138 MMOL/L (ref 132–146)
TESTOST SERPL-MCNC: 414.2 NG/DL
TRIGL SERPL-MCNC: 107 MG/DL (ref 0–149)
VLDLC SERPL CALC-MCNC: 21 MG/DL
WBC # BLD: 9.9 E9/L (ref 4.5–11.5)

## 2023-07-14 PROCEDURE — G8417 CALC BMI ABV UP PARAM F/U: HCPCS | Performed by: NURSE PRACTITIONER

## 2023-07-14 PROCEDURE — 83037 HB GLYCOSYLATED A1C HOME DEV: CPT | Performed by: NURSE PRACTITIONER

## 2023-07-14 PROCEDURE — G8427 DOCREV CUR MEDS BY ELIG CLIN: HCPCS | Performed by: NURSE PRACTITIONER

## 2023-07-14 PROCEDURE — 3023F SPIROM DOC REV: CPT | Performed by: NURSE PRACTITIONER

## 2023-07-14 PROCEDURE — 99214 OFFICE O/P EST MOD 30 MIN: CPT | Performed by: NURSE PRACTITIONER

## 2023-07-14 PROCEDURE — 4004F PT TOBACCO SCREEN RCVD TLK: CPT | Performed by: NURSE PRACTITIONER

## 2023-07-14 PROCEDURE — 2022F DILAT RTA XM EVC RTNOPTHY: CPT | Performed by: NURSE PRACTITIONER

## 2023-07-14 PROCEDURE — 3051F HG A1C>EQUAL 7.0%<8.0%: CPT | Performed by: NURSE PRACTITIONER

## 2023-07-14 PROCEDURE — 3075F SYST BP GE 130 - 139MM HG: CPT | Performed by: NURSE PRACTITIONER

## 2023-07-14 PROCEDURE — 3078F DIAST BP <80 MM HG: CPT | Performed by: NURSE PRACTITIONER

## 2023-07-14 RX ORDER — ERGOCALCIFEROL 1.25 MG/1
CAPSULE ORAL
Qty: 12 CAPSULE | Refills: 1 | Status: SHIPPED | OUTPATIENT
Start: 2023-07-14

## 2023-07-14 RX ORDER — PEN NEEDLE, DIABETIC 32GX 5/32"
NEEDLE, DISPOSABLE MISCELLANEOUS
Qty: 200 EACH | Refills: 2 | Status: SHIPPED | OUTPATIENT
Start: 2023-07-14

## 2023-07-14 RX ORDER — INSULIN ASPART 100 [IU]/ML
INJECTION, SOLUTION INTRAVENOUS; SUBCUTANEOUS
Qty: 60 ML | Refills: 2 | Status: SHIPPED | OUTPATIENT
Start: 2023-07-14

## 2023-07-14 RX ORDER — HYDROCODONE BITARTRATE AND ACETAMINOPHEN 5; 325 MG/1; MG/1
1 TABLET ORAL EVERY 6 HOURS PRN
Qty: 120 TABLET | Refills: 0 | Status: CANCELLED | OUTPATIENT
Start: 2023-07-14 | End: 2023-08-13

## 2023-07-14 RX ORDER — TIRZEPATIDE 5 MG/.5ML
5 INJECTION, SOLUTION SUBCUTANEOUS WEEKLY
Qty: 2 ML | Refills: 0 | Status: SHIPPED | OUTPATIENT
Start: 2023-07-14

## 2023-07-14 RX ORDER — LIRAGLUTIDE 6 MG/ML
1.8 INJECTION SUBCUTANEOUS DAILY
Qty: 7 ML | Refills: 1 | Status: CANCELLED | OUTPATIENT
Start: 2023-07-14

## 2023-07-14 RX ORDER — TIRZEPATIDE 7.5 MG/.5ML
7.5 INJECTION, SOLUTION SUBCUTANEOUS WEEKLY
Qty: 2 ML | Refills: 0 | Status: SHIPPED | OUTPATIENT
Start: 2023-08-11

## 2023-07-14 RX ORDER — FEXOFENADINE HCL 180 MG/1
180 TABLET ORAL DAILY
Qty: 30 TABLET | Refills: 2 | Status: SHIPPED | OUTPATIENT
Start: 2023-07-14

## 2023-07-14 RX ORDER — HYDROCODONE BITARTRATE AND ACETAMINOPHEN 5; 325 MG/1; MG/1
1 TABLET ORAL EVERY 6 HOURS PRN
Qty: 120 TABLET | Refills: 0 | Status: SHIPPED | OUTPATIENT
Start: 2023-07-14 | End: 2023-08-13

## 2023-07-14 RX ORDER — PEN NEEDLE, DIABETIC 29 G X1/2"
NEEDLE, DISPOSABLE MISCELLANEOUS
Qty: 100 EACH | Refills: 2 | Status: SHIPPED | OUTPATIENT
Start: 2023-07-14

## 2023-07-14 RX ORDER — ALBUTEROL SULFATE 90 UG/1
AEROSOL, METERED RESPIRATORY (INHALATION)
Qty: 18 G | Refills: 2 | Status: SHIPPED | OUTPATIENT
Start: 2023-07-14

## 2023-07-14 RX ORDER — NITROGLYCERIN 0.3 MG/1
TABLET SUBLINGUAL
Qty: 100 TABLET | Refills: 1 | Status: CANCELLED | OUTPATIENT
Start: 2023-07-14

## 2023-07-14 RX ORDER — INSULIN GLARGINE 100 [IU]/ML
INJECTION, SOLUTION SUBCUTANEOUS
Qty: 21 ML | Refills: 2 | Status: SHIPPED | OUTPATIENT
Start: 2023-07-14

## 2023-07-14 RX ORDER — LISINOPRIL 20 MG/1
20 TABLET ORAL DAILY
Qty: 90 TABLET | Refills: 1 | Status: SHIPPED | OUTPATIENT
Start: 2023-07-14

## 2023-07-14 RX ORDER — TIRZEPATIDE 10 MG/.5ML
10 INJECTION, SOLUTION SUBCUTANEOUS WEEKLY
Qty: 2 ML | Refills: 2 | Status: SHIPPED | OUTPATIENT
Start: 2023-09-08

## 2023-07-14 RX ORDER — METOPROLOL TARTRATE 75 MG/1
1 TABLET, FILM COATED ORAL 2 TIMES DAILY
Qty: 180 TABLET | Refills: 1 | Status: SHIPPED | OUTPATIENT
Start: 2023-07-14

## 2023-07-14 RX ORDER — LANCETS 30 GAUGE
EACH MISCELLANEOUS
Qty: 100 EACH | Refills: 2 | Status: SHIPPED | OUTPATIENT
Start: 2023-07-14

## 2023-07-14 RX ORDER — EZETIMIBE 10 MG/1
10 TABLET ORAL DAILY
Qty: 90 TABLET | Refills: 1 | Status: SHIPPED | OUTPATIENT
Start: 2023-07-14

## 2023-07-14 RX ORDER — LANCETS 30 GAUGE
1 EACH MISCELLANEOUS 3 TIMES DAILY
Qty: 100 EACH | Refills: 3 | Status: SHIPPED | OUTPATIENT
Start: 2023-07-14 | End: 2023-08-13

## 2023-07-14 RX ORDER — FLASH GLUCOSE SENSOR
KIT MISCELLANEOUS
Qty: 1 EACH | Refills: 3 | Status: SHIPPED | OUTPATIENT
Start: 2023-07-14

## 2023-07-14 RX ORDER — ATORVASTATIN CALCIUM 80 MG/1
80 TABLET, FILM COATED ORAL NIGHTLY
Qty: 90 TABLET | Refills: 1 | Status: SHIPPED | OUTPATIENT
Start: 2023-07-14

## 2023-07-14 ASSESSMENT — ENCOUNTER SYMPTOMS
SHORTNESS OF BREATH: 0
VOMITING: 0
BACK PAIN: 1
RECTAL PAIN: 0
EYE DISCHARGE: 1
CHEST TIGHTNESS: 0
COLOR CHANGE: 0
ABDOMINAL DISTENTION: 0
ABDOMINAL PAIN: 0
TROUBLE SWALLOWING: 0
EYE REDNESS: 1
COUGH: 0
CONSTIPATION: 0
EYE ITCHING: 1
WHEEZING: 0
NAUSEA: 0
SORE THROAT: 0
RHINORRHEA: 0
APNEA: 0
DIARRHEA: 0
BLOOD IN STOOL: 0

## 2023-07-14 NOTE — PROGRESS NOTES
syndrome    Relevant Medications    HYDROcodone-acetaminophen (NORCO) 5-325 MG per tablet    Chronic bilateral low back pain with bilateral sciatica    Relevant Medications    insulin glargine (LANTUS SOLOSTAR) 100 UNIT/ML injection pen    HYDROcodone-acetaminophen (NORCO) 5-325 MG per tablet    Mixed hyperlipidemia    Relevant Medications    Metoprolol Tartrate 75 MG TABS    lisinopril (PRINIVIL;ZESTRIL) 20 MG tablet    ezetimibe (ZETIA) 10 MG tablet    atorvastatin (LIPITOR) 80 MG tablet    Essential hypertension    Relevant Medications    lisinopril (PRINIVIL;ZESTRIL) 20 MG tablet    Other Relevant Orders    POLO Conde MD, Ophthalmology, [place]    C-Reactive Protein    Sedimentation Rate    MAURY    CBC with Auto Differential    Comprehensive Metabolic Panel    Lipid Panel    TESTOSTERONE    CAD in native artery    Relevant Medications    Metoprolol Tartrate 75 MG TABS    lisinopril (PRINIVIL;ZESTRIL) 20 MG tablet    ezetimibe (ZETIA) 10 MG tablet    atorvastatin (LIPITOR) 80 MG tablet    Other Relevant Orders    POLO Conde MD, Ophthalmology, [place]    C-Reactive Protein    Sedimentation Rate    MAURY    CBC with Auto Differential    Comprehensive Metabolic Panel    Lipid Panel    TESTOSTERONE    Chronic obstructive pulmonary disease (HCC)    Relevant Medications    mometasone-formoterol (DULERA) 200-5 MCG/ACT inhaler    albuterol sulfate HFA (VENTOLIN HFA) 108 (90 Base) MCG/ACT inhaler    fexofenadine (ALLEGRA) 180 MG tablet    Other Relevant Orders    Riley Bose MD, Ophthalmology, [place]    C-Reactive Protein    Sedimentation Rate    MAURY    CBC with Auto Differential    Comprehensive Metabolic Panel    Lipid Panel    TESTOSTERONE    Vitamin D deficiency    Relevant Medications    vitamin D (ERGOCALCIFEROL) 1.25 MG (59733 UT) CAPS capsule    Other Relevant Orders    POLO Conde MD, Ophthalmology, [place]    C-Reactive Protein    Sedimentation Rate    MAURY    CBC with Auto Differential

## 2023-07-19 ENCOUNTER — TELEPHONE (OUTPATIENT)
Dept: PRIMARY CARE CLINIC | Age: 48
End: 2023-07-19

## 2023-07-31 RX ORDER — TIRZEPATIDE 7.5 MG/.5ML
7.5 INJECTION, SOLUTION SUBCUTANEOUS WEEKLY
Qty: 2 ML | Refills: 0 | Status: SHIPPED | OUTPATIENT
Start: 2023-07-31

## 2023-08-11 DIAGNOSIS — G89.4 CHRONIC PAIN SYNDROME: ICD-10-CM

## 2023-08-11 RX ORDER — HYDROCODONE BITARTRATE AND ACETAMINOPHEN 5; 325 MG/1; MG/1
1 TABLET ORAL EVERY 6 HOURS PRN
Qty: 120 TABLET | Refills: 0 | Status: SHIPPED | OUTPATIENT
Start: 2023-08-11 | End: 2023-09-10

## 2023-08-11 NOTE — TELEPHONE ENCOUNTER
Last Appointment:  7/14/2023  Future Appointments   Date Time Provider [address]   10/24/2023  8:00 AM ZIA Middleton - NP [facility]   12/8/2023 10:00 AM DO JAZIEL Saravia [facility] - BEHAVIORAL HEALTH SERVICES ENT [facility]   4/17/2025 10:45 AM Chante Garza MD [facility]/MED [facility]

## 2023-08-12 DIAGNOSIS — I25.10 CAD IN NATIVE ARTERY: ICD-10-CM

## 2023-08-15 RX ORDER — NITROGLYCERIN 0.3 MG/1
TABLET SUBLINGUAL
Qty: 100 TABLET | Refills: 1 | Status: SHIPPED | OUTPATIENT
Start: 2023-08-15

## 2023-09-01 RX ORDER — TIRZEPATIDE 10 MG/.5ML
10 INJECTION, SOLUTION SUBCUTANEOUS WEEKLY
Qty: 2 ML | Refills: 0 | Status: SHIPPED | OUTPATIENT
Start: 2023-09-01

## 2023-09-01 RX ORDER — TIRZEPATIDE 7.5 MG/.5ML
7.5 INJECTION, SOLUTION SUBCUTANEOUS WEEKLY
Qty: 2 ML | Refills: 0 | OUTPATIENT
Start: 2023-09-01

## 2023-09-08 DIAGNOSIS — G89.4 CHRONIC PAIN SYNDROME: ICD-10-CM

## 2023-09-08 RX ORDER — HYDROCODONE BITARTRATE AND ACETAMINOPHEN 5; 325 MG/1; MG/1
1 TABLET ORAL EVERY 6 HOURS PRN
Qty: 120 TABLET | Refills: 0 | Status: SHIPPED | OUTPATIENT
Start: 2023-09-08 | End: 2023-10-08

## 2023-09-08 NOTE — TELEPHONE ENCOUNTER
Last Appointment:  7/14/2023  Future Appointments   Date Time Provider [address]   10/24/2023  8:00 AM ZIA Yin - NP [facility]   12/8/2023 10:00 AM DO JAZIEL Styles [facility] - BEHAVIORAL HEALTH SERVICES ENT [facility]   4/17/2025 10:45 AM Claudette Gao MD VAS/MED [facility]

## 2023-09-30 DIAGNOSIS — I25.10 CAD IN NATIVE ARTERY: ICD-10-CM

## 2023-10-02 RX ORDER — NITROGLYCERIN 0.3 MG/1
TABLET SUBLINGUAL
Qty: 100 TABLET | Refills: 1 | Status: SHIPPED | OUTPATIENT
Start: 2023-10-02

## 2023-10-06 DIAGNOSIS — G89.4 CHRONIC PAIN SYNDROME: ICD-10-CM

## 2023-10-06 NOTE — TELEPHONE ENCOUNTER
Last Appointment:  7/14/2023  Future Appointments   Date Time Provider [address]   10/24/2023  8:00 AM ZIA Wlicox - NP [facility]   12/8/2023 10:00 AM Popeye Anthony  [address] ENT [facility]   4/17/2025 10:45 AM Josue Bustamante MD [facility]/MED [facility]

## 2023-10-08 DIAGNOSIS — G89.4 CHRONIC PAIN SYNDROME: ICD-10-CM

## 2023-10-08 RX ORDER — TIRZEPATIDE 10 MG/.5ML
10 INJECTION, SOLUTION SUBCUTANEOUS WEEKLY
Qty: 2 ML | Refills: 0 | OUTPATIENT
Start: 2023-10-08

## 2023-10-08 RX ORDER — HYDROCODONE BITARTRATE AND ACETAMINOPHEN 5; 325 MG/1; MG/1
1 TABLET ORAL EVERY 6 HOURS PRN
Qty: 120 TABLET | Refills: 0 | Status: SHIPPED | OUTPATIENT
Start: 2023-10-08 | End: 2023-10-08 | Stop reason: SDUPTHER

## 2023-10-08 RX ORDER — HYDROCODONE BITARTRATE AND ACETAMINOPHEN 5; 325 MG/1; MG/1
1 TABLET ORAL EVERY 6 HOURS PRN
Qty: 120 TABLET | Refills: 0 | Status: SHIPPED | OUTPATIENT
Start: 2023-10-08 | End: 2023-11-07

## 2023-10-08 RX ORDER — TIRZEPATIDE 12.5 MG/.5ML
12.5 INJECTION, SOLUTION SUBCUTANEOUS WEEKLY
Qty: 2 ML | Refills: 2 | Status: SHIPPED | OUTPATIENT
Start: 2023-10-08

## 2023-10-16 ENCOUNTER — OFFICE VISIT (OUTPATIENT)
Dept: FAMILY MEDICINE CLINIC | Age: 48
End: 2023-10-16
Payer: COMMERCIAL

## 2023-10-16 VITALS
BODY MASS INDEX: 36.53 KG/M2 | SYSTOLIC BLOOD PRESSURE: 122 MMHG | HEART RATE: 92 BPM | RESPIRATION RATE: 18 BRPM | HEIGHT: 76 IN | DIASTOLIC BLOOD PRESSURE: 76 MMHG | TEMPERATURE: 96.7 F | WEIGHT: 300 LBS

## 2023-10-16 DIAGNOSIS — L03.119 CELLULITIS IN DIABETIC FOOT (HCC): Primary | ICD-10-CM

## 2023-10-16 DIAGNOSIS — E11.628 CELLULITIS IN DIABETIC FOOT (HCC): Primary | ICD-10-CM

## 2023-10-16 DIAGNOSIS — I10 ESSENTIAL HYPERTENSION: ICD-10-CM

## 2023-10-16 DIAGNOSIS — E11.65 UNCONTROLLED TYPE 2 DIABETES MELLITUS WITH HYPERGLYCEMIA (HCC): ICD-10-CM

## 2023-10-16 LAB — HBA1C MFR BLD: 5.3 %

## 2023-10-16 PROCEDURE — 3044F HG A1C LEVEL LT 7.0%: CPT | Performed by: NURSE PRACTITIONER

## 2023-10-16 PROCEDURE — 83036 HEMOGLOBIN GLYCOSYLATED A1C: CPT | Performed by: NURSE PRACTITIONER

## 2023-10-16 PROCEDURE — G8427 DOCREV CUR MEDS BY ELIG CLIN: HCPCS | Performed by: NURSE PRACTITIONER

## 2023-10-16 PROCEDURE — 3074F SYST BP LT 130 MM HG: CPT | Performed by: NURSE PRACTITIONER

## 2023-10-16 PROCEDURE — 3078F DIAST BP <80 MM HG: CPT | Performed by: NURSE PRACTITIONER

## 2023-10-16 PROCEDURE — G8417 CALC BMI ABV UP PARAM F/U: HCPCS | Performed by: NURSE PRACTITIONER

## 2023-10-16 PROCEDURE — 2022F DILAT RTA XM EVC RTNOPTHY: CPT | Performed by: NURSE PRACTITIONER

## 2023-10-16 PROCEDURE — G8484 FLU IMMUNIZE NO ADMIN: HCPCS | Performed by: NURSE PRACTITIONER

## 2023-10-16 PROCEDURE — 99214 OFFICE O/P EST MOD 30 MIN: CPT | Performed by: NURSE PRACTITIONER

## 2023-10-16 PROCEDURE — 4004F PT TOBACCO SCREEN RCVD TLK: CPT | Performed by: NURSE PRACTITIONER

## 2023-10-16 RX ORDER — METOPROLOL TARTRATE 50 MG/1
50 TABLET, FILM COATED ORAL 2 TIMES DAILY
Qty: 60 TABLET | Refills: 5 | Status: SHIPPED | OUTPATIENT
Start: 2023-10-16

## 2023-10-16 RX ORDER — LISINOPRIL 5 MG/1
5 TABLET ORAL DAILY
Qty: 30 TABLET | Refills: 5 | Status: SHIPPED | OUTPATIENT
Start: 2023-10-16

## 2023-10-16 RX ORDER — CEPHALEXIN 500 MG/1
500 CAPSULE ORAL 2 TIMES DAILY
Qty: 14 CAPSULE | Refills: 0 | Status: SHIPPED | OUTPATIENT
Start: 2023-10-16 | End: 2023-10-23

## 2023-10-16 ASSESSMENT — PATIENT HEALTH QUESTIONNAIRE - PHQ9
SUM OF ALL RESPONSES TO PHQ QUESTIONS 1-9: 0
SUM OF ALL RESPONSES TO PHQ9 QUESTIONS 1 & 2: 0
2. FEELING DOWN, DEPRESSED OR HOPELESS: 0
1. LITTLE INTEREST OR PLEASURE IN DOING THINGS: 0
SUM OF ALL RESPONSES TO PHQ QUESTIONS 1-9: 0

## 2023-10-23 ENCOUNTER — TELEPHONE (OUTPATIENT)
Dept: PODIATRY | Age: 48
End: 2023-10-23

## 2023-10-23 NOTE — TELEPHONE ENCOUNTER
Pt called to get an appt as he has a Large open wound on left leg for appx 1 week. No availability at this time due to pt care. Please contact pt.

## 2023-10-24 ENCOUNTER — OFFICE VISIT (OUTPATIENT)
Dept: PODIATRY | Age: 48
End: 2023-10-24
Payer: COMMERCIAL

## 2023-10-24 VITALS — HEIGHT: 76 IN | WEIGHT: 300 LBS | BODY MASS INDEX: 36.53 KG/M2

## 2023-10-24 DIAGNOSIS — S90.821D BLISTER OF RIGHT HEEL, SUBSEQUENT ENCOUNTER: ICD-10-CM

## 2023-10-24 DIAGNOSIS — S91.301D OPEN WOUND OF RIGHT HEEL, SUBSEQUENT ENCOUNTER: ICD-10-CM

## 2023-10-24 DIAGNOSIS — E11.9 TYPE 2 DIABETES MELLITUS WITHOUT COMPLICATION, UNSPECIFIED WHETHER LONG TERM INSULIN USE (HCC): Primary | ICD-10-CM

## 2023-10-24 PROCEDURE — 2022F DILAT RTA XM EVC RTNOPTHY: CPT | Performed by: PODIATRIST

## 2023-10-24 PROCEDURE — G8427 DOCREV CUR MEDS BY ELIG CLIN: HCPCS | Performed by: PODIATRIST

## 2023-10-24 PROCEDURE — 3044F HG A1C LEVEL LT 7.0%: CPT | Performed by: PODIATRIST

## 2023-10-24 PROCEDURE — 29580 STRAPPING UNNA BOOT: CPT | Performed by: PODIATRIST

## 2023-10-24 PROCEDURE — 4004F PT TOBACCO SCREEN RCVD TLK: CPT | Performed by: PODIATRIST

## 2023-10-24 PROCEDURE — G8417 CALC BMI ABV UP PARAM F/U: HCPCS | Performed by: PODIATRIST

## 2023-10-24 PROCEDURE — 99213 OFFICE O/P EST LOW 20 MIN: CPT | Performed by: PODIATRIST

## 2023-10-24 PROCEDURE — G8484 FLU IMMUNIZE NO ADMIN: HCPCS | Performed by: PODIATRIST

## 2023-10-24 NOTE — PROGRESS NOTES
diabetes mellitus without complication, unspecified whether long term insulin use (HCC)    Open wound of right heel, subsequent encounter        Seen today blister with superficial wound posterior right heel  Most recent hemoglobin A1c from 10/16/2023.  5.3. Has been more active and has been walking more. New blister posterior right heel. No clinical signs of infection. I did recommend Unna boot and Ace bandage for edema control. Any calf pain any nausea vomiting fever chills shortness breath go emergency room. An unna boot  compressive wrap was applied to the right lower extremity. It's purpose is to  decrease  the amount of edema present, and to allow proper healing of the soft tissues. The patient was instructed to keep the unna boot clean, dry and intact until the next follow up visit. The patient was instructed to look for signs of redness, irritation, blistering and pain. If these or any other symptoms were to develop, they were advised to contact the office immediately for reevaluation. Unna boot and Ace bandage for edema control. Keep clean dry intact next 3-5 days. Any increasing calf pain removed sooner to go directly to the emergency room. I will follow-up in office 1 week              Return in about 1 week (around 10/31/2023). Seen By:  Magan Chery DPM      Document was created using voice recognition software. Note was reviewed, however may contain grammatical errors.

## 2023-10-31 ENCOUNTER — OFFICE VISIT (OUTPATIENT)
Dept: PODIATRY | Age: 48
End: 2023-10-31
Payer: COMMERCIAL

## 2023-10-31 VITALS — HEIGHT: 76 IN | WEIGHT: 300 LBS | BODY MASS INDEX: 36.53 KG/M2

## 2023-10-31 DIAGNOSIS — S91.301D OPEN WOUND OF RIGHT HEEL, SUBSEQUENT ENCOUNTER: ICD-10-CM

## 2023-10-31 DIAGNOSIS — E11.9 TYPE 2 DIABETES MELLITUS WITHOUT COMPLICATION, UNSPECIFIED WHETHER LONG TERM INSULIN USE (HCC): Primary | ICD-10-CM

## 2023-10-31 DIAGNOSIS — S90.821D BLISTER OF RIGHT HEEL, SUBSEQUENT ENCOUNTER: ICD-10-CM

## 2023-10-31 PROCEDURE — 2022F DILAT RTA XM EVC RTNOPTHY: CPT | Performed by: PODIATRIST

## 2023-10-31 PROCEDURE — G8427 DOCREV CUR MEDS BY ELIG CLIN: HCPCS | Performed by: PODIATRIST

## 2023-10-31 PROCEDURE — 4004F PT TOBACCO SCREEN RCVD TLK: CPT | Performed by: PODIATRIST

## 2023-10-31 PROCEDURE — 99213 OFFICE O/P EST LOW 20 MIN: CPT | Performed by: PODIATRIST

## 2023-10-31 PROCEDURE — G8484 FLU IMMUNIZE NO ADMIN: HCPCS | Performed by: PODIATRIST

## 2023-10-31 PROCEDURE — G8417 CALC BMI ABV UP PARAM F/U: HCPCS | Performed by: PODIATRIST

## 2023-10-31 PROCEDURE — 3044F HG A1C LEVEL LT 7.0%: CPT | Performed by: PODIATRIST

## 2023-10-31 NOTE — PROGRESS NOTES
Patient is here today to follow up on right heel pain. An unna boot was applied last visit and he reports the wrap helped with the pain for about the first day and a half. He states the swelling started going down. PCP is ZIA Jain, last seen 10/16/2023. CC:    Blister right heel  Follow-up open heel wound    HPI:   Presents follow-up open heel wound. Did tolerated Unna boot for several days well. Has been applying Neosporin and a Band-Aid on the back of the right heel. Denies nausea vomiting fever chills shortness of breath. No additional pedal complaints. ROS:  Const: Denies constitutional symptoms  Musculo: Denies symptoms other than stated above  Skin: Denies symptoms other than stated above       Current Outpatient Medications:     Wound Dressings (MEDIHONEY) GEL gel, Apply topically BID to left heel., Disp: 2 each, Rfl: 2    lisinopril (PRINIVIL;ZESTRIL) 5 MG tablet, Take 1 tablet by mouth daily, Disp: 30 tablet, Rfl: 5    metoprolol tartrate (LOPRESSOR) 50 MG tablet, Take 1 tablet by mouth 2 times daily, Disp: 60 tablet, Rfl: 5    Tirzepatide (MOUNJARO) 12.5 MG/0.5ML SOPN SC injection, Inject 0.5 mLs into the skin once a week, Disp: 2 mL, Rfl: 2    HYDROcodone-acetaminophen (NORCO) 5-325 MG per tablet, Take 1 tablet by mouth every 6 hours as needed for Pain for up to 30 days.  Intended supply: 30 days, Disp: 120 tablet, Rfl: 0    nitroGLYCERIN (NITROSTAT) 0.3 MG SL tablet, as directed place 1 tablet under the tongue if needed every 5 minutes for chest pain for 3 doses IF NO RELIEF AFTER FIRST DOSE CALL PRESCRIBER ., Disp: 100 tablet, Rfl: 1    vitamin D (ERGOCALCIFEROL) 1.25 MG (85902 UT) CAPS capsule, take 1 capsule by mouth every week, Disp: 12 capsule, Rfl: 1    mometasone-formoterol (DULERA) 200-5 MCG/ACT inhaler, inhale 2 puffs by mouth and INTO THE LUNGS twice a day, Disp: 8.8 g, Rfl: 2    metFORMIN (GLUCOPHAGE) 850 MG tablet, take 1 tablet by mouth three times a day, Disp:

## 2023-11-05 DIAGNOSIS — G89.4 CHRONIC PAIN SYNDROME: ICD-10-CM

## 2023-11-06 RX ORDER — HYDROCODONE BITARTRATE AND ACETAMINOPHEN 5; 325 MG/1; MG/1
1 TABLET ORAL EVERY 6 HOURS PRN
Qty: 120 TABLET | Refills: 0 | Status: SHIPPED | OUTPATIENT
Start: 2023-11-06 | End: 2023-12-06

## 2023-11-07 ENCOUNTER — OFFICE VISIT (OUTPATIENT)
Dept: PRIMARY CARE CLINIC | Age: 48
End: 2023-11-07
Payer: COMMERCIAL

## 2023-11-07 ENCOUNTER — OFFICE VISIT (OUTPATIENT)
Dept: PODIATRY | Age: 48
End: 2023-11-07
Payer: COMMERCIAL

## 2023-11-07 VITALS — WEIGHT: 291 LBS | BODY MASS INDEX: 35.44 KG/M2 | HEIGHT: 76 IN

## 2023-11-07 VITALS
HEIGHT: 76 IN | TEMPERATURE: 97.2 F | WEIGHT: 291 LBS | SYSTOLIC BLOOD PRESSURE: 126 MMHG | RESPIRATION RATE: 16 BRPM | BODY MASS INDEX: 35.44 KG/M2 | DIASTOLIC BLOOD PRESSURE: 70 MMHG

## 2023-11-07 DIAGNOSIS — E55.9 VITAMIN D DEFICIENCY: ICD-10-CM

## 2023-11-07 DIAGNOSIS — S90.821D BLISTER OF RIGHT HEEL, SUBSEQUENT ENCOUNTER: ICD-10-CM

## 2023-11-07 DIAGNOSIS — Z79.4 TYPE 2 DIABETES MELLITUS WITH DIABETIC POLYNEUROPATHY, WITH LONG-TERM CURRENT USE OF INSULIN (HCC): Primary | ICD-10-CM

## 2023-11-07 DIAGNOSIS — G89.4 CHRONIC PAIN SYNDROME: ICD-10-CM

## 2023-11-07 DIAGNOSIS — E78.2 MIXED HYPERLIPIDEMIA: ICD-10-CM

## 2023-11-07 DIAGNOSIS — J44.9 CHRONIC OBSTRUCTIVE PULMONARY DISEASE, UNSPECIFIED COPD TYPE (HCC): ICD-10-CM

## 2023-11-07 DIAGNOSIS — L97.509 TYPE 2 DIABETES MELLITUS WITH FOOT ULCER, UNSPECIFIED WHETHER LONG TERM INSULIN USE (HCC): ICD-10-CM

## 2023-11-07 DIAGNOSIS — L03.119 CELLULITIS IN DIABETIC FOOT (HCC): ICD-10-CM

## 2023-11-07 DIAGNOSIS — G47.00 ACUTE INSOMNIA: ICD-10-CM

## 2023-11-07 DIAGNOSIS — S91.301D OPEN WOUND OF RIGHT FOOT, SUBSEQUENT ENCOUNTER: Primary | ICD-10-CM

## 2023-11-07 DIAGNOSIS — I10 ESSENTIAL HYPERTENSION: ICD-10-CM

## 2023-11-07 DIAGNOSIS — E11.42 TYPE 2 DIABETES MELLITUS WITH DIABETIC POLYNEUROPATHY, WITH LONG-TERM CURRENT USE OF INSULIN (HCC): Primary | ICD-10-CM

## 2023-11-07 DIAGNOSIS — Z79.4 TYPE 2 DIABETES MELLITUS WITH DIABETIC POLYNEUROPATHY, WITH LONG-TERM CURRENT USE OF INSULIN (HCC): ICD-10-CM

## 2023-11-07 DIAGNOSIS — I25.10 CAD IN NATIVE ARTERY: ICD-10-CM

## 2023-11-07 DIAGNOSIS — E11.628 CELLULITIS IN DIABETIC FOOT (HCC): ICD-10-CM

## 2023-11-07 DIAGNOSIS — E11.42 TYPE 2 DIABETES MELLITUS WITH DIABETIC POLYNEUROPATHY, WITH LONG-TERM CURRENT USE OF INSULIN (HCC): ICD-10-CM

## 2023-11-07 DIAGNOSIS — Z23 NEED FOR VACCINATION: ICD-10-CM

## 2023-11-07 DIAGNOSIS — H04.123 DRY EYES: ICD-10-CM

## 2023-11-07 DIAGNOSIS — H10.13 ALLERGIC CONJUNCTIVITIS OF BOTH EYES: ICD-10-CM

## 2023-11-07 DIAGNOSIS — E11.621 TYPE 2 DIABETES MELLITUS WITH FOOT ULCER, UNSPECIFIED WHETHER LONG TERM INSULIN USE (HCC): ICD-10-CM

## 2023-11-07 LAB
ABSOLUTE IMMATURE GRANULOCYTE: 0.03 K/UL (ref 0–0.58)
ALBUMIN SERPL-MCNC: 3.8 G/DL (ref 3.5–5.2)
ALP BLD-CCNC: 98 U/L (ref 40–129)
ALT SERPL-CCNC: 7 U/L (ref 0–40)
ANION GAP SERPL CALCULATED.3IONS-SCNC: 21 MMOL/L (ref 7–16)
AST SERPL-CCNC: 13 U/L (ref 0–39)
BASOPHILS ABSOLUTE: 0.07 K/UL (ref 0–0.2)
BASOPHILS RELATIVE PERCENT: 1 % (ref 0–2)
BILIRUB SERPL-MCNC: 0.4 MG/DL (ref 0–1.2)
BUN BLDV-MCNC: 10 MG/DL (ref 6–20)
CALCIUM SERPL-MCNC: 9.8 MG/DL (ref 8.6–10.2)
CHLORIDE BLD-SCNC: 101 MMOL/L (ref 98–107)
CHOLESTEROL: 136 MG/DL
CO2: 20 MMOL/L (ref 22–29)
CREAT SERPL-MCNC: 0.7 MG/DL (ref 0.7–1.2)
EOSINOPHILS ABSOLUTE: 0.15 K/UL (ref 0.05–0.5)
EOSINOPHILS RELATIVE PERCENT: 2 % (ref 0–6)
GFR SERPL CREATININE-BSD FRML MDRD: >60 ML/MIN/1.73M2
GLUCOSE BLD-MCNC: 81 MG/DL (ref 74–99)
HCT VFR BLD CALC: 49.9 % (ref 37–54)
HDLC SERPL-MCNC: 47 MG/DL
HEMOGLOBIN: 16.1 G/DL (ref 12.5–16.5)
IMMATURE GRANULOCYTES: 0 % (ref 0–5)
LDL CHOLESTEROL: 74 MG/DL
LYMPHOCYTES ABSOLUTE: 2.11 K/UL (ref 1.5–4)
LYMPHOCYTES RELATIVE PERCENT: 23 % (ref 20–42)
MCH RBC QN AUTO: 31.9 PG (ref 26–35)
MCHC RBC AUTO-ENTMCNC: 32.3 G/DL (ref 32–34.5)
MCV RBC AUTO: 98.8 FL (ref 80–99.9)
MONOCYTES ABSOLUTE: 0.92 K/UL (ref 0.1–0.95)
MONOCYTES RELATIVE PERCENT: 10 % (ref 2–12)
NEUTROPHILS ABSOLUTE: 5.86 K/UL (ref 1.8–7.3)
NEUTROPHILS RELATIVE PERCENT: 64 % (ref 43–80)
PDW BLD-RTO: 14.6 % (ref 11.5–15)
PLATELET # BLD: 235 K/UL (ref 130–450)
PMV BLD AUTO: 9.3 FL (ref 7–12)
POTASSIUM SERPL-SCNC: 4.2 MMOL/L (ref 3.5–5)
RBC # BLD: 5.05 M/UL (ref 3.8–5.8)
SODIUM BLD-SCNC: 142 MMOL/L (ref 132–146)
TOTAL PROTEIN: 7.3 G/DL (ref 6.4–8.3)
TRIGL SERPL-MCNC: 77 MG/DL
VLDLC SERPL CALC-MCNC: 15 MG/DL
WBC # BLD: 9.1 K/UL (ref 4.5–11.5)

## 2023-11-07 PROCEDURE — 4004F PT TOBACCO SCREEN RCVD TLK: CPT | Performed by: NURSE PRACTITIONER

## 2023-11-07 PROCEDURE — 99213 OFFICE O/P EST LOW 20 MIN: CPT | Performed by: PODIATRIST

## 2023-11-07 PROCEDURE — 4004F PT TOBACCO SCREEN RCVD TLK: CPT | Performed by: PODIATRIST

## 2023-11-07 PROCEDURE — 3023F SPIROM DOC REV: CPT | Performed by: NURSE PRACTITIONER

## 2023-11-07 PROCEDURE — 2022F DILAT RTA XM EVC RTNOPTHY: CPT | Performed by: PODIATRIST

## 2023-11-07 PROCEDURE — G8482 FLU IMMUNIZE ORDER/ADMIN: HCPCS | Performed by: NURSE PRACTITIONER

## 2023-11-07 PROCEDURE — G8417 CALC BMI ABV UP PARAM F/U: HCPCS | Performed by: NURSE PRACTITIONER

## 2023-11-07 PROCEDURE — G8417 CALC BMI ABV UP PARAM F/U: HCPCS | Performed by: PODIATRIST

## 2023-11-07 PROCEDURE — 2022F DILAT RTA XM EVC RTNOPTHY: CPT | Performed by: NURSE PRACTITIONER

## 2023-11-07 PROCEDURE — G8427 DOCREV CUR MEDS BY ELIG CLIN: HCPCS | Performed by: NURSE PRACTITIONER

## 2023-11-07 PROCEDURE — 3044F HG A1C LEVEL LT 7.0%: CPT | Performed by: PODIATRIST

## 2023-11-07 PROCEDURE — 3074F SYST BP LT 130 MM HG: CPT | Performed by: NURSE PRACTITIONER

## 2023-11-07 PROCEDURE — G8482 FLU IMMUNIZE ORDER/ADMIN: HCPCS | Performed by: PODIATRIST

## 2023-11-07 PROCEDURE — 3078F DIAST BP <80 MM HG: CPT | Performed by: NURSE PRACTITIONER

## 2023-11-07 PROCEDURE — 99214 OFFICE O/P EST MOD 30 MIN: CPT | Performed by: NURSE PRACTITIONER

## 2023-11-07 PROCEDURE — 90471 IMMUNIZATION ADMIN: CPT | Performed by: NURSE PRACTITIONER

## 2023-11-07 PROCEDURE — 90674 CCIIV4 VAC NO PRSV 0.5 ML IM: CPT | Performed by: NURSE PRACTITIONER

## 2023-11-07 PROCEDURE — 3044F HG A1C LEVEL LT 7.0%: CPT | Performed by: NURSE PRACTITIONER

## 2023-11-07 PROCEDURE — G8427 DOCREV CUR MEDS BY ELIG CLIN: HCPCS | Performed by: PODIATRIST

## 2023-11-07 RX ORDER — INSULIN ASPART 100 [IU]/ML
INJECTION, SOLUTION INTRAVENOUS; SUBCUTANEOUS
COMMUNITY
Start: 2023-11-06

## 2023-11-07 RX ORDER — PEN NEEDLE, DIABETIC 29 G X1/2"
NEEDLE, DISPOSABLE MISCELLANEOUS
Qty: 100 EACH | Refills: 2 | Status: SHIPPED | OUTPATIENT
Start: 2023-11-07

## 2023-11-07 RX ORDER — PEN NEEDLE, DIABETIC 32GX 5/32"
NEEDLE, DISPOSABLE MISCELLANEOUS
Qty: 200 EACH | Refills: 2 | Status: SHIPPED | OUTPATIENT
Start: 2023-11-07

## 2023-11-07 RX ORDER — FEXOFENADINE HCL 180 MG/1
180 TABLET ORAL DAILY
Qty: 30 TABLET | Refills: 2 | Status: SHIPPED | OUTPATIENT
Start: 2023-11-07

## 2023-11-07 RX ORDER — ALBUTEROL SULFATE 90 UG/1
AEROSOL, METERED RESPIRATORY (INHALATION)
Qty: 18 G | Refills: 2 | Status: SHIPPED | OUTPATIENT
Start: 2023-11-07

## 2023-11-07 RX ORDER — METOPROLOL TARTRATE 75 MG/1
1 TABLET, FILM COATED ORAL 2 TIMES DAILY
COMMUNITY
Start: 2023-11-05 | End: 2023-11-07 | Stop reason: DRUGHIGH

## 2023-11-07 RX ORDER — GABAPENTIN 300 MG/1
300 CAPSULE ORAL NIGHTLY
Qty: 30 CAPSULE | Refills: 1 | Status: SHIPPED | OUTPATIENT
Start: 2023-11-07 | End: 2024-02-05

## 2023-11-07 RX ORDER — ATORVASTATIN CALCIUM 80 MG/1
80 TABLET, FILM COATED ORAL NIGHTLY
Qty: 90 TABLET | Refills: 1 | Status: SHIPPED | OUTPATIENT
Start: 2023-11-07

## 2023-11-07 RX ORDER — TIRZEPATIDE 7.5 MG/.5ML
INJECTION, SOLUTION SUBCUTANEOUS
COMMUNITY
Start: 2023-09-08 | End: 2023-11-07

## 2023-11-07 RX ORDER — LANCETS 33 GAUGE
EACH MISCELLANEOUS
Qty: 100 EACH | Refills: 2 | Status: SHIPPED | OUTPATIENT
Start: 2023-11-07

## 2023-11-07 RX ORDER — METOPROLOL TARTRATE 50 MG/1
50 TABLET, FILM COATED ORAL 2 TIMES DAILY
Qty: 60 TABLET | Refills: 5 | Status: SHIPPED | OUTPATIENT
Start: 2023-11-07

## 2023-11-07 RX ORDER — INSULIN GLARGINE 100 [IU]/ML
INJECTION, SOLUTION SUBCUTANEOUS
COMMUNITY
Start: 2023-11-03

## 2023-11-07 RX ORDER — LISINOPRIL 5 MG/1
5 TABLET ORAL DAILY
Qty: 30 TABLET | Refills: 5 | Status: SHIPPED | OUTPATIENT
Start: 2023-11-07

## 2023-11-07 RX ORDER — TRAZODONE HYDROCHLORIDE 50 MG/1
50 TABLET ORAL NIGHTLY PRN
Qty: 30 TABLET | Refills: 5 | Status: SHIPPED | OUTPATIENT
Start: 2023-11-07

## 2023-11-07 RX ORDER — ERGOCALCIFEROL 1.25 MG/1
CAPSULE ORAL
Qty: 12 CAPSULE | Refills: 1 | Status: SHIPPED | OUTPATIENT
Start: 2023-11-07

## 2023-11-07 RX ORDER — EZETIMIBE 10 MG/1
10 TABLET ORAL DAILY
Qty: 90 TABLET | Refills: 1 | Status: SHIPPED | OUTPATIENT
Start: 2023-11-07

## 2023-11-07 RX ORDER — LANCETS 30 GAUGE
1 EACH MISCELLANEOUS 3 TIMES DAILY
Qty: 100 EACH | Refills: 3 | Status: SHIPPED | OUTPATIENT
Start: 2023-11-07 | End: 2023-12-07

## 2023-11-07 RX ORDER — TIRZEPATIDE 12.5 MG/.5ML
12.5 INJECTION, SOLUTION SUBCUTANEOUS WEEKLY
Qty: 6 ML | Refills: 0 | Status: SHIPPED | OUTPATIENT
Start: 2023-11-07

## 2023-11-07 RX ORDER — HYDROCODONE BITARTRATE AND ACETAMINOPHEN 5; 325 MG/1; MG/1
1 TABLET ORAL EVERY 6 HOURS PRN
Qty: 120 TABLET | Refills: 0 | Status: CANCELLED | OUTPATIENT
Start: 2023-11-07 | End: 2023-12-07

## 2023-11-07 RX ORDER — NITROGLYCERIN 0.3 MG/1
TABLET SUBLINGUAL
Qty: 100 TABLET | Refills: 1 | Status: SHIPPED | OUTPATIENT
Start: 2023-11-07

## 2023-11-07 ASSESSMENT — ENCOUNTER SYMPTOMS
EYE DISCHARGE: 1
BLOOD IN STOOL: 0
SORE THROAT: 0
ABDOMINAL DISTENTION: 0
COUGH: 0
NAUSEA: 0
VOMITING: 0
CONSTIPATION: 0
CHEST TIGHTNESS: 0
RHINORRHEA: 0
SHORTNESS OF BREATH: 0
TROUBLE SWALLOWING: 0
COLOR CHANGE: 0
ABDOMINAL PAIN: 0
APNEA: 0
EYE REDNESS: 1
EYE ITCHING: 1
RECTAL PAIN: 0
DIARRHEA: 0
BACK PAIN: 1
WHEEZING: 0

## 2023-11-07 ASSESSMENT — PATIENT HEALTH QUESTIONNAIRE - PHQ9
SUM OF ALL RESPONSES TO PHQ QUESTIONS 1-9: 0
2. FEELING DOWN, DEPRESSED OR HOPELESS: 0
1. LITTLE INTEREST OR PLEASURE IN DOING THINGS: 0
SUM OF ALL RESPONSES TO PHQ9 QUESTIONS 1 & 2: 0
SUM OF ALL RESPONSES TO PHQ QUESTIONS 1-9: 0

## 2023-11-07 NOTE — PROGRESS NOTES
(DROPLET PEN NEEDLES) 32G X 4 MM MISC, use 1 PEN NEEDLE to inject MEDICATION subcutaneously three to four times a day, Disp: 200 each, Rfl: 2    fexofenadine (ALLEGRA) 180 MG tablet, Take 1 tablet by mouth daily, Disp: 30 tablet, Rfl: 2    ezetimibe (ZETIA) 10 MG tablet, Take 1 tablet by mouth daily, Disp: 90 tablet, Rfl: 1    dapagliflozin (FARXIGA) 10 MG tablet, Take 1 tablet by mouth every morning, Disp: 90 tablet, Rfl: 1    Brimonidine Tartrate 0.025 % SOLN, Instill 1 drop into both eyes as needed eye redness, Disp: 7.5 mL, Rfl: 2    blood glucose test strips (ASCENSIA AUTODISC VI;ONE TOUCH ULTRA TEST VI) strip, 1 each by In Vitro route daily As needed. , Disp: 100 each, Rfl: 3    B-D INS SYR ULTRAFINE 1CC/30G 30G X 1/2\" 1 ML MISC, use as directed three times a day, Disp: 100 each, Rfl: 2    atorvastatin (LIPITOR) 80 MG tablet, Take 1 tablet by mouth nightly, Disp: 90 tablet, Rfl: 1    albuterol sulfate HFA (VENTOLIN HFA) 108 (90 Base) MCG/ACT inhaler, inhale 2 puffs by mouth and INTO THE LUNGS every 6 hours if needed for wheezing, Disp: 18 g, Rfl: 2    gabapentin (NEURONTIN) 300 MG capsule, Take 1 capsule by mouth nightly for 90 days. , Disp: 30 capsule, Rfl: 1    traZODone (DESYREL) 50 MG tablet, Take 1 tablet by mouth nightly as needed for Sleep, Disp: 30 tablet, Rfl: 5    HYDROcodone-acetaminophen (NORCO) 5-325 MG per tablet, Take 1 tablet by mouth every 6 hours as needed for Pain for up to 30 days.  Intended supply: 30 days, Disp: 120 tablet, Rfl: 0    Wound Dressings (MEDIHONEY) GEL gel, Apply topically BID to left heel., Disp: 2 each, Rfl: 2    Continuous Blood Gluc Sensor (FREESTYLE DIPTI 2 SENSOR) MISC, Use as directed, Disp: 2 each, Rfl: 5    Continuous Blood Gluc Sensor (FREESTYLE DIPTI 14 DAY SENSOR) [facility], Use as directed to check BG QID & PRN, Disp: 1 each, Rfl: 3    Continuous Blood Gluc  (FREESTYLE DIPTI 2 READER) MANNY, Use as directed to check BG QID & PRN, Disp: 2 each, Rfl: 3    aspirin EC

## 2023-11-14 ENCOUNTER — OFFICE VISIT (OUTPATIENT)
Dept: PODIATRY | Age: 48
End: 2023-11-14
Payer: COMMERCIAL

## 2023-11-14 VITALS — WEIGHT: 291 LBS | BODY MASS INDEX: 35.44 KG/M2 | HEIGHT: 76 IN

## 2023-11-14 DIAGNOSIS — S91.301D OPEN WOUND OF RIGHT FOOT, SUBSEQUENT ENCOUNTER: ICD-10-CM

## 2023-11-14 DIAGNOSIS — E11.9 TYPE 2 DIABETES MELLITUS WITHOUT COMPLICATION, UNSPECIFIED WHETHER LONG TERM INSULIN USE (HCC): Primary | ICD-10-CM

## 2023-11-14 PROCEDURE — 3044F HG A1C LEVEL LT 7.0%: CPT | Performed by: PODIATRIST

## 2023-11-14 PROCEDURE — 4004F PT TOBACCO SCREEN RCVD TLK: CPT | Performed by: PODIATRIST

## 2023-11-14 PROCEDURE — G8417 CALC BMI ABV UP PARAM F/U: HCPCS | Performed by: PODIATRIST

## 2023-11-14 PROCEDURE — 2022F DILAT RTA XM EVC RTNOPTHY: CPT | Performed by: PODIATRIST

## 2023-11-14 PROCEDURE — G8427 DOCREV CUR MEDS BY ELIG CLIN: HCPCS | Performed by: PODIATRIST

## 2023-11-14 PROCEDURE — G8482 FLU IMMUNIZE ORDER/ADMIN: HCPCS | Performed by: PODIATRIST

## 2023-11-14 PROCEDURE — 99213 OFFICE O/P EST LOW 20 MIN: CPT | Performed by: PODIATRIST

## 2023-11-14 NOTE — PROGRESS NOTES
Patient is here today to follow up on a right foot ulcer. PCP is ZIA Juan, last seen 11/07/2023. CC:    Follow-up diabetic wound right heel    HPI:   Presents today follow-up wound diabetic healed. No open wounds today. Wearing regular shoes. Pain-free foot and ankle today. Denies recent injury or trauma. No redness or tenderness on the back of the right heel today. Pleased with progression.     ROS:  Const: Denies constitutional symptoms  Musculo: Denies symptoms other than stated above  Skin: Denies symptoms other than stated above       Current Outpatient Medications:     NOVOLOG 100 UNIT/ML injection vial, , Disp: , Rfl:     LANTUS SOLOSTAR 100 UNIT/ML injection pen, INJECT 70 UNITS NIGHTLY, Disp: , Rfl:     vitamin D (ERGOCALCIFEROL) 1.25 MG (53152 UT) CAPS capsule, take 1 capsule by mouth every week, Disp: 12 capsule, Rfl: 1    Tirzepatide (MOUNJARO) 12.5 MG/0.5ML SOPN SC injection, Inject 0.5 mLs into the skin once a week, Disp: 6 mL, Rfl: 0    nitroGLYCERIN (NITROSTAT) 0.3 MG SL tablet, as directed place 1 tablet under the tongue if needed every 5 minutes for chest pain for 3 doses IF NO RELIEF AFTER FIRST DOSE CALL PRESCRIBER ., Disp: 100 tablet, Rfl: 1    mometasone-formoterol (DULERA) 200-5 MCG/ACT inhaler, inhale 2 puffs by mouth and INTO THE LUNGS twice a day, Disp: 8.8 g, Rfl: 2    metoprolol tartrate (LOPRESSOR) 50 MG tablet, Take 1 tablet by mouth 2 times daily, Disp: 60 tablet, Rfl: 5    metFORMIN (GLUCOPHAGE) 850 MG tablet, take 1 tablet by mouth three times a day, Disp: 270 tablet, Rfl: 1    lisinopril (PRINIVIL;ZESTRIL) 5 MG tablet, Take 1 tablet by mouth daily, Disp: 30 tablet, Rfl: 5    Lancets MISC, 1 each by Does not apply route 3 times daily, Disp: 100 each, Rfl: 3    Lancets (ONETOUCH DELICA PLUS [id number]) MISC, use 1 LANCET to TEST BLOOD SUGAR three to four times a day, Disp: 100 each, Rfl: 2    Insulin Pen Needle (DROPLET PEN NEEDLES) 32G X 4 MM MISC, use 1 PEN

## 2023-11-30 DIAGNOSIS — E11.42 TYPE 2 DIABETES MELLITUS WITH DIABETIC POLYNEUROPATHY, WITH LONG-TERM CURRENT USE OF INSULIN (HCC): ICD-10-CM

## 2023-11-30 DIAGNOSIS — Z79.4 TYPE 2 DIABETES MELLITUS WITH DIABETIC POLYNEUROPATHY, WITH LONG-TERM CURRENT USE OF INSULIN (HCC): ICD-10-CM

## 2023-11-30 NOTE — TELEPHONE ENCOUNTER
Last Appointment:  11/7/2023  Future Appointments   Date Time Provider Department Center   12/8/2023 10:00 AM Uday Anthony DO [place] E[facility]   2/9/2024  8:00 AM Aline Aparicio APRN - NP [place] [facility]   4/17/2025 10:45 AM Tucker Parker MD VASC/MED [facility]

## 2023-12-01 RX ORDER — INSULIN GLARGINE 100 [IU]/ML
INJECTION, SOLUTION SUBCUTANEOUS
Qty: 21 ML | Refills: 2 | Status: SHIPPED | OUTPATIENT
Start: 2023-12-01

## 2023-12-04 DIAGNOSIS — G89.4 CHRONIC PAIN SYNDROME: ICD-10-CM

## 2023-12-04 RX ORDER — HYDROCODONE BITARTRATE AND ACETAMINOPHEN 5; 325 MG/1; MG/1
1 TABLET ORAL EVERY 6 HOURS PRN
Qty: 120 TABLET | Refills: 0 | Status: SHIPPED | OUTPATIENT
Start: 2023-12-04 | End: 2024-01-03

## 2024-01-01 DIAGNOSIS — G89.4 CHRONIC PAIN SYNDROME: ICD-10-CM

## 2024-01-01 DIAGNOSIS — I25.10 CAD IN NATIVE ARTERY: ICD-10-CM

## 2024-01-01 DIAGNOSIS — E55.9 VITAMIN D DEFICIENCY: ICD-10-CM

## 2024-01-02 DIAGNOSIS — Z79.4 TYPE 2 DIABETES MELLITUS WITH DIABETIC POLYNEUROPATHY, WITH LONG-TERM CURRENT USE OF INSULIN (HCC): ICD-10-CM

## 2024-01-02 DIAGNOSIS — E11.42 TYPE 2 DIABETES MELLITUS WITH DIABETIC POLYNEUROPATHY, WITH LONG-TERM CURRENT USE OF INSULIN (HCC): ICD-10-CM

## 2024-01-02 RX ORDER — HYDROCODONE BITARTRATE AND ACETAMINOPHEN 5; 325 MG/1; MG/1
1 TABLET ORAL EVERY 6 HOURS PRN
Qty: 120 TABLET | Refills: 0 | Status: SHIPPED | OUTPATIENT
Start: 2024-01-02 | End: 2024-02-01

## 2024-01-02 RX ORDER — GABAPENTIN 300 MG/1
300 CAPSULE ORAL NIGHTLY
Qty: 90 CAPSULE | Refills: 0 | Status: SHIPPED | OUTPATIENT
Start: 2024-01-02 | End: 2024-04-01

## 2024-01-02 RX ORDER — ERGOCALCIFEROL 1.25 MG/1
CAPSULE ORAL
Qty: 12 CAPSULE | Refills: 1 | Status: SHIPPED | OUTPATIENT
Start: 2024-01-02

## 2024-01-02 RX ORDER — ASPIRIN 81 MG/1
81 TABLET ORAL DAILY
Qty: 90 TABLET | Refills: 1 | Status: SHIPPED | OUTPATIENT
Start: 2024-01-02

## 2024-01-02 NOTE — TELEPHONE ENCOUNTER
Last Appointment:  11/7/2023  Future Appointments   Date Time Provider Department Center   1/30/2024  7:15 AM Uday Anthony DO [place] [facility]   2/9/2024  8:00 AM Aline Aparicio APRN - NP [place] [facility]   4/17/2025 10:45 AM Tucker Parker MD VAS/MED [facility]

## 2024-01-02 NOTE — TELEPHONE ENCOUNTER
Last Appointment:  11/7/2023  Future Appointments   Date Time Provider Department Center   1/30/2024  7:15 AM Uday Anthony DO [place] [facility]   2/9/2024  8:00 AM Aline Aparicio APRN - NP [place] [facility]   4/17/2025 10:45 AM Tucker Parker MD VAS/MED [facility]

## 2024-01-13 DIAGNOSIS — I25.10 CAD IN NATIVE ARTERY: ICD-10-CM

## 2024-01-15 RX ORDER — NITROGLYCERIN 0.3 MG/1
TABLET SUBLINGUAL
Qty: 30 TABLET | Refills: 1 | Status: SHIPPED | OUTPATIENT
Start: 2024-01-15

## 2024-01-30 ENCOUNTER — OFFICE VISIT (OUTPATIENT)
Dept: ENT CLINIC | Age: 49
End: 2024-01-30
Payer: COMMERCIAL

## 2024-01-30 VITALS
HEART RATE: 88 BPM | OXYGEN SATURATION: 98 % | TEMPERATURE: 97.9 F | BODY MASS INDEX: 31.17 KG/M2 | WEIGHT: 256 LBS | SYSTOLIC BLOOD PRESSURE: 114 MMHG | DIASTOLIC BLOOD PRESSURE: 78 MMHG | HEIGHT: 76 IN

## 2024-01-30 DIAGNOSIS — Z79.4 TYPE 2 DIABETES MELLITUS WITH DIABETIC POLYNEUROPATHY, WITH LONG-TERM CURRENT USE OF INSULIN (HCC): ICD-10-CM

## 2024-01-30 DIAGNOSIS — G89.4 CHRONIC PAIN SYNDROME: ICD-10-CM

## 2024-01-30 DIAGNOSIS — H69.92 ETD (EUSTACHIAN TUBE DYSFUNCTION), LEFT: ICD-10-CM

## 2024-01-30 DIAGNOSIS — J30.1 SEASONAL ALLERGIC RHINITIS DUE TO POLLEN: ICD-10-CM

## 2024-01-30 DIAGNOSIS — H69.93 DYSFUNCTION OF BOTH EUSTACHIAN TUBES: Primary | ICD-10-CM

## 2024-01-30 DIAGNOSIS — E11.42 TYPE 2 DIABETES MELLITUS WITH DIABETIC POLYNEUROPATHY, WITH LONG-TERM CURRENT USE OF INSULIN (HCC): ICD-10-CM

## 2024-01-30 PROCEDURE — G8417 CALC BMI ABV UP PARAM F/U: HCPCS | Performed by: OTOLARYNGOLOGY

## 2024-01-30 PROCEDURE — G8482 FLU IMMUNIZE ORDER/ADMIN: HCPCS | Performed by: OTOLARYNGOLOGY

## 2024-01-30 PROCEDURE — 4004F PT TOBACCO SCREEN RCVD TLK: CPT | Performed by: OTOLARYNGOLOGY

## 2024-01-30 PROCEDURE — 3078F DIAST BP <80 MM HG: CPT | Performed by: OTOLARYNGOLOGY

## 2024-01-30 PROCEDURE — G8427 DOCREV CUR MEDS BY ELIG CLIN: HCPCS | Performed by: OTOLARYNGOLOGY

## 2024-01-30 PROCEDURE — 3074F SYST BP LT 130 MM HG: CPT | Performed by: OTOLARYNGOLOGY

## 2024-01-30 PROCEDURE — 99213 OFFICE O/P EST LOW 20 MIN: CPT | Performed by: OTOLARYNGOLOGY

## 2024-01-30 RX ORDER — HYDROCODONE BITARTRATE AND ACETAMINOPHEN 5; 325 MG/1; MG/1
1 TABLET ORAL EVERY 6 HOURS PRN
Qty: 120 TABLET | Refills: 0 | Status: SHIPPED | OUTPATIENT
Start: 2024-01-30 | End: 2024-02-29

## 2024-01-30 NOTE — TELEPHONE ENCOUNTER
Last Appointment:  11/7/2023  Future Appointments   Date Time Provider Department Center   1/30/2024  7:15 AM Uday Anthony DO [place] [facility]   2/9/2024  8:00 AM Aline Aparicio APRN - NP [facility]   4/17/2025 10:45 AM Tucker Parker MD VAS/MED [facility]

## 2024-01-30 NOTE — PROGRESS NOTES
Subjective:      Patient ID:  Spike Aviles is a 48 y.o. male.    HPI Comments: Pt returns for check of ear tubes, there have not been infections since last visit.      Tubes were placed 2022     Past Medical History:   Diagnosis Date    CAD (coronary artery disease)     Dr Deras    COPD (chronic obstructive pulmonary disease) (HCC)     Diabetes mellitus (HCC)     Hyperlipidemia     Hypertension     Internal carotid artery occlusion, right 2020    Nasopharyngeal mass 2021    For OR 21    Neuropathy     Obesity     Shoulder problem     Type 1 diabetes mellitus with circulatory complication (HCC) 2023     Past Surgical History:   Procedure Laterality Date    CARDIAC CATHETERIZATION  2020    Dr. Schmitt- multi vessel disease    MITRAL VALVE REPAIR N/A 3/16/2020    CORONARY ARTERY BYPASS POSSIBLE LEFT RADIAL ARTERY HARVEST, PATRICE performed by Liu De Paz MD at [facility] OR    NOSE SURGERY N/A 2021    NASOPHARYNGEAL BIOPSY performed by Uday Anthony DO at [facility] OR    THORACOSCOPY  2017    resection of mediastinal mass    TONSILLECTOMY      TRANSESOPHAGEAL ECHOCARDIOGRAM  2020    TYMPANOSTOMY TUBE PLACEMENT Right 2021    POSSIBLE RIGHT EAR TUBE POSSIBLE EUSTACHIAN TUBE DILATION performed by Uday Anthony DO at [facility] OR     Family History   Problem Relation Age of Onset    Heart Disease Mother         MI age 41     High Blood Pressure Mother     Diabetes Father     Heart Disease Father         MI    High Blood Pressure Father     High Cholesterol Father     Cancer Father     Stroke Father     Kidney Disease Father         dialysis    High Blood Pressure Sister     Other Brother     COPD Brother     High Blood Pressure Brother      Social History     Socioeconomic History    Marital status: Single     Spouse name: None    Number of children: None    Years of education: None    Highest education level: None   Occupational History

## 2024-02-02 DIAGNOSIS — I25.10 CAD IN NATIVE ARTERY: ICD-10-CM

## 2024-02-02 RX ORDER — NITROGLYCERIN 0.3 MG/1
TABLET SUBLINGUAL
Qty: 100 TABLET | Refills: 0 | Status: SHIPPED | OUTPATIENT
Start: 2024-02-02

## 2024-02-09 ENCOUNTER — OFFICE VISIT (OUTPATIENT)
Dept: PRIMARY CARE CLINIC | Age: 49
End: 2024-02-09

## 2024-02-09 VITALS
OXYGEN SATURATION: 96 % | HEIGHT: 76 IN | TEMPERATURE: 98.7 F | WEIGHT: 260 LBS | BODY MASS INDEX: 31.66 KG/M2 | HEART RATE: 89 BPM | RESPIRATION RATE: 16 BRPM | DIASTOLIC BLOOD PRESSURE: 66 MMHG | SYSTOLIC BLOOD PRESSURE: 128 MMHG

## 2024-02-09 DIAGNOSIS — E11.42 TYPE 2 DIABETES MELLITUS WITH DIABETIC POLYNEUROPATHY, WITH LONG-TERM CURRENT USE OF INSULIN (HCC): ICD-10-CM

## 2024-02-09 DIAGNOSIS — E78.2 MIXED HYPERLIPIDEMIA: ICD-10-CM

## 2024-02-09 DIAGNOSIS — M54.42 CHRONIC BILATERAL LOW BACK PAIN WITH BILATERAL SCIATICA: ICD-10-CM

## 2024-02-09 DIAGNOSIS — Z11.3 SCREENING EXAMINATION FOR STD (SEXUALLY TRANSMITTED DISEASE): ICD-10-CM

## 2024-02-09 DIAGNOSIS — E29.1 HYPOGONADISM IN MALE: ICD-10-CM

## 2024-02-09 DIAGNOSIS — J44.9 CHRONIC OBSTRUCTIVE PULMONARY DISEASE, UNSPECIFIED COPD TYPE (HCC): ICD-10-CM

## 2024-02-09 DIAGNOSIS — G89.29 CHRONIC PAIN OF LEFT KNEE: ICD-10-CM

## 2024-02-09 DIAGNOSIS — Z79.4 TYPE 2 DIABETES MELLITUS WITH DIABETIC POLYNEUROPATHY, WITH LONG-TERM CURRENT USE OF INSULIN (HCC): Primary | ICD-10-CM

## 2024-02-09 DIAGNOSIS — E11.42 TYPE 2 DIABETES MELLITUS WITH DIABETIC POLYNEUROPATHY, WITH LONG-TERM CURRENT USE OF INSULIN (HCC): Primary | ICD-10-CM

## 2024-02-09 DIAGNOSIS — J39.2 NASOPHARYNGEAL MASS: ICD-10-CM

## 2024-02-09 DIAGNOSIS — G89.4 CHRONIC PAIN SYNDROME: ICD-10-CM

## 2024-02-09 DIAGNOSIS — I25.10 CAD IN NATIVE ARTERY: ICD-10-CM

## 2024-02-09 DIAGNOSIS — M54.16 LUMBAR RADICULOPATHY: ICD-10-CM

## 2024-02-09 DIAGNOSIS — H69.91 ETD (EUSTACHIAN TUBE DYSFUNCTION), RIGHT: ICD-10-CM

## 2024-02-09 DIAGNOSIS — Z12.11 SCREENING FOR COLON CANCER: ICD-10-CM

## 2024-02-09 DIAGNOSIS — M25.562 CHRONIC PAIN OF LEFT KNEE: ICD-10-CM

## 2024-02-09 DIAGNOSIS — E55.9 VITAMIN D DEFICIENCY: ICD-10-CM

## 2024-02-09 DIAGNOSIS — I10 ESSENTIAL HYPERTENSION: ICD-10-CM

## 2024-02-09 DIAGNOSIS — M54.41 CHRONIC BILATERAL LOW BACK PAIN WITH BILATERAL SCIATICA: ICD-10-CM

## 2024-02-09 DIAGNOSIS — I65.21 OCCLUSION OF RIGHT INTERNAL CAROTID ARTERY: ICD-10-CM

## 2024-02-09 DIAGNOSIS — G89.29 CHRONIC BILATERAL LOW BACK PAIN WITH BILATERAL SCIATICA: ICD-10-CM

## 2024-02-09 DIAGNOSIS — M62.830 SPASM OF THORACIC BACK MUSCLE: ICD-10-CM

## 2024-02-09 DIAGNOSIS — Z01.818 PRE-OP TESTING: ICD-10-CM

## 2024-02-09 DIAGNOSIS — Z79.4 TYPE 2 DIABETES MELLITUS WITH DIABETIC POLYNEUROPATHY, WITH LONG-TERM CURRENT USE OF INSULIN (HCC): ICD-10-CM

## 2024-02-09 RX ORDER — TIRZEPATIDE 15 MG/.5ML
15 INJECTION, SOLUTION SUBCUTANEOUS WEEKLY
Qty: 6 ML | Refills: 1 | Status: SHIPPED | OUTPATIENT
Start: 2024-02-09

## 2024-02-09 RX ORDER — ATORVASTATIN CALCIUM 80 MG/1
80 TABLET, FILM COATED ORAL NIGHTLY
Qty: 90 TABLET | Refills: 1 | Status: SHIPPED | OUTPATIENT
Start: 2024-02-09

## 2024-02-09 RX ORDER — TIRZEPATIDE 12.5 MG/.5ML
12.5 INJECTION, SOLUTION SUBCUTANEOUS WEEKLY
Qty: 6 ML | Refills: 0 | Status: CANCELLED | OUTPATIENT
Start: 2024-02-09

## 2024-02-09 RX ORDER — EZETIMIBE 10 MG/1
10 TABLET ORAL DAILY
Qty: 90 TABLET | Refills: 1 | Status: SHIPPED | OUTPATIENT
Start: 2024-02-09

## 2024-02-09 RX ORDER — HYDROCODONE BITARTRATE AND ACETAMINOPHEN 5; 325 MG/1; MG/1
1 TABLET ORAL EVERY 6 HOURS PRN
Qty: 120 TABLET | Refills: 0 | Status: SHIPPED | OUTPATIENT
Start: 2024-02-09 | End: 2024-03-10

## 2024-02-09 RX ORDER — ERGOCALCIFEROL 1.25 MG/1
CAPSULE ORAL
Qty: 12 CAPSULE | Refills: 1 | Status: SHIPPED | OUTPATIENT
Start: 2024-02-09

## 2024-02-09 RX ORDER — NITROGLYCERIN 0.3 MG/1
TABLET SUBLINGUAL
Qty: 100 TABLET | Refills: 0 | Status: SHIPPED | OUTPATIENT
Start: 2024-02-09

## 2024-02-09 RX ORDER — GABAPENTIN 300 MG/1
300 CAPSULE ORAL NIGHTLY
Qty: 90 CAPSULE | Refills: 0 | Status: CANCELLED | OUTPATIENT
Start: 2024-02-09 | End: 2024-05-09

## 2024-02-09 RX ORDER — LISINOPRIL 5 MG/1
5 TABLET ORAL DAILY
Qty: 30 TABLET | Refills: 5 | Status: SHIPPED | OUTPATIENT
Start: 2024-02-09

## 2024-02-09 RX ORDER — METOPROLOL TARTRATE 50 MG/1
50 TABLET, FILM COATED ORAL 2 TIMES DAILY
Qty: 60 TABLET | Refills: 5 | Status: SHIPPED | OUTPATIENT
Start: 2024-02-09

## 2024-02-09 ASSESSMENT — ENCOUNTER SYMPTOMS
SHORTNESS OF BREATH: 0
CONSTIPATION: 0
ABDOMINAL PAIN: 0
WHEEZING: 0
NAUSEA: 0
DIARRHEA: 0
COUGH: 0

## 2024-02-09 ASSESSMENT — PATIENT HEALTH QUESTIONNAIRE - PHQ9
SUM OF ALL RESPONSES TO PHQ QUESTIONS 1-9: 0
SUM OF ALL RESPONSES TO PHQ QUESTIONS 1-9: 0
2. FEELING DOWN, DEPRESSED OR HOPELESS: 0
1. LITTLE INTEREST OR PLEASURE IN DOING THINGS: 0
SUM OF ALL RESPONSES TO PHQ QUESTIONS 1-9: 0
SUM OF ALL RESPONSES TO PHQ9 QUESTIONS 1 & 2: 0
SUM OF ALL RESPONSES TO PHQ QUESTIONS 1-9: 0

## 2024-02-09 NOTE — ASSESSMENT & PLAN NOTE
Controlled. GDMT- ace, bb, statin, asa, sglt 2 inhibitor. Denies CP. Rarely uses ntg.  Follows with cardio yearly

## 2024-02-09 NOTE — PROGRESS NOTES
puffs by mouth and INTO THE LUNGS every 6 hours if needed for wheezing, Disp: 18 g, Rfl: 2    Continuous Blood Gluc Sensor (FREESTYLE DIPTI 14 DAY SENSOR) [facility], Use as directed to check BG QID & PRN, Disp: 1 each, Rfl: 3    Continuous Blood Gluc  (FREESTYLE DIPTI 2 READER) [facility], Use as directed to check BG QID & PRN, Disp: 2 each, Rfl: 3    vitamin B-1 (THIAMINE) 100 MG tablet, Take 1 tablet by mouth daily, Disp: , Rfl:     vitamin C (ASCORBIC ACID) 500 MG tablet, Take 1 tablet by mouth daily, Disp: , Rfl:     Zinc Sulfate (ZINC 15 PO), Take by mouth, Disp: , Rfl:     Lancets (ONETOUCH DELICA PLUS [id number]) MISC, use 1 LANCET to TEST BLOOD SUGAR three to four times a day, Disp: 100 each, Rfl: 2  Physical Exam:     Vitals:    02/09/24 0814   BP: 128/66   Pulse: 89   Resp: 16   Temp: 98.7 °F (37.1 °C)   SpO2: 96%   Weight: 117.9 kg (260 lb)   Height: 1.93 m (6' 4\")     BP Readings from Last 3 Encounters:   02/09/24 128/66   01/30/24 114/78   11/07/23 126/70     Wt Readings from Last 3 Encounters:   02/09/24 117.9 kg (260 lb)   01/30/24 116.1 kg (256 lb)   11/14/23 132 kg (291 lb)     Physical Exam  Vitals and nursing note reviewed.   Constitutional:       Appearance: Normal appearance. He is normal weight.   HENT:      Head: Normocephalic and atraumatic.      Right Ear: External ear normal.      Left Ear: External ear normal.      Nose: Nose normal.      Mouth/Throat:      Mouth: Mucous membranes are moist.   Eyes:      Conjunctiva/sclera: Conjunctivae normal.      Pupils: Pupils are equal, round, and reactive to light.   Neck:      Vascular: No carotid bruit.   Cardiovascular:      Rate and Rhythm: Normal rate and regular rhythm.      Pulses: Normal pulses.      Heart sounds: Normal heart sounds.   Pulmonary:      Effort: Pulmonary effort is normal.      Breath sounds: Normal breath sounds.   Abdominal:      General: Abdomen is flat.   Musculoskeletal:         General: Normal range of motion.      Cervical

## 2024-02-09 NOTE — ASSESSMENT & PLAN NOTE
controlled. Controlled substance contract updated. OARRs report reviewed and appropriate. UDS today.

## 2024-02-12 LAB
6-MONOACETYLMORPHINE, URINE: NEGATIVE
ABNORMAL SPECIMEN VALIDITY TEST: NORMAL
ABSOLUTE IMMATURE GRANULOCYTE: <0.03 K/UL (ref 0–0.58)
ALBUMIN SERPL-MCNC: 4 G/DL (ref 3.5–5.2)
ALCOHOL URINE: NOT DETECTED MG/DL
ALP BLD-CCNC: 77 U/L (ref 40–129)
ALT SERPL-CCNC: 13 U/L (ref 0–40)
AMPHETAMINE SCREEN URINE: NEGATIVE
ANION GAP SERPL CALCULATED.3IONS-SCNC: 15 MMOL/L (ref 7–16)
AST SERPL-CCNC: 17 U/L (ref 0–39)
BARBITURATE SCREEN URINE: NEGATIVE
BASOPHILS ABSOLUTE: 0.08 K/UL (ref 0–0.2)
BASOPHILS RELATIVE PERCENT: 1 % (ref 0–2)
BENZODIAZEPINE SCREEN, URINE: NEGATIVE
BILIRUB SERPL-MCNC: 0.5 MG/DL (ref 0–1.2)
BUN BLDV-MCNC: 11 MG/DL (ref 6–20)
BUPRENORPHINE URINE: NEGATIVE
CALCIUM SERPL-MCNC: 9.6 MG/DL (ref 8.6–10.2)
CANNABINOID SCREEN URINE: NEGATIVE
CHLORIDE BLD-SCNC: 104 MMOL/L (ref 98–107)
CHOLESTEROL: 140 MG/DL
CO2: 24 MMOL/L (ref 22–29)
COCAINE METABOLITE, URINE: NEGATIVE
CREAT SERPL-MCNC: 0.7 MG/DL (ref 0.7–1.2)
CREATININE URINE: 220.3 MG/DL (ref 40–278)
EOSINOPHILS ABSOLUTE: 0.19 K/UL (ref 0.05–0.5)
EOSINOPHILS RELATIVE PERCENT: 3 % (ref 0–6)
FENTANYL URINE: NEGATIVE
GFR SERPL CREATININE-BSD FRML MDRD: >60 ML/MIN/1.73M2
GLUCOSE BLD-MCNC: 81 MG/DL (ref 74–99)
HBA1C MFR BLD: 5.3 % (ref 4–5.6)
HCT VFR BLD CALC: 46.4 % (ref 37–54)
HDLC SERPL-MCNC: 60 MG/DL
HEMOGLOBIN: 15.1 G/DL (ref 12.5–16.5)
IMMATURE GRANULOCYTES: 0 % (ref 0–5)
INTEGRITY CHECK, CREATININE, URINE: 213.7 MG/DL (ref 22–250)
INTEGRITY CHECK, OXIDANT, URINE: <40 MG/L
INTEGRITY CHECK, PH, URINE: 5.7 (ref 4.5–9)
INTEGRITY CHECK, SPECIFIC GRAVITY, URINE: 1.02 (ref 1–1.03)
LDL CHOLESTEROL: 66 MG/DL
LYMPHOCYTES ABSOLUTE: 2.36 K/UL (ref 1.5–4)
LYMPHOCYTES RELATIVE PERCENT: 35 % (ref 20–42)
MCH RBC QN AUTO: 32.4 PG (ref 26–35)
MCHC RBC AUTO-ENTMCNC: 32.5 G/DL (ref 32–34.5)
MCV RBC AUTO: 99.6 FL (ref 80–99.9)
METHADONE SCREEN, URINE: NEGATIVE
MICROALBUMIN/CREAT 24H UR: 15 MG/L (ref 0–19)
MICROALBUMIN/CREAT UR-RTO: 7 MCG/MG CREAT (ref 0–30)
MONOCYTES ABSOLUTE: 0.63 K/UL (ref 0.1–0.95)
MONOCYTES RELATIVE PERCENT: 9 % (ref 2–12)
NEUTROPHILS ABSOLUTE: 3.43 K/UL (ref 1.8–7.3)
NEUTROPHILS RELATIVE PERCENT: 51 % (ref 43–80)
OPIATES, URINE: NEGATIVE
OXYCODONE SCREEN URINE: NEGATIVE
PDW BLD-RTO: 14.1 % (ref 11.5–15)
PHENCYCLIDINE, URINE: NEGATIVE
PLATELET # BLD: 231 K/UL (ref 130–450)
PMV BLD AUTO: 9.3 FL (ref 7–12)
POTASSIUM SERPL-SCNC: 4 MMOL/L (ref 3.5–5)
RBC # BLD: 4.66 M/UL (ref 3.8–5.8)
SODIUM BLD-SCNC: 143 MMOL/L (ref 132–146)
TEST INFORMATION: NORMAL
TOTAL PROTEIN: 7 G/DL (ref 6.4–8.3)
TRAMADOL, URINE: NEGATIVE
TRIGL SERPL-MCNC: 72 MG/DL
TSH SERPL DL<=0.05 MIU/L-ACNC: 1.1 UIU/ML (ref 0.27–4.2)
VITAMIN D 25-HYDROXY: 81.7 NG/ML (ref 30–100)
VLDLC SERPL CALC-MCNC: 14 MG/DL
WBC # BLD: 6.7 K/UL (ref 4.5–11.5)

## 2024-02-13 LAB
C. TRACHOMATIS DNA ,URINE: NEGATIVE
COMPLIANCE DRUG ANALYSIS, URINE: NORMAL
HYDROCODONE, QUANTITATIVE, URINE: 582.3 NG/ML
HYDROMORPHONE, QUANTITATIVE, URINE: 55.8 NG/ML
N. GONORRHOEAE DNA, URINE: NEGATIVE
NORHYDROCODONE, QUANTITATIVE, URINE: 756.9 NG/ML
SEX HORMONE BINDING GLOBULIN: 76 NMOL/L (ref 11–80)
TESTOSTERONE FREE-NONMALE: 100.8 PG/ML (ref 47–244)
TESTOSTERONE TOTAL: 804 NG/DL (ref 220–1000)
TESTOSTERONE, BIOAVAILABLE: 236.2 NG/DL (ref 130–680)

## 2024-02-19 SDOH — HEALTH STABILITY: PHYSICAL HEALTH: ON AVERAGE, HOW MANY MINUTES DO YOU ENGAGE IN EXERCISE AT THIS LEVEL?: 60 MIN

## 2024-02-19 SDOH — HEALTH STABILITY: PHYSICAL HEALTH: ON AVERAGE, HOW MANY DAYS PER WEEK DO YOU ENGAGE IN MODERATE TO STRENUOUS EXERCISE (LIKE A BRISK WALK)?: 4 DAYS

## 2024-02-21 ENCOUNTER — OFFICE VISIT (OUTPATIENT)
Dept: ORTHOPEDIC SURGERY | Age: 49
End: 2024-02-21
Payer: COMMERCIAL

## 2024-02-21 VITALS — WEIGHT: 249 LBS | HEIGHT: 76 IN | BODY MASS INDEX: 30.32 KG/M2

## 2024-02-21 DIAGNOSIS — M17.12 PRIMARY OSTEOARTHRITIS OF LEFT KNEE: ICD-10-CM

## 2024-02-21 DIAGNOSIS — M25.562 ACUTE PAIN OF LEFT KNEE: Primary | ICD-10-CM

## 2024-02-21 PROCEDURE — 20610 DRAIN/INJ JOINT/BURSA W/O US: CPT | Performed by: FAMILY MEDICINE

## 2024-02-21 PROCEDURE — G8428 CUR MEDS NOT DOCUMENT: HCPCS | Performed by: FAMILY MEDICINE

## 2024-02-21 PROCEDURE — 4004F PT TOBACCO SCREEN RCVD TLK: CPT | Performed by: FAMILY MEDICINE

## 2024-02-21 PROCEDURE — G8482 FLU IMMUNIZE ORDER/ADMIN: HCPCS | Performed by: FAMILY MEDICINE

## 2024-02-21 PROCEDURE — G8417 CALC BMI ABV UP PARAM F/U: HCPCS | Performed by: FAMILY MEDICINE

## 2024-02-21 PROCEDURE — 99204 OFFICE O/P NEW MOD 45 MIN: CPT | Performed by: FAMILY MEDICINE

## 2024-02-21 RX ORDER — LIDOCAINE HYDROCHLORIDE 10 MG/ML
3 INJECTION, SOLUTION INFILTRATION; PERINEURAL ONCE
Status: COMPLETED | OUTPATIENT
Start: 2024-02-21 | End: 2024-02-21

## 2024-02-21 RX ORDER — TRIAMCINOLONE ACETONIDE 40 MG/ML
40 INJECTION, SUSPENSION INTRA-ARTICULAR; INTRAMUSCULAR ONCE
Status: COMPLETED | OUTPATIENT
Start: 2024-02-21 | End: 2024-02-21

## 2024-02-21 RX ADMIN — TRIAMCINOLONE ACETONIDE 40 MG: 40 INJECTION, SUSPENSION INTRA-ARTICULAR; INTRAMUSCULAR at 14:54

## 2024-02-21 RX ADMIN — LIDOCAINE HYDROCHLORIDE 3 ML: 10 INJECTION, SOLUTION INFILTRATION; PERINEURAL at 14:53

## 2024-02-21 NOTE — PROGRESS NOTES
provider note, physical exam and imaging (as interpreted by me) are consistent with degenerative meniscal disease vs mild left knee osteoarthritis.  Treatment options discussed with patient in the office today including activity modification, oral anti-inflammatories, physical therapy, injection options, advanced imaging the form of a MRI and referral to an orthopedic surgeon for discussion of surgical opinion. Patient wishes to proceed with conservative treatment in the form of an intra-articular corticosteroid injection which was performed in the office today, please see procedure note below for further details.  Patient will follow up in 6 weeks for reevaluation of symptoms and consider escalation therapy should symptoms persist.  Patient is agreeable with above plan all questions and concerns were addressed in the office today.     - XR KNEE LEFT (MIN 4 VIEWS); Future  - lidocaine 1 % injection 3 mL  - triamcinolone acetonide (KENALOG-40) injection 40 mg    PROCEDURE NOTE: LEFT knee intra-articular corticosteroid injection  The procedure and its risks, benefits and alternatives were discussed with the patient, and informed consent was obtained.  The area was prepped and cleaned with Alcohol.  Ethyl Chloride was used for local anesthesia.  A 25G 1.5\" needle was inserted into the joint and 3.0 mL of 1% Lidocaine without Epinephrine mixed with 1mL of Kenalog 40mg/1mL was injected into the joint without difficulty.  A band aid was placed over the injection site.  There was no blood loss. The patient tolerated the procedure well.  Discussed signs and symptoms of infection and advised to call right away if this happens.  Patient verbalizes understanding and agrees with above assessment, plan, procedure and counseling.     Return to Office: Return in about 6 weeks (around 4/3/2024) for injection follow up.    Uday oMura MD

## 2024-02-24 LAB — NONINV COLON CA DNA+OCC BLD SCRN STL QL: NEGATIVE

## 2024-02-27 DIAGNOSIS — G89.4 CHRONIC PAIN SYNDROME: ICD-10-CM

## 2024-02-27 RX ORDER — HYDROCODONE BITARTRATE AND ACETAMINOPHEN 5; 325 MG/1; MG/1
1 TABLET ORAL EVERY 6 HOURS PRN
Qty: 120 TABLET | Refills: 0 | Status: SHIPPED | OUTPATIENT
Start: 2024-02-27 | End: 2024-03-28

## 2024-02-27 NOTE — TELEPHONE ENCOUNTER
Last Appointment:  2/9/2024  Future Appointments   Date Time Provider Department Center   5/9/2024  8:30 AM Aline Aparicio APRN - NP [place] [facility]   7/30/2024  7:15 AM Uday Anthony DO [place] [facility]   4/17/2025 10:45 AM Tucker Parker MD [facility]

## 2024-02-28 DIAGNOSIS — J44.9 CHRONIC OBSTRUCTIVE PULMONARY DISEASE, UNSPECIFIED COPD TYPE (HCC): ICD-10-CM

## 2024-02-29 RX ORDER — ALBUTEROL SULFATE 90 UG/1
AEROSOL, METERED RESPIRATORY (INHALATION)
Qty: 8.5 G | Refills: 2 | Status: SHIPPED | OUTPATIENT
Start: 2024-02-29

## 2024-03-06 LAB
ESTIMATED AVERAGE GLUCOSE: NORMAL
HBA1C MFR BLD: NORMAL %

## 2024-03-26 DIAGNOSIS — G89.4 CHRONIC PAIN SYNDROME: ICD-10-CM

## 2024-03-26 RX ORDER — HYDROCODONE BITARTRATE AND ACETAMINOPHEN 5; 325 MG/1; MG/1
1 TABLET ORAL EVERY 6 HOURS PRN
Qty: 120 TABLET | Refills: 0 | Status: SHIPPED | OUTPATIENT
Start: 2024-03-26 | End: 2024-04-25

## 2024-03-26 NOTE — TELEPHONE ENCOUNTER
Last Appointment:  2/9/2024  Future Appointments   Date Time Provider Department Center   5/9/2024  8:30 AM Aline Aparicio APRN - NP [place] [facility]   7/30/2024  7:15 AM Uday Anthony DO [place] [facility]   4/17/2025 10:45 AM Tucker Parker MD [facility]

## 2024-04-11 ENCOUNTER — TELEPHONE (OUTPATIENT)
Dept: PRIMARY CARE CLINIC | Age: 49
End: 2024-04-11

## 2024-04-11 NOTE — TELEPHONE ENCOUNTER
Patient called in stating he has been out of Mounjaro for about 8 weeks and can not find it anywhere.  He is wanting to know if there is something else he can take.   Please advise.

## 2024-04-12 ENCOUNTER — TELEPHONE (OUTPATIENT)
Dept: PRIMARY CARE CLINIC | Age: 49
End: 2024-04-12

## 2024-04-13 DIAGNOSIS — G47.00 ACUTE INSOMNIA: ICD-10-CM

## 2024-04-14 RX ORDER — TRAZODONE HYDROCHLORIDE 50 MG/1
50 TABLET ORAL NIGHTLY
Qty: 30 TABLET | Refills: 5 | OUTPATIENT
Start: 2024-04-14

## 2024-04-21 DIAGNOSIS — E11.42 TYPE 2 DIABETES MELLITUS WITH DIABETIC POLYNEUROPATHY, WITH LONG-TERM CURRENT USE OF INSULIN (HCC): ICD-10-CM

## 2024-04-21 DIAGNOSIS — Z79.4 TYPE 2 DIABETES MELLITUS WITH DIABETIC POLYNEUROPATHY, WITH LONG-TERM CURRENT USE OF INSULIN (HCC): ICD-10-CM

## 2024-04-22 RX ORDER — PEN NEEDLE, DIABETIC 29 G X1/2"
NEEDLE, DISPOSABLE MISCELLANEOUS
Qty: 100 EACH | Refills: 2 | Status: SHIPPED | OUTPATIENT
Start: 2024-04-22

## 2024-04-22 NOTE — TELEPHONE ENCOUNTER
Last Appointment:  2/9/2024  Future Appointments   Date Time Provider Department Center   5/9/2024  8:30 AM Aline Aparicio APRN - NP [place] [facility]   7/30/2024  7:15 AM Uday Anthony DO [place] [facility]   4/17/2025 10:45 AM Tucker Parker MD [facility]

## 2024-04-23 DIAGNOSIS — G89.4 CHRONIC PAIN SYNDROME: ICD-10-CM

## 2024-04-23 RX ORDER — HYDROCODONE BITARTRATE AND ACETAMINOPHEN 5; 325 MG/1; MG/1
1 TABLET ORAL EVERY 6 HOURS PRN
Qty: 120 TABLET | Refills: 0 | Status: SHIPPED | OUTPATIENT
Start: 2024-04-23 | End: 2024-05-23

## 2024-04-23 NOTE — TELEPHONE ENCOUNTER
Last Appointment:  2/9/2024  Future Appointments   Date Time Provider Department Center   5/9/2024  8:30 AM Aline Aparicio APRN - NP [place] [facility]   7/30/2024  7:15 AM Uday Anthony DO [place] [facility]   4/17/2025 10:45 AM Tucker Parker MD [facility]

## 2024-05-08 DIAGNOSIS — J44.9 CHRONIC OBSTRUCTIVE PULMONARY DISEASE, UNSPECIFIED COPD TYPE (HCC): ICD-10-CM

## 2024-05-08 RX ORDER — ALBUTEROL SULFATE 90 UG/1
AEROSOL, METERED RESPIRATORY (INHALATION)
Qty: 8.5 G | Refills: 2 | Status: SHIPPED | OUTPATIENT
Start: 2024-05-08

## 2024-05-09 ENCOUNTER — OFFICE VISIT (OUTPATIENT)
Dept: PRIMARY CARE CLINIC | Age: 49
End: 2024-05-09
Payer: COMMERCIAL

## 2024-05-09 VITALS
OXYGEN SATURATION: 98 % | DIASTOLIC BLOOD PRESSURE: 80 MMHG | WEIGHT: 231 LBS | BODY MASS INDEX: 28.13 KG/M2 | HEART RATE: 98 BPM | SYSTOLIC BLOOD PRESSURE: 128 MMHG | TEMPERATURE: 97.6 F | HEIGHT: 76 IN

## 2024-05-09 DIAGNOSIS — M54.42 CHRONIC BILATERAL LOW BACK PAIN WITH BILATERAL SCIATICA: ICD-10-CM

## 2024-05-09 DIAGNOSIS — E55.9 VITAMIN D DEFICIENCY: ICD-10-CM

## 2024-05-09 DIAGNOSIS — G89.4 CHRONIC PAIN SYNDROME: ICD-10-CM

## 2024-05-09 DIAGNOSIS — I10 ESSENTIAL HYPERTENSION: ICD-10-CM

## 2024-05-09 DIAGNOSIS — E11.42 TYPE 2 DIABETES MELLITUS WITH DIABETIC POLYNEUROPATHY, WITHOUT LONG-TERM CURRENT USE OF INSULIN (HCC): ICD-10-CM

## 2024-05-09 DIAGNOSIS — J44.9 CHRONIC OBSTRUCTIVE PULMONARY DISEASE, UNSPECIFIED COPD TYPE (HCC): ICD-10-CM

## 2024-05-09 DIAGNOSIS — M54.41 CHRONIC BILATERAL LOW BACK PAIN WITH BILATERAL SCIATICA: ICD-10-CM

## 2024-05-09 DIAGNOSIS — E78.2 MIXED HYPERLIPIDEMIA: ICD-10-CM

## 2024-05-09 DIAGNOSIS — I25.10 CAD IN NATIVE ARTERY: ICD-10-CM

## 2024-05-09 DIAGNOSIS — G89.29 CHRONIC BILATERAL LOW BACK PAIN WITH BILATERAL SCIATICA: ICD-10-CM

## 2024-05-09 DIAGNOSIS — M54.16 LUMBAR RADICULOPATHY: ICD-10-CM

## 2024-05-09 DIAGNOSIS — H69.91 ETD (EUSTACHIAN TUBE DYSFUNCTION), RIGHT: ICD-10-CM

## 2024-05-09 DIAGNOSIS — M62.830 MUSCLE SPASM OF BACK: Primary | ICD-10-CM

## 2024-05-09 LAB — HBA1C MFR BLD: 5.4 %

## 2024-05-09 PROCEDURE — G8427 DOCREV CUR MEDS BY ELIG CLIN: HCPCS | Performed by: NURSE PRACTITIONER

## 2024-05-09 PROCEDURE — 3074F SYST BP LT 130 MM HG: CPT | Performed by: NURSE PRACTITIONER

## 2024-05-09 PROCEDURE — G8417 CALC BMI ABV UP PARAM F/U: HCPCS | Performed by: NURSE PRACTITIONER

## 2024-05-09 PROCEDURE — G2211 COMPLEX E/M VISIT ADD ON: HCPCS | Performed by: NURSE PRACTITIONER

## 2024-05-09 PROCEDURE — 3044F HG A1C LEVEL LT 7.0%: CPT | Performed by: NURSE PRACTITIONER

## 2024-05-09 PROCEDURE — 3079F DIAST BP 80-89 MM HG: CPT | Performed by: NURSE PRACTITIONER

## 2024-05-09 PROCEDURE — 2022F DILAT RTA XM EVC RTNOPTHY: CPT | Performed by: NURSE PRACTITIONER

## 2024-05-09 PROCEDURE — 99214 OFFICE O/P EST MOD 30 MIN: CPT | Performed by: NURSE PRACTITIONER

## 2024-05-09 PROCEDURE — 3023F SPIROM DOC REV: CPT | Performed by: NURSE PRACTITIONER

## 2024-05-09 PROCEDURE — 83036 HEMOGLOBIN GLYCOSYLATED A1C: CPT | Performed by: NURSE PRACTITIONER

## 2024-05-09 PROCEDURE — 4004F PT TOBACCO SCREEN RCVD TLK: CPT | Performed by: NURSE PRACTITIONER

## 2024-05-09 RX ORDER — METOPROLOL TARTRATE 50 MG/1
50 TABLET, FILM COATED ORAL 2 TIMES DAILY
Qty: 60 TABLET | Refills: 5 | Status: SHIPPED | OUTPATIENT
Start: 2024-05-09

## 2024-05-09 RX ORDER — HYDROCHLOROTHIAZIDE 25 MG/1
25 TABLET ORAL DAILY PRN
Qty: 30 TABLET | Refills: 5 | Status: SHIPPED | OUTPATIENT
Start: 2024-05-09

## 2024-05-09 RX ORDER — PEN NEEDLE, DIABETIC 32GX 5/32"
NEEDLE, DISPOSABLE MISCELLANEOUS
COMMUNITY
Start: 2024-04-15

## 2024-05-09 RX ORDER — ASPIRIN 81 MG/1
81 TABLET ORAL DAILY
Qty: 90 TABLET | Refills: 1 | Status: SHIPPED | OUTPATIENT
Start: 2024-05-09

## 2024-05-09 RX ORDER — DAPAGLIFLOZIN 10 MG/1
10 TABLET, FILM COATED ORAL EVERY MORNING
Qty: 90 TABLET | Refills: 1 | Status: SHIPPED | OUTPATIENT
Start: 2024-05-09

## 2024-05-09 RX ORDER — TIRZEPATIDE 15 MG/.5ML
15 INJECTION, SOLUTION SUBCUTANEOUS WEEKLY
Qty: 6 ML | Refills: 1 | Status: SHIPPED | OUTPATIENT
Start: 2024-05-09

## 2024-05-09 RX ORDER — ATORVASTATIN CALCIUM 80 MG/1
80 TABLET, FILM COATED ORAL NIGHTLY
Qty: 90 TABLET | Refills: 1 | Status: SHIPPED | OUTPATIENT
Start: 2024-05-09

## 2024-05-09 RX ORDER — LISINOPRIL 5 MG/1
5 TABLET ORAL DAILY
Qty: 90 TABLET | Refills: 1 | Status: SHIPPED | OUTPATIENT
Start: 2024-05-09

## 2024-05-09 RX ORDER — TRAZODONE HYDROCHLORIDE 50 MG/1
50 TABLET ORAL NIGHTLY
COMMUNITY
Start: 2024-02-16

## 2024-05-09 RX ORDER — EZETIMIBE 10 MG/1
10 TABLET ORAL DAILY
Qty: 90 TABLET | Refills: 1 | Status: SHIPPED | OUTPATIENT
Start: 2024-05-09

## 2024-05-09 RX ORDER — ERGOCALCIFEROL 1.25 MG/1
CAPSULE ORAL
Qty: 12 CAPSULE | Refills: 1 | Status: SHIPPED
Start: 2024-05-09 | End: 2024-05-09

## 2024-05-09 RX ORDER — HYDROCODONE BITARTRATE AND ACETAMINOPHEN 5; 325 MG/1; MG/1
1 TABLET ORAL EVERY 6 HOURS PRN
Qty: 120 TABLET | Refills: 0 | Status: CANCELLED | OUTPATIENT
Start: 2024-05-09 | End: 2024-06-08

## 2024-05-09 ASSESSMENT — ENCOUNTER SYMPTOMS
NAUSEA: 0
SHORTNESS OF BREATH: 0
ABDOMINAL PAIN: 0
DIARRHEA: 0
CONSTIPATION: 0
WHEEZING: 0
COUGH: 0

## 2024-05-09 NOTE — PROGRESS NOTES
morning, Disp-90 tablet, R-1Normal  -     POCT glycosylated hemoglobin (Hb A1C)  4. Chronic pain syndrome  Assessment & Plan:   controlled.  Continue Norco, OARRs report reviewed and appropriate.  5. Essential hypertension  Assessment & Plan:    at goal.  Continue lisinopril and metoprolol.  Follows with cardiology yearly.  Orders:  -     lisinopril (PRINIVIL;ZESTRIL) 5 MG tablet; Take 1 tablet by mouth daily, Disp-90 tablet, R-1Normal  -     metoprolol tartrate (LOPRESSOR) 50 MG tablet; Take 1 tablet by mouth 2 times daily, Disp-60 tablet, R-5Normal  6. Chronic obstructive pulmonary disease, unspecified COPD type (HCC)  Assessment & Plan:   controlled. Albuterol as needed  Orders:  -     mometasone-formoterol (DULERA) 200-5 MCG/ACT inhaler; inhale 2 puffs by mouth and INTO THE LUNGS twice a day, Disp-8.8 g, R-2Normal  7. CAD in native artery  Assessment & Plan:  Controlled. GDMT- ace, bb, statin, asa, sglt 2 inhibitor. Denies CP. Rarely uses ntg.  Follows with cardio yearly   8. Vitamin D deficiency  Assessment & Plan:  Discontinue supplementation, last value 81.  9. ETD (Eustachian tube dysfunction), right  Comments:  Stable, follows with ENT.  Assessment & Plan:     10. Chronic bilateral low back pain with bilateral sciatica  Assessment & Plan:     11. Lumbar radiculopathy  Assessment & Plan:    controlled with Norco, OARRS report reviewed and appropriate.  UDS is appropriate.    Counseled patient regarding above diagnosis, including possible risks and complications, especially if left uncontrolled.  Counseled patient as appropriate and relevant regarding any possible side effects, risks, and alternatives to treatment; the patient and/or guardian verbalizes understanding, and is in agreement with the plan as detailed above.   All questions answered to the patient's satisfaction.  The patient was advised to call or send PushPage message for any concerns prior to next appointment.  Return in about 3 months (around

## 2024-05-09 NOTE — ASSESSMENT & PLAN NOTE
at goal.  A1c 5.4.  Continue CGM, Farxiga, lisinopril, Mounjaro.  Patient is to schedule a diabetic retinal eye exam.  Continue diet and exercise regiment.

## 2024-05-10 ENCOUNTER — TELEPHONE (OUTPATIENT)
Dept: ADMINISTRATIVE | Age: 49
End: 2024-05-10

## 2024-05-10 ENCOUNTER — OFFICE VISIT (OUTPATIENT)
Dept: PODIATRY | Age: 49
End: 2024-05-10
Payer: COMMERCIAL

## 2024-05-10 VITALS — WEIGHT: 226 LBS | BODY MASS INDEX: 27.52 KG/M2 | HEIGHT: 76 IN

## 2024-05-10 DIAGNOSIS — E11.621 TYPE 2 DIABETES MELLITUS WITH FOOT ULCER, UNSPECIFIED WHETHER LONG TERM INSULIN USE (HCC): Primary | ICD-10-CM

## 2024-05-10 DIAGNOSIS — E11.9 TYPE 2 DIABETES MELLITUS WITHOUT COMPLICATION, UNSPECIFIED WHETHER LONG TERM INSULIN USE (HCC): ICD-10-CM

## 2024-05-10 DIAGNOSIS — L03.032 CELLULITIS OF TOE OF LEFT FOOT: ICD-10-CM

## 2024-05-10 DIAGNOSIS — L97.509 TYPE 2 DIABETES MELLITUS WITH FOOT ULCER, UNSPECIFIED WHETHER LONG TERM INSULIN USE (HCC): Primary | ICD-10-CM

## 2024-05-10 DIAGNOSIS — M79.675 PAIN IN TOE OF LEFT FOOT: ICD-10-CM

## 2024-05-10 DIAGNOSIS — G60.8 HEREDITARY SENSORY NEUROPATHY: ICD-10-CM

## 2024-05-10 LAB
BASOPHILS ABSOLUTE: 0.06 K/UL (ref 0–0.2)
BASOPHILS RELATIVE PERCENT: 1 % (ref 0–2)
C-REACTIVE PROTEIN: 36 MG/L (ref 0–5)
EOSINOPHILS ABSOLUTE: 0.1 K/UL (ref 0.05–0.5)
EOSINOPHILS RELATIVE PERCENT: 1 % (ref 0–6)
HCT VFR BLD CALC: 50 % (ref 37–54)
HEMOGLOBIN: 16.7 G/DL (ref 12.5–16.5)
IMMATURE GRANULOCYTES %: 0 % (ref 0–5)
IMMATURE GRANULOCYTES ABSOLUTE: <0.03 K/UL (ref 0–0.58)
LYMPHOCYTES ABSOLUTE: 2.53 K/UL (ref 1.5–4)
LYMPHOCYTES RELATIVE PERCENT: 30 % (ref 20–42)
MCH RBC QN AUTO: 32.7 PG (ref 26–35)
MCHC RBC AUTO-ENTMCNC: 33.4 G/DL (ref 32–34.5)
MCV RBC AUTO: 98 FL (ref 80–99.9)
MONOCYTES ABSOLUTE: 0.87 K/UL (ref 0.1–0.95)
MONOCYTES RELATIVE PERCENT: 10 % (ref 2–12)
NEUTROPHILS ABSOLUTE: 4.8 K/UL (ref 1.8–7.3)
NEUTROPHILS RELATIVE PERCENT: 57 % (ref 43–80)
PDW BLD-RTO: 13.2 % (ref 11.5–15)
PLATELET # BLD: 240 K/UL (ref 130–450)
PMV BLD AUTO: 9.2 FL (ref 7–12)
RBC # BLD: 5.1 M/UL (ref 3.8–5.8)
WBC # BLD: 8.4 K/UL (ref 4.5–11.5)

## 2024-05-10 PROCEDURE — G8427 DOCREV CUR MEDS BY ELIG CLIN: HCPCS | Performed by: PODIATRIST

## 2024-05-10 PROCEDURE — 4004F PT TOBACCO SCREEN RCVD TLK: CPT | Performed by: PODIATRIST

## 2024-05-10 PROCEDURE — 3044F HG A1C LEVEL LT 7.0%: CPT | Performed by: PODIATRIST

## 2024-05-10 PROCEDURE — 99214 OFFICE O/P EST MOD 30 MIN: CPT | Performed by: PODIATRIST

## 2024-05-10 PROCEDURE — 2022F DILAT RTA XM EVC RTNOPTHY: CPT | Performed by: PODIATRIST

## 2024-05-10 PROCEDURE — G8417 CALC BMI ABV UP PARAM F/U: HCPCS | Performed by: PODIATRIST

## 2024-05-10 RX ORDER — DOXYCYCLINE HYCLATE 100 MG/1
100 CAPSULE ORAL 2 TIMES DAILY
Qty: 14 CAPSULE | Refills: 0 | Status: SHIPPED | OUTPATIENT
Start: 2024-05-10 | End: 2024-05-17

## 2024-05-10 NOTE — TELEPHONE ENCOUNTER
Pt's 2nd lt toe appears to be infected.  His nail turned black and fell off.  Some of the skin came off also, but it now looks infected and is painful going up into ankle and shin.  Pt is a diabetic.  No availability, staff unavailable due to pt care.  Please contact pt.

## 2024-05-10 NOTE — PROGRESS NOTES
Pt presents this PM with c/o possible infected toe, L foor, 2 nd toe. Believes that symptoms are caused from walking/exercising.       Spike Aviles : 1975 Sex: male  Age: 48 y.o.    Patient was referred by Aline Aparicio APRN - NP    CC:    Open wound left second toe symptoms present approximately 1 week    HPI:   Spike presents today for open wound left second toe symptoms present approximately 1 week.  Denies injury or trauma but does have history of diabetes and neuropathy and states he does not have any pain associated with his toe.  Denies nausea vomiting fever chills shortness of breath.  Wearing regular shoes.  States he has been exercising and losing a lot more weight and has been a lot more active recently.  Does state he does go to the gym regularly and has been on his feet more.  They have radiographs today  No additional pedal complaints at this time.    ROS:  Const: Denies constitutional symptoms  Musculo: Denies symptoms other than stated above  Skin: Denies symptoms other than stated above       Current Outpatient Medications:     doxycycline hyclate (VIBRAMYCIN) 100 MG capsule, Take 1 capsule by mouth 2 times daily for 7 days, Disp: 14 capsule, Rfl: 0    traZODone (DESYREL) 50 MG tablet, Take 1 tablet by mouth nightly, Disp: , Rfl:     DROPLET PEN NEEDLES 32G X 4 MM MISC, use 1 PEN NEEDLE to inject MEDICATION subcutaneously three to four times a day, Disp: , Rfl:     aspirin 81 MG EC tablet, Take 1 tablet by mouth daily, Disp: 90 tablet, Rfl: 1    atorvastatin (LIPITOR) 80 MG tablet, Take 1 tablet by mouth nightly, Disp: 90 tablet, Rfl: 1    Continuous Glucose Sensor (FREESTYLE DIPTI 2 SENSOR) MISC, Use as directed, Disp: 2 each, Rfl: 5    dapagliflozin (FARXIGA) 10 MG tablet, Take 1 tablet by mouth every morning, Disp: 90 tablet, Rfl: 1    ezetimibe (ZETIA) 10 MG tablet, Take 1 tablet by mouth daily, Disp: 90 tablet, Rfl: 1    lisinopril (PRINIVIL;ZESTRIL) 5 MG tablet, Take 1 tablet by mouth

## 2024-05-11 LAB — SEDIMENTATION RATE, ERYTHROCYTE: 36 MM/HR (ref 0–15)

## 2024-05-17 ENCOUNTER — OFFICE VISIT (OUTPATIENT)
Dept: PODIATRY | Age: 49
End: 2024-05-17
Payer: COMMERCIAL

## 2024-05-17 VITALS — BODY MASS INDEX: 27.52 KG/M2 | WEIGHT: 226 LBS | HEIGHT: 76 IN

## 2024-05-17 DIAGNOSIS — E11.621 TYPE 2 DIABETES MELLITUS WITH FOOT ULCER, UNSPECIFIED WHETHER LONG TERM INSULIN USE (HCC): Primary | ICD-10-CM

## 2024-05-17 DIAGNOSIS — L97.509 TYPE 2 DIABETES MELLITUS WITH FOOT ULCER, UNSPECIFIED WHETHER LONG TERM INSULIN USE (HCC): Primary | ICD-10-CM

## 2024-05-17 PROCEDURE — G8427 DOCREV CUR MEDS BY ELIG CLIN: HCPCS | Performed by: PODIATRIST

## 2024-05-17 PROCEDURE — G8417 CALC BMI ABV UP PARAM F/U: HCPCS | Performed by: PODIATRIST

## 2024-05-17 PROCEDURE — 2022F DILAT RTA XM EVC RTNOPTHY: CPT | Performed by: PODIATRIST

## 2024-05-17 PROCEDURE — 4004F PT TOBACCO SCREEN RCVD TLK: CPT | Performed by: PODIATRIST

## 2024-05-17 PROCEDURE — 28230 INCISION OF FOOT TENDON(S): CPT | Performed by: PODIATRIST

## 2024-05-17 PROCEDURE — 99213 OFFICE O/P EST LOW 20 MIN: CPT | Performed by: PODIATRIST

## 2024-05-17 PROCEDURE — 3044F HG A1C LEVEL LT 7.0%: CPT | Performed by: PODIATRIST

## 2024-05-17 RX ORDER — DOXYCYCLINE HYCLATE 100 MG/1
100 CAPSULE ORAL 2 TIMES DAILY
Qty: 14 CAPSULE | Refills: 0 | Status: SHIPPED | OUTPATIENT
Start: 2024-05-17 | End: 2024-05-24

## 2024-05-17 NOTE — PROGRESS NOTES
Pt presents this PM for a 1 week wound chec.      CC:    Follow-up open diabetic wound left second toe    HPI:   Spike presents today for follow-up open wound.  Does have a wound noted second toe left foot.  Denies any nausea vomiting fever chills shortness of breath.  Wearing regular shoe today.  Denies any drainage from the wound.    ROS:  Const: Denies constitutional symptoms  Musculo: Denies symptoms other than stated above  Skin: Denies symptoms other than stated above       Current Outpatient Medications:     doxycycline hyclate (VIBRAMYCIN) 100 MG capsule, Take 1 capsule by mouth 2 times daily for 7 days, Disp: 14 capsule, Rfl: 0    traZODone (DESYREL) 50 MG tablet, Take 1 tablet by mouth nightly, Disp: , Rfl:     DROPLET PEN NEEDLES 32G X 4 MM MISC, use 1 PEN NEEDLE to inject MEDICATION subcutaneously three to four times a day, Disp: , Rfl:     aspirin 81 MG EC tablet, Take 1 tablet by mouth daily, Disp: 90 tablet, Rfl: 1    atorvastatin (LIPITOR) 80 MG tablet, Take 1 tablet by mouth nightly, Disp: 90 tablet, Rfl: 1    Continuous Glucose Sensor (FREESTYLE DIPTI 2 SENSOR) MISC, Use as directed, Disp: 2 each, Rfl: 5    dapagliflozin (FARXIGA) 10 MG tablet, Take 1 tablet by mouth every morning, Disp: 90 tablet, Rfl: 1    ezetimibe (ZETIA) 10 MG tablet, Take 1 tablet by mouth daily, Disp: 90 tablet, Rfl: 1    lisinopril (PRINIVIL;ZESTRIL) 5 MG tablet, Take 1 tablet by mouth daily, Disp: 90 tablet, Rfl: 1    metoprolol tartrate (LOPRESSOR) 50 MG tablet, Take 1 tablet by mouth 2 times daily, Disp: 60 tablet, Rfl: 5    mometasone-formoterol (DULERA) 200-5 MCG/ACT inhaler, inhale 2 puffs by mouth and INTO THE LUNGS twice a day, Disp: 8.8 g, Rfl: 2    MOUNJARO 15 MG/0.5ML SOPN SC injection, Inject 0.5 mLs into the skin once a week, Disp: 6 mL, Rfl: 1    hydroCHLOROthiazide (HYDRODIURIL) 25 MG tablet, Take 1 tablet by mouth daily as needed (swelling), Disp: 30 tablet, Rfl: 5    albuterol sulfate HFA

## 2024-05-21 DIAGNOSIS — G89.4 CHRONIC PAIN SYNDROME: ICD-10-CM

## 2024-05-21 LAB
CULTURE: ABNORMAL
DIRECT EXAM: ABNORMAL
SPECIMEN DESCRIPTION: ABNORMAL

## 2024-05-21 RX ORDER — HYDROCODONE BITARTRATE AND ACETAMINOPHEN 5; 325 MG/1; MG/1
1 TABLET ORAL EVERY 6 HOURS PRN
Qty: 120 TABLET | Refills: 0 | Status: SHIPPED | OUTPATIENT
Start: 2024-05-21 | End: 2024-06-20

## 2024-05-21 NOTE — TELEPHONE ENCOUNTER
Last Appointment:  5/9/2024  Future Appointments   Date Time Provider Department Center   5/24/2024 10:30 AM Seng Higgins DPM [facility]   7/30/2024  7:15 AM Uday Anthony DO [place] [facility]   8/9/2024  8:00 AM Aline Aparicio APRN - NP [facility]   4/17/2025 10:45 AM Tucker Parker MD [facility]/MED [facility]

## 2024-05-24 ENCOUNTER — OFFICE VISIT (OUTPATIENT)
Dept: PODIATRY | Age: 49
End: 2024-05-24
Payer: COMMERCIAL

## 2024-05-24 VITALS — HEIGHT: 76 IN | WEIGHT: 226 LBS | BODY MASS INDEX: 27.52 KG/M2

## 2024-05-24 DIAGNOSIS — L03.032 CELLULITIS OF TOE OF LEFT FOOT: ICD-10-CM

## 2024-05-24 DIAGNOSIS — E11.621 TYPE 2 DIABETES MELLITUS WITH FOOT ULCER, UNSPECIFIED WHETHER LONG TERM INSULIN USE (HCC): Primary | ICD-10-CM

## 2024-05-24 DIAGNOSIS — L97.509 TYPE 2 DIABETES MELLITUS WITH FOOT ULCER, UNSPECIFIED WHETHER LONG TERM INSULIN USE (HCC): Primary | ICD-10-CM

## 2024-05-24 DIAGNOSIS — G60.8 HEREDITARY SENSORY NEUROPATHY: ICD-10-CM

## 2024-05-24 PROCEDURE — 99213 OFFICE O/P EST LOW 20 MIN: CPT | Performed by: PODIATRIST

## 2024-05-24 PROCEDURE — G8427 DOCREV CUR MEDS BY ELIG CLIN: HCPCS | Performed by: PODIATRIST

## 2024-05-24 PROCEDURE — G8417 CALC BMI ABV UP PARAM F/U: HCPCS | Performed by: PODIATRIST

## 2024-05-24 PROCEDURE — 2022F DILAT RTA XM EVC RTNOPTHY: CPT | Performed by: PODIATRIST

## 2024-05-24 PROCEDURE — 4004F PT TOBACCO SCREEN RCVD TLK: CPT | Performed by: PODIATRIST

## 2024-05-24 PROCEDURE — 3044F HG A1C LEVEL LT 7.0%: CPT | Performed by: PODIATRIST

## 2024-05-24 NOTE — PROGRESS NOTES
Patient is here today for wound check of the left 2nd toe. Patient states no new concerns. Patient is no longer on insulin, and is taking no medications for diabetes at the time, A1C has consistently been below 5. Patient is using mounjaro for weight loss.  Electronically signed by Corrina Mehta MA on 5/24/2024 at 10:22 AM     CC:    Follow-up open diabetic wound left second toe    HPI:   Follow-up wound left second toe.  Does state he has noticed improvement this week.  Does have regular shoe on.  Regular Band-Aid with no drainage.  He denies any nausea vomiting fever chills shortness of breath.  Denies any drainage.  No additional pedal complaints      ROS:  Const: Denies constitutional symptoms  Musculo: Denies symptoms other than stated above  Skin: Denies symptoms other than stated above       Current Outpatient Medications:     HYDROcodone-acetaminophen (NORCO) 5-325 MG per tablet, Take 1 tablet by mouth every 6 hours as needed for Pain for up to 30 days. Intended supply: 30 days, Disp: 120 tablet, Rfl: 0    doxycycline hyclate (VIBRAMYCIN) 100 MG capsule, Take 1 capsule by mouth 2 times daily for 7 days, Disp: 14 capsule, Rfl: 0    traZODone (DESYREL) 50 MG tablet, Take 1 tablet by mouth nightly, Disp: , Rfl:     DROPLET PEN NEEDLES 32G X 4 MM MISC, use 1 PEN NEEDLE to inject MEDICATION subcutaneously three to four times a day, Disp: , Rfl:     aspirin 81 MG EC tablet, Take 1 tablet by mouth daily, Disp: 90 tablet, Rfl: 1    atorvastatin (LIPITOR) 80 MG tablet, Take 1 tablet by mouth nightly, Disp: 90 tablet, Rfl: 1    Continuous Glucose Sensor (FREESTYLE DIPTI 2 SENSOR) MISC, Use as directed, Disp: 2 each, Rfl: 5    dapagliflozin (FARXIGA) 10 MG tablet, Take 1 tablet by mouth every morning, Disp: 90 tablet, Rfl: 1    ezetimibe (ZETIA) 10 MG tablet, Take 1 tablet by mouth daily, Disp: 90 tablet, Rfl: 1    lisinopril (PRINIVIL;ZESTRIL) 5 MG tablet, Take 1 tablet by mouth daily, Disp: 90 tablet, Rfl: 1

## 2024-06-07 ENCOUNTER — OFFICE VISIT (OUTPATIENT)
Dept: PODIATRY | Age: 49
End: 2024-06-07
Payer: COMMERCIAL

## 2024-06-07 VITALS — BODY MASS INDEX: 25.93 KG/M2 | WEIGHT: 213 LBS | TEMPERATURE: 98.2 F

## 2024-06-07 DIAGNOSIS — L97.509 TYPE 2 DIABETES MELLITUS WITH FOOT ULCER, UNSPECIFIED WHETHER LONG TERM INSULIN USE (HCC): Primary | ICD-10-CM

## 2024-06-07 DIAGNOSIS — G60.8 HEREDITARY SENSORY NEUROPATHY: ICD-10-CM

## 2024-06-07 DIAGNOSIS — E11.621 TYPE 2 DIABETES MELLITUS WITH FOOT ULCER, UNSPECIFIED WHETHER LONG TERM INSULIN USE (HCC): Primary | ICD-10-CM

## 2024-06-07 PROCEDURE — 3044F HG A1C LEVEL LT 7.0%: CPT | Performed by: PODIATRIST

## 2024-06-07 PROCEDURE — G8427 DOCREV CUR MEDS BY ELIG CLIN: HCPCS | Performed by: PODIATRIST

## 2024-06-07 PROCEDURE — 4004F PT TOBACCO SCREEN RCVD TLK: CPT | Performed by: PODIATRIST

## 2024-06-07 PROCEDURE — 2022F DILAT RTA XM EVC RTNOPTHY: CPT | Performed by: PODIATRIST

## 2024-06-07 PROCEDURE — 99213 OFFICE O/P EST LOW 20 MIN: CPT | Performed by: PODIATRIST

## 2024-06-07 PROCEDURE — G8417 CALC BMI ABV UP PARAM F/U: HCPCS | Performed by: PODIATRIST

## 2024-06-07 NOTE — PROGRESS NOTES
One week f/u dm foot ulcer with Cellulitis left foot second digit   Aline Aparicio, APRN - NP  Last visit 5/21/24      CC:    Follow-up open diabetic wound left second toe    HPI:   Follow-up open wound left second toe.  Denies any foot or ankle pain today no recent injury or trauma.  Very pleased with progression denies any drainage from his toe.  Has been wearing regular shoe but does state has been putting padding and changing with a Band-Aid daily.    ROS:  Const: Denies constitutional symptoms  Musculo: Denies symptoms other than stated above  Skin: Denies symptoms other than stated above       Current Outpatient Medications:     HYDROcodone-acetaminophen (NORCO) 5-325 MG per tablet, Take 1 tablet by mouth every 6 hours as needed for Pain for up to 30 days. Intended supply: 30 days, Disp: 120 tablet, Rfl: 0    traZODone (DESYREL) 50 MG tablet, Take 1 tablet by mouth nightly, Disp: , Rfl:     DROPLET PEN NEEDLES 32G X 4 MM MISC, use 1 PEN NEEDLE to inject MEDICATION subcutaneously three to four times a day, Disp: , Rfl:     aspirin 81 MG EC tablet, Take 1 tablet by mouth daily, Disp: 90 tablet, Rfl: 1    atorvastatin (LIPITOR) 80 MG tablet, Take 1 tablet by mouth nightly, Disp: 90 tablet, Rfl: 1    Continuous Glucose Sensor (FREESTYLE DIPTI 2 SENSOR) MISC, Use as directed, Disp: 2 each, Rfl: 5    dapagliflozin (FARXIGA) 10 MG tablet, Take 1 tablet by mouth every morning, Disp: 90 tablet, Rfl: 1    ezetimibe (ZETIA) 10 MG tablet, Take 1 tablet by mouth daily, Disp: 90 tablet, Rfl: 1    lisinopril (PRINIVIL;ZESTRIL) 5 MG tablet, Take 1 tablet by mouth daily, Disp: 90 tablet, Rfl: 1    metoprolol tartrate (LOPRESSOR) 50 MG tablet, Take 1 tablet by mouth 2 times daily, Disp: 60 tablet, Rfl: 5    mometasone-formoterol (DULERA) 200-5 MCG/ACT inhaler, inhale 2 puffs by mouth and INTO THE LUNGS twice a day, Disp: 8.8 g, Rfl: 2    MOUNJARO 15 MG/0.5ML SOPN SC injection, Inject 0.5 mLs into the skin once a week, Disp: 6 mL,

## 2024-06-18 DIAGNOSIS — G89.4 CHRONIC PAIN SYNDROME: ICD-10-CM

## 2024-06-18 NOTE — TELEPHONE ENCOUNTER
Last Appointment:  5/9/2024  Future Appointments   Date Time Provider Department Center   6/21/2024  9:30 AM Seng Higgins DPM [facility]   7/30/2024  7:15 AM Uday Anthony DO [facility] [facility]   8/9/2024  8:00 AM Aline Aparicio APRN - NP [place] [facility]   4/17/2025 10:45 AM Tucker Parker MD [facility]/MED [facility]

## 2024-06-19 RX ORDER — HYDROCODONE BITARTRATE AND ACETAMINOPHEN 5; 325 MG/1; MG/1
1 TABLET ORAL EVERY 6 HOURS PRN
Qty: 120 TABLET | Refills: 0 | Status: SHIPPED | OUTPATIENT
Start: 2024-06-19 | End: 2024-07-19

## 2024-06-19 NOTE — TELEPHONE ENCOUNTER
PDMP appropriate.  Covering Dr. Aparicio's in basket.  Will refill [place] while she is away.  Patient is set to follow-up with Dr. Aparicio in August 2024.

## 2024-06-20 ENCOUNTER — TELEPHONE (OUTPATIENT)
Dept: PRIMARY CARE CLINIC | Age: 49
End: 2024-06-20

## 2024-06-20 ENCOUNTER — OFFICE VISIT (OUTPATIENT)
Dept: FAMILY MEDICINE CLINIC | Age: 49
End: 2024-06-20
Payer: COMMERCIAL

## 2024-06-20 VITALS
WEIGHT: 213 LBS | TEMPERATURE: 97.8 F | HEART RATE: 91 BPM | HEIGHT: 75 IN | DIASTOLIC BLOOD PRESSURE: 72 MMHG | BODY MASS INDEX: 26.49 KG/M2 | SYSTOLIC BLOOD PRESSURE: 118 MMHG | OXYGEN SATURATION: 98 % | RESPIRATION RATE: 18 BRPM

## 2024-06-20 DIAGNOSIS — M54.16 LUMBAR RADICULOPATHY: Primary | ICD-10-CM

## 2024-06-20 DIAGNOSIS — H66.001 NON-RECURRENT ACUTE SUPPURATIVE OTITIS MEDIA OF RIGHT EAR WITHOUT SPONTANEOUS RUPTURE OF TYMPANIC MEMBRANE: Primary | ICD-10-CM

## 2024-06-20 DIAGNOSIS — M62.830 MUSCLE SPASM OF BACK: ICD-10-CM

## 2024-06-20 PROCEDURE — G8417 CALC BMI ABV UP PARAM F/U: HCPCS | Performed by: NURSE PRACTITIONER

## 2024-06-20 PROCEDURE — 4004F PT TOBACCO SCREEN RCVD TLK: CPT | Performed by: NURSE PRACTITIONER

## 2024-06-20 PROCEDURE — 3078F DIAST BP <80 MM HG: CPT | Performed by: NURSE PRACTITIONER

## 2024-06-20 PROCEDURE — 99213 OFFICE O/P EST LOW 20 MIN: CPT | Performed by: NURSE PRACTITIONER

## 2024-06-20 PROCEDURE — 3074F SYST BP LT 130 MM HG: CPT | Performed by: NURSE PRACTITIONER

## 2024-06-20 PROCEDURE — G8427 DOCREV CUR MEDS BY ELIG CLIN: HCPCS | Performed by: NURSE PRACTITIONER

## 2024-06-20 RX ORDER — AMOXICILLIN AND CLAVULANATE POTASSIUM 875; 125 MG/1; MG/1
1 TABLET, FILM COATED ORAL 2 TIMES DAILY
Qty: 20 TABLET | Refills: 0 | Status: SHIPPED | OUTPATIENT
Start: 2024-06-20 | End: 2024-06-30

## 2024-06-20 NOTE — TELEPHONE ENCOUNTER
Pt says that at last appt with Aline Markus a referral was sent to chiropractor and they never contacted him so he wants to know if he can just do physical therapy instead and not chiropractor. [facility] to [facility] because it is here in [place]. Could aline do a referral for that instead.?  Please call pt and advise.

## 2024-06-21 ENCOUNTER — OFFICE VISIT (OUTPATIENT)
Dept: PODIATRY | Age: 49
End: 2024-06-21
Payer: COMMERCIAL

## 2024-06-21 VITALS — BODY MASS INDEX: 25.31 KG/M2 | HEIGHT: 76 IN | WEIGHT: 207.8 LBS

## 2024-06-21 DIAGNOSIS — L97.509 TYPE 2 DIABETES MELLITUS WITH FOOT ULCER, UNSPECIFIED WHETHER LONG TERM INSULIN USE (HCC): Primary | ICD-10-CM

## 2024-06-21 DIAGNOSIS — E11.621 TYPE 2 DIABETES MELLITUS WITH FOOT ULCER, UNSPECIFIED WHETHER LONG TERM INSULIN USE (HCC): Primary | ICD-10-CM

## 2024-06-21 DIAGNOSIS — G60.8 HEREDITARY SENSORY NEUROPATHY: ICD-10-CM

## 2024-06-21 PROCEDURE — G8427 DOCREV CUR MEDS BY ELIG CLIN: HCPCS | Performed by: PODIATRIST

## 2024-06-21 PROCEDURE — G8417 CALC BMI ABV UP PARAM F/U: HCPCS | Performed by: PODIATRIST

## 2024-06-21 PROCEDURE — 99213 OFFICE O/P EST LOW 20 MIN: CPT | Performed by: PODIATRIST

## 2024-06-21 PROCEDURE — 2022F DILAT RTA XM EVC RTNOPTHY: CPT | Performed by: PODIATRIST

## 2024-06-21 PROCEDURE — 3044F HG A1C LEVEL LT 7.0%: CPT | Performed by: PODIATRIST

## 2024-06-21 PROCEDURE — 4004F PT TOBACCO SCREEN RCVD TLK: CPT | Performed by: PODIATRIST

## 2024-06-21 NOTE — PROGRESS NOTES
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  Posterior tibialis palpable bilateral.    Capillary Refill Time:  Immediate return  Hair growth:  Symmetrical and bilateral   Skin:  Not atrophic  Edema: No edema bilateral feet or ankles.  Edema left second toe    Neurologic:  Light touch diminished bilateral    Musculoskeletal/ Orthopedic examination:    Equinis: Absent bilateral  Dorsiflexion, plantarflexion, inversion, eversion bilateral 5 out of 5 muscle strength  Negative Homans  No pain left second toe.    Dermatology examination:    Distal left second toe wound measure approximately 2 mm x 2 mm x 2 mm.  100% granular.  Mild surrounding hyperkeratotic tissue.  No drainage.  Wound does not probe to bone.        Assessment and Plan:  Spike was seen today for diabetes and wound check.    Diagnoses and all orders for this visit:    Type 2 diabetes mellitus with foot ulcer, unspecified whether long term insulin use ([facility])  -      DIABETES FOOT EXAM    Hereditary sensory neuropathy          Did have recent blood work from 5/10/2024.  White blood cell count 8.4, did have elevated CRP 36.0 and elevated sedimentation rate of 36      Radiographs left foot 5/10/2024.  No osteomyelitis noted.    Follow-up wound left second toe.  Progressing very well.  Did perform debridement of hyperkeratotic tissue today  Continue present bandage every other day offloading padding hammertoe padding and Band-Aid.  No drainage or erythema left second toe today progressing well.  Any increased redness or

## 2024-07-05 ENCOUNTER — OFFICE VISIT (OUTPATIENT)
Dept: PODIATRY | Age: 49
End: 2024-07-05
Payer: COMMERCIAL

## 2024-07-05 VITALS — HEIGHT: 76 IN | WEIGHT: 206 LBS | BODY MASS INDEX: 25.09 KG/M2

## 2024-07-05 DIAGNOSIS — M20.41 HAMMER TOES OF BOTH FEET: ICD-10-CM

## 2024-07-05 DIAGNOSIS — E11.621 TYPE 2 DIABETES MELLITUS WITH FOOT ULCER, UNSPECIFIED WHETHER LONG TERM INSULIN USE (HCC): Primary | ICD-10-CM

## 2024-07-05 DIAGNOSIS — G60.8 HEREDITARY SENSORY NEUROPATHY: ICD-10-CM

## 2024-07-05 DIAGNOSIS — M20.42 HAMMER TOES OF BOTH FEET: ICD-10-CM

## 2024-07-05 DIAGNOSIS — L97.509 TYPE 2 DIABETES MELLITUS WITH FOOT ULCER, UNSPECIFIED WHETHER LONG TERM INSULIN USE (HCC): Primary | ICD-10-CM

## 2024-07-05 PROCEDURE — 99213 OFFICE O/P EST LOW 20 MIN: CPT | Performed by: PODIATRIST

## 2024-07-05 PROCEDURE — G8427 DOCREV CUR MEDS BY ELIG CLIN: HCPCS | Performed by: PODIATRIST

## 2024-07-05 PROCEDURE — 3044F HG A1C LEVEL LT 7.0%: CPT | Performed by: PODIATRIST

## 2024-07-05 PROCEDURE — G8417 CALC BMI ABV UP PARAM F/U: HCPCS | Performed by: PODIATRIST

## 2024-07-05 PROCEDURE — 2022F DILAT RTA XM EVC RTNOPTHY: CPT | Performed by: PODIATRIST

## 2024-07-05 PROCEDURE — 4004F PT TOBACCO SCREEN RCVD TLK: CPT | Performed by: PODIATRIST

## 2024-07-05 NOTE — PROGRESS NOTES
Patient here today to follow-up on his wound, left foot 2nd digit.  He is a type 2 diabetic.  Last visit with pcp, Aline Aparicio, [facility] - NP, was on 5/09/2024.  Electronically signed by Anne-Marie Chauhan MA on 7/5/2024 at 8:12 AM    CC:    Follow-up open diabetic wound left second toe    HPI:   Follow-up open wound left second toe.  No redness or drainage today.  Denies any open wounds.  Very pleased with progression wearing regular shoes.  No additional pedal complaints today.      ROS:  Const: Denies constitutional symptoms  Musculo: Denies symptoms other than stated above  Skin: Denies symptoms other than stated above       Current Outpatient Medications:     HYDROcodone-acetaminophen (NORCO) 5-325 MG per tablet, Take 1 tablet by mouth every 6 hours as needed for Pain for up to 30 days. Intended supply: 30 days, Disp: 120 tablet, Rfl: 0    traZODone (DESYREL) 50 MG tablet, Take 1 tablet by mouth nightly, Disp: , Rfl:     DROPLET PEN NEEDLES 32G X 4 MM MISC, use 1 PEN NEEDLE to inject MEDICATION subcutaneously three to four times a day, Disp: , Rfl:     aspirin 81 MG EC tablet, Take 1 tablet by mouth daily, Disp: 90 tablet, Rfl: 1    atorvastatin (LIPITOR) 80 MG tablet, Take 1 tablet by mouth nightly, Disp: 90 tablet, Rfl: 1    Continuous Glucose Sensor (FREESTYLE DIPTI 2 SENSOR) MISC, Use as directed, Disp: 2 each, Rfl: 5    dapagliflozin (FARXIGA) 10 MG tablet, Take 1 tablet by mouth every morning, Disp: 90 tablet, Rfl: 1    ezetimibe (ZETIA) 10 MG tablet, Take 1 tablet by mouth daily, Disp: 90 tablet, Rfl: 1    lisinopril (PRINIVIL;ZESTRIL) 5 MG tablet, Take 1 tablet by mouth daily, Disp: 90 tablet, Rfl: 1    metoprolol tartrate (LOPRESSOR) 50 MG tablet, Take 1 tablet by mouth 2 times daily, Disp: 60 tablet, Rfl: 5    mometasone-formoterol (DULERA) 200-5 MCG/ACT inhaler, inhale 2 puffs by mouth and INTO THE LUNGS twice a day, Disp: 8.8 g, Rfl: 2    MOUNJARO 15 MG/0.5ML SOPN SC injection, Inject 0.5 mLs into the

## 2024-07-08 DIAGNOSIS — E11.42 TYPE 2 DIABETES MELLITUS WITH DIABETIC POLYNEUROPATHY, WITH LONG-TERM CURRENT USE OF INSULIN (HCC): ICD-10-CM

## 2024-07-08 DIAGNOSIS — Z79.4 TYPE 2 DIABETES MELLITUS WITH DIABETIC POLYNEUROPATHY, WITH LONG-TERM CURRENT USE OF INSULIN (HCC): ICD-10-CM

## 2024-07-08 RX ORDER — ASPIRIN 81 MG/1
81 TABLET ORAL DAILY
Qty: 90 TABLET | Refills: 1 | Status: SHIPPED | OUTPATIENT
Start: 2024-07-08

## 2024-07-17 DIAGNOSIS — G89.4 CHRONIC PAIN SYNDROME: ICD-10-CM

## 2024-07-17 RX ORDER — HYDROCODONE BITARTRATE AND ACETAMINOPHEN 5; 325 MG/1; MG/1
1 TABLET ORAL EVERY 6 HOURS PRN
Qty: 120 TABLET | Refills: 0 | Status: SHIPPED | OUTPATIENT
Start: 2024-07-17 | End: 2024-08-16

## 2024-07-17 NOTE — TELEPHONE ENCOUNTER
Last Appointment:  Visit date not found  Future Appointments   Date Time Provider Department Center   7/30/2024  7:15 AM Uday Anthony DO [facility] [facility]   8/6/2024  9:00 AM Seng Higgins DPM [facility]   8/9/2024  8:00 AM Aline Aparicio APRN - NP [facility]   4/17/2025 10:45 AM Tucker Parker MD [facility]

## 2024-07-30 ENCOUNTER — OFFICE VISIT (OUTPATIENT)
Dept: ENT CLINIC | Age: 49
End: 2024-07-30
Payer: COMMERCIAL

## 2024-07-30 VITALS
SYSTOLIC BLOOD PRESSURE: 109 MMHG | HEART RATE: 74 BPM | WEIGHT: 208 LBS | BODY MASS INDEX: 25.33 KG/M2 | HEIGHT: 76 IN | DIASTOLIC BLOOD PRESSURE: 71 MMHG | OXYGEN SATURATION: 98 %

## 2024-07-30 DIAGNOSIS — H69.93 DYSFUNCTION OF BOTH EUSTACHIAN TUBES: ICD-10-CM

## 2024-07-30 DIAGNOSIS — H69.92 ETD (EUSTACHIAN TUBE DYSFUNCTION), LEFT: Primary | ICD-10-CM

## 2024-07-30 PROCEDURE — G8427 DOCREV CUR MEDS BY ELIG CLIN: HCPCS | Performed by: OTOLARYNGOLOGY

## 2024-07-30 PROCEDURE — 4004F PT TOBACCO SCREEN RCVD TLK: CPT | Performed by: OTOLARYNGOLOGY

## 2024-07-30 PROCEDURE — G8417 CALC BMI ABV UP PARAM F/U: HCPCS | Performed by: OTOLARYNGOLOGY

## 2024-07-30 PROCEDURE — 99213 OFFICE O/P EST LOW 20 MIN: CPT | Performed by: OTOLARYNGOLOGY

## 2024-07-30 PROCEDURE — 3074F SYST BP LT 130 MM HG: CPT | Performed by: OTOLARYNGOLOGY

## 2024-07-30 PROCEDURE — 3078F DIAST BP <80 MM HG: CPT | Performed by: OTOLARYNGOLOGY

## 2024-07-30 RX ORDER — FLUTICASONE PROPIONATE 50 MCG
2 SPRAY, SUSPENSION (ML) NASAL DAILY
Qty: 16 G | Refills: 3 | Status: SHIPPED | OUTPATIENT
Start: 2024-07-30

## 2024-07-30 RX ORDER — CIPROFLOXACIN AND DEXAMETHASONE 3; 1 MG/ML; MG/ML
3 SUSPENSION/ DROPS AURICULAR (OTIC) 3 TIMES DAILY
Qty: 7.5 ML | Refills: 3 | Status: SHIPPED | OUTPATIENT
Start: 2024-07-30 | End: 2024-08-06

## 2024-07-30 NOTE — PROGRESS NOTES
Subjective:      Patient ID:  Spike Aviles is a 48 y.o. male.    HPI Comments: Pt returns for check of ear tubes, there have been infections since last visit.      Tubes were placed 2022     Past Medical History:   Diagnosis Date    CAD (coronary artery disease)     Dr Deras    COPD (chronic obstructive pulmonary disease) (HCC)     Diabetes mellitus (HCC)     Hyperlipidemia     Hypertension     Internal carotid artery occlusion, right 2020    Nasopharyngeal mass 2021    For OR 21    Neuropathy     Obesity     Shoulder problem     Type 1 diabetes mellitus with circulatory complication (HCC) 2023     Past Surgical History:   Procedure Laterality Date    CARDIAC CATHETERIZATION  2020    Dr. Schmitt- multi vessel disease    MITRAL VALVE REPAIR N/A 3/16/2020    CORONARY ARTERY BYPASS POSSIBLE LEFT RADIAL ARTERY HARVEST, PATRICE performed by Liu De Paz MD at [facility] OR    NOSE SURGERY N/A 2021    NASOPHARYNGEAL BIOPSY performed by Uday Anthony DO at [facility] OR    THORACOSCOPY  2017    resection of mediastinal mass    TONSILLECTOMY      TRANSESOPHAGEAL ECHOCARDIOGRAM  2020    TYMPANOSTOMY TUBE PLACEMENT Right 2021    POSSIBLE RIGHT EAR TUBE POSSIBLE EUSTACHIAN TUBE DILATION performed by Uday Anthony DO at [facility] OR     Family History   Problem Relation Age of Onset    Heart Disease Mother         MI age 41     High Blood Pressure Mother     Diabetes Father     Heart Disease Father         MI    High Blood Pressure Father     High Cholesterol Father     Cancer Father     Stroke Father     Kidney Disease Father         dialysis    High Blood Pressure Sister     Other Brother     COPD Brother     High Blood Pressure Brother      Social History     Socioeconomic History    Marital status: Single     Spouse name: None    Number of children: None    Years of education: None    Highest education level: None   Occupational History

## 2024-08-06 ENCOUNTER — OFFICE VISIT (OUTPATIENT)
Dept: PODIATRY | Age: 49
End: 2024-08-06
Payer: COMMERCIAL

## 2024-08-06 VITALS — BODY MASS INDEX: 24.48 KG/M2 | WEIGHT: 201 LBS | HEIGHT: 76 IN

## 2024-08-06 DIAGNOSIS — M20.42 HAMMER TOES OF BOTH FEET: ICD-10-CM

## 2024-08-06 DIAGNOSIS — E11.9 TYPE 2 DIABETES MELLITUS WITHOUT COMPLICATION, UNSPECIFIED WHETHER LONG TERM INSULIN USE (HCC): Primary | ICD-10-CM

## 2024-08-06 DIAGNOSIS — G60.8 HEREDITARY SENSORY NEUROPATHY: ICD-10-CM

## 2024-08-06 DIAGNOSIS — M20.41 HAMMER TOES OF BOTH FEET: ICD-10-CM

## 2024-08-06 PROCEDURE — 4004F PT TOBACCO SCREEN RCVD TLK: CPT | Performed by: PODIATRIST

## 2024-08-06 PROCEDURE — G8427 DOCREV CUR MEDS BY ELIG CLIN: HCPCS | Performed by: PODIATRIST

## 2024-08-06 PROCEDURE — 3044F HG A1C LEVEL LT 7.0%: CPT | Performed by: PODIATRIST

## 2024-08-06 PROCEDURE — G8420 CALC BMI NORM PARAMETERS: HCPCS | Performed by: PODIATRIST

## 2024-08-06 PROCEDURE — 99213 OFFICE O/P EST LOW 20 MIN: CPT | Performed by: PODIATRIST

## 2024-08-06 PROCEDURE — 2022F DILAT RTA XM EVC RTNOPTHY: CPT | Performed by: PODIATRIST

## 2024-08-06 RX ORDER — ERGOCALCIFEROL 1.25 MG/1
CAPSULE ORAL
COMMUNITY
Start: 2024-08-05 | End: 2024-08-11 | Stop reason: DRUGHIGH

## 2024-08-06 NOTE — PROGRESS NOTES
Patient here today to follow-up on his wound, left foot 2nd digit.  He is a type 2 diabetic with an A1C of 5.4 that was taken on 5/09/2024.  Last visit with pcp, Aline Aparicio APRN - NP, was on 5/09/2024.    Electronically signed by Anne-Marie Chauhan MA on 8/6/2024 at 9:14 AM    CC:    Follow-up open diabetic wound left second toe    HPI:   Follow-up diabetic foot ankle exam.  No open wounds.  Denies any foot or ankle pain today.  Pleased with overall progression.  No additional pedal complaints today.      ROS:  Const: Denies constitutional symptoms  Musculo: Denies symptoms other than stated above  Skin: Denies symptoms other than stated above       Current Outpatient Medications:     vitamin D (ERGOCALCIFEROL) 1.25 MG (50903 UT) CAPS capsule, , Disp: , Rfl:     ciprofloxacin-dexAMETHasone (CIPRODEX) 0.3-0.1 % otic suspension, Place 3 drops into both ears 3 times daily for 7 days, Disp: 7.5 mL, Rfl: 3    fluticasone (FLONASE) 50 MCG/ACT nasal spray, 2 sprays by Nasal route daily 2 sprays in each nostril daily, Disp: 16 g, Rfl: 3    HYDROcodone-acetaminophen (NORCO) 5-325 MG per tablet, Take 1 tablet by mouth every 6 hours as needed for Pain for up to 30 days. Intended supply: 30 days, Disp: 120 tablet, Rfl: 0    blood glucose test strips (ASCENSIA AUTODISC VI;ONE TOUCH ULTRA TEST VI) strip, 1 each by In Vitro route daily As needed., Disp: 100 each, Rfl: 3    aspirin 81 MG EC tablet, Take 1 tablet by mouth daily, Disp: 90 tablet, Rfl: 1    traZODone (DESYREL) 50 MG tablet, Take 1 tablet by mouth nightly, Disp: , Rfl:     DROPLET PEN NEEDLES 32G X 4 MM MISC, use 1 PEN NEEDLE to inject MEDICATION subcutaneously three to four times a day, Disp: , Rfl:     atorvastatin (LIPITOR) 80 MG tablet, Take 1 tablet by mouth nightly, Disp: 90 tablet, Rfl: 1    Continuous Glucose Sensor (FREESTYLE DIPTI 2 SENSOR) MISC, Use as directed, Disp: 2 each, Rfl: 5    dapagliflozin (FARXIGA) 10 MG tablet, Take 1 tablet by mouth every

## 2024-08-09 ENCOUNTER — OFFICE VISIT (OUTPATIENT)
Dept: PRIMARY CARE CLINIC | Age: 49
End: 2024-08-09
Payer: COMMERCIAL

## 2024-08-09 VITALS
SYSTOLIC BLOOD PRESSURE: 124 MMHG | OXYGEN SATURATION: 97 % | BODY MASS INDEX: 24.84 KG/M2 | HEART RATE: 90 BPM | WEIGHT: 204 LBS | DIASTOLIC BLOOD PRESSURE: 68 MMHG | HEIGHT: 76 IN | TEMPERATURE: 98.7 F

## 2024-08-09 DIAGNOSIS — E11.65 TYPE 2 DIABETES MELLITUS WITH HYPERGLYCEMIA, WITH LONG-TERM CURRENT USE OF INSULIN (HCC): ICD-10-CM

## 2024-08-09 DIAGNOSIS — J44.9 CHRONIC OBSTRUCTIVE PULMONARY DISEASE, UNSPECIFIED COPD TYPE (HCC): ICD-10-CM

## 2024-08-09 DIAGNOSIS — E78.2 MIXED HYPERLIPIDEMIA: ICD-10-CM

## 2024-08-09 DIAGNOSIS — Z79.4 TYPE 2 DIABETES MELLITUS WITH DIABETIC POLYNEUROPATHY, WITH LONG-TERM CURRENT USE OF INSULIN (HCC): ICD-10-CM

## 2024-08-09 DIAGNOSIS — E11.42 TYPE 2 DIABETES MELLITUS WITH DIABETIC POLYNEUROPATHY, WITHOUT LONG-TERM CURRENT USE OF INSULIN (HCC): Primary | ICD-10-CM

## 2024-08-09 DIAGNOSIS — M54.16 LUMBAR RADICULOPATHY: ICD-10-CM

## 2024-08-09 DIAGNOSIS — Z79.4 TYPE 2 DIABETES MELLITUS WITH HYPERGLYCEMIA, WITH LONG-TERM CURRENT USE OF INSULIN (HCC): ICD-10-CM

## 2024-08-09 DIAGNOSIS — E55.9 VITAMIN D DEFICIENCY: ICD-10-CM

## 2024-08-09 DIAGNOSIS — M62.830 MUSCLE SPASM OF BACK: ICD-10-CM

## 2024-08-09 DIAGNOSIS — I10 ESSENTIAL HYPERTENSION: ICD-10-CM

## 2024-08-09 DIAGNOSIS — E11.42 TYPE 2 DIABETES MELLITUS WITH DIABETIC POLYNEUROPATHY, WITHOUT LONG-TERM CURRENT USE OF INSULIN (HCC): ICD-10-CM

## 2024-08-09 DIAGNOSIS — E11.42 TYPE 2 DIABETES MELLITUS WITH DIABETIC POLYNEUROPATHY, WITH LONG-TERM CURRENT USE OF INSULIN (HCC): ICD-10-CM

## 2024-08-09 DIAGNOSIS — G89.4 CHRONIC PAIN SYNDROME: ICD-10-CM

## 2024-08-09 DIAGNOSIS — I25.10 CAD IN NATIVE ARTERY: ICD-10-CM

## 2024-08-09 LAB
ALBUMIN: 4.5 G/DL (ref 3.5–5.2)
ALP BLD-CCNC: 69 U/L (ref 40–129)
ALT SERPL-CCNC: 20 U/L (ref 0–40)
ANION GAP SERPL CALCULATED.3IONS-SCNC: 16 MMOL/L (ref 7–16)
AST SERPL-CCNC: 19 U/L (ref 0–39)
BASOPHILS ABSOLUTE: 0.06 K/UL (ref 0–0.2)
BASOPHILS RELATIVE PERCENT: 1 % (ref 0–2)
BILIRUB SERPL-MCNC: 0.6 MG/DL (ref 0–1.2)
BUN BLDV-MCNC: 21 MG/DL (ref 6–20)
CALCIUM SERPL-MCNC: 10 MG/DL (ref 8.6–10.2)
CHLORIDE BLD-SCNC: 96 MMOL/L (ref 98–107)
CHOLESTEROL, TOTAL: 167 MG/DL
CO2: 25 MMOL/L (ref 22–29)
CREAT SERPL-MCNC: 0.8 MG/DL (ref 0.7–1.2)
EOSINOPHILS ABSOLUTE: 0.15 K/UL (ref 0.05–0.5)
EOSINOPHILS RELATIVE PERCENT: 2 % (ref 0–6)
GFR, ESTIMATED: >90 ML/MIN/1.73M2
GLUCOSE BLD-MCNC: 108 MG/DL (ref 74–99)
HBA1C MFR BLD: 5.7 %
HCT VFR BLD CALC: 47.2 % (ref 37–54)
HDLC SERPL-MCNC: 66 MG/DL
HEMOGLOBIN: 16.3 G/DL (ref 12.5–16.5)
IMMATURE GRANULOCYTES %: 0 % (ref 0–5)
IMMATURE GRANULOCYTES ABSOLUTE: <0.03 K/UL (ref 0–0.58)
LDL CHOLESTEROL: 81 MG/DL
LYMPHOCYTES ABSOLUTE: 2.44 K/UL (ref 1.5–4)
LYMPHOCYTES RELATIVE PERCENT: 37 % (ref 20–42)
MCH RBC QN AUTO: 32.8 PG (ref 26–35)
MCHC RBC AUTO-ENTMCNC: 34.5 G/DL (ref 32–34.5)
MCV RBC AUTO: 95 FL (ref 80–99.9)
MONOCYTES ABSOLUTE: 0.45 K/UL (ref 0.1–0.95)
MONOCYTES RELATIVE PERCENT: 7 % (ref 2–12)
NEUTROPHILS ABSOLUTE: 3.4 K/UL (ref 1.8–7.3)
NEUTROPHILS RELATIVE PERCENT: 52 % (ref 43–80)
PDW BLD-RTO: 13.2 % (ref 11.5–15)
PLATELET # BLD: 250 K/UL (ref 130–450)
PMV BLD AUTO: 8.8 FL (ref 7–12)
POTASSIUM SERPL-SCNC: 4.4 MMOL/L (ref 3.5–5)
RBC # BLD: 4.97 M/UL (ref 3.8–5.8)
SODIUM BLD-SCNC: 137 MMOL/L (ref 132–146)
TOTAL PROTEIN: 7.2 G/DL (ref 6.4–8.3)
TRIGL SERPL-MCNC: 102 MG/DL
TSH SERPL DL<=0.05 MIU/L-ACNC: 0.76 UIU/ML (ref 0.27–4.2)
VITAMIN D 25-HYDROXY: 86.3 NG/ML (ref 30–100)
VLDLC SERPL CALC-MCNC: 20 MG/DL
WBC # BLD: 6.5 K/UL (ref 4.5–11.5)

## 2024-08-09 PROCEDURE — 83036 HEMOGLOBIN GLYCOSYLATED A1C: CPT | Performed by: NURSE PRACTITIONER

## 2024-08-09 PROCEDURE — 3023F SPIROM DOC REV: CPT | Performed by: NURSE PRACTITIONER

## 2024-08-09 PROCEDURE — 99214 OFFICE O/P EST MOD 30 MIN: CPT | Performed by: NURSE PRACTITIONER

## 2024-08-09 PROCEDURE — 2022F DILAT RTA XM EVC RTNOPTHY: CPT | Performed by: NURSE PRACTITIONER

## 2024-08-09 PROCEDURE — 3078F DIAST BP <80 MM HG: CPT | Performed by: NURSE PRACTITIONER

## 2024-08-09 PROCEDURE — G8420 CALC BMI NORM PARAMETERS: HCPCS | Performed by: NURSE PRACTITIONER

## 2024-08-09 PROCEDURE — G8427 DOCREV CUR MEDS BY ELIG CLIN: HCPCS | Performed by: NURSE PRACTITIONER

## 2024-08-09 PROCEDURE — 3044F HG A1C LEVEL LT 7.0%: CPT | Performed by: NURSE PRACTITIONER

## 2024-08-09 PROCEDURE — 4004F PT TOBACCO SCREEN RCVD TLK: CPT | Performed by: NURSE PRACTITIONER

## 2024-08-09 PROCEDURE — 3074F SYST BP LT 130 MM HG: CPT | Performed by: NURSE PRACTITIONER

## 2024-08-09 RX ORDER — ASPIRIN 81 MG/1
81 TABLET ORAL DAILY
Qty: 90 TABLET | Refills: 1 | Status: SHIPPED | OUTPATIENT
Start: 2024-08-09

## 2024-08-09 RX ORDER — DAPAGLIFLOZIN 10 MG/1
10 TABLET, FILM COATED ORAL EVERY MORNING
Qty: 90 TABLET | Refills: 1 | Status: SHIPPED | OUTPATIENT
Start: 2024-08-09

## 2024-08-09 RX ORDER — FLUTICASONE PROPIONATE 50 MCG
2 SPRAY, SUSPENSION (ML) NASAL DAILY
Qty: 16 G | Refills: 3 | Status: SHIPPED | OUTPATIENT
Start: 2024-08-09

## 2024-08-09 RX ORDER — METHOCARBAMOL 750 MG/1
750 TABLET, FILM COATED ORAL 4 TIMES DAILY
Qty: 40 TABLET | Refills: 0 | Status: SHIPPED | OUTPATIENT
Start: 2024-08-09 | End: 2024-08-19

## 2024-08-09 RX ORDER — LISINOPRIL 5 MG/1
5 TABLET ORAL DAILY
Qty: 90 TABLET | Refills: 1 | Status: SHIPPED | OUTPATIENT
Start: 2024-08-09

## 2024-08-09 RX ORDER — METOPROLOL TARTRATE 50 MG/1
50 TABLET, FILM COATED ORAL 2 TIMES DAILY
Qty: 60 TABLET | Refills: 5 | Status: SHIPPED | OUTPATIENT
Start: 2024-08-09

## 2024-08-09 RX ORDER — HYDROCHLOROTHIAZIDE 25 MG/1
25 TABLET ORAL DAILY PRN
Qty: 30 TABLET | Refills: 5 | Status: SHIPPED | OUTPATIENT
Start: 2024-08-09

## 2024-08-09 RX ORDER — ATORVASTATIN CALCIUM 80 MG/1
80 TABLET, FILM COATED ORAL NIGHTLY
Qty: 90 TABLET | Refills: 1 | Status: SHIPPED | OUTPATIENT
Start: 2024-08-09

## 2024-08-09 RX ORDER — EZETIMIBE 10 MG/1
10 TABLET ORAL DAILY
Qty: 90 TABLET | Refills: 1 | Status: SHIPPED | OUTPATIENT
Start: 2024-08-09

## 2024-08-09 RX ORDER — TIRZEPATIDE 15 MG/.5ML
15 INJECTION, SOLUTION SUBCUTANEOUS WEEKLY
Qty: 6 ML | Refills: 1 | Status: SHIPPED | OUTPATIENT
Start: 2024-08-09

## 2024-08-09 RX ORDER — PEN NEEDLE, DIABETIC 29 G X1/2"
NEEDLE, DISPOSABLE MISCELLANEOUS
Qty: 100 EACH | Refills: 2 | Status: SHIPPED | OUTPATIENT
Start: 2024-08-09

## 2024-08-09 RX ORDER — ALBUTEROL SULFATE 90 UG/1
AEROSOL, METERED RESPIRATORY (INHALATION)
Qty: 8.5 G | Refills: 2 | Status: SHIPPED | OUTPATIENT
Start: 2024-08-09

## 2024-08-09 RX ORDER — TIRZEPATIDE 15 MG/.5ML
15 INJECTION, SOLUTION SUBCUTANEOUS WEEKLY
Qty: 6 ML | Refills: 1 | Status: SHIPPED
Start: 2024-08-09 | End: 2024-08-09 | Stop reason: SDUPTHER

## 2024-08-09 RX ORDER — HYDROCODONE BITARTRATE AND ACETAMINOPHEN 5; 325 MG/1; MG/1
1 TABLET ORAL EVERY 6 HOURS PRN
Qty: 120 TABLET | Refills: 0 | Status: SHIPPED | OUTPATIENT
Start: 2024-08-09 | End: 2024-09-08

## 2024-08-09 RX ORDER — FLASH GLUCOSE SENSOR
KIT MISCELLANEOUS
Qty: 1 EACH | Refills: 3 | Status: SHIPPED | OUTPATIENT
Start: 2024-08-09

## 2024-08-09 SDOH — ECONOMIC STABILITY: HOUSING INSECURITY
IN THE LAST 12 MONTHS, WAS THERE A TIME WHEN YOU DID NOT HAVE A STEADY PLACE TO SLEEP OR SLEPT IN A SHELTER (INCLUDING NOW)?: NO

## 2024-08-09 SDOH — ECONOMIC STABILITY: INCOME INSECURITY: HOW HARD IS IT FOR YOU TO PAY FOR THE VERY BASICS LIKE FOOD, HOUSING, MEDICAL CARE, AND HEATING?: NOT HARD AT ALL

## 2024-08-09 SDOH — ECONOMIC STABILITY: FOOD INSECURITY: WITHIN THE PAST 12 MONTHS, THE FOOD YOU BOUGHT JUST DIDN'T LAST AND YOU DIDN'T HAVE MONEY TO GET MORE.: NEVER TRUE

## 2024-08-09 SDOH — ECONOMIC STABILITY: FOOD INSECURITY: WITHIN THE PAST 12 MONTHS, YOU WORRIED THAT YOUR FOOD WOULD RUN OUT BEFORE YOU GOT MONEY TO BUY MORE.: NEVER TRUE

## 2024-08-09 ASSESSMENT — ENCOUNTER SYMPTOMS
DIARRHEA: 0
BLOOD IN STOOL: 0
NAUSEA: 0
APNEA: 0
CHEST TIGHTNESS: 0
RECTAL PAIN: 0
COLOR CHANGE: 0
WHEEZING: 0
BACK PAIN: 0
EYE REDNESS: 0
ABDOMINAL PAIN: 0
CONSTIPATION: 0
SORE THROAT: 0
TROUBLE SWALLOWING: 0
SHORTNESS OF BREATH: 0
RHINORRHEA: 0
COUGH: 0
VOMITING: 0
ABDOMINAL DISTENTION: 0

## 2024-08-09 NOTE — PROGRESS NOTES
ESTABLISHED PRIMARY CARE VISIT  24  Name: Spike Aviles   : 1975   Age: 48 y.o.  Sex: male    Subjective:     Chief Complaint   Patient presents with    Diabetes     Spike Aviles presents to the office for follow up on chronic problems.  Issues that were addressed today include:  Diabetes mellitus type 2-controlled.  A1c in office today is 5.7.  Overall the patient is doing well.  He was on insulin in the past, since this has been discontinued.  He is tolerating Farxiga and 1 [place].  The patient has been on Mounjaro for about a year now and has had greater than 100 pound weight loss.  He has been exercising on a regular basis walking at least 4 miles 4-5 times weekly.  And following a Mediterranean diet.  Highest weight 366 pounds, now 204 pounds.  Hypertension/hyperlipidemia/CAD status post CABG in -he does follow with cardiology on a yearly basis.  He denies any chest pain or shortness of breath.  He is tolerating his medications.  He is euvolemic at this time.  He is due for fasting lab work.  Vitamin D deficiency-last value 81.7, currently off supplementation.  Lumbar radiculopathy/DDD-controlled with [place].  OARRS report reviewed and appropriate.  He had previously followed with pain management.  He has failed physical therapy, gabapentin, Lyrica and Butrans patches.  His biggest complaint today is persistent left rhomboid muscular pain, this has been ongoing for several months.  He has trialed a short course of cyclobenzaprine.  He has tried home exercises, formal physical therapy with minimal relief of symptoms.  The patient reports that symptoms occur after walking about 2 miles or standing for long periods of time.  COPD-controlled.  Denies any shortness of breath or wheezing.  Currently taking Dulera.  Rarely utilizes albuterol inhaler.      Review of Systems   Constitutional:  Negative for activity change, appetite change, chills, diaphoresis, fatigue, fever and unexpected weight

## 2024-08-09 NOTE — ASSESSMENT & PLAN NOTE
at goal.  A1c 5.7.  Continue CGM, Farxiga, lisinopril, Mounjaro.  Patient is to schedule a diabetic retinal eye exam.  Continue diet and exercise regiment.

## 2024-08-11 DIAGNOSIS — I25.10 CAD IN NATIVE ARTERY: ICD-10-CM

## 2024-08-12 RX ORDER — NITROGLYCERIN 0.3 MG/1
TABLET SUBLINGUAL
Qty: 100 TABLET | Refills: 0 | Status: SHIPPED | OUTPATIENT
Start: 2024-08-12

## 2024-08-12 NOTE — TELEPHONE ENCOUNTER
Last Appointment:  8/9/2024  Future Appointments   Date Time Provider Department Center   10/10/2024  8:15 AM Seng Higgins DPM Col Podiatry [facility]   11/8/2024  8:00 AM Aline Aparicio APRN - NP [facility] DEP   1/31/2025  7:00 AM Uday Anthony DO [place] [facility]   4/17/2025 10:45 AM Tucker Parker MD VAS/MED [facility]

## 2024-08-13 DIAGNOSIS — G89.4 CHRONIC PAIN SYNDROME: ICD-10-CM

## 2024-08-13 RX ORDER — HYDROCODONE BITARTRATE AND ACETAMINOPHEN 5; 325 MG/1; MG/1
1 TABLET ORAL EVERY 6 HOURS PRN
Qty: 120 TABLET | Refills: 0 | OUTPATIENT
Start: 2024-08-13 | End: 2024-09-12

## 2024-09-11 DIAGNOSIS — G89.4 CHRONIC PAIN SYNDROME: Primary | ICD-10-CM

## 2024-09-12 RX ORDER — HYDROCODONE BITARTRATE AND ACETAMINOPHEN 5; 325 MG/1; MG/1
1 TABLET ORAL EVERY 6 HOURS PRN
Qty: 30 TABLET | Refills: 0 | Status: SHIPPED | OUTPATIENT
Start: 2024-09-12 | End: 2024-10-12

## 2024-10-10 ENCOUNTER — OFFICE VISIT (OUTPATIENT)
Dept: PODIATRY | Age: 49
End: 2024-10-10

## 2024-10-10 VITALS — BODY MASS INDEX: 24.84 KG/M2 | WEIGHT: 204 LBS | HEIGHT: 76 IN

## 2024-10-10 DIAGNOSIS — M20.41 HAMMERTOES OF BOTH FEET: Primary | ICD-10-CM

## 2024-10-10 DIAGNOSIS — M20.42 HAMMERTOES OF BOTH FEET: Primary | ICD-10-CM

## 2024-10-10 DIAGNOSIS — E11.9 TYPE 2 DIABETES MELLITUS WITHOUT COMPLICATION, UNSPECIFIED WHETHER LONG TERM INSULIN USE (HCC): ICD-10-CM

## 2024-10-10 RX ORDER — CIPROFLOXACIN AND DEXAMETHASONE 3; 1 MG/ML; MG/ML
SUSPENSION/ DROPS AURICULAR (OTIC)
COMMUNITY
Start: 2024-10-07

## 2024-10-10 NOTE — PROGRESS NOTES
Patient here for 2 month diabetic foot check.  No issues.  Would like to discuss, tendon release.  Last visit, Alien Aparicio, ZIA - NP, 8/09/2024.    Electronically signed by Seng Higgins DPM on 10/10/2024 at 9:32 AM    CC:    Diabetic foot and ankle exam  Hammertoes third digit left and right    HPI:   Presents follow-up diabetic foot ankle exam.  History of wound second toe.  No open wounds today but does get occasional tenderness third toe left and right where he does have a hammertoe.  Responded very well with flexor tenotomy's in the past would like to discuss flexor tenotomy third toe left and right foot today.      ROS:  Const: Denies constitutional symptoms  Musculo: Denies symptoms other than stated above  Skin: Denies symptoms other than stated above       Current Outpatient Medications:     ciprofloxacin-dexAMETHasone (CIPRODEX) 0.3-0.1 % otic suspension, , Disp: , Rfl:     HYDROcodone-acetaminophen (NORCO) 5-325 MG per tablet, Take 1 tablet by mouth every 6 hours as needed for Pain for up to 180 doses. Intended supply: 5 days. Take lowest dose possible to manage pain Max Daily Amount: 4 tablets, Disp: 30 tablet, Rfl: 0    nitroGLYCERIN (NITROSTAT) 0.3 MG SL tablet, place 1 tablet under the tongue if needed every 5 minutes for chest pain, Disp: 100 tablet, Rfl: 0    albuterol sulfate HFA (PROVENTIL;VENTOLIN;PROAIR) 108 (90 Base) MCG/ACT inhaler, inhale 2 puffs by mouth and INTO THE LUNGS every 6 hours if needed for wheezing, Disp: 8.5 g, Rfl: 2    aspirin 81 MG EC tablet, Take 1 tablet by mouth daily, Disp: 90 tablet, Rfl: 1    atorvastatin (LIPITOR) 80 MG tablet, Take 1 tablet by mouth nightly, Disp: 90 tablet, Rfl: 1    B-D INS SYR ULTRAFINE 1CC/30G 30G X 1/2\" 1 ML MISC, use as directed three times a day, Disp: 100 each, Rfl: 2    blood glucose test strips (ASCENSIA AUTODISC VI;ONE TOUCH ULTRA TEST VI) strip, 1 each by In Vitro route daily As needed., Disp: 100 each, Rfl: 3    Continuous Glucose

## 2024-10-24 ENCOUNTER — OFFICE VISIT (OUTPATIENT)
Dept: PODIATRY | Age: 49
End: 2024-10-24

## 2024-10-24 VITALS — WEIGHT: 204 LBS | BODY MASS INDEX: 24.84 KG/M2 | HEIGHT: 76 IN

## 2024-10-24 DIAGNOSIS — M20.41 HAMMERTOES OF BOTH FEET: ICD-10-CM

## 2024-10-24 DIAGNOSIS — E11.9 TYPE 2 DIABETES MELLITUS WITHOUT COMPLICATION, UNSPECIFIED WHETHER LONG TERM INSULIN USE (HCC): Primary | ICD-10-CM

## 2024-10-24 DIAGNOSIS — M20.42 HAMMERTOES OF BOTH FEET: ICD-10-CM

## 2024-10-24 NOTE — PROGRESS NOTES
Patient here for 2 wk f/u for flexor tenotomy.  Feels great.  Type 2 diabetic.  Last visit, Aline Aparicio, ZIA - NP, 8/09/2024.    Electronically signed by Seng Higgins DPM on 10/31/2024 at 8:37 AM    CC:    Diabetic foot and ankle exam  Hammertoes third digit left and right    HPI:   Follow-up diabetic foot ankle exam.  History of flexor tenotomy's second and third toes left and right.  Has been more active and still working out daily.  Denies any open wounds denies any foot or ankle pain today.      ROS:  Const: Denies constitutional symptoms  Musculo: Denies symptoms other than stated above  Skin: Denies symptoms other than stated above       Current Outpatient Medications:     ciprofloxacin-dexAMETHasone (CIPRODEX) 0.3-0.1 % otic suspension, , Disp: , Rfl:     nitroGLYCERIN (NITROSTAT) 0.3 MG SL tablet, place 1 tablet under the tongue if needed every 5 minutes for chest pain, Disp: 100 tablet, Rfl: 0    albuterol sulfate HFA (PROVENTIL;VENTOLIN;PROAIR) 108 (90 Base) MCG/ACT inhaler, inhale 2 puffs by mouth and INTO THE LUNGS every 6 hours if needed for wheezing, Disp: 8.5 g, Rfl: 2    aspirin 81 MG EC tablet, Take 1 tablet by mouth daily, Disp: 90 tablet, Rfl: 1    atorvastatin (LIPITOR) 80 MG tablet, Take 1 tablet by mouth nightly, Disp: 90 tablet, Rfl: 1    B-D INS SYR ULTRAFINE 1CC/30G 30G X 1/2\" 1 ML MISC, use as directed three times a day, Disp: 100 each, Rfl: 2    blood glucose test strips (ASCENSIA AUTODISC VI;ONE TOUCH ULTRA TEST VI) strip, 1 each by In Vitro route daily As needed., Disp: 100 each, Rfl: 3    Continuous Glucose Sensor (FREESTYLE DIPTI 14 DAY SENSOR) MISC, Use as directed to check BG QID & PRN, Disp: 1 each, Rfl: 3    dapagliflozin (FARXIGA) 10 MG tablet, Take 1 tablet by mouth every morning, Disp: 90 tablet, Rfl: 1    ezetimibe (ZETIA) 10 MG tablet, Take 1 tablet by mouth daily, Disp: 90 tablet, Rfl: 1    fluticasone (FLONASE) 50 MCG/ACT nasal spray, 2 sprays by Nasal route daily 2

## 2024-11-08 ENCOUNTER — OFFICE VISIT (OUTPATIENT)
Dept: PRIMARY CARE CLINIC | Age: 49
End: 2024-11-08

## 2024-11-08 VITALS
DIASTOLIC BLOOD PRESSURE: 80 MMHG | OXYGEN SATURATION: 97 % | SYSTOLIC BLOOD PRESSURE: 132 MMHG | HEART RATE: 92 BPM | WEIGHT: 193 LBS | HEIGHT: 76 IN | BODY MASS INDEX: 23.5 KG/M2 | TEMPERATURE: 98.1 F

## 2024-11-08 DIAGNOSIS — E55.9 VITAMIN D DEFICIENCY: ICD-10-CM

## 2024-11-08 DIAGNOSIS — I25.10 CAD IN NATIVE ARTERY: ICD-10-CM

## 2024-11-08 DIAGNOSIS — M62.830 MUSCLE SPASM OF BACK: ICD-10-CM

## 2024-11-08 DIAGNOSIS — I10 ESSENTIAL HYPERTENSION: ICD-10-CM

## 2024-11-08 DIAGNOSIS — E78.2 MIXED HYPERLIPIDEMIA: ICD-10-CM

## 2024-11-08 DIAGNOSIS — E11.42 TYPE 2 DIABETES MELLITUS WITH DIABETIC POLYNEUROPATHY, WITHOUT LONG-TERM CURRENT USE OF INSULIN (HCC): ICD-10-CM

## 2024-11-08 DIAGNOSIS — Z23 NEED FOR VACCINATION: Primary | ICD-10-CM

## 2024-11-08 DIAGNOSIS — G89.4 CHRONIC PAIN SYNDROME: ICD-10-CM

## 2024-11-08 DIAGNOSIS — J44.9 CHRONIC OBSTRUCTIVE PULMONARY DISEASE, UNSPECIFIED COPD TYPE (HCC): ICD-10-CM

## 2024-11-08 LAB
ALBUMIN: 4.3 G/DL (ref 3.5–5.2)
ALP BLD-CCNC: 72 U/L (ref 40–129)
ALT SERPL-CCNC: 24 U/L (ref 0–40)
ANION GAP SERPL CALCULATED.3IONS-SCNC: 11 MMOL/L (ref 7–16)
AST SERPL-CCNC: 21 U/L (ref 0–39)
BILIRUB SERPL-MCNC: 0.6 MG/DL (ref 0–1.2)
BILIRUBIN DIRECT: <0.2 MG/DL (ref 0–0.3)
BILIRUBIN, INDIRECT: NORMAL MG/DL (ref 0–1)
BUN BLDV-MCNC: 19 MG/DL (ref 6–20)
CALCIUM SERPL-MCNC: 9.8 MG/DL (ref 8.6–10.2)
CHLORIDE BLD-SCNC: 100 MMOL/L (ref 98–107)
CO2: 28 MMOL/L (ref 22–29)
CREAT SERPL-MCNC: 0.8 MG/DL (ref 0.7–1.2)
GFR, ESTIMATED: >90 ML/MIN/1.73M2
GLUCOSE BLD-MCNC: 119 MG/DL (ref 74–99)
HBA1C MFR BLD: 5.2 %
POTASSIUM SERPL-SCNC: 4.3 MMOL/L (ref 3.5–5)
SODIUM BLD-SCNC: 139 MMOL/L (ref 132–146)
TOTAL PROTEIN: 7 G/DL (ref 6.4–8.3)

## 2024-11-08 RX ORDER — HYDROCHLOROTHIAZIDE 25 MG/1
25 TABLET ORAL DAILY PRN
Qty: 30 TABLET | Refills: 5 | Status: SHIPPED | OUTPATIENT
Start: 2024-11-08

## 2024-11-08 RX ORDER — FLUTICASONE PROPIONATE 50 MCG
2 SPRAY, SUSPENSION (ML) NASAL DAILY
Qty: 16 G | Refills: 3 | Status: SHIPPED | OUTPATIENT
Start: 2024-11-08

## 2024-11-08 RX ORDER — EZETIMIBE 10 MG/1
10 TABLET ORAL DAILY
Qty: 90 TABLET | Refills: 1 | Status: SHIPPED | OUTPATIENT
Start: 2024-11-08

## 2024-11-08 RX ORDER — ASPIRIN 81 MG/1
81 TABLET ORAL DAILY
Qty: 90 TABLET | Refills: 1 | Status: SHIPPED | OUTPATIENT
Start: 2024-11-08

## 2024-11-08 RX ORDER — ATORVASTATIN CALCIUM 80 MG/1
80 TABLET, FILM COATED ORAL NIGHTLY
Qty: 90 TABLET | Refills: 1 | Status: SHIPPED | OUTPATIENT
Start: 2024-11-08

## 2024-11-08 RX ORDER — METHOCARBAMOL 750 MG/1
750 TABLET, FILM COATED ORAL 3 TIMES DAILY
Qty: 90 TABLET | Refills: 2 | Status: SHIPPED | OUTPATIENT
Start: 2024-11-08 | End: 2025-02-06

## 2024-11-08 RX ORDER — LISINOPRIL 5 MG/1
5 TABLET ORAL DAILY
Qty: 90 TABLET | Refills: 1 | Status: SHIPPED | OUTPATIENT
Start: 2024-11-08

## 2024-11-08 RX ORDER — DAPAGLIFLOZIN 10 MG/1
10 TABLET, FILM COATED ORAL EVERY MORNING
Qty: 90 TABLET | Refills: 1 | Status: SHIPPED | OUTPATIENT
Start: 2024-11-08

## 2024-11-08 RX ORDER — NITROGLYCERIN 0.3 MG/1
TABLET SUBLINGUAL
Qty: 100 TABLET | Refills: 0 | Status: SHIPPED | OUTPATIENT
Start: 2024-11-08

## 2024-11-08 RX ORDER — METOPROLOL TARTRATE 50 MG
50 TABLET ORAL 2 TIMES DAILY
Qty: 180 TABLET | Refills: 1 | Status: SHIPPED | OUTPATIENT
Start: 2024-11-08

## 2024-11-08 RX ORDER — HYDROCODONE BITARTRATE AND ACETAMINOPHEN 5; 325 MG/1; MG/1
1 TABLET ORAL EVERY 6 HOURS PRN
Qty: 120 TABLET | Refills: 0 | Status: SHIPPED | OUTPATIENT
Start: 2024-11-08 | End: 2024-12-08

## 2024-11-08 RX ORDER — ALBUTEROL SULFATE 90 UG/1
INHALANT RESPIRATORY (INHALATION)
Qty: 8.5 G | Refills: 2 | Status: SHIPPED | OUTPATIENT
Start: 2024-11-08

## 2024-11-08 RX ORDER — FLASH GLUCOSE SENSOR
KIT MISCELLANEOUS
Qty: 1 EACH | Refills: 3 | Status: CANCELLED | OUTPATIENT
Start: 2024-11-08

## 2024-11-08 RX ORDER — PEN NEEDLE, DIABETIC 29 G X1/2"
NEEDLE, DISPOSABLE MISCELLANEOUS
Qty: 100 EACH | Refills: 2 | Status: SHIPPED | OUTPATIENT
Start: 2024-11-08

## 2024-11-08 ASSESSMENT — ENCOUNTER SYMPTOMS
WHEEZING: 0
COLOR CHANGE: 0
ABDOMINAL DISTENTION: 0
NAUSEA: 0
VOMITING: 0
ABDOMINAL PAIN: 0
RECTAL PAIN: 0
BACK PAIN: 0
BLOOD IN STOOL: 0
APNEA: 0
COUGH: 0
DIARRHEA: 0
SORE THROAT: 0
TROUBLE SWALLOWING: 0
RHINORRHEA: 0
EYE REDNESS: 0
CHEST TIGHTNESS: 0
SHORTNESS OF BREATH: 0
CONSTIPATION: 0

## 2024-11-08 NOTE — PROGRESS NOTES
long-term current use of insulin (HCC)  At goal.  A1c in office today is 5.2.  Continue current medication regiment.    Orders:    B-D INS SYR ULTRAFINE 1CC/30G 30G X 1/2\" 1 ML MISC; use as directed three times a day    blood glucose test strips (ASCENSIA AUTODISC VI;ONE TOUCH ULTRA TEST VI) strip; 1 each by In Vitro route daily As needed.    dapagliflozin (FARXIGA) 10 MG tablet; Take 1 tablet by mouth every morning    fluticasone (FLONASE) 50 MCG/ACT nasal spray; 2 sprays by Nasal route daily 2 sprays in each nostril daily    hydroCHLOROthiazide (HYDRODIURIL) 25 MG tablet; Take 1 tablet by mouth daily as needed (swelling)    POCT glycosylated hemoglobin (Hb A1C)    Essential hypertension  At goal.  Continue current medications.    Orders:    hydroCHLOROthiazide (HYDRODIURIL) 25 MG tablet; Take 1 tablet by mouth daily as needed (swelling)    lisinopril (PRINIVIL;ZESTRIL) 5 MG tablet; Take 1 tablet by mouth daily    metoprolol tartrate (LOPRESSOR) 50 MG tablet; Take 1 tablet by mouth 2 times daily    Hepatic Function Panel; Future    Basic Metabolic Panel; Future    CAD in native artery  Denies chest pain.  Continue aspirin 81 mg daily.  Follow with cardiology on a yearly basis.    Orders:    aspirin 81 MG EC tablet; Take 1 tablet by mouth daily    nitroGLYCERIN (NITROSTAT) 0.3 MG SL tablet; place 1 tablet under the tongue if needed every 5 minutes for chest pain    Chronic pain syndrome  Manageable with Norco, OARRS report reviewed and appropriate.    Orders:    HYDROcodone-acetaminophen (NORCO) 5-325 MG per tablet; Take 1 tablet by mouth every 6 hours as needed for Pain for up to 30 days. Intended supply: 5 days. Take lowest dose possible to manage pain Max Daily Amount: 4 tablets    Muscle spasm of back  Reinitiate Robaxin as needed, side effects and administration instructions discussed.    Orders:    methocarbamol (ROBAXIN-750) 750 MG tablet; Take 1 tablet by mouth 3 times daily    Need for vaccination  Patient

## 2024-11-08 NOTE — ASSESSMENT & PLAN NOTE
Controlled.  Repeat labs at next office visit.  Continue atorvastatin and Zetia.    Orders:    atorvastatin (LIPITOR) 80 MG tablet; Take 1 tablet by mouth nightly    ezetimibe (ZETIA) 10 MG tablet; Take 1 tablet by mouth daily

## 2024-11-08 NOTE — ASSESSMENT & PLAN NOTE
Denies chest pain.  Continue aspirin 81 mg daily.  Follow with cardiology on a yearly basis.    Orders:    aspirin 81 MG EC tablet; Take 1 tablet by mouth daily    nitroGLYCERIN (NITROSTAT) 0.3 MG SL tablet; place 1 tablet under the tongue if needed every 5 minutes for chest pain

## 2024-11-08 NOTE — ASSESSMENT & PLAN NOTE
Manageable with Norco, OARRS report reviewed and appropriate.    Orders:    HYDROcodone-acetaminophen (NORCO) 5-325 MG per tablet; Take 1 tablet by mouth every 6 hours as needed for Pain for up to 30 days. Intended supply: 5 days. Take lowest dose possible to manage pain Max Daily Amount: 4 tablets

## 2024-11-08 NOTE — ASSESSMENT & PLAN NOTE
At goal.  A1c in office today is 5.2.  Continue current medication regiment.    Orders:    B-D INS SYR ULTRAFINE 1CC/30G 30G X 1/2\" 1 ML MISC; use as directed three times a day    blood glucose test strips (ASCENSIA AUTODISC VI;ONE TOUCH ULTRA TEST VI) strip; 1 each by In Vitro route daily As needed.    dapagliflozin (FARXIGA) 10 MG tablet; Take 1 tablet by mouth every morning    fluticasone (FLONASE) 50 MCG/ACT nasal spray; 2 sprays by Nasal route daily 2 sprays in each nostril daily    hydroCHLOROthiazide (HYDRODIURIL) 25 MG tablet; Take 1 tablet by mouth daily as needed (swelling)    POCT glycosylated hemoglobin (Hb A1C)

## 2024-11-08 NOTE — ASSESSMENT & PLAN NOTE
Controlled.  Continue Dulera and albuterol as needed.    Orders:    albuterol sulfate HFA (PROVENTIL;VENTOLIN;PROAIR) 108 (90 Base) MCG/ACT inhaler; inhale 2 puffs by mouth and INTO THE LUNGS every 6 hours if needed for wheezing    mometasone-formoterol (DULERA) 200-5 MCG/ACT inhaler; inhale 2 puffs by mouth and INTO THE LUNGS twice a day

## 2024-11-08 NOTE — ASSESSMENT & PLAN NOTE
At goal.  Continue current medications.    Orders:    hydroCHLOROthiazide (HYDRODIURIL) 25 MG tablet; Take 1 tablet by mouth daily as needed (swelling)    lisinopril (PRINIVIL;ZESTRIL) 5 MG tablet; Take 1 tablet by mouth daily    metoprolol tartrate (LOPRESSOR) 50 MG tablet; Take 1 tablet by mouth 2 times daily    Hepatic Function Panel; Future    Basic Metabolic Panel; Future

## 2024-11-25 DIAGNOSIS — H69.92 ETD (EUSTACHIAN TUBE DYSFUNCTION), LEFT: Primary | ICD-10-CM

## 2024-11-25 DIAGNOSIS — E11.42 TYPE 2 DIABETES MELLITUS WITH DIABETIC POLYNEUROPATHY, WITHOUT LONG-TERM CURRENT USE OF INSULIN (HCC): ICD-10-CM

## 2024-11-26 RX ORDER — FLUTICASONE PROPIONATE 50 MCG
2 SPRAY, SUSPENSION (ML) NASAL DAILY
Qty: 16 G | Refills: 3 | Status: SHIPPED | OUTPATIENT
Start: 2024-11-26

## 2024-12-06 DIAGNOSIS — G89.4 CHRONIC PAIN SYNDROME: ICD-10-CM

## 2024-12-06 NOTE — TELEPHONE ENCOUNTER
Name of Medication(s) Requested:  Requested Prescriptions     Pending Prescriptions Disp Refills    HYDROcodone-acetaminophen (NORCO) 5-325 MG per tablet 120 tablet 0     Sig: Take 1 tablet by mouth every 6 hours as needed for Pain for up to 30 days. Intended supply: 5 days. Take lowest dose possible to manage pain Max Daily Amount: 4 tablets       Medication is on current medication list Yes    Dosage and directions were verified? Yes    Quantity verified: 30 day supply     Pharmacy Verified?  Yes    Last Appointment:  11/8/2024    Future appts:  Future Appointments   Date Time Provider Department Center   1/31/2025  7:00 AM Uday Anthony DO [place] [facility]   2/11/2025  8:00 AM Aline Aparicio APRN - NP [place] DEP   2/25/2025  8:00 AM Seng Higgins DPM Col Podiatry [facility]   4/17/2025 10:45 AM Tucker Parker MD VAS/MED [facility]        (If no appt send self scheduling link. .REFILLAPPT)  Scheduling request sent?     [] Yes  [x] No    Does patient need updated?  [] Yes  [x] No

## 2024-12-09 RX ORDER — HYDROCODONE BITARTRATE AND ACETAMINOPHEN 5; 325 MG/1; MG/1
1 TABLET ORAL EVERY 6 HOURS PRN
Qty: 120 TABLET | Refills: 0 | Status: SHIPPED | OUTPATIENT
Start: 2024-12-09 | End: 2025-01-08

## 2024-12-23 ENCOUNTER — TELEPHONE (OUTPATIENT)
Dept: PRIMARY CARE CLINIC | Age: 49
End: 2024-12-23

## 2024-12-23 NOTE — TELEPHONE ENCOUNTER
Pt called in asking if prior auth was done on his stephro.  Spoke with Cynthia and she stated that yes it was and denied. Relayed that information to patient and he was wondering where he went from here.  I explained we are waiting to hear if provider is wanting to do an appeal on this.  Pt stated understanding and will be waiting to hear.

## 2024-12-23 NOTE — TELEPHONE ENCOUNTER
Tried to do auth and it was denied.  Do you want to switch the medication or you could possibly try to appeal it.   Please advise.

## 2025-01-06 DIAGNOSIS — G89.4 CHRONIC PAIN SYNDROME: ICD-10-CM

## 2025-01-06 RX ORDER — HYDROCODONE BITARTRATE AND ACETAMINOPHEN 5; 325 MG/1; MG/1
1 TABLET ORAL EVERY 6 HOURS PRN
Qty: 120 TABLET | Refills: 0 | Status: SHIPPED | OUTPATIENT
Start: 2025-01-06 | End: 2025-02-05

## 2025-01-06 NOTE — TELEPHONE ENCOUNTER
Last Appointment:  11/8/2024  Future Appointments   Date Time Provider Department Center   1/31/2025  7:00 AM Uday Anthony DO [place] [facility]   2/11/2025  8:00 AM Aline Aparicio APRN - NP [place] [facility] DEP   2/25/2025  8:00 AM Seng Higgins DPM Col Podiatry [facility]   4/17/2025 10:45 AM Tucker Parker MD VASC/MED [facility]      Pended med for your review and signature.     Electronically signed by Marsha Mchugh LPN on 1/6/2025 at 8:52 AM

## 2025-01-31 ENCOUNTER — OFFICE VISIT (OUTPATIENT)
Dept: ENT CLINIC | Age: 50
End: 2025-01-31
Payer: COMMERCIAL

## 2025-01-31 VITALS
WEIGHT: 216 LBS | DIASTOLIC BLOOD PRESSURE: 79 MMHG | BODY MASS INDEX: 26.3 KG/M2 | SYSTOLIC BLOOD PRESSURE: 121 MMHG | HEART RATE: 72 BPM | TEMPERATURE: 98.1 F | HEIGHT: 76 IN | OXYGEN SATURATION: 98 %

## 2025-01-31 DIAGNOSIS — H69.93 DYSFUNCTION OF BOTH EUSTACHIAN TUBES: ICD-10-CM

## 2025-01-31 DIAGNOSIS — J30.1 SEASONAL ALLERGIC RHINITIS DUE TO POLLEN: ICD-10-CM

## 2025-01-31 DIAGNOSIS — Z96.22 S/P MYRINGOTOMY WITH INSERTION OF TUBE: Primary | ICD-10-CM

## 2025-01-31 PROCEDURE — G8417 CALC BMI ABV UP PARAM F/U: HCPCS | Performed by: OTOLARYNGOLOGY

## 2025-01-31 PROCEDURE — G8427 DOCREV CUR MEDS BY ELIG CLIN: HCPCS | Performed by: OTOLARYNGOLOGY

## 2025-01-31 PROCEDURE — 99213 OFFICE O/P EST LOW 20 MIN: CPT | Performed by: OTOLARYNGOLOGY

## 2025-01-31 PROCEDURE — 4004F PT TOBACCO SCREEN RCVD TLK: CPT | Performed by: OTOLARYNGOLOGY

## 2025-01-31 PROCEDURE — 69433 CREATE EARDRUM OPENING: CPT | Performed by: OTOLARYNGOLOGY

## 2025-01-31 PROCEDURE — 3074F SYST BP LT 130 MM HG: CPT | Performed by: OTOLARYNGOLOGY

## 2025-01-31 PROCEDURE — 3078F DIAST BP <80 MM HG: CPT | Performed by: OTOLARYNGOLOGY

## 2025-01-31 RX ORDER — AZELASTINE 1 MG/ML
2 SPRAY, METERED NASAL 2 TIMES DAILY
Qty: 30 ML | Refills: 1 | Status: SHIPPED | OUTPATIENT
Start: 2025-01-31

## 2025-01-31 RX ORDER — MULTIVITAMIN WITH IRON
250 TABLET ORAL DAILY
COMMUNITY

## 2025-01-31 RX ORDER — CYANOCOBALAMIN (VITAMIN B-12) 500 MCG
1 TABLET ORAL DAILY
COMMUNITY

## 2025-01-31 NOTE — PROGRESS NOTES
Subjective:      Patient ID:  Spike Aviles is a 49 y.o. male.    HPI Comments: Pt returns for check of ear tubes. Pt reports right ear tube fell out a few months ago. Reports pressure and pain on the right side ever since tube fell out.    Current left T-tube was placed 2022   H/o nasopharyngeal biopsy right tube placement and eustachian tube dilation in 2021.    Past Medical History:   Diagnosis Date    CAD (coronary artery disease)     Dr Deras    COPD (chronic obstructive pulmonary disease) (HCC)     Diabetes mellitus (HCC)     Hyperlipidemia     Hypertension     Internal carotid artery occlusion, right 2020    Nasopharyngeal mass 2021    For OR 21    Neuropathy     Obesity     Shoulder problem     Type 1 diabetes mellitus with circulatory complication (HCC) 2023     Past Surgical History:   Procedure Laterality Date    CARDIAC CATHETERIZATION  2020    Dr. Schmitt- multi vessel disease    MITRAL VALVE REPAIR N/A 3/16/2020    CORONARY ARTERY BYPASS POSSIBLE LEFT RADIAL ARTERY HARVEST, PATRICE performed by Liu De Paz MD at [facility] OR    NOSE SURGERY N/A 2021    NASOPHARYNGEAL BIOPSY performed by Uday Anthony DO at [facility] OR    THORACOSCOPY  2017    resection of mediastinal mass    TONSILLECTOMY      TRANSESOPHAGEAL ECHOCARDIOGRAM  2020    TYMPANOSTOMY TUBE PLACEMENT Right 2021    POSSIBLE RIGHT EAR TUBE POSSIBLE EUSTACHIAN TUBE DILATION performed by Uday Anthony DO at [facility] OR     Family History   Problem Relation Age of Onset    Heart Disease Mother         MI age 41     High Blood Pressure Mother     Diabetes Father     Heart Disease Father         MI    High Blood Pressure Father     High Cholesterol Father     Cancer Father     Stroke Father     Kidney Disease Father         dialysis    High Blood Pressure Sister     Other Brother     COPD Brother     High Blood Pressure Brother      Social History     Socioeconomic

## 2025-02-03 DIAGNOSIS — G89.4 CHRONIC PAIN SYNDROME: ICD-10-CM

## 2025-02-03 RX ORDER — HYDROCODONE BITARTRATE AND ACETAMINOPHEN 5; 325 MG/1; MG/1
1 TABLET ORAL EVERY 6 HOURS PRN
Qty: 120 TABLET | Refills: 0 | Status: SHIPPED | OUTPATIENT
Start: 2025-02-03 | End: 2025-03-05

## 2025-02-03 NOTE — TELEPHONE ENCOUNTER
Name of Medication(s) Requested:  Requested Prescriptions     Pending Prescriptions Disp Refills    HYDROcodone-acetaminophen (NORCO) 5-325 MG per tablet 120 tablet 0     Sig: Take 1 tablet by mouth every 6 hours as needed for Pain for up to 30 days. Intended supply: 5 days. Take lowest dose possible to manage pain Max Daily Amount: 4 tablets       Medication is on current medication list Yes    Dosage and directions were verified? Yes    Quantity verified: 30 day supply     Pharmacy Verified?  Yes    Last Appointment:  11/8/2024    Future appts:  Future Appointments   Date Time Provider Department Center   2/11/2025  8:00 AM Aline Apraicio APRN - NP [facility] DEP   2/25/2025  8:00 AM Seng Higgins DPM Col Podiatry [facility]   4/17/2025 10:45 AM Tucker Parker MD VASC/MED [facility]   8/13/2025  7:45 AM Uday Anthony DO [place] [facility]        (If no appt send self scheduling link. .REFILLAPPT)  Scheduling request sent?     [] Yes  [x] No    Does patient need updated?  [] Yes  [x] No

## 2025-02-08 ASSESSMENT — PATIENT HEALTH QUESTIONNAIRE - PHQ9
SUM OF ALL RESPONSES TO PHQ9 QUESTIONS 1 & 2: 0
2. FEELING DOWN, DEPRESSED OR HOPELESS: NOT AT ALL
SUM OF ALL RESPONSES TO PHQ QUESTIONS 1-9: 0
SUM OF ALL RESPONSES TO PHQ QUESTIONS 1-9: 0
2. FEELING DOWN, DEPRESSED OR HOPELESS: NOT AT ALL
1. LITTLE INTEREST OR PLEASURE IN DOING THINGS: NOT AT ALL
SUM OF ALL RESPONSES TO PHQ QUESTIONS 1-9: 0
1. LITTLE INTEREST OR PLEASURE IN DOING THINGS: NOT AT ALL
SUM OF ALL RESPONSES TO PHQ QUESTIONS 1-9: 0
SUM OF ALL RESPONSES TO PHQ9 QUESTIONS 1 & 2: 0

## 2025-02-11 ENCOUNTER — OFFICE VISIT (OUTPATIENT)
Dept: PRIMARY CARE CLINIC | Age: 50
End: 2025-02-11

## 2025-02-11 VITALS
BODY MASS INDEX: 26.67 KG/M2 | HEART RATE: 60 BPM | WEIGHT: 219 LBS | OXYGEN SATURATION: 99 % | HEIGHT: 76 IN | DIASTOLIC BLOOD PRESSURE: 74 MMHG | TEMPERATURE: 98.4 F | SYSTOLIC BLOOD PRESSURE: 138 MMHG

## 2025-02-11 DIAGNOSIS — E11.42 TYPE 2 DIABETES MELLITUS WITH DIABETIC POLYNEUROPATHY, WITHOUT LONG-TERM CURRENT USE OF INSULIN (HCC): ICD-10-CM

## 2025-02-11 DIAGNOSIS — Z00.01 ENCOUNTER FOR GENERAL ADULT MEDICAL EXAMINATION WITH ABNORMAL FINDINGS: Primary | ICD-10-CM

## 2025-02-11 DIAGNOSIS — G89.4 CHRONIC PAIN SYNDROME: ICD-10-CM

## 2025-02-11 DIAGNOSIS — J44.9 CHRONIC OBSTRUCTIVE PULMONARY DISEASE, UNSPECIFIED COPD TYPE (HCC): ICD-10-CM

## 2025-02-11 DIAGNOSIS — I25.10 CAD IN NATIVE ARTERY: ICD-10-CM

## 2025-02-11 DIAGNOSIS — E78.2 MIXED HYPERLIPIDEMIA: ICD-10-CM

## 2025-02-11 DIAGNOSIS — R63.4 WEIGHT LOSS: ICD-10-CM

## 2025-02-11 DIAGNOSIS — E29.1 HYPOGONADISM IN MALE: ICD-10-CM

## 2025-02-11 DIAGNOSIS — M54.16 LUMBAR RADICULOPATHY: ICD-10-CM

## 2025-02-11 DIAGNOSIS — I10 ESSENTIAL HYPERTENSION: ICD-10-CM

## 2025-02-11 LAB
ALBUMIN: 4.2 G/DL (ref 3.5–5.2)
ALP BLD-CCNC: 82 U/L (ref 40–129)
ALT SERPL-CCNC: 80 U/L (ref 0–40)
ANION GAP SERPL CALCULATED.3IONS-SCNC: 12 MMOL/L (ref 7–16)
AST SERPL-CCNC: 50 U/L (ref 0–39)
BASOPHILS ABSOLUTE: 0.07 K/UL (ref 0–0.2)
BASOPHILS RELATIVE PERCENT: 1 % (ref 0–2)
BILIRUB SERPL-MCNC: 0.8 MG/DL (ref 0–1.2)
BUN BLDV-MCNC: 17 MG/DL (ref 6–20)
CALCIUM SERPL-MCNC: 9.5 MG/DL (ref 8.6–10.2)
CHLORIDE BLD-SCNC: 100 MMOL/L (ref 98–107)
CHOLESTEROL, TOTAL: 145 MG/DL
CO2: 26 MMOL/L (ref 22–29)
CREAT SERPL-MCNC: 0.6 MG/DL (ref 0.7–1.2)
EOSINOPHILS ABSOLUTE: 0.14 K/UL (ref 0.05–0.5)
EOSINOPHILS RELATIVE PERCENT: 2 % (ref 0–6)
GFR, ESTIMATED: >90 ML/MIN/1.73M2
GLUCOSE BLD-MCNC: 153 MG/DL (ref 74–99)
HBA1C MFR BLD: 6.5 %
HCT VFR BLD CALC: 47.4 % (ref 37–54)
HDLC SERPL-MCNC: 78 MG/DL
HEMOGLOBIN: 15.8 G/DL (ref 12.5–16.5)
IMMATURE GRANULOCYTES %: 0 % (ref 0–5)
IMMATURE GRANULOCYTES ABSOLUTE: <0.03 K/UL (ref 0–0.58)
LDL CHOLESTEROL: 54 MG/DL
LYMPHOCYTES ABSOLUTE: 2.91 K/UL (ref 1.5–4)
LYMPHOCYTES RELATIVE PERCENT: 46 % (ref 20–42)
MCH RBC QN AUTO: 31.9 PG (ref 26–35)
MCHC RBC AUTO-ENTMCNC: 33.3 G/DL (ref 32–34.5)
MCV RBC AUTO: 95.6 FL (ref 80–99.9)
MONOCYTES ABSOLUTE: 0.47 K/UL (ref 0.1–0.95)
MONOCYTES RELATIVE PERCENT: 7 % (ref 2–12)
NEUTROPHILS ABSOLUTE: 2.79 K/UL (ref 1.8–7.3)
NEUTROPHILS RELATIVE PERCENT: 44 % (ref 43–80)
PDW BLD-RTO: 13 % (ref 11.5–15)
PLATELET # BLD: 215 K/UL (ref 130–450)
PMV BLD AUTO: 9.2 FL (ref 7–12)
POTASSIUM SERPL-SCNC: 4.4 MMOL/L (ref 3.5–5)
RBC # BLD: 4.96 M/UL (ref 3.8–5.8)
SODIUM BLD-SCNC: 138 MMOL/L (ref 132–146)
TESTOSTERONE TOTAL: 648 NG/DL (ref 249–836)
TOTAL PROTEIN: 6.9 G/DL (ref 6.4–8.3)
TRIGL SERPL-MCNC: 63 MG/DL
TSH SERPL DL<=0.05 MIU/L-ACNC: 1.13 UIU/ML (ref 0.27–4.2)
VITAMIN D 25-HYDROXY: 43.9 NG/ML (ref 30–100)
VLDLC SERPL CALC-MCNC: 13 MG/DL
WBC # BLD: 6.4 K/UL (ref 4.5–11.5)

## 2025-02-11 RX ORDER — AZELASTINE 1 MG/ML
2 SPRAY, METERED NASAL 2 TIMES DAILY
Qty: 30 ML | Refills: 1 | Status: SHIPPED | OUTPATIENT
Start: 2025-02-11

## 2025-02-11 RX ORDER — HYDROCHLOROTHIAZIDE 25 MG/1
25 TABLET ORAL DAILY PRN
Qty: 30 TABLET | Refills: 5 | Status: SHIPPED | OUTPATIENT
Start: 2025-02-11

## 2025-02-11 RX ORDER — ATORVASTATIN CALCIUM 80 MG/1
80 TABLET, FILM COATED ORAL NIGHTLY
Qty: 90 TABLET | Refills: 1 | Status: SHIPPED | OUTPATIENT
Start: 2025-02-11

## 2025-02-11 RX ORDER — LISINOPRIL 5 MG/1
5 TABLET ORAL DAILY
Qty: 90 TABLET | Refills: 1 | Status: SHIPPED | OUTPATIENT
Start: 2025-02-11

## 2025-02-11 RX ORDER — PEN NEEDLE, DIABETIC 29 G X1/2"
NEEDLE, DISPOSABLE MISCELLANEOUS
Qty: 100 EACH | Refills: 2 | Status: SHIPPED | OUTPATIENT
Start: 2025-02-11

## 2025-02-11 RX ORDER — ALBUTEROL SULFATE 90 UG/1
INHALANT RESPIRATORY (INHALATION)
Qty: 8.5 G | Refills: 2 | Status: SHIPPED | OUTPATIENT
Start: 2025-02-11

## 2025-02-11 RX ORDER — DAPAGLIFLOZIN 10 MG/1
10 TABLET, FILM COATED ORAL EVERY MORNING
Qty: 90 TABLET | Refills: 1 | Status: SHIPPED | OUTPATIENT
Start: 2025-02-11

## 2025-02-11 RX ORDER — HYDROCODONE BITARTRATE AND ACETAMINOPHEN 5; 325 MG/1; MG/1
1 TABLET ORAL EVERY 6 HOURS PRN
Qty: 120 TABLET | Refills: 0 | Status: SHIPPED | OUTPATIENT
Start: 2025-03-03 | End: 2025-04-02

## 2025-02-11 RX ORDER — FLUTICASONE PROPIONATE 50 MCG
2 SPRAY, SUSPENSION (ML) NASAL DAILY
Qty: 16 G | Refills: 3 | Status: SHIPPED | OUTPATIENT
Start: 2025-02-11

## 2025-02-11 RX ORDER — FLASH GLUCOSE SENSOR
KIT MISCELLANEOUS
Qty: 1 EACH | Refills: 3 | Status: SHIPPED | OUTPATIENT
Start: 2025-02-11

## 2025-02-11 RX ORDER — ASPIRIN 81 MG/1
81 TABLET ORAL DAILY
Qty: 90 TABLET | Refills: 1 | Status: SHIPPED | OUTPATIENT
Start: 2025-02-11

## 2025-02-11 RX ORDER — METOPROLOL TARTRATE 50 MG
50 TABLET ORAL 2 TIMES DAILY
Qty: 180 TABLET | Refills: 1 | Status: SHIPPED | OUTPATIENT
Start: 2025-02-11

## 2025-02-11 RX ORDER — EZETIMIBE 10 MG/1
10 TABLET ORAL DAILY
Qty: 90 TABLET | Refills: 1 | Status: SHIPPED | OUTPATIENT
Start: 2025-02-11

## 2025-02-11 RX ORDER — NITROGLYCERIN 0.3 MG/1
TABLET SUBLINGUAL
Qty: 100 TABLET | Refills: 0 | Status: SHIPPED | OUTPATIENT
Start: 2025-02-11

## 2025-02-11 SDOH — ECONOMIC STABILITY: FOOD INSECURITY: WITHIN THE PAST 12 MONTHS, THE FOOD YOU BOUGHT JUST DIDN'T LAST AND YOU DIDN'T HAVE MONEY TO GET MORE.: NEVER TRUE

## 2025-02-11 SDOH — ECONOMIC STABILITY: FOOD INSECURITY: WITHIN THE PAST 12 MONTHS, YOU WORRIED THAT YOUR FOOD WOULD RUN OUT BEFORE YOU GOT MONEY TO BUY MORE.: NEVER TRUE

## 2025-02-11 ASSESSMENT — ENCOUNTER SYMPTOMS
SHORTNESS OF BREATH: 0
RHINORRHEA: 0
TROUBLE SWALLOWING: 0
WHEEZING: 0
DIARRHEA: 0
RECTAL PAIN: 0
CHEST TIGHTNESS: 0
CONSTIPATION: 0
NAUSEA: 0
SORE THROAT: 0
BLOOD IN STOOL: 0
COLOR CHANGE: 0
EYE REDNESS: 0
APNEA: 0
COUGH: 0
ABDOMINAL PAIN: 0
VOMITING: 0
BACK PAIN: 0
ABDOMINAL DISTENTION: 0

## 2025-02-11 NOTE — PROGRESS NOTES
ESTABLISHED PRIMARY CARE VISIT  25  Name: Spike Aviles   : 1975   Age: 49 y.o.  Sex: male   Chief Complaint   Patient presents with    Annual Exam    Diabetes     HPI:     History of Present Illness  The patient is a 49-year-old male who presents today for a 3 month follow-up and physical.    Diabetes mellitus type 2-he reports that his insurance has discontinued coverage for Mounjaro, a medication he was previously on at a dose of 15 mg. Since discontinuing the medication a few months ago, he has been experiencing severe hunger, leading to an increase in his weight from 193 pounds in 2024 to 219 pounds currently. His goal weight is 190 pounds. He expresses frustration with the constant urge to eat and the subsequent weight gain. Despite increasing his fiber intake, he has not observed any significant changes. He believes that if he can manage his appetite, he will be able to achieve his desired weight.  A1c has increased today to 6.5.  He denies any hypoglycemic episodes.  He is requesting a new 14-day sensor reader as his current one is malfunctioning, leaving him unable to monitor his blood sugar levels.     Hyperlipidemia/CAD/hypertension-controlled.  The patient does not follow with cardiology.  LDL goal less than 70.  He is also seeking to have his cholesterol levels checked. He does not recall the date of his last eye exam but acknowledges it was some time ago. He has not had a dental exam in approximately 5 years.     Peripheral edema he has been using hydrochlorothiazide to manage swelling in his legs and feet, which becomes noticeable when he removes his socks.     COPD-controlled.  He reports no shortness of breath or wheezing and has not required the use of an albuterol inhaler.    Chronic fatigue-he is requesting testosterone injections due to a perceived lack of energy and motivation. He is also taking supplements for vitamin C, vitamin D, vitamin E, zinc, and iron.  Last

## 2025-02-12 LAB
CREATININE URINE: 27.7 MG/DL (ref 40–278)
FOLATE: 10.2 NG/ML (ref 4.8–24.2)
MICROALBUMIN/CREAT 24H UR: <12 MG/L (ref 0–19)
MICROALBUMIN/CREAT UR-RTO: ABNORMAL MCG/MG CREAT (ref 0–30)
VITAMIN B-12: >2000 PG/ML (ref 211–946)

## 2025-02-14 ENCOUNTER — TELEPHONE (OUTPATIENT)
Dept: PRIMARY CARE CLINIC | Age: 50
End: 2025-02-14

## 2025-03-31 DIAGNOSIS — G89.4 CHRONIC PAIN SYNDROME: ICD-10-CM

## 2025-03-31 RX ORDER — HYDROCODONE BITARTRATE AND ACETAMINOPHEN 5; 325 MG/1; MG/1
1 TABLET ORAL EVERY 6 HOURS PRN
Qty: 120 TABLET | Refills: 0 | Status: SHIPPED | OUTPATIENT
Start: 2025-03-31 | End: 2025-04-30

## 2025-04-17 ENCOUNTER — TELEPHONE (OUTPATIENT)
Dept: VASCULAR SURGERY | Age: 50
End: 2025-04-17

## 2025-04-17 ENCOUNTER — OFFICE VISIT (OUTPATIENT)
Dept: VASCULAR SURGERY | Age: 50
End: 2025-04-17
Payer: COMMERCIAL

## 2025-04-17 DIAGNOSIS — I65.21 OCCLUSION OF RIGHT INTERNAL CAROTID ARTERY: Primary | ICD-10-CM

## 2025-04-17 PROCEDURE — 4004F PT TOBACCO SCREEN RCVD TLK: CPT | Performed by: SURGERY

## 2025-04-17 PROCEDURE — 99214 OFFICE O/P EST MOD 30 MIN: CPT | Performed by: SURGERY

## 2025-04-17 PROCEDURE — G8427 DOCREV CUR MEDS BY ELIG CLIN: HCPCS | Performed by: SURGERY

## 2025-04-17 PROCEDURE — G8417 CALC BMI ABV UP PARAM F/U: HCPCS | Performed by: SURGERY

## 2025-04-17 NOTE — TELEPHONE ENCOUNTER
I called Spike and left a message. He has an appointment at [facility] on June 5,2025 at 9:30 am. to register for a 10:00 am. [employer]. He can enter in entrance A. Spike is to bring his insurance cards, photo Id. , and his list of medications with him.

## 2025-04-17 NOTE — PROGRESS NOTES
Chief Complaint:   Chief Complaint   Patient presents with    Follow-up     PAUL.          HPI: Patient came to the office after more than 2 years, for evaluation of carotid artery disease, chronic occluded right internal carotid, overall doing well    Patient walks long distances,, also jogs, several miles per day, last appointment 100 pounds during the last few years, feels good    No changes in his health system, his diabetes under control due to weight loss and diet, hypertension, hyperlipidemia, chronic obstructive lung disease etc. are stable      Patient denies any focal lateralizing neurological symptoms like loss of speech, vision or loss of function of extremity      Allergies   Allergen Reactions    Sulfa Antibiotics Other (See Comments)     Unknown reaction       Current Outpatient Medications   Medication Sig Dispense Refill    HYDROcodone-acetaminophen (NORCO) 5-325 MG per tablet Take 1 tablet by mouth every 6 hours as needed for Pain for up to 30 days. Intended supply: 5 days. Take lowest dose possible to manage pain Max Daily Amount: 4 tablets 120 tablet 0    albuterol sulfate HFA (PROVENTIL;VENTOLIN;PROAIR) 108 (90 Base) MCG/ACT inhaler inhale 2 puffs by mouth and INTO THE LUNGS every 6 hours if needed for wheezing 8.5 g 2    aspirin 81 MG EC tablet Take 1 tablet by mouth daily 90 tablet 1    atorvastatin (LIPITOR) 80 MG tablet Take 1 tablet by mouth nightly 90 tablet 1    azelastine (ASTELIN) 0.1 % nasal spray 2 sprays by Nasal route 2 times daily Use in each nostril as directed 30 mL 1    B-D INS SYR ULTRAFINE 1CC/30G 30G X 1/2\" 1 ML MISC use as directed three times a day 100 each 2    blood glucose test strips (ASCENSIA AUTODISC VI;ONE TOUCH ULTRA TEST VI) strip 1 each by In Vitro route daily As needed. 100 each 3    Continuous Glucose Sensor (FREESTYLE DIPTI 14 DAY SENSOR) MISC Use as directed to check BG QID & PRN 1 each 3    Continuous Glucose Sensor (FREESTYLE DIPTI 2 SENSOR) MISC Use as

## 2025-04-28 DIAGNOSIS — G89.4 CHRONIC PAIN SYNDROME: ICD-10-CM

## 2025-04-28 RX ORDER — HYDROCODONE BITARTRATE AND ACETAMINOPHEN 5; 325 MG/1; MG/1
1 TABLET ORAL EVERY 6 HOURS PRN
Qty: 120 TABLET | Refills: 0 | Status: SHIPPED | OUTPATIENT
Start: 2025-04-28 | End: 2025-05-28

## 2025-04-28 NOTE — TELEPHONE ENCOUNTER
Name of Medication(s) Requested:  Requested Prescriptions     Pending Prescriptions Disp Refills    HYDROcodone-acetaminophen (NORCO) 5-325 MG per tablet 120 tablet 0     Sig: Take 1 tablet by mouth every 6 hours as needed for Pain for up to 30 days. Intended supply: 5 days. Take lowest dose possible to manage pain Max Daily Amount: 4 tablets       Medication is on current medication list Yes    Dosage and directions were verified? Yes    Quantity verified: 30 day supply     Pharmacy Verified?  Yes    Last Appointment:  2/11/2025    Future appts:  Future Appointments   Date Time Provider Department Center   5/23/2025  8:00 AM Aline Aparicio APRN - NP [place] [facility] DEP   6/5/2025 10:00 AM SEB US RM 2 SEBZ US SEB Radiolog   8/13/2025  7:45 AM Uday Anthony DO [place] [facility]   2/17/2026  8:00 AM Aline Aparicio APRN - NP [place] [facility] DEP   4/22/2027 11:00 AM SAJI JUAREZ VAS US 1 SEYZ CARDIO [facility]Car   4/22/2027 11:30 AM Tucker Parker MD VASC/MED [facility]        (If no appt send self scheduling link. .REFILLAPPT)  Scheduling request sent?     [] Yes  [x] No    Does patient need updated?  [] Yes  [x] No

## 2025-04-29 ENCOUNTER — OFFICE VISIT (OUTPATIENT)
Dept: FAMILY MEDICINE CLINIC | Age: 50
End: 2025-04-29
Payer: COMMERCIAL

## 2025-04-29 ENCOUNTER — TELEPHONE (OUTPATIENT)
Dept: ORTHOPEDIC SURGERY | Age: 50
End: 2025-04-29

## 2025-04-29 VITALS
SYSTOLIC BLOOD PRESSURE: 116 MMHG | OXYGEN SATURATION: 98 % | DIASTOLIC BLOOD PRESSURE: 64 MMHG | RESPIRATION RATE: 18 BRPM | HEART RATE: 74 BPM | WEIGHT: 230 LBS | TEMPERATURE: 97.9 F | HEIGHT: 76 IN | BODY MASS INDEX: 28.01 KG/M2

## 2025-04-29 DIAGNOSIS — H66.90 ACUTE OTITIS MEDIA, UNSPECIFIED OTITIS MEDIA TYPE: Primary | ICD-10-CM

## 2025-04-29 DIAGNOSIS — M25.511 ACUTE PAIN OF RIGHT SHOULDER: ICD-10-CM

## 2025-04-29 PROCEDURE — 3078F DIAST BP <80 MM HG: CPT

## 2025-04-29 PROCEDURE — 3074F SYST BP LT 130 MM HG: CPT

## 2025-04-29 PROCEDURE — G8427 DOCREV CUR MEDS BY ELIG CLIN: HCPCS

## 2025-04-29 PROCEDURE — 4004F PT TOBACCO SCREEN RCVD TLK: CPT

## 2025-04-29 PROCEDURE — G8417 CALC BMI ABV UP PARAM F/U: HCPCS

## 2025-04-29 PROCEDURE — 99213 OFFICE O/P EST LOW 20 MIN: CPT

## 2025-04-29 NOTE — TELEPHONE ENCOUNTER
Contacted patient regarding scheduling for his right shoulder pain.  Established previously with Dr. Moura (LOV 2/21/24) however, patient requesting [place].  Patient given information for [facility] Walk[facility] with Di Sorenson PA-C. Also discussed imaging can be done at the office.  Patient agreed and stated he would go there for visit and declined to schedule at this time.      [facility]:   [address], [place] [address] [phone]    Referral will be closed at this time.

## 2025-04-29 NOTE — PROGRESS NOTES
Normocephalic.      Right Ear: External ear normal. No mastoid tenderness. A PE tube is present.      Left Ear: External ear normal. Drainage present. No mastoid tenderness. A PE tube is present.      Ears:      Comments: Bilateral PE tubes are patent  Eyes:      Pupils: Pupils are equal, round, and reactive to light.   Cardiovascular:      Rate and Rhythm: Normal rate and regular rhythm.      Pulses: Normal pulses.   Pulmonary:      Effort: Pulmonary effort is normal.      Breath sounds: Normal breath sounds.   Abdominal:      General: Abdomen is flat.      Palpations: Abdomen is soft.   Musculoskeletal:      Right shoulder: Tenderness present. No swelling, bony tenderness or crepitus. Decreased range of motion. Normal pulse.      Right upper arm: Normal.      Right elbow: Normal.      Comments: The patient has decreased range of motion when trying to lift arm above his head or reach behind his back.   Skin:     General: Skin is warm and dry.   Neurological:      General: No focal deficit present.      Mental Status: He is alert and oriented to person, place, and time.   Psychiatric:         Mood and Affect: Mood normal.         Behavior: Behavior normal.         Thought Content: Thought content normal.         Judgment: Judgment normal.           Testing:     Results         Assessment/Plan:   I personally reviewed the patient's allergies, past medical history, medications, and vitals sign.      Spike was seen today for ear pain.    Diagnoses and all orders for this visit:    Acute otitis media, unspecified otitis media type  -     amoxicillin-clavulanate (AUGMENTIN) 875-125 MG per tablet; Take 1 tablet by mouth 2 times daily for 10 days    Acute pain of right shoulder  -     [facility]  -     XR SHOULDER RIGHT (MIN 2 VIEWS)        Assessment & Plan  1. Left otalgia.  - Reports persistent left ear pain with drainage suggesting an ongoing ear infection.  -   - Discussed previous use of antibiotic drops with

## 2025-05-01 ENCOUNTER — OFFICE VISIT (OUTPATIENT)
Dept: ORTHOPEDIC SURGERY | Age: 50
End: 2025-05-01

## 2025-05-01 VITALS — WEIGHT: 220 LBS | BODY MASS INDEX: 26.79 KG/M2 | HEIGHT: 76 IN

## 2025-05-01 DIAGNOSIS — M25.511 ACUTE PAIN OF RIGHT SHOULDER: ICD-10-CM

## 2025-05-01 DIAGNOSIS — M75.41 ROTATOR CUFF IMPINGEMENT SYNDROME, RIGHT: Primary | ICD-10-CM

## 2025-05-01 RX ORDER — LIDOCAINE HYDROCHLORIDE 10 MG/ML
4 INJECTION, SOLUTION INFILTRATION; PERINEURAL ONCE
Status: COMPLETED | OUTPATIENT
Start: 2025-05-01 | End: 2025-05-01

## 2025-05-01 RX ORDER — TRIAMCINOLONE ACETONIDE 40 MG/ML
40 INJECTION, SUSPENSION INTRA-ARTICULAR; INTRAMUSCULAR ONCE
Status: COMPLETED | OUTPATIENT
Start: 2025-05-01 | End: 2025-05-01

## 2025-05-01 RX ADMIN — TRIAMCINOLONE ACETONIDE 40 MG: 40 INJECTION, SUSPENSION INTRA-ARTICULAR; INTRAMUSCULAR at 14:49

## 2025-05-01 RX ADMIN — LIDOCAINE HYDROCHLORIDE 4 ML: 10 INJECTION, SOLUTION INFILTRATION; PERINEURAL at 14:49

## 2025-05-01 NOTE — PROGRESS NOTES
[facility]  New Patient Note      CHIEF COMPLAINT:   Chief Complaint   Patient presents with    Shoulder Pain     Right shoulder pain x 3 months, pulled car seat forward       HISTORY OF PRESENT ILLNESS:                The patient is a 49 y.o. male who presents today with complaints of right shoulder pain x 3 months, pulled seat forward in the car.  Pt localizes the pain to posterior lateral aspect of shoulder and deep.  Pt denies any numbness, tingling, loss of sensation or radiation of symptoms into fingers.  Pain is worse with sleeping, \"certain movements.\"  They have tried at home therapies of norco, voltaran gel, ice/heat, massage gun for symptomatic relief.  Pt has never injured this shoulder in the past.  Pt is right hand dominant.        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 Schmitt- multi vessel disease    MITRAL VALVE REPAIR N/A 3/16/2020    CORONARY ARTERY BYPASS POSSIBLE LEFT RADIAL ARTERY HARVEST, PATRICE performed by Liu De Paz MD at [facility] OR    NOSE SURGERY N/A 11/22/2021    NASOPHARYNGEAL BIOPSY performed by Uday Anthony DO at [facility] OR    THORACOSCOPY  04/28/2017    resection of mediastinal mass    TONSILLECTOMY      TRANSESOPHAGEAL ECHOCARDIOGRAM  03/03/2020    TYMPANOSTOMY TUBE PLACEMENT Right 11/22/2021    POSSIBLE RIGHT EAR TUBE POSSIBLE EUSTACHIAN TUBE DILATION [facility]     Current Medications:   No current

## 2025-05-19 ENCOUNTER — RESULTS FOLLOW-UP (OUTPATIENT)
Dept: FAMILY MEDICINE CLINIC | Age: 50
End: 2025-05-19

## 2025-05-19 ENCOUNTER — OFFICE VISIT (OUTPATIENT)
Dept: PRIMARY CARE CLINIC | Age: 50
End: 2025-05-19
Payer: COMMERCIAL

## 2025-05-19 VITALS
HEIGHT: 76 IN | SYSTOLIC BLOOD PRESSURE: 138 MMHG | WEIGHT: 222 LBS | HEART RATE: 67 BPM | OXYGEN SATURATION: 98 % | DIASTOLIC BLOOD PRESSURE: 86 MMHG | BODY MASS INDEX: 27.03 KG/M2

## 2025-05-19 DIAGNOSIS — G89.4 CHRONIC PAIN SYNDROME: ICD-10-CM

## 2025-05-19 DIAGNOSIS — E78.2 MIXED HYPERLIPIDEMIA: ICD-10-CM

## 2025-05-19 DIAGNOSIS — E55.9 VITAMIN D DEFICIENCY: ICD-10-CM

## 2025-05-19 DIAGNOSIS — E11.42 TYPE 2 DIABETES MELLITUS WITH DIABETIC POLYNEUROPATHY, WITHOUT LONG-TERM CURRENT USE OF INSULIN (HCC): ICD-10-CM

## 2025-05-19 DIAGNOSIS — I25.10 CAD IN NATIVE ARTERY: ICD-10-CM

## 2025-05-19 DIAGNOSIS — M19.011 LOCALIZED OSTEOARTHRITIS OF RIGHT SHOULDER: ICD-10-CM

## 2025-05-19 DIAGNOSIS — J44.9 CHRONIC OBSTRUCTIVE PULMONARY DISEASE, UNSPECIFIED COPD TYPE (HCC): ICD-10-CM

## 2025-05-19 DIAGNOSIS — E11.42 TYPE 2 DIABETES MELLITUS WITH DIABETIC POLYNEUROPATHY, WITHOUT LONG-TERM CURRENT USE OF INSULIN (HCC): Primary | ICD-10-CM

## 2025-05-19 DIAGNOSIS — I10 ESSENTIAL HYPERTENSION: ICD-10-CM

## 2025-05-19 LAB
ALBUMIN: 4.2 G/DL (ref 3.5–5.2)
ALP BLD-CCNC: 72 U/L (ref 40–129)
ALT SERPL-CCNC: 46 U/L (ref 0–50)
ANION GAP SERPL CALCULATED.3IONS-SCNC: 11 MMOL/L (ref 7–16)
AST SERPL-CCNC: 34 U/L (ref 0–50)
BILIRUB SERPL-MCNC: 0.5 MG/DL (ref 0–1.2)
BUN BLDV-MCNC: 17 MG/DL (ref 6–20)
CALCIUM SERPL-MCNC: 9.6 MG/DL (ref 8.6–10)
CHLORIDE BLD-SCNC: 102 MMOL/L (ref 98–107)
CO2: 28 MMOL/L (ref 22–29)
CREAT SERPL-MCNC: 0.7 MG/DL (ref 0.7–1.2)
GFR, ESTIMATED: >90 ML/MIN/1.73M2
GLUCOSE BLD-MCNC: 155 MG/DL (ref 74–99)
HBA1C MFR BLD: 8.1 %
POTASSIUM SERPL-SCNC: 4.2 MMOL/L (ref 3.5–5.1)
SODIUM BLD-SCNC: 141 MMOL/L (ref 136–145)
TOTAL PROTEIN: 7 G/DL (ref 6.4–8.3)

## 2025-05-19 PROCEDURE — 3052F HG A1C>EQUAL 8.0%<EQUAL 9.0%: CPT | Performed by: NURSE PRACTITIONER

## 2025-05-19 PROCEDURE — 3079F DIAST BP 80-89 MM HG: CPT | Performed by: NURSE PRACTITIONER

## 2025-05-19 PROCEDURE — 3023F SPIROM DOC REV: CPT | Performed by: NURSE PRACTITIONER

## 2025-05-19 PROCEDURE — 2022F DILAT RTA XM EVC RTNOPTHY: CPT | Performed by: NURSE PRACTITIONER

## 2025-05-19 PROCEDURE — G2211 COMPLEX E/M VISIT ADD ON: HCPCS | Performed by: NURSE PRACTITIONER

## 2025-05-19 PROCEDURE — 99214 OFFICE O/P EST MOD 30 MIN: CPT | Performed by: NURSE PRACTITIONER

## 2025-05-19 PROCEDURE — 4004F PT TOBACCO SCREEN RCVD TLK: CPT | Performed by: NURSE PRACTITIONER

## 2025-05-19 PROCEDURE — 3075F SYST BP GE 130 - 139MM HG: CPT | Performed by: NURSE PRACTITIONER

## 2025-05-19 PROCEDURE — G8417 CALC BMI ABV UP PARAM F/U: HCPCS | Performed by: NURSE PRACTITIONER

## 2025-05-19 PROCEDURE — G8427 DOCREV CUR MEDS BY ELIG CLIN: HCPCS | Performed by: NURSE PRACTITIONER

## 2025-05-19 PROCEDURE — 83036 HEMOGLOBIN GLYCOSYLATED A1C: CPT | Performed by: NURSE PRACTITIONER

## 2025-05-19 RX ORDER — METOPROLOL TARTRATE 50 MG
25 TABLET ORAL 2 TIMES DAILY
Qty: 30 TABLET | Refills: 3 | Status: SHIPPED | OUTPATIENT
Start: 2025-05-19 | End: 2025-05-19

## 2025-05-19 RX ORDER — ASPIRIN 81 MG/1
81 TABLET ORAL DAILY
Qty: 90 TABLET | Refills: 1 | Status: SHIPPED | OUTPATIENT
Start: 2025-05-19

## 2025-05-19 RX ORDER — FLASH GLUCOSE SENSOR
KIT MISCELLANEOUS
Qty: 1 EACH | Refills: 3 | Status: CANCELLED | OUTPATIENT
Start: 2025-05-19

## 2025-05-19 RX ORDER — ATORVASTATIN CALCIUM 80 MG/1
80 TABLET, FILM COATED ORAL NIGHTLY
Qty: 90 TABLET | Refills: 1 | Status: SHIPPED | OUTPATIENT
Start: 2025-05-19

## 2025-05-19 RX ORDER — LANCETS 30 GAUGE
EACH MISCELLANEOUS
Qty: 100 EACH | Refills: 2 | Status: SHIPPED | OUTPATIENT
Start: 2025-05-19

## 2025-05-19 RX ORDER — EZETIMIBE 10 MG/1
10 TABLET ORAL DAILY
Qty: 90 TABLET | Refills: 1 | Status: SHIPPED | OUTPATIENT
Start: 2025-05-19

## 2025-05-19 RX ORDER — HYDROCODONE BITARTRATE AND ACETAMINOPHEN 5; 325 MG/1; MG/1
1 TABLET ORAL EVERY 6 HOURS PRN
Qty: 120 TABLET | Refills: 0 | Status: CANCELLED | OUTPATIENT
Start: 2025-05-19 | End: 2025-06-18

## 2025-05-19 RX ORDER — ALBUTEROL SULFATE 90 UG/1
INHALANT RESPIRATORY (INHALATION)
Qty: 8.5 G | Refills: 2 | Status: SHIPPED | OUTPATIENT
Start: 2025-05-19

## 2025-05-19 RX ORDER — NITROGLYCERIN 0.3 MG/1
TABLET SUBLINGUAL
Qty: 100 TABLET | Refills: 0 | Status: SHIPPED | OUTPATIENT
Start: 2025-05-19

## 2025-05-19 RX ORDER — METOPROLOL TARTRATE 50 MG
50 TABLET ORAL 2 TIMES DAILY
Qty: 180 TABLET | Refills: 1 | Status: SHIPPED | OUTPATIENT
Start: 2025-05-19

## 2025-05-19 RX ORDER — PEN NEEDLE, DIABETIC 29 G X1/2"
NEEDLE, DISPOSABLE MISCELLANEOUS
Qty: 100 EACH | Refills: 2 | Status: SHIPPED | OUTPATIENT
Start: 2025-05-19

## 2025-05-19 RX ORDER — DAPAGLIFLOZIN 10 MG/1
10 TABLET, FILM COATED ORAL EVERY MORNING
Qty: 90 TABLET | Refills: 1 | Status: SHIPPED | OUTPATIENT
Start: 2025-05-19

## 2025-05-19 RX ORDER — FLUTICASONE PROPIONATE 50 MCG
2 SPRAY, SUSPENSION (ML) NASAL DAILY
Qty: 16 G | Refills: 3 | Status: SHIPPED | OUTPATIENT
Start: 2025-05-19

## 2025-05-19 RX ORDER — METOPROLOL TARTRATE 50 MG
50 TABLET ORAL 2 TIMES DAILY
Qty: 180 TABLET | Refills: 1 | Status: CANCELLED | OUTPATIENT
Start: 2025-05-19

## 2025-05-19 RX ORDER — HYDROCODONE BITARTRATE AND ACETAMINOPHEN 5; 325 MG/1; MG/1
1 TABLET ORAL EVERY 6 HOURS PRN
Qty: 120 TABLET | Refills: 0 | Status: SHIPPED | OUTPATIENT
Start: 2025-05-26 | End: 2025-06-25

## 2025-05-19 RX ORDER — HYDROCHLOROTHIAZIDE 25 MG/1
25 TABLET ORAL DAILY PRN
Qty: 30 TABLET | Refills: 5 | Status: SHIPPED | OUTPATIENT
Start: 2025-05-19

## 2025-05-19 ASSESSMENT — ENCOUNTER SYMPTOMS
RHINORRHEA: 0
SHORTNESS OF BREATH: 0
COLOR CHANGE: 0
ABDOMINAL DISTENTION: 0
TROUBLE SWALLOWING: 0
BACK PAIN: 0
EYE REDNESS: 0
NAUSEA: 0
APNEA: 0
BLOOD IN STOOL: 0
CONSTIPATION: 0
CHEST TIGHTNESS: 0
SORE THROAT: 0
ABDOMINAL PAIN: 0
VOMITING: 0
RECTAL PAIN: 0
COUGH: 0
DIARRHEA: 0
WHEEZING: 0

## 2025-05-19 NOTE — PROGRESS NOTES
ESTABLISHED PRIMARY CARE VISIT  25  Name: Spike Aviles   : 1975   Age: 49 y.o.  Sex: male   Chief Complaint   Patient presents with    Follow-up     HPI:     History of Present Illness  The patient is a 49-year-old male here for a 3-month follow-up.    He reports shoulder arthritis evaluated by an orthopedic specialist. X-ray showed a potential partial tear in the right rotator cuff and mild arthritis. Joint injection provided some relief, but pain persists during rapid movements. He is using Voltaren gel as recommended and is on Norco for back pain, with no physical therapy initiated.  He is not interested in physical therapy at this time and will defer MRI.    He is dissatisfied with his blood sugar monitor due to inaccuracies. He is performing daily fingerstick glucose checks but is out of supplies. A new sensor showed blood sugar of 240 after consuming only beef protein for 36 hours, which he discontinued due to inconsistencies. He is struggling with cravings and late-night eating and believes glucose control is possible if cravings are controlled. He has been off Mounjaro since 2024 due to insurance not covering medication, and his weight has increased since discontinuation. He reports no abdominal pain and is engaging in physical activity, biking 15 miles daily and walking 3-5 miles. He is consuming a high-fiber diet to manage appetite.    He is on metoprolol 50 mg twice daily for blood pressure. His resting heart rate is 54 bpm, with no lightheadedness, dizziness, or fatigue. Home blood pressure readings are around 116/60s. His heart rate does not exceed 90 after biking 10 miles. He took his medication this morning but has had no water intake today.  Blood pressure is borderline in office today.    He is on atorvastatin and Zetia for cholesterol. His liver enzymes were slightly elevated in 2025. He reports no overuse of Tylenol and no regular alcohol consumption.    He has

## 2025-05-30 ENCOUNTER — OFFICE VISIT (OUTPATIENT)
Dept: ORTHOPEDIC SURGERY | Age: 50
End: 2025-05-30
Payer: COMMERCIAL

## 2025-05-30 VITALS — HEIGHT: 76 IN | WEIGHT: 219 LBS | BODY MASS INDEX: 26.67 KG/M2

## 2025-05-30 DIAGNOSIS — E11.610: Primary | ICD-10-CM

## 2025-05-30 DIAGNOSIS — S92.254A CLOSED NONDISPLACED FRACTURE OF NAVICULAR BONE OF RIGHT FOOT, INITIAL ENCOUNTER: ICD-10-CM

## 2025-05-30 DIAGNOSIS — M79.671 FOOT PAIN, RIGHT: ICD-10-CM

## 2025-05-30 PROCEDURE — G8417 CALC BMI ABV UP PARAM F/U: HCPCS | Performed by: PHYSICIAN ASSISTANT

## 2025-05-30 PROCEDURE — G8427 DOCREV CUR MEDS BY ELIG CLIN: HCPCS | Performed by: PHYSICIAN ASSISTANT

## 2025-05-30 PROCEDURE — 3052F HG A1C>EQUAL 8.0%<EQUAL 9.0%: CPT | Performed by: PHYSICIAN ASSISTANT

## 2025-05-30 PROCEDURE — 99213 OFFICE O/P EST LOW 20 MIN: CPT | Performed by: PHYSICIAN ASSISTANT

## 2025-05-30 PROCEDURE — 4004F PT TOBACCO SCREEN RCVD TLK: CPT | Performed by: PHYSICIAN ASSISTANT

## 2025-05-30 PROCEDURE — 2022F DILAT RTA XM EVC RTNOPTHY: CPT | Performed by: PHYSICIAN ASSISTANT

## 2025-05-30 NOTE — PROGRESS NOTES
[facility]  Established Patient New Problem Note      CHIEF COMPLAINT:   Chief Complaint   Patient presents with    Foot Pain     Pt presents today with c/o R foot pain x2 months. No known KWAME other than he increased his exercise and noticed it when he was jogging. He switched to biking to try to ease the pain and it did not help. Has history of neuropathy. Pain in throughout the foot. No previous fracture or surgery that he remembers.        HISTORY OF PRESENT ILLNESS:                The patient is a 49 y.o. male who presents today with complaints of right foot pain x 2 months no know KWAME.  States he started jogging recently and is unsure if this contributed. Pt unable to localize the pain due to neuropathy.  Pain is worse with jogging, ambulation.  They have tried at home therapies of norco which he uses for chronic back pain.  Pt has never injured this foot in the past.        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 Schmitt- multi vessel disease    MITRAL VALVE REPAIR N/A 3/16/2020    CORONARY ARTERY BYPASS POSSIBLE LEFT RADIAL ARTERY HARVEST, PATRICE performed by [facility] De Paz MD at [facility] [place]    NOSE SURGERY N/A 11/22/2021    NASOPHARYNGEAL BIOPSY performed by Uday Anthony DO at [facility] OR    THORACOSCOPY  04/28/2017    resection of mediastinal mass    TONSILLECTOMY      TRANSESOPHAGEAL ECHOCARDIOGRAM  03/03/2020    TYMPANOSTOMY TUBE PLACEMENT Right 11/22/2021    POSSIBLE RIGHT EAR TUBE

## 2025-06-05 ENCOUNTER — TELEPHONE (OUTPATIENT)
Dept: VASCULAR SURGERY | Age: 50
End: 2025-06-05

## 2025-06-05 ENCOUNTER — HOSPITAL ENCOUNTER (OUTPATIENT)
Dept: ULTRASOUND IMAGING | Age: 50
Discharge: HOME | End: 2025-06-07
Attending: SURGERY
Payer: COMMERCIAL

## 2025-06-05 DIAGNOSIS — I65.21 OCCLUSION OF RIGHT INTERNAL CAROTID ARTERY: ICD-10-CM

## 2025-06-05 PROCEDURE — 93880 EXTRACRANIAL BILAT STUDY: CPT

## 2025-06-17 ENCOUNTER — OFFICE VISIT (OUTPATIENT)
Dept: PODIATRY | Age: 50
End: 2025-06-17
Payer: COMMERCIAL

## 2025-06-17 VITALS — BODY MASS INDEX: 26.66 KG/M2 | WEIGHT: 219 LBS

## 2025-06-17 DIAGNOSIS — M20.41 HAMMERTOES OF BOTH FEET: ICD-10-CM

## 2025-06-17 DIAGNOSIS — E11.9 TYPE 2 DIABETES MELLITUS WITHOUT COMPLICATION, UNSPECIFIED WHETHER LONG TERM INSULIN USE (HCC): Primary | ICD-10-CM

## 2025-06-17 DIAGNOSIS — G60.8 HEREDITARY SENSORY NEUROPATHY: ICD-10-CM

## 2025-06-17 DIAGNOSIS — M14.671 CHARCOT JOINT OF RIGHT FOOT: ICD-10-CM

## 2025-06-17 DIAGNOSIS — M20.42 HAMMERTOES OF BOTH FEET: ICD-10-CM

## 2025-06-17 PROCEDURE — 4004F PT TOBACCO SCREEN RCVD TLK: CPT | Performed by: PODIATRIST

## 2025-06-17 PROCEDURE — 3052F HG A1C>EQUAL 8.0%<EQUAL 9.0%: CPT | Performed by: PODIATRIST

## 2025-06-17 PROCEDURE — 99213 OFFICE O/P EST LOW 20 MIN: CPT | Performed by: PODIATRIST

## 2025-06-17 PROCEDURE — G8427 DOCREV CUR MEDS BY ELIG CLIN: HCPCS | Performed by: PODIATRIST

## 2025-06-17 PROCEDURE — 2022F DILAT RTA XM EVC RTNOPTHY: CPT | Performed by: PODIATRIST

## 2025-06-17 PROCEDURE — G8417 CALC BMI ABV UP PARAM F/U: HCPCS | Performed by: PODIATRIST

## 2025-06-17 NOTE — PROGRESS NOTES
Right foot fx  Not wearing cam walking boot  New xrays done today    CC:    Diabetic foot and ankle exam  History of possible fracture right foot    HPI:   Presents today diabetic foot ankle exam.  Possible fracture.  Was seen at [facility] and discussed possible fracture.  Has been wearing a regular shoe.    ROS:  Const: Denies constitutional symptoms  Musculo: Denies symptoms other than stated above  Skin: Denies symptoms other than stated above       Current Outpatient Medications:     albuterol sulfate HFA (PROVENTIL;VENTOLIN;PROAIR) 108 (90 Base) MCG/ACT inhaler, inhale 2 puffs by mouth and INTO THE LUNGS every 6 hours if needed for wheezing, Disp: 8.5 g, Rfl: 2    aspirin 81 MG EC tablet, Take 1 tablet by mouth daily, Disp: 90 tablet, Rfl: 1    atorvastatin (LIPITOR) 80 MG tablet, Take 1 tablet by mouth nightly, Disp: 90 tablet, Rfl: 1    B-D INS SYR ULTRAFINE 1CC/30G 30G X 1/2\" 1 ML MISC, use as directed three times a day, Disp: 100 each, Rfl: 2    blood glucose test strips (ASCENSIA AUTODISC VI;ONE TOUCH ULTRA TEST VI) strip, 1 each by In Vitro route daily As needed., Disp: 100 each, Rfl: 3    dapagliflozin (FARXIGA) 10 MG tablet, Take 1 tablet by mouth every morning, Disp: 90 tablet, Rfl: 1    diclofenac sodium (VOLTAREN) 1 % GEL, Apply 4 g topically 4 times daily as needed for Pain, Disp: 50 g, Rfl: 2    ezetimibe (ZETIA) 10 MG tablet, Take 1 tablet by mouth daily, Disp: 90 tablet, Rfl: 1    fluticasone (FLONASE) 50 MCG/ACT nasal spray, 2 sprays by Each Nostril route daily, Disp: 16 g, Rfl: 3    hydroCHLOROthiazide (HYDRODIURIL) 25 MG tablet, Take 1 tablet by mouth daily as needed (swelling), Disp: 30 tablet, Rfl: 5    Lancets (ONETOUCH DELICA PLUS [id number]) MISC, use 1 LANCET to TEST BLOOD SUGAR three to four times a day, Disp: 100 each, Rfl: 2    mometasone-formoterol (DULERA) 200-5 MCG/ACT inhaler, inhale 2 puffs by mouth and INTO THE LUNGS twice a day, Disp: 8.8 g, Rfl: 2    nitroGLYCERIN

## 2025-06-23 DIAGNOSIS — G89.4 CHRONIC PAIN SYNDROME: ICD-10-CM

## 2025-06-23 RX ORDER — HYDROCODONE BITARTRATE AND ACETAMINOPHEN 5; 325 MG/1; MG/1
1 TABLET ORAL EVERY 6 HOURS PRN
Qty: 120 TABLET | Refills: 0 | Status: SHIPPED | OUTPATIENT
Start: 2025-06-23 | End: 2025-07-23

## 2025-07-10 ENCOUNTER — OFFICE VISIT (OUTPATIENT)
Dept: FAMILY MEDICINE CLINIC | Age: 50
End: 2025-07-10
Payer: COMMERCIAL

## 2025-07-10 VITALS
BODY MASS INDEX: 28.25 KG/M2 | WEIGHT: 232 LBS | HEIGHT: 76 IN | DIASTOLIC BLOOD PRESSURE: 74 MMHG | TEMPERATURE: 97.4 F | OXYGEN SATURATION: 99 % | HEART RATE: 78 BPM | RESPIRATION RATE: 18 BRPM | SYSTOLIC BLOOD PRESSURE: 118 MMHG

## 2025-07-10 DIAGNOSIS — L30.9 DERMATITIS: Primary | ICD-10-CM

## 2025-07-10 PROCEDURE — 4004F PT TOBACCO SCREEN RCVD TLK: CPT | Performed by: NURSE PRACTITIONER

## 2025-07-10 PROCEDURE — G8417 CALC BMI ABV UP PARAM F/U: HCPCS | Performed by: NURSE PRACTITIONER

## 2025-07-10 PROCEDURE — 3078F DIAST BP <80 MM HG: CPT | Performed by: NURSE PRACTITIONER

## 2025-07-10 PROCEDURE — G8427 DOCREV CUR MEDS BY ELIG CLIN: HCPCS | Performed by: NURSE PRACTITIONER

## 2025-07-10 PROCEDURE — 3074F SYST BP LT 130 MM HG: CPT | Performed by: NURSE PRACTITIONER

## 2025-07-10 PROCEDURE — 99213 OFFICE O/P EST LOW 20 MIN: CPT | Performed by: NURSE PRACTITIONER

## 2025-07-10 RX ORDER — METHYLPREDNISOLONE 4 MG/1
TABLET ORAL
Qty: 1 KIT | Refills: 0 | Status: SHIPPED | OUTPATIENT
Start: 2025-07-10

## 2025-07-10 NOTE — PROGRESS NOTES
multi vessel disease    MITRAL VALVE REPAIR N/A 3/16/2020    CORONARY ARTERY BYPASS POSSIBLE LEFT RADIAL ARTERY HARVEST, PATRICE performed by Liu De Paz MD at [facility] OR    NOSE SURGERY N/A 2021    NASOPHARYNGEAL BIOPSY performed by Uday Anthony DO at [facility] OR    THORACOSCOPY  2017    resection of mediastinal mass    TONSILLECTOMY      TRANSESOPHAGEAL ECHOCARDIOGRAM  2020    TYMPANOSTOMY TUBE PLACEMENT Right 2021    POSSIBLE RIGHT EAR TUBE POSSIBLE EUSTACHIAN TUBE DILATION performed by Uday Anthony DO at [facility] OR       Family History   Problem Relation Age of Onset    Heart Disease Mother         MI age 41     High Blood Pressure Mother     Diabetes Father     Heart Disease Father         MI    High Blood Pressure Father     High Cholesterol Father     Cancer Father     Stroke Father     Kidney Disease Father         dialysis    High Blood Pressure Sister     Other Brother     COPD Brother     High Blood Pressure Brother        Medications:     Current Outpatient Medications:     methylPREDNISolone (MEDROL DOSEPACK) 4 MG tablet, Take by mouth., Disp: 1 kit, Rfl: 0    HYDROcodone-acetaminophen (NORCO) 5-325 MG per tablet, Take 1 tablet by mouth every 6 hours as needed for Pain for up to 30 days. Intended supply: 5 days. Take lowest dose possible to manage pain Max Daily Amount: 4 tablets, Disp: 120 tablet, Rfl: 0    albuterol sulfate HFA (PROVENTIL;VENTOLIN;PROAIR) 108 (90 Base) MCG/ACT inhaler, inhale 2 puffs by mouth and INTO THE LUNGS every 6 hours if needed for wheezing, Disp: 8.5 g, Rfl: 2    aspirin 81 MG EC tablet, Take 1 tablet by mouth daily, Disp: 90 tablet, Rfl: 1    atorvastatin (LIPITOR) 80 MG tablet, Take 1 tablet by mouth nightly, Disp: 90 tablet, Rfl: 1    B-D INS SYR ULTRAFINE 1CC/30G 30G X 1/2\" 1 ML MISC, use as directed three times a day, Disp: 100 each, Rfl: 2    blood glucose test strips (ASCENSIA AUTODISC VI;ONE TOUCH ULTRA TEST VI) strip, 1

## 2025-07-21 DIAGNOSIS — G89.4 CHRONIC PAIN SYNDROME: ICD-10-CM

## 2025-07-21 RX ORDER — HYDROCODONE BITARTRATE AND ACETAMINOPHEN 5; 325 MG/1; MG/1
1 TABLET ORAL EVERY 6 HOURS PRN
Qty: 120 TABLET | Refills: 0 | Status: SHIPPED | OUTPATIENT
Start: 2025-07-21 | End: 2025-08-20

## 2025-07-21 NOTE — TELEPHONE ENCOUNTER
Name of Medication(s) Requested:  Requested Prescriptions     Pending Prescriptions Disp Refills    HYDROcodone-acetaminophen (NORCO) 5-325 MG per tablet 120 tablet 0     Sig: Take 1 tablet by mouth every 6 hours as needed for Pain for up to 30 days. Intended supply: 5 days. Take lowest dose possible to manage pain Max Daily Amount: 4 tablets       Medication is on current medication list Yes    Dosage and directions were verified? Yes    Quantity verified: 30 day supply     Pharmacy Verified?  Yes    Last Appointment:  5/19/2025    Future appts:  Future Appointments   Date Time Provider Department Center   8/13/2025  7:45 AM Uday Anthony DO [place] [facility]   8/25/2025  8:00 AM Aline Aparicio APRN - NP [place] [facility] DEP   9/18/2025  8:00 AM Seng Higgins DPM [facility] Podiatry [facility]   2/17/2026  8:00 AM Aline Aparicio APRN - NP [place] [facility] DEP   4/22/2027 11:00 AM SAJI JUAREZ VAS US 1 SEYZ CARDIO [facility] Rad/Car   4/22/2027 11:30 AM Tucker Parker MD VASC/MED [facility]        (If no appt send self scheduling link. .REFILLAPPT)  Scheduling request sent?     [] Yes  [x] No    Does patient need updated?  [] Yes  [x] No

## 2025-08-01 NOTE — TELEPHONE ENCOUNTER
Name of Medication(s) Requested:  Requested Prescriptions     Pending Prescriptions Disp Refills    Tirzepatide (MOUNJARO) 7.5 MG/0.5ML SOAJ pen 2 mL 0     Sig: Inject 7.5 mg into the skin every 7 days       Medication is on current medication list Yes    Dosage and directions were verified? Yes    Quantity verified: 30 day supply     Pharmacy Verified?  Yes    Last Appointment:  5/19/2025    Future appts:  Future Appointments   Date Time Provider Department Center   8/13/2025  7:45 AM Uday Anthony DO [place] [facility]   8/25/2025  8:00 AM Aline Aparicio APRN - NP [place] [facility] DEP   9/18/2025  8:00 AM Seng Higgins DPM [facility] Podiatry [facility]   2/17/2026  8:00 AM Aline Aparicio APRN - NP [place] [facility] DEP   4/22/2027 11:00 AM SAJI JUAREZ VAS US 1 SEYZ CARDIO [facility] Rad/Car   4/22/2027 11:30 AM Tucker Parker MD VAS/MED [facility]        (If no appt send self scheduling link. .REFILLAPPT)  Scheduling request sent?     [] Yes  [x] No    Does patient need updated?  [] Yes  [x] No

## 2025-08-13 ENCOUNTER — OFFICE VISIT (OUTPATIENT)
Dept: ENT CLINIC | Age: 50
End: 2025-08-13
Payer: COMMERCIAL

## 2025-08-13 VITALS
WEIGHT: 231 LBS | RESPIRATION RATE: 16 BRPM | BODY MASS INDEX: 28.13 KG/M2 | OXYGEN SATURATION: 96 % | HEIGHT: 76 IN | DIASTOLIC BLOOD PRESSURE: 70 MMHG | TEMPERATURE: 97 F | SYSTOLIC BLOOD PRESSURE: 108 MMHG | HEART RATE: 75 BPM

## 2025-08-13 DIAGNOSIS — J30.1 SEASONAL ALLERGIC RHINITIS DUE TO POLLEN: ICD-10-CM

## 2025-08-13 DIAGNOSIS — Z96.22 S/P MYRINGOTOMY WITH INSERTION OF TUBE: Primary | ICD-10-CM

## 2025-08-13 DIAGNOSIS — H69.93 DYSFUNCTION OF BOTH EUSTACHIAN TUBES: ICD-10-CM

## 2025-08-13 PROCEDURE — 99213 OFFICE O/P EST LOW 20 MIN: CPT | Performed by: OTOLARYNGOLOGY

## 2025-08-13 PROCEDURE — G8427 DOCREV CUR MEDS BY ELIG CLIN: HCPCS | Performed by: OTOLARYNGOLOGY

## 2025-08-13 PROCEDURE — 4004F PT TOBACCO SCREEN RCVD TLK: CPT | Performed by: OTOLARYNGOLOGY

## 2025-08-13 PROCEDURE — G8417 CALC BMI ABV UP PARAM F/U: HCPCS | Performed by: OTOLARYNGOLOGY

## 2025-08-13 PROCEDURE — 3074F SYST BP LT 130 MM HG: CPT | Performed by: OTOLARYNGOLOGY

## 2025-08-13 PROCEDURE — 3078F DIAST BP <80 MM HG: CPT | Performed by: OTOLARYNGOLOGY

## 2025-08-18 DIAGNOSIS — G89.4 CHRONIC PAIN SYNDROME: ICD-10-CM

## 2025-08-18 RX ORDER — HYDROCODONE BITARTRATE AND ACETAMINOPHEN 5; 325 MG/1; MG/1
1 TABLET ORAL EVERY 6 HOURS PRN
Qty: 120 TABLET | Refills: 0 | Status: SHIPPED | OUTPATIENT
Start: 2025-08-18 | End: 2025-09-17

## 2025-08-25 ENCOUNTER — OFFICE VISIT (OUTPATIENT)
Dept: PRIMARY CARE CLINIC | Age: 50
End: 2025-08-25
Payer: COMMERCIAL

## 2025-08-25 VITALS
HEART RATE: 67 BPM | SYSTOLIC BLOOD PRESSURE: 128 MMHG | DIASTOLIC BLOOD PRESSURE: 72 MMHG | WEIGHT: 238 LBS | BODY MASS INDEX: 28.98 KG/M2 | OXYGEN SATURATION: 98 % | RESPIRATION RATE: 16 BRPM | HEIGHT: 76 IN

## 2025-08-25 DIAGNOSIS — E78.2 MIXED HYPERLIPIDEMIA: ICD-10-CM

## 2025-08-25 DIAGNOSIS — I25.10 CAD IN NATIVE ARTERY: ICD-10-CM

## 2025-08-25 DIAGNOSIS — I10 ESSENTIAL HYPERTENSION: ICD-10-CM

## 2025-08-25 DIAGNOSIS — E11.42 TYPE 2 DIABETES MELLITUS WITH DIABETIC POLYNEUROPATHY, WITHOUT LONG-TERM CURRENT USE OF INSULIN (HCC): ICD-10-CM

## 2025-08-25 DIAGNOSIS — J44.9 CHRONIC OBSTRUCTIVE PULMONARY DISEASE, UNSPECIFIED COPD TYPE (HCC): ICD-10-CM

## 2025-08-25 DIAGNOSIS — Z13.9 ENCOUNTER FOR HEALTH-RELATED SCREENING: ICD-10-CM

## 2025-08-25 DIAGNOSIS — G89.4 CHRONIC PAIN SYNDROME: ICD-10-CM

## 2025-08-25 DIAGNOSIS — M19.011 LOCALIZED OSTEOARTHRITIS OF RIGHT SHOULDER: Primary | ICD-10-CM

## 2025-08-25 LAB
ALBUMIN: 4.1 G/DL (ref 3.5–5.2)
ALP BLD-CCNC: 62 U/L (ref 40–129)
ALT SERPL-CCNC: 33 U/L (ref 0–50)
ANION GAP SERPL CALCULATED.3IONS-SCNC: 11 MMOL/L (ref 7–16)
AST SERPL-CCNC: 32 U/L (ref 0–50)
BASOPHILS ABSOLUTE: 0.05 K/UL (ref 0–0.2)
BASOPHILS RELATIVE PERCENT: 1 % (ref 0–2)
BILIRUB SERPL-MCNC: 0.4 MG/DL (ref 0–1.2)
BUN BLDV-MCNC: 18 MG/DL (ref 6–20)
CALCIUM SERPL-MCNC: 9.6 MG/DL (ref 8.6–10)
CHLORIDE BLD-SCNC: 102 MMOL/L (ref 98–107)
CHOLESTEROL, TOTAL: 141 MG/DL
CO2: 25 MMOL/L (ref 22–29)
CREAT SERPL-MCNC: 0.8 MG/DL (ref 0.7–1.2)
EOSINOPHILS ABSOLUTE: 0.19 K/UL (ref 0.05–0.5)
EOSINOPHILS RELATIVE PERCENT: 3 % (ref 0–6)
GFR, ESTIMATED: >90 ML/MIN/1.73M2
GLUCOSE BLD-MCNC: 124 MG/DL (ref 74–99)
HBA1C MFR BLD: 5.7 %
HCT VFR BLD CALC: 43.8 % (ref 37–54)
HDLC SERPL-MCNC: 72 MG/DL
HEMOGLOBIN: 14.9 G/DL (ref 12.5–16.5)
IMMATURE GRANULOCYTES %: 0 % (ref 0–5)
IMMATURE GRANULOCYTES ABSOLUTE: <0.03 K/UL (ref 0–0.58)
LDL CHOLESTEROL: 56 MG/DL
LYMPHOCYTES ABSOLUTE: 2.79 K/UL (ref 1.5–4)
LYMPHOCYTES RELATIVE PERCENT: 38 % (ref 20–42)
MCH RBC QN AUTO: 32 PG (ref 26–35)
MCHC RBC AUTO-ENTMCNC: 34 G/DL (ref 32–34.5)
MCV RBC AUTO: 94.2 FL (ref 80–99.9)
MONOCYTES ABSOLUTE: 0.53 K/UL (ref 0.1–0.95)
MONOCYTES RELATIVE PERCENT: 7 % (ref 2–12)
NEUTROPHILS ABSOLUTE: 3.68 K/UL (ref 1.8–7.3)
NEUTROPHILS RELATIVE PERCENT: 51 % (ref 43–80)
PDW BLD-RTO: 12.8 % (ref 11.5–15)
PLATELET # BLD: 215 K/UL (ref 130–450)
PMV BLD AUTO: 8.8 FL (ref 7–12)
POTASSIUM SERPL-SCNC: 4.7 MMOL/L (ref 3.5–5.1)
RBC # BLD: 4.65 M/UL (ref 3.8–5.8)
SODIUM BLD-SCNC: 139 MMOL/L (ref 136–145)
TOTAL PROTEIN: 7.1 G/DL (ref 6.4–8.3)
TRIGL SERPL-MCNC: 61 MG/DL
TSH SERPL DL<=0.05 MIU/L-ACNC: 1.39 UIU/ML (ref 0.27–4.2)
VLDLC SERPL CALC-MCNC: 12 MG/DL
WBC # BLD: 7.3 K/UL (ref 4.5–11.5)

## 2025-08-25 PROCEDURE — 99214 OFFICE O/P EST MOD 30 MIN: CPT | Performed by: NURSE PRACTITIONER

## 2025-08-25 PROCEDURE — 83036 HEMOGLOBIN GLYCOSYLATED A1C: CPT | Performed by: NURSE PRACTITIONER

## 2025-08-25 PROCEDURE — 2022F DILAT RTA XM EVC RTNOPTHY: CPT | Performed by: NURSE PRACTITIONER

## 2025-08-25 PROCEDURE — 3044F HG A1C LEVEL LT 7.0%: CPT | Performed by: NURSE PRACTITIONER

## 2025-08-25 PROCEDURE — G8417 CALC BMI ABV UP PARAM F/U: HCPCS | Performed by: NURSE PRACTITIONER

## 2025-08-25 PROCEDURE — G8427 DOCREV CUR MEDS BY ELIG CLIN: HCPCS | Performed by: NURSE PRACTITIONER

## 2025-08-25 PROCEDURE — 3074F SYST BP LT 130 MM HG: CPT | Performed by: NURSE PRACTITIONER

## 2025-08-25 PROCEDURE — 4004F PT TOBACCO SCREEN RCVD TLK: CPT | Performed by: NURSE PRACTITIONER

## 2025-08-25 PROCEDURE — 3078F DIAST BP <80 MM HG: CPT | Performed by: NURSE PRACTITIONER

## 2025-08-25 PROCEDURE — 3023F SPIROM DOC REV: CPT | Performed by: NURSE PRACTITIONER

## 2025-08-25 RX ORDER — SYRINGE AND NEEDLE,INSULIN,1ML 30 G X1/2"
SYRINGE, EMPTY DISPOSABLE MISCELLANEOUS
Qty: 100 EACH | Refills: 2 | Status: SHIPPED | OUTPATIENT
Start: 2025-08-25

## 2025-08-25 RX ORDER — ASPIRIN 81 MG/1
81 TABLET ORAL DAILY
Qty: 90 TABLET | Refills: 1 | Status: SHIPPED | OUTPATIENT
Start: 2025-08-25

## 2025-08-25 RX ORDER — METOPROLOL TARTRATE 25 MG/1
25 TABLET, FILM COATED ORAL 2 TIMES DAILY
Qty: 180 TABLET | Refills: 1 | Status: SHIPPED | OUTPATIENT
Start: 2025-08-25

## 2025-08-25 RX ORDER — ATORVASTATIN CALCIUM 80 MG/1
80 TABLET, FILM COATED ORAL NIGHTLY
Qty: 90 TABLET | Refills: 1 | Status: SHIPPED | OUTPATIENT
Start: 2025-08-25

## 2025-08-25 RX ORDER — FLUTICASONE PROPIONATE 50 MCG
2 SPRAY, SUSPENSION (ML) NASAL DAILY
Qty: 16 G | Refills: 3 | Status: SHIPPED | OUTPATIENT
Start: 2025-08-25

## 2025-08-25 RX ORDER — EZETIMIBE 10 MG/1
10 TABLET ORAL DAILY
Qty: 90 TABLET | Refills: 1 | Status: SHIPPED | OUTPATIENT
Start: 2025-08-25

## 2025-08-25 RX ORDER — BLOOD-GLUCOSE METER
1 KIT MISCELLANEOUS DAILY
Qty: 1 KIT | Refills: 0 | Status: SHIPPED | OUTPATIENT
Start: 2025-08-25

## 2025-08-25 RX ORDER — HYDROCHLOROTHIAZIDE 25 MG/1
25 TABLET ORAL DAILY PRN
Qty: 30 TABLET | Refills: 5 | Status: SHIPPED | OUTPATIENT
Start: 2025-08-25

## 2025-08-25 RX ORDER — HYDROCODONE BITARTRATE AND ACETAMINOPHEN 5; 325 MG/1; MG/1
1 TABLET ORAL EVERY 6 HOURS PRN
Qty: 120 TABLET | Refills: 0 | Status: SHIPPED | OUTPATIENT
Start: 2025-09-15 | End: 2025-10-15

## 2025-08-25 RX ORDER — LISINOPRIL 5 MG/1
TABLET ORAL
COMMUNITY
Start: 2025-05-26

## 2025-08-25 RX ORDER — LANCETS 30 GAUGE
EACH MISCELLANEOUS
Qty: 100 EACH | Refills: 2 | Status: SHIPPED | OUTPATIENT
Start: 2025-08-25

## 2025-08-25 RX ORDER — ALBUTEROL SULFATE 90 UG/1
INHALANT RESPIRATORY (INHALATION)
Qty: 8.5 G | Refills: 2 | Status: SHIPPED | OUTPATIENT
Start: 2025-08-25

## 2025-08-25 RX ORDER — METOPROLOL TARTRATE 50 MG
50 TABLET ORAL 2 TIMES DAILY
Qty: 180 TABLET | Refills: 1 | Status: CANCELLED | OUTPATIENT
Start: 2025-08-25

## 2025-08-25 ASSESSMENT — ENCOUNTER SYMPTOMS
CHEST TIGHTNESS: 0
SORE THROAT: 0
ABDOMINAL DISTENTION: 0
WHEEZING: 0
DIARRHEA: 0
SHORTNESS OF BREATH: 0
BACK PAIN: 0
CONSTIPATION: 0
RECTAL PAIN: 0
BLOOD IN STOOL: 0
COUGH: 0
ABDOMINAL PAIN: 0
COLOR CHANGE: 0
RHINORRHEA: 0
EYE REDNESS: 0
VOMITING: 0
NAUSEA: 0
TROUBLE SWALLOWING: 0
APNEA: 0

## (undated) DEVICE — GLOVE ORANGE PI 7   MSG9070

## (undated) DEVICE — DEVICE INFL PRSS G INDIC DISP (MUST BE PURC IN MULTIPLES OF 5)

## (undated) DEVICE — PERFUSION PACK CUST OPN HRT

## (undated) DEVICE — CONTROL SYRINGE LUER-LOCK TIP: Brand: MONOJECT

## (undated) DEVICE — BLOOD TRANSFUSION FILTER: Brand: HAEMONETICS

## (undated) DEVICE — CARDIAC STRYKER STERNAL SAW

## (undated) DEVICE — SURGICAL PROCEDURE PACK CRD SEHC

## (undated) DEVICE — TOWEL,OR,DSP,ST,BLUE,DLX,4/PK,20PK/CS: Brand: MEDLINE

## (undated) DEVICE — NEEDLE FLTR 18GA L1.5IN MEM THK5UM BLNT DISP

## (undated) DEVICE — STERILE LATEX POWDER FREE SURGICAL GLOVES WITH HYDROGEL COATING: Brand: PROTEXIS

## (undated) DEVICE — CLOTH SURG PREP PREOPERATIVE CHLORHEXIDINE GLUC 2% READYPREP

## (undated) DEVICE — TAPE ADH W2INXL10YD WHT PAPR GENTLE BRTH FLX COMFORTABLE

## (undated) DEVICE — MPS® DELIVERY SET W/ARREST AGENT AND ADDITIVE CASSETTES, HEAT EXCHANGER & 10 FT. DELIVERY TUBING: Brand: MPS

## (undated) DEVICE — BLADE MYR OFFSET 45DEG SPEAR TIP NAR SHFT W/ RND KNURLED

## (undated) DEVICE — SOLUTION IV IRRIG POUR BRL 0.9% SODIUM CHL 2F7124

## (undated) DEVICE — SYRINGE,EAR/ULCER, 2 OZ, STERILE: Brand: MEDLINE

## (undated) DEVICE — CODMAN® SURGICAL PATTIES 1/2" X 3" (1.27CM X 7.62CM): Brand: CODMAN®

## (undated) DEVICE — TOWEL,OR,DSP,ST,BLUE,STD,6/PK,12PK/CS: Brand: MEDLINE

## (undated) DEVICE — GLOVE SURG SZ 6 THK91MIL LTX FREE SYN POLYISOPRENE ANTI

## (undated) DEVICE — 4-PORT MANIFOLD: Brand: NEPTUNE 2

## (undated) DEVICE — TUBE NASOPHARYNGEAL W/ BALLOON CATH

## (undated) DEVICE — TUBING, SUCTION, 3/16" X 12', STRAIGHT: Brand: MEDLINE

## (undated) DEVICE — CONNECTOR PERF W64XH64XL64MM W  LUERLOCK

## (undated) DEVICE — CATHETER,URETHRAL,REDRUBBER,STERILE,20FR: Brand: MEDLINE

## (undated) DEVICE — BASIC SINGLE BASIN 1-LF: Brand: MEDLINE INDUSTRIES, INC.

## (undated) DEVICE — LABEL MED CARD SURG 4 IN PANEL STRL

## (undated) DEVICE — SET NASAL

## (undated) DEVICE — ALCOHOL RUBBING ISO 16OZ 70%

## (undated) DEVICE — SHEATH 1912010 5PK 4MM/30DEG STORZ XOMED: Brand: ENDO-SCRUB®

## (undated) DEVICE — COAGULATOR SUCT 10FR LAIN FTSWCH ACTIVATION DISP VALLEYLAB

## (undated) DEVICE — TIP APPL GEL PLT ENDO 5MMX32CM

## (undated) DEVICE — Z INACTIVE USE 2660664 SOLUTION IRRIG 3000ML 0.9% SOD CHL USP UROMATIC PLAS CONT

## (undated) DEVICE — SPONGE GZ W4XL4IN RAYON POLY FILL CVR W/ NONWOVEN FAB

## (undated) DEVICE — DRESSING FOAM W22XL25CM FILVE LAYR FOAM DP DEF SAFETAC

## (undated) DEVICE — GLOVE ORANGE PI 8 1/2   MSG9085

## (undated) DEVICE — SET SURG BASIN OPEN HEART NO  1 REUSABLE

## (undated) DEVICE — SOLUTION IV 1000ML 0.9% SOD CHL PH 5 INJ USP VIAFLX PLAS

## (undated) DEVICE — DOUBLE BASIN SET: Brand: MEDLINE INDUSTRIES, INC.

## (undated) DEVICE — SOLUTION IV 50ML 0.9% SOD CHL PLAS CONT USP VIAFLX

## (undated) DEVICE — KIT PLT RATIO DISPNS KT 2IN CANN TIP SPRY TIP DISP MAGELLAN

## (undated) DEVICE — AEGIS 1" DISK 4MM HOLE, PEEL OPEN: Brand: MEDLINE

## (undated) DEVICE — TOWEL,OR,DSP,ST,WHITE,DLX,4/PK,20PK/CS: Brand: MEDLINE

## (undated) DEVICE — SET CARDIAC I

## (undated) DEVICE — MARKER,SKIN,WI/RULER AND LABELS: Brand: MEDLINE

## (undated) DEVICE — COTTON STRIP: Brand: DEROYAL

## (undated) DEVICE — PUMP SUC IRR TBNG L10FT W/ HNDPC ASSEMB STRYKEFLOW 2

## (undated) DEVICE — Device

## (undated) DEVICE — PACK OPEN HRT DRP

## (undated) DEVICE — RETROGRADE CARDIOPLEGIA CATHETER: Brand: EDWARDS LIFESCIENCES RETROGRADE CARDIOPLEGIA CATHETER

## (undated) DEVICE — SOLUTION IV 500ML 0.9% SOD CHL PH 5 INJ USP VIAFLX PLAS

## (undated) DEVICE — PROCISE MAX COBLATION WAND: Brand: COBLATION

## (undated) DEVICE — DISCONTINUED USE 320496 BATTERY 50-800-01 OR 50-800-03 SNGL USE

## (undated) DEVICE — CANNULA PERF 7FR L5.5IN AORT ROOT RADPQ STD TIP W/ VENT LN

## (undated) DEVICE — CATHETER THOR 32FR L23IN PVC 6 EYELET STR ATRAUM

## (undated) DEVICE — SOLUTION IV IRRIG 500ML 0.9% SODIUM CHL 2F7123

## (undated) DEVICE — TUBING PERF PMP L10FT OD0.25IN ID1/16IN MED CLASS VI PRECUT

## (undated) DEVICE — CONNECTOR PERF W0.25XH3/8IN BASE Y SHP REDUC W/O LUERLOCK

## (undated) DEVICE — CATHETER,RED SUCT,POLY-CATH,10: Brand: MEDLINE INDUSTRIES, INC.

## (undated) DEVICE — MPS® PRESSURE LINE W/TRANSDUCER: Brand: MPS

## (undated) DEVICE — SURGICAL PROCEDURE PACK EENT CUST

## (undated) DEVICE — SUMP INTCARD SUCT AD 20FR PERICARD MAYO STYL FLX VERSATILE

## (undated) DEVICE — TUBING 1912030 ENDO-SCRUB 2 5PK: Brand: ENDO-SCRUB®

## (undated) DEVICE — BLADE CLIPPER GEN PURP NS

## (undated) DEVICE — SET CARDIAC II

## (undated) DEVICE — CAMERA STRYKER 1488

## (undated) DEVICE — KIT,ANTI FOG,W/SPONGE & FLUID,SOFT PACK: Brand: MEDLINE

## (undated) DEVICE — SET SINUS ENDOSCOPY

## (undated) DEVICE — DRAIN SURG SGL COLL PT TB FOR ATS BG OASIS

## (undated) DEVICE — 1.5L THIN WALL CAN: Brand: CRD

## (undated) DEVICE — Z DISCONTINUED PER MEDLINE USE 2425483 TAPE UMB L30IN DIA1/8IN WHT COT NONABSORBABLE W/O NDL FOR

## (undated) DEVICE — SS SUTURE, 3 PER SLEEVE: Brand: MYO/WIRE II

## (undated) DEVICE — LIGHT SOURCE WHT

## (undated) DEVICE — CATHETER ETER SUCT 14FR RED POLYPR STR OPN W VLV

## (undated) DEVICE — Z INACTIVE USE 2662641 SOLUTION IV 1000ML 140MEQ/L SOD 5MEQ/L K 3MEQ/L MG 27MEQ/L

## (undated) DEVICE — SET SINUS SCOPE

## (undated) DEVICE — DRAPE THER FLUID WARMING 66X44 IN FLAT SLUSH DBL DISC ORS

## (undated) DEVICE — GLOVE ORANGE PI 7 1/2   MSG9075

## (undated) DEVICE — TAPE ADH W2INXL10YD PLAS TRNSPAR H2O RESIST HYPOALRG CURAD

## (undated) DEVICE — 6 FOOT DISPOSABLE EXTENSION CABLE WITH SAFE CONNECT / SCREW-DOWN

## (undated) DEVICE — Z DUP USE 2701075 SYSTEM SKIN CLSR 42CM DERMBND PRINEO

## (undated) DEVICE — SET CARDIAC VALVE EXTRAS

## (undated) DEVICE — GOWN,SIRUS,FABRNF,XL,20/CS: Brand: MEDLINE

## (undated) DEVICE — GLOVE SURG SZ 7.5 L11.73IN FNGR THK9.8MIL STRW LTX POLYMER

## (undated) DEVICE — SINU FOAM: Brand: SINU-FOAM

## (undated) DEVICE — PUNCH TONSIL / ADENOID

## (undated) DEVICE — SET SURG BASIN MAYO REUSABLE

## (undated) DEVICE — AGENT HEMSTAT W4XL8IN OXIDIZED REGENERATED CELOS ABSRB

## (undated) DEVICE — GLOVE SURG SZ 65 THK91MIL LTX FREE SYN POLYISOPRENE

## (undated) DEVICE — TTL1LYR 16FR10ML 100%SIL TMPST TR: Brand: MEDLINE

## (undated) DEVICE — SOLUTION IV IRRIG WATER 1000ML POUR BRL 2F7114

## (undated) DEVICE — ELECTRODE PT RET AD L9FT HI MOIST COND ADH HYDRGEL CORDED

## (undated) DEVICE — PADDLE INTERN DEFIB CHILD